# Patient Record
Sex: MALE | Race: WHITE | Employment: OTHER | URBAN - METROPOLITAN AREA
[De-identification: names, ages, dates, MRNs, and addresses within clinical notes are randomized per-mention and may not be internally consistent; named-entity substitution may affect disease eponyms.]

---

## 2019-12-02 ENCOUNTER — HOSPITAL ENCOUNTER (OUTPATIENT)
Dept: INTERVENTIONAL RADIOLOGY/VASCULAR | Age: 84
Discharge: HOME | End: 2019-12-02
Payer: MEDICARE

## 2019-12-02 DIAGNOSIS — M47.816 LUMBAR SPONDYLOSIS: ICD-10-CM

## 2019-12-02 PROCEDURE — 6360000004 HC RX CONTRAST MEDICATION: Performed by: RADIOLOGY

## 2019-12-02 PROCEDURE — 2500000003 HC RX 250 WO HCPCS: Performed by: RADIOLOGY

## 2019-12-02 PROCEDURE — 62323 NJX INTERLAMINAR LMBR/SAC: CPT | Performed by: RADIOLOGY

## 2019-12-02 PROCEDURE — 6360000002 HC RX W HCPCS: Performed by: RADIOLOGY

## 2019-12-02 RX ORDER — LIDOCAINE HYDROCHLORIDE 5 MG/ML
1 INJECTION, SOLUTION INFILTRATION; INTRAVENOUS ONCE
Status: COMPLETED | OUTPATIENT
Start: 2019-12-02 | End: 2019-12-02

## 2019-12-02 RX ORDER — BETAMETHASONE SODIUM PHOSPHATE AND BETAMETHASONE ACETATE 3; 3 MG/ML; MG/ML
12 INJECTION, SUSPENSION INTRA-ARTICULAR; INTRALESIONAL; INTRAMUSCULAR; SOFT TISSUE ONCE
Status: COMPLETED | OUTPATIENT
Start: 2019-12-02 | End: 2019-12-02

## 2019-12-02 RX ADMIN — IOPAMIDOL 3 ML: 408 INJECTION, SOLUTION INTRATHECAL at 14:27

## 2019-12-02 RX ADMIN — LIDOCAINE HYDROCHLORIDE 1 ML: 5 INJECTION, SOLUTION INFILTRATION at 14:27

## 2019-12-02 RX ADMIN — BETAMETHASONE SODIUM PHOSPHATE AND BETAMETHASONE ACETATE 12 MG: 3; 3 INJECTION, SUSPENSION INTRA-ARTICULAR; INTRALESIONAL; INTRAMUSCULAR at 14:26

## 2020-08-14 ENCOUNTER — HOSPITAL ENCOUNTER (OUTPATIENT)
Age: 85
Discharge: HOME | End: 2020-08-16
Payer: MEDICARE

## 2020-08-14 PROCEDURE — 84153 ASSAY OF PSA TOTAL: CPT

## 2020-08-14 PROCEDURE — G0103 PSA SCREENING: HCPCS

## 2020-08-17 LAB
PROSTATE SPECIFIC ANTIGEN: 4.89 NG/ML (ref 0–4)
PROSTATE SPECIFIC ANTIGEN: ABNORMAL NG/ML (ref 0–4)

## 2021-03-18 ENCOUNTER — HOSPITAL ENCOUNTER (OUTPATIENT)
Age: 86
Discharge: HOME | End: 2021-03-18
Payer: MEDICARE

## 2021-03-18 PROCEDURE — U0003 INFECTIOUS AGENT DETECTION BY NUCLEIC ACID (DNA OR RNA); SEVERE ACUTE RESPIRATORY SYNDROME CORONAVIRUS 2 (SARS-COV-2) (CORONAVIRUS DISEASE [COVID-19]), AMPLIFIED PROBE TECHNIQUE, MAKING USE OF HIGH THROUGHPUT TECHNOLOGIES AS DESCRIBED BY CMS-2020-01-R: HCPCS

## 2021-03-19 LAB
SARS-COV-2: NOT DETECTED
SOURCE: NORMAL

## 2021-03-23 ENCOUNTER — HOSPITAL ENCOUNTER (OUTPATIENT)
Dept: INTERVENTIONAL RADIOLOGY/VASCULAR | Age: 86
Discharge: HOME | End: 2021-03-23
Payer: MEDICARE

## 2021-03-23 DIAGNOSIS — M47.817 LUMBOSACRAL SPONDYLOSIS WITHOUT MYELOPATHY: ICD-10-CM

## 2021-03-23 LAB
INR BLD: 1.1
PROTHROMBIN TIME: 13 SEC (ref 9.3–12.4)

## 2021-03-23 PROCEDURE — 2500000003 HC RX 250 WO HCPCS: Performed by: RADIOLOGY

## 2021-03-23 PROCEDURE — 36415 COLL VENOUS BLD VENIPUNCTURE: CPT

## 2021-03-23 PROCEDURE — 62323 NJX INTERLAMINAR LMBR/SAC: CPT

## 2021-03-23 PROCEDURE — 6360000004 HC RX CONTRAST MEDICATION: Performed by: RADIOLOGY

## 2021-03-23 PROCEDURE — 6360000002 HC RX W HCPCS: Performed by: RADIOLOGY

## 2021-03-23 PROCEDURE — 85610 PROTHROMBIN TIME: CPT

## 2021-03-23 RX ORDER — LIDOCAINE HYDROCHLORIDE 5 MG/ML
1 INJECTION, SOLUTION INFILTRATION; INTRAVENOUS ONCE
Status: COMPLETED | OUTPATIENT
Start: 2021-03-23 | End: 2021-03-23

## 2021-03-23 RX ORDER — BETAMETHASONE SODIUM PHOSPHATE AND BETAMETHASONE ACETATE 3; 3 MG/ML; MG/ML
12 INJECTION, SUSPENSION INTRA-ARTICULAR; INTRALESIONAL; INTRAMUSCULAR; SOFT TISSUE ONCE
Status: COMPLETED | OUTPATIENT
Start: 2021-03-23 | End: 2021-03-23

## 2021-03-23 RX ADMIN — LIDOCAINE HYDROCHLORIDE 1 ML: 5 INJECTION, SOLUTION INFILTRATION; INTRAVENOUS at 13:26

## 2021-03-23 RX ADMIN — IOPAMIDOL 3 ML: 408 INJECTION, SOLUTION INTRATHECAL at 13:26

## 2021-03-23 RX ADMIN — BETAMETHASONE SODIUM PHOSPHATE AND BETAMETHASONE ACETATE 12 MG: 3; 3 INJECTION, SUSPENSION INTRA-ARTICULAR; INTRALESIONAL; INTRAMUSCULAR at 13:26

## 2021-05-05 ENCOUNTER — HOSPITAL ENCOUNTER (OUTPATIENT)
Age: 86
Discharge: HOME | End: 2021-05-05
Payer: MEDICARE

## 2021-05-05 PROCEDURE — U0005 INFEC AGEN DETEC AMPLI PROBE: HCPCS

## 2021-05-05 PROCEDURE — U0003 INFECTIOUS AGENT DETECTION BY NUCLEIC ACID (DNA OR RNA); SEVERE ACUTE RESPIRATORY SYNDROME CORONAVIRUS 2 (SARS-COV-2) (CORONAVIRUS DISEASE [COVID-19]), AMPLIFIED PROBE TECHNIQUE, MAKING USE OF HIGH THROUGHPUT TECHNOLOGIES AS DESCRIBED BY CMS-2020-01-R: HCPCS

## 2021-05-11 ENCOUNTER — HOSPITAL ENCOUNTER (OUTPATIENT)
Dept: INTERVENTIONAL RADIOLOGY/VASCULAR | Age: 86
Discharge: HOME | End: 2021-05-11
Payer: MEDICARE

## 2021-05-11 DIAGNOSIS — M43.10 ACQUIRED SPONDYLOLISTHESIS: ICD-10-CM

## 2021-05-11 PROCEDURE — 2500000003 HC RX 250 WO HCPCS: Performed by: RADIOLOGY

## 2021-05-11 PROCEDURE — 6360000004 HC RX CONTRAST MEDICATION: Performed by: RADIOLOGY

## 2021-05-11 PROCEDURE — 6360000002 HC RX W HCPCS: Performed by: RADIOLOGY

## 2021-05-11 PROCEDURE — 62323 NJX INTERLAMINAR LMBR/SAC: CPT

## 2021-05-11 RX ORDER — DEXAMETHASONE SODIUM PHOSPHATE 4 MG/ML
4 INJECTION, SOLUTION INTRA-ARTICULAR; INTRALESIONAL; INTRAMUSCULAR; INTRAVENOUS; SOFT TISSUE ONCE
Status: COMPLETED | OUTPATIENT
Start: 2021-05-11 | End: 2021-05-11

## 2021-05-11 RX ORDER — LIDOCAINE HYDROCHLORIDE 5 MG/ML
1 INJECTION, SOLUTION INFILTRATION; INTRAVENOUS ONCE
Status: COMPLETED | OUTPATIENT
Start: 2021-05-11 | End: 2021-05-11

## 2021-05-11 RX ADMIN — IOPAMIDOL 3 ML: 408 INJECTION, SOLUTION INTRATHECAL at 13:54

## 2021-05-11 RX ADMIN — DEXAMETHASONE SODIUM PHOSPHATE 4 MG: 4 INJECTION, SOLUTION INTRAMUSCULAR; INTRAVENOUS at 13:53

## 2021-05-11 RX ADMIN — LIDOCAINE HYDROCHLORIDE 1 ML: 5 INJECTION, SOLUTION INFILTRATION at 13:54

## 2021-05-11 NOTE — PROGRESS NOTES
Lumbar epidural injection    1335 - Patient admitted to x-ray specials room ambulatory, gait steady. Permit obtained. Patient positioned, secured and prepped for procedure. Dr. Dylan Ellis reviewed case with patient. Voices understanding of procedure. Emotional support provided to patient. Patient reports pain lower back, numbness and tingling in BLE. Pt last had an epidural shot in March 2021 with little relief. Pre procedure /71   79    20   95% room air    1343 - Start procedure /69   80   20   100% room air    prone    1350 - End procedure /74   80   18   98% room air    prone    1353 - Patient sitting at the side of the table. Patient denies any dizziness/lightheadedness. Patient denies any  numbness/tingling of legs/feet. Ambulatory, Gait steady    1355 - Patient taken to x-ray waiting area to wait with . Educated patient to call with any questions/concerns, voices understanding. Discharged home with  at this time.   No complaints of post injection

## 2021-10-21 ENCOUNTER — HOSPITAL ENCOUNTER (OUTPATIENT)
Age: 86
Discharge: HOME | End: 2021-10-23
Payer: MEDICARE

## 2021-10-21 ENCOUNTER — INITIAL CONSULT (OUTPATIENT)
Dept: NEUROSURGERY | Age: 86
End: 2021-10-21
Payer: MEDICARE

## 2021-10-21 VITALS
SYSTOLIC BLOOD PRESSURE: 127 MMHG | HEART RATE: 77 BPM | OXYGEN SATURATION: 93 % | RESPIRATION RATE: 20 BRPM | TEMPERATURE: 98.1 F | HEIGHT: 73 IN | DIASTOLIC BLOOD PRESSURE: 57 MMHG | BODY MASS INDEX: 26.51 KG/M2 | WEIGHT: 200 LBS

## 2021-10-21 DIAGNOSIS — M43.16 SPONDYLOLISTHESIS AT L4-L5 LEVEL: Primary | ICD-10-CM

## 2021-10-21 PROCEDURE — 99203 OFFICE O/P NEW LOW 30 MIN: CPT

## 2021-10-21 RX ORDER — ASPIRIN 81 MG/1
TABLET ORAL
Status: ON HOLD | COMMUNITY
End: 2021-12-16 | Stop reason: HOSPADM

## 2021-10-21 RX ORDER — MELOXICAM 7.5 MG/1
TABLET ORAL
COMMUNITY
End: 2021-12-06

## 2021-10-21 RX ORDER — GABAPENTIN 300 MG/1
300 CAPSULE ORAL 3 TIMES DAILY
Qty: 90 CAPSULE | Refills: 0 | Status: SHIPPED
Start: 2021-10-21 | End: 2021-12-06

## 2021-10-21 RX ORDER — PRAVASTATIN SODIUM 10 MG
TABLET ORAL DAILY
COMMUNITY
Start: 2021-09-21

## 2021-10-21 RX ORDER — AMLODIPINE BESYLATE 10 MG/1
TABLET ORAL DAILY
Status: ON HOLD | COMMUNITY
Start: 2021-09-03 | End: 2021-12-30 | Stop reason: HOSPADM

## 2021-10-21 ASSESSMENT — ENCOUNTER SYMPTOMS
SHORTNESS OF BREATH: 0
ABDOMINAL DISTENTION: 0
VOMITING: 0
NAUSEA: 0
BACK PAIN: 0
TROUBLE SWALLOWING: 0

## 2021-10-21 NOTE — PROGRESS NOTES
Subjective:      Patient ID: Osiris Willson is a 82 y.o. male. Pt seen and evaluated in neurosurgery clinic for back pain. Pt states he has been having back pain for several years. He denies any recent aggravating injury. He states that standing for a long period of time makes his pain worse as well as bending over. He states that OTC Tylenol and ibuprofen help his pain. He describes the pain as \"throbbing\". He states the pain radiates down both legs and is worse on the right. Pt states his pain is currently a 10/10. Pt recently had ERON and states that it's not helping his pain anymore. Pt has had no recent PT. He denies being on any blood thinners right now. He denies any current tobacco usage. Review of Systems   Constitutional: Negative for fever. HENT: Negative for trouble swallowing. Eyes: Negative for visual disturbance. Respiratory: Negative for shortness of breath. Cardiovascular: Negative for chest pain. Gastrointestinal: Negative for abdominal distention, nausea and vomiting. Endocrine: Negative for polyuria. Genitourinary: Negative for dysuria. Musculoskeletal: Negative for back pain and neck pain. Skin: Negative for rash. Neurological: Negative for facial asymmetry and numbness. Psychiatric/Behavioral: Negative for confusion. Objective:   Physical Exam  Constitutional:       Appearance: Normal appearance. HENT:      Head: Normocephalic and atraumatic. Eyes:      Extraocular Movements: Extraocular movements intact. Conjunctiva/sclera: Conjunctivae normal.      Pupils: Pupils are equal, round, and reactive to light. Cardiovascular:      Rate and Rhythm: Normal rate. Pulmonary:      Effort: Pulmonary effort is normal.      Breath sounds: Normal breath sounds. Abdominal:      General: Abdomen is flat. There is no distension. Palpations: Abdomen is soft. Musculoskeletal:         General: Normal range of motion.       Cervical back: Normal range of motion and neck supple. Skin:     General: Skin is warm and dry. Neurological:      Mental Status: He is alert. Comments: Alert and oriented x3  CN 3-12 intact  Motor strength full  Sensation intact to LT  Reflexes normal   Psychiatric:         Mood and Affect: Mood normal.         Thought Content: Thought content normal.         Assessment:      81 y/o male with severe back pain and lumbar radiculopathy. Pt has recently failed ERON. Pt not interested PT at this time due to pain. 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       Plan:      -Lumbar flex/ex films  -Gabapentin 300 mg TID  -Follow up in clinic with Dr. Alex Peña, PA

## 2021-11-04 ENCOUNTER — OFFICE VISIT (OUTPATIENT)
Dept: NEUROSURGERY | Age: 86
End: 2021-11-04
Payer: MEDICARE

## 2021-11-04 VITALS
HEART RATE: 63 BPM | DIASTOLIC BLOOD PRESSURE: 72 MMHG | OXYGEN SATURATION: 94 % | WEIGHT: 200 LBS | TEMPERATURE: 98.6 F | SYSTOLIC BLOOD PRESSURE: 138 MMHG | RESPIRATION RATE: 20 BRPM | HEIGHT: 73 IN | BODY MASS INDEX: 26.51 KG/M2

## 2021-11-04 DIAGNOSIS — M54.42 ACUTE MIDLINE LOW BACK PAIN WITH BILATERAL SCIATICA: Primary | ICD-10-CM

## 2021-11-04 DIAGNOSIS — M54.41 ACUTE MIDLINE LOW BACK PAIN WITH BILATERAL SCIATICA: Primary | ICD-10-CM

## 2021-11-04 PROCEDURE — G8417 CALC BMI ABV UP PARAM F/U: HCPCS | Performed by: NEUROLOGICAL SURGERY

## 2021-11-04 PROCEDURE — 4040F PNEUMOC VAC/ADMIN/RCVD: CPT | Performed by: NEUROLOGICAL SURGERY

## 2021-11-04 PROCEDURE — 99212 OFFICE O/P EST SF 10 MIN: CPT | Performed by: NEUROLOGICAL SURGERY

## 2021-11-04 PROCEDURE — G8427 DOCREV CUR MEDS BY ELIG CLIN: HCPCS | Performed by: NEUROLOGICAL SURGERY

## 2021-11-04 PROCEDURE — G8484 FLU IMMUNIZE NO ADMIN: HCPCS | Performed by: NEUROLOGICAL SURGERY

## 2021-11-04 PROCEDURE — 1036F TOBACCO NON-USER: CPT | Performed by: NEUROLOGICAL SURGERY

## 2021-11-04 PROCEDURE — 1123F ACP DISCUSS/DSCN MKR DOCD: CPT | Performed by: NEUROLOGICAL SURGERY

## 2021-11-04 NOTE — PROGRESS NOTES
Patient is here for follow up consult for: back and bilateral leg pain    Physical exam  Alert and Oriented X3  PERRLA, EOMI  BECKHAM 5/5 except 4/5 in his LLE  Sensation intact to LT and PP  Reflexes are 2+ and symmetric    A/P: patient is here for follow up for: back and bilateral leg pain. His MRI shows spondylolisthesis at L3-L4 and L4-L5. There is also stenosis from L2-L5. I will repeat a lumbar MRI as his MRI is over 3years old. If the findings a re the same,.  I will recommend an L2-L5 laminectomy and fusion    Lawson Shrap MD

## 2021-11-15 ENCOUNTER — PREP FOR PROCEDURE (OUTPATIENT)
Dept: NEUROSURGERY | Age: 86
End: 2021-11-15

## 2021-11-15 DIAGNOSIS — Z01.818 PRE-OP EVALUATION: Primary | ICD-10-CM

## 2021-11-15 RX ORDER — SODIUM CHLORIDE 9 MG/ML
25 INJECTION, SOLUTION INTRAVENOUS PRN
Status: CANCELLED | OUTPATIENT
Start: 2021-11-15

## 2021-11-15 RX ORDER — SODIUM CHLORIDE 9 MG/ML
INJECTION, SOLUTION INTRAVENOUS CONTINUOUS
Status: CANCELLED | OUTPATIENT
Start: 2021-11-15

## 2021-11-15 RX ORDER — SODIUM CHLORIDE 0.9 % (FLUSH) 0.9 %
10 SYRINGE (ML) INJECTION EVERY 12 HOURS SCHEDULED
Status: CANCELLED | OUTPATIENT
Start: 2021-11-15

## 2021-11-15 RX ORDER — SODIUM CHLORIDE 0.9 % (FLUSH) 0.9 %
10 SYRINGE (ML) INJECTION PRN
Status: CANCELLED | OUTPATIENT
Start: 2021-11-15

## 2021-11-24 ENCOUNTER — HOSPITAL ENCOUNTER (OUTPATIENT)
Dept: MRI IMAGING | Age: 86
Discharge: HOME | End: 2021-11-26
Payer: MEDICARE

## 2021-11-24 DIAGNOSIS — M54.41 ACUTE MIDLINE LOW BACK PAIN WITH BILATERAL SCIATICA: ICD-10-CM

## 2021-11-24 DIAGNOSIS — M54.42 ACUTE MIDLINE LOW BACK PAIN WITH BILATERAL SCIATICA: ICD-10-CM

## 2021-11-24 PROCEDURE — 72148 MRI LUMBAR SPINE W/O DYE: CPT

## 2021-12-06 NOTE — PROGRESS NOTES
Megan 38 PRE-ADMISSION TESTING GENERAL INSTRUCTIONS- [facility]-phone number:[phone]    GENERAL INSTRUCTIONS  [x] Antibacterial Soap shower Night before  Surgery  [x] Jeffery wipe instruction sheet and wipes given. [x] Nothing by mouth after midnight, including gum, candy, mints, or water. [x] You may brush your teeth, gargle, but do NOT swallow water. [x]No smoking, chewing tobacco, illegal drugs, or alcohol within 24 hours of your surgery. [x] Jewelry, valuables or body piercing's should not be brought to the hospital. All body and/or tongue piercing's must be removed prior to arriving to hospital.  ALL hair pins must be removed. [x] Do not wear makeup, lotions, powders, deodorant. Nail polish as directed by the nurse. [x] Arrange transportation with a responsible adult  to and from the hospital. If you do not have a responsible adult  to transport you, you will need to make arrangements with a medical transportation company (i.e. [employer]. A Uber/taxi/bus is not appropriate unless you are accompanied by a responsible adult ). Arrange for someone to be with you for the remainder of the day and for 24 hours after your procedure due to having had anesthesia. Who will be your  for transportation? wife  [x] Bring insurance card and photo ID. [x] Transfusion Bracelet: Please bring with you to hospital, day of surgery     PARKING INSTRUCTIONS:   [x] Arrival Time: Dec 14 th, arrive at 5 am, one person to accompany you, please wear mask  · [x] Parking lot '\"I\"  is located on [facility] (the corner of [facility]). To enter, press the button and the gate will lift. A free token will be provided to exit the lot. One car per patient is allowed to park in this lot. All other cars are to park on [address] [place] [address] either in the parking garage or the handicap lot.            [] To reach the [facility] lobby from [address] [place] [address], upon entering the [facility], take elevator B to the 3rd floor. EDUCATION INSTRUCTIONS:    .     [x] Pre-admission Testing educational folder given  [x] Incentive Spirometry,coughing & deep breathing exercises reviewed. [x]Medication information sheet(s)   [x]Fluoroscopy-Xray used in surgery reviewed with patient. Educational pamphlet placed in chart. [x]Pain: Post-op pain is normal and to be expected. You will be asked to rate your pain from 0-10(a zero is not acceptable-education is needed). Your post-op pain goal is:  [x] Ask your nurse for your pain medication. MEDICATION INSTRUCTIONS:   [x]Bring a complete list of your medications, please write the last time you took the medicine, give this list to the nurse. [x] Take the following medications the morning of surgery with 1-2 ounces of water: metoprolol, amlodipine  [x] Stop herbal supplements and vitamins 5 days before your surgery. [x] Follow physician instructions regarding any blood thinners you may be taking. WHAT TO EXPECT:  [x] The day of surgery you will be greeted and checked in by the [employer].  In addition, you will be registered in the [facility] by a Patient Access Representative. Please bring your photo ID and insurance card. A nurse will greet you in accordance to the time you are needed in the pre-op area to prepare you for surgery. Please do not be discouraged if you are not greeted in the order you arrive as there are many variables that are involved in patient preparation. Your patience is greatly appreciated as you wait for your nurse. Please bring in items such as: books, magazines, newspapers, electronics, or any other items  to occupy your time in the waiting area. [x]  Delays may occur with surgery and staff will make a sincere effort to keep you informed of delays. If any delays occur with your procedure, we apologize ahead of time for your inconvenience as we recognize the value of your time.

## 2021-12-07 DIAGNOSIS — M48.061 SPINAL STENOSIS, LUMBAR REGION, WITHOUT NEUROGENIC CLAUDICATION: Primary | ICD-10-CM

## 2021-12-09 ENCOUNTER — HOSPITAL ENCOUNTER (OUTPATIENT)
Dept: PREADMISSION TESTING | Age: 86
Discharge: HOME | End: 2021-12-09
Payer: MEDICARE

## 2021-12-09 ENCOUNTER — OFFICE VISIT (OUTPATIENT)
Dept: NEUROSURGERY | Age: 86
End: 2021-12-09
Payer: MEDICARE

## 2021-12-09 ENCOUNTER — HOSPITAL ENCOUNTER (OUTPATIENT)
Age: 86
Discharge: HOME | End: 2021-12-11
Payer: MEDICARE

## 2021-12-09 ENCOUNTER — NURSE ONLY (OUTPATIENT)
Dept: PRIMARY CARE CLINIC | Age: 86
End: 2021-12-09

## 2021-12-09 ENCOUNTER — HOSPITAL ENCOUNTER (OUTPATIENT)
Dept: GENERAL RADIOLOGY | Age: 86
Discharge: HOME | End: 2021-12-11
Payer: MEDICARE

## 2021-12-09 VITALS
TEMPERATURE: 97.8 F | HEIGHT: 73 IN | OXYGEN SATURATION: 92 % | WEIGHT: 200 LBS | BODY MASS INDEX: 26.51 KG/M2 | DIASTOLIC BLOOD PRESSURE: 64 MMHG | RESPIRATION RATE: 20 BRPM | HEART RATE: 81 BPM | SYSTOLIC BLOOD PRESSURE: 142 MMHG

## 2021-12-09 VITALS
HEART RATE: 72 BPM | RESPIRATION RATE: 20 BRPM | TEMPERATURE: 98.1 F | OXYGEN SATURATION: 93 % | DIASTOLIC BLOOD PRESSURE: 67 MMHG | SYSTOLIC BLOOD PRESSURE: 153 MMHG

## 2021-12-09 DIAGNOSIS — M54.42 CHRONIC MIDLINE LOW BACK PAIN WITH BILATERAL SCIATICA: Primary | ICD-10-CM

## 2021-12-09 DIAGNOSIS — Z01.812 PRE-OPERATIVE LABORATORY EXAMINATION: ICD-10-CM

## 2021-12-09 DIAGNOSIS — M54.41 CHRONIC MIDLINE LOW BACK PAIN WITH BILATERAL SCIATICA: Primary | ICD-10-CM

## 2021-12-09 DIAGNOSIS — Z01.818 PRE-OP EVALUATION: ICD-10-CM

## 2021-12-09 DIAGNOSIS — G89.29 CHRONIC MIDLINE LOW BACK PAIN WITH BILATERAL SCIATICA: Primary | ICD-10-CM

## 2021-12-09 DIAGNOSIS — U07.1 COVID-19: ICD-10-CM

## 2021-12-09 LAB
ABO/RH: NORMAL
ANION GAP SERPL CALCULATED.3IONS-SCNC: 12 MMOL/L (ref 7–16)
ANTIBODY SCREEN: NORMAL
BASOPHILS ABSOLUTE: 0.08 E9/L (ref 0–0.2)
BASOPHILS RELATIVE PERCENT: 0.9 % (ref 0–2)
BILIRUBIN URINE: NEGATIVE
BLOOD, URINE: NEGATIVE
BUN BLDV-MCNC: 21 MG/DL (ref 6–23)
CALCIUM SERPL-MCNC: 9.5 MG/DL (ref 8.6–10.2)
CHLORIDE BLD-SCNC: 103 MMOL/L (ref 98–107)
CLARITY: CLEAR
CO2: 25 MMOL/L (ref 22–29)
COLOR: YELLOW
CREAT SERPL-MCNC: 1.2 MG/DL (ref 0.7–1.2)
EKG ATRIAL RATE: 63 BPM
EKG P AXIS: 68 DEGREES
EKG P-R INTERVAL: 268 MS
EKG Q-T INTERVAL: 378 MS
EKG QRS DURATION: 96 MS
EKG QTC CALCULATION (BAZETT): 386 MS
EKG R AXIS: 56 DEGREES
EKG T AXIS: 73 DEGREES
EKG VENTRICULAR RATE: 63 BPM
EOSINOPHILS ABSOLUTE: 0.22 E9/L (ref 0.05–0.5)
EOSINOPHILS RELATIVE PERCENT: 2.5 % (ref 0–6)
GFR AFRICAN AMERICAN: >60
GFR NON-AFRICAN AMERICAN: 58 ML/MIN/1.73
GLUCOSE BLD-MCNC: 94 MG/DL (ref 74–99)
GLUCOSE URINE: NEGATIVE MG/DL
HCT VFR BLD CALC: 44.9 % (ref 37–54)
HEMOGLOBIN: 14.8 G/DL (ref 12.5–16.5)
IMMATURE GRANULOCYTES #: 0.03 E9/L
IMMATURE GRANULOCYTES %: 0.3 % (ref 0–5)
INR BLD: 1.2
KETONES, URINE: ABNORMAL MG/DL
LEUKOCYTE ESTERASE, URINE: NEGATIVE
LYMPHOCYTES ABSOLUTE: 1.58 E9/L (ref 1.5–4)
LYMPHOCYTES RELATIVE PERCENT: 17.9 % (ref 20–42)
MCH RBC QN AUTO: 29.9 PG (ref 26–35)
MCHC RBC AUTO-ENTMCNC: 33 % (ref 32–34.5)
MCV RBC AUTO: 90.7 FL (ref 80–99.9)
MONOCYTES ABSOLUTE: 0.67 E9/L (ref 0.1–0.95)
MONOCYTES RELATIVE PERCENT: 7.6 % (ref 2–12)
NEUTROPHILS ABSOLUTE: 6.27 E9/L (ref 1.8–7.3)
NEUTROPHILS RELATIVE PERCENT: 70.8 % (ref 43–80)
NITRITE, URINE: NEGATIVE
PDW BLD-RTO: 13.2 FL (ref 11.5–15)
PH UA: 6 (ref 5–9)
PLATELET # BLD: 232 E9/L (ref 130–450)
PMV BLD AUTO: 9.1 FL (ref 7–12)
POTASSIUM REFLEX MAGNESIUM: 4.7 MMOL/L (ref 3.5–5)
PROTEIN UA: NEGATIVE MG/DL
PROTHROMBIN TIME: 12.7 SEC (ref 9.3–12.4)
RBC # BLD: 4.95 E12/L (ref 3.8–5.8)
SODIUM BLD-SCNC: 140 MMOL/L (ref 132–146)
SPECIFIC GRAVITY UA: 1.02 (ref 1–1.03)
UROBILINOGEN, URINE: 0.2 E.U./DL
WBC # BLD: 8.9 E9/L (ref 4.5–11.5)

## 2021-12-09 PROCEDURE — U0005 INFEC AGEN DETEC AMPLI PROBE: HCPCS

## 2021-12-09 PROCEDURE — 86850 RBC ANTIBODY SCREEN: CPT

## 2021-12-09 PROCEDURE — 87088 URINE BACTERIA CULTURE: CPT

## 2021-12-09 PROCEDURE — G8427 DOCREV CUR MEDS BY ELIG CLIN: HCPCS | Performed by: NEUROLOGICAL SURGERY

## 2021-12-09 PROCEDURE — 93005 ELECTROCARDIOGRAM TRACING: CPT

## 2021-12-09 PROCEDURE — 80048 BASIC METABOLIC PNL TOTAL CA: CPT

## 2021-12-09 PROCEDURE — 86900 BLOOD TYPING SEROLOGIC ABO: CPT

## 2021-12-09 PROCEDURE — 86901 BLOOD TYPING SEROLOGIC RH(D): CPT

## 2021-12-09 PROCEDURE — 85025 COMPLETE CBC W/AUTO DIFF WBC: CPT

## 2021-12-09 PROCEDURE — G8484 FLU IMMUNIZE NO ADMIN: HCPCS | Performed by: NEUROLOGICAL SURGERY

## 2021-12-09 PROCEDURE — 4040F PNEUMOC VAC/ADMIN/RCVD: CPT | Performed by: NEUROLOGICAL SURGERY

## 2021-12-09 PROCEDURE — U0003 INFECTIOUS AGENT DETECTION BY NUCLEIC ACID (DNA OR RNA); SEVERE ACUTE RESPIRATORY SYNDROME CORONAVIRUS 2 (SARS-COV-2) (CORONAVIRUS DISEASE [COVID-19]), AMPLIFIED PROBE TECHNIQUE, MAKING USE OF HIGH THROUGHPUT TECHNOLOGIES AS DESCRIBED BY CMS-2020-01-R: HCPCS

## 2021-12-09 PROCEDURE — G8417 CALC BMI ABV UP PARAM F/U: HCPCS | Performed by: NEUROLOGICAL SURGERY

## 2021-12-09 PROCEDURE — 36415 COLL VENOUS BLD VENIPUNCTURE: CPT

## 2021-12-09 PROCEDURE — 85610 PROTHROMBIN TIME: CPT

## 2021-12-09 PROCEDURE — 93010 ELECTROCARDIOGRAM REPORT: CPT | Performed by: INTERNAL MEDICINE

## 2021-12-09 PROCEDURE — 87081 CULTURE SCREEN ONLY: CPT

## 2021-12-09 PROCEDURE — 1036F TOBACCO NON-USER: CPT | Performed by: NEUROLOGICAL SURGERY

## 2021-12-09 PROCEDURE — 99213 OFFICE O/P EST LOW 20 MIN: CPT | Performed by: NEUROLOGICAL SURGERY

## 2021-12-09 PROCEDURE — 81003 URINALYSIS AUTO W/O SCOPE: CPT

## 2021-12-09 PROCEDURE — 1123F ACP DISCUSS/DSCN MKR DOCD: CPT | Performed by: NEUROLOGICAL SURGERY

## 2021-12-09 PROCEDURE — 71046 X-RAY EXAM CHEST 2 VIEWS: CPT

## 2021-12-09 NOTE — PROGRESS NOTES
Saw pt/wife in PAT, reviewed:    Surgical Procedure-reviewed procedure and post-op events:pre-op/PACU, Unit admission, PT/OT evaluation, Discharge [place] [address] Stay expectations-reviewed importance of early mobilization. Instructions of Spine Precautions given-PT to review on evaluation. Surgical Pathway Booklet-reviewed and given  Post-op/Discharge Instructions-reviewed activity allowances/restrictions  Use of Incentive Spirometer-instructed on importance of use post-op and continued use @ home to prevent complications. Instructions on use given  Setting realistic pain goals-reaching goal before discharge. ORTHOTICS: Pt HAS APPT TODAY FOR BRACE FITTING @  ORTHOTICS. REMINDED TO BRING DOS    Discharge Plans- home with self/family care. Receptive to [facility] [address] if recommended. Verbalized understanding of all instructions and questions answered. Contact number given.     Justyn Fields RN, Spine Navigator  [phone] Office  [phone] Cell

## 2021-12-09 NOTE — PROGRESS NOTES
Patient is here for follow up consult for: back and bilateral leg pain. He had a repeat MRI     Physical exam  Alert and Oriented X3  PERRLA, EOMI  BECKHAM 5/5 except 4/5 in his LLE  Sensation intact to LT and PP  Reflexes are 2+ and symmetric     A/P: patient is here for follow up for: back and bilateral leg pain. His MRI shows spondylolisthesis at L3-L4 and L4-L5. There is also stenosis from L2-L5.   I will  recommend an L2-L5 laminectomy and fusion     Simona Curry MD

## 2021-12-10 LAB
MRSA CULTURE ONLY: NORMAL
SARS-COV-2, PCR: NOT DETECTED

## 2021-12-11 LAB — URINE CULTURE, ROUTINE: NORMAL

## 2021-12-13 ENCOUNTER — ANESTHESIA EVENT (OUTPATIENT)
Dept: OPERATING ROOM | Age: 86
DRG: 459 | End: 2021-12-13
Payer: MEDICARE

## 2021-12-13 NOTE — H&P
Pt seen and evaluated in neurosurgery clinic for back pain. Pt states he has been having back pain for several years. He denies any recent aggravating injury. He states that standing for a long period of time makes his pain worse as well as bending over. He states that OTC Tylenol and ibuprofen help his pain. He describes the pain as \"throbbing\". He states the pain radiates down both legs and is worse on the right. Pt states his pain is currently a 10/10.     Pt recently had ERON and states that it's not helping his pain anymore. Pt has had no recent PT. He denies being on any blood thinners right now. He denies any current tobacco usage. Past Medical History:   Diagnosis Date    Hypertension      Past Surgical History:   Procedure Laterality Date    APPENDECTOMY      CAROTID ENDARTERECTOMY Right     CARPAL TUNNEL RELEASE Bilateral     JOINT REPLACEMENT Right      Social History     Socioeconomic History    Marital status:      Spouse name: Not on file    Number of children: Not on file    Years of education: Not on file    Highest education level: Not on file   Occupational History    Not on file   Tobacco Use    Smoking status: Former Smoker    Smokeless tobacco: Never Used   Vaping Use    Vaping Use: Never used   Substance and Sexual Activity    Alcohol use: Not Currently    Drug use: Never    Sexual activity: Not on file   Other Topics Concern    Not on file   Social History Narrative    Not on file     Social Determinants of Health     Financial Resource Strain:     Difficulty of Paying Living Expenses: Not on file   Food Insecurity:     Worried About [address] in the Last Year: Not on file    [address] in the Last Year: Not on file   Transportation Needs:     Lack of Transportation (Medical): Not on file    Lack of Transportation (Non-Medical):  Not on file   Physical Activity:     Days of Exercise per Week: Not on file    Minutes of Exercise per Session: Not Effort: Pulmonary effort is normal.      Breath sounds: Normal breath sounds. Abdominal:      General: Abdomen is flat. There is no distension. Palpations: Abdomen is soft. Musculoskeletal:         General: Normal range of motion. Cervical back: Normal range of motion and neck supple. Skin:     General: Skin is warm and dry. Neurological:      Mental Status: He is alert. Comments: Alert and oriented x3  CN 3-12 intact  Motor strength full  Sensation intact to LT  Reflexes normal   Psychiatric:         Mood and Affect: Mood normal.         Thought Content: Thought content normal.            Assessment:   83 y/o male with severe back pain and lumbar radiculopathy. Pt has recently failed ERON. Pt not interested PT at this time due to pain. 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 Plan:   I am recommending an L2-L5 laminectomy and fusion.   R/B/A of surgery have been discussed and he wishes to proceed

## 2021-12-14 ENCOUNTER — ANESTHESIA (OUTPATIENT)
Dept: OPERATING ROOM | Age: 86
DRG: 459 | End: 2021-12-14
Payer: MEDICARE

## 2021-12-14 ENCOUNTER — HOSPITAL ENCOUNTER (INPATIENT)
Age: 86
LOS: 16 days | Discharge: HOME HEALTH | DRG: 459 | End: 2021-12-30
Attending: NEUROLOGICAL SURGERY | Admitting: NEUROLOGICAL SURGERY
Payer: MEDICARE

## 2021-12-14 ENCOUNTER — APPOINTMENT (OUTPATIENT)
Dept: GENERAL RADIOLOGY | Age: 86
DRG: 459 | End: 2021-12-14
Attending: NEUROLOGICAL SURGERY
Payer: MEDICARE

## 2021-12-14 VITALS — OXYGEN SATURATION: 97 % | DIASTOLIC BLOOD PRESSURE: 72 MMHG | TEMPERATURE: 97.2 F | SYSTOLIC BLOOD PRESSURE: 135 MMHG

## 2021-12-14 DIAGNOSIS — Z01.812 PRE-OPERATIVE LABORATORY EXAMINATION: ICD-10-CM

## 2021-12-14 DIAGNOSIS — M54.9 BACK PAIN, UNSPECIFIED BACK LOCATION, UNSPECIFIED BACK PAIN LATERALITY, UNSPECIFIED CHRONICITY: ICD-10-CM

## 2021-12-14 DIAGNOSIS — U07.1 COVID-19: Primary | ICD-10-CM

## 2021-12-14 DIAGNOSIS — M43.16 SPONDYLOLISTHESIS, LUMBAR REGION: ICD-10-CM

## 2021-12-14 PROBLEM — M48.061 SPINAL STENOSIS OF LUMBAR REGION: Status: ACTIVE | Noted: 2021-12-14

## 2021-12-14 PROCEDURE — 6370000000 HC RX 637 (ALT 250 FOR IP): Performed by: NEUROLOGICAL SURGERY

## 2021-12-14 PROCEDURE — 2720000010 HC SURG SUPPLY STERILE: Performed by: NEUROLOGICAL SURGERY

## 2021-12-14 PROCEDURE — 2500000003 HC RX 250 WO HCPCS: Performed by: ANESTHESIOLOGIST ASSISTANT

## 2021-12-14 PROCEDURE — 2580000003 HC RX 258

## 2021-12-14 PROCEDURE — 63048 LAM FACETEC &FORAMOT EA ADDL: CPT | Performed by: PHYSICIAN ASSISTANT

## 2021-12-14 PROCEDURE — 6370000000 HC RX 637 (ALT 250 FOR IP): Performed by: PHYSICIAN ASSISTANT

## 2021-12-14 PROCEDURE — 6360000002 HC RX W HCPCS: Performed by: ANESTHESIOLOGIST ASSISTANT

## 2021-12-14 PROCEDURE — 6360000002 HC RX W HCPCS

## 2021-12-14 PROCEDURE — 2580000003 HC RX 258: Performed by: NEUROLOGICAL SURGERY

## 2021-12-14 PROCEDURE — 0SG1071 FUSION OF 2 OR MORE LUMBAR VERTEBRAL JOINTS WITH AUTOLOGOUS TISSUE SUBSTITUTE, POSTERIOR APPROACH, POSTERIOR COLUMN, OPEN APPROACH: ICD-10-PCS | Performed by: NEUROLOGICAL SURGERY

## 2021-12-14 PROCEDURE — 1200000000 HC SEMI PRIVATE

## 2021-12-14 PROCEDURE — 95940 IONM IN OPERATNG ROOM 15 MIN: CPT | Performed by: AUDIOLOGIST

## 2021-12-14 PROCEDURE — 2500000003 HC RX 250 WO HCPCS: Performed by: NEUROLOGICAL SURGERY

## 2021-12-14 PROCEDURE — 2580000003 HC RX 258: Performed by: PHYSICIAN ASSISTANT

## 2021-12-14 PROCEDURE — 3700000000 HC ANESTHESIA ATTENDED CARE: Performed by: NEUROLOGICAL SURGERY

## 2021-12-14 PROCEDURE — 97530 THERAPEUTIC ACTIVITIES: CPT

## 2021-12-14 PROCEDURE — 6360000002 HC RX W HCPCS: Performed by: ANESTHESIOLOGY

## 2021-12-14 PROCEDURE — 3600000015 HC SURGERY LEVEL 5 ADDTL 15MIN: Performed by: NEUROLOGICAL SURGERY

## 2021-12-14 PROCEDURE — 3209999900 FLUORO FOR SURGICAL PROCEDURES

## 2021-12-14 PROCEDURE — 6360000002 HC RX W HCPCS: Performed by: NEUROLOGICAL SURGERY

## 2021-12-14 PROCEDURE — 22614 ARTHRD PST TQ 1NTRSPC EA ADD: CPT | Performed by: PHYSICIAN ASSISTANT

## 2021-12-14 PROCEDURE — 00NY0ZZ RELEASE LUMBAR SPINAL CORD, OPEN APPROACH: ICD-10-PCS | Performed by: NEUROLOGICAL SURGERY

## 2021-12-14 PROCEDURE — 95910 NRV CNDJ TEST 7-8 STUDIES: CPT | Performed by: AUDIOLOGIST

## 2021-12-14 PROCEDURE — G0378 HOSPITAL OBSERVATION PER HR: HCPCS

## 2021-12-14 PROCEDURE — 7100000000 HC PACU RECOVERY - FIRST 15 MIN: Performed by: NEUROLOGICAL SURGERY

## 2021-12-14 PROCEDURE — 63047 LAM FACETEC & FORAMOT LUMBAR: CPT | Performed by: NEUROLOGICAL SURGERY

## 2021-12-14 PROCEDURE — 97161 PT EVAL LOW COMPLEX 20 MIN: CPT

## 2021-12-14 PROCEDURE — 22612 ARTHRD PST TQ 1NTRSPC LUMBAR: CPT | Performed by: NEUROLOGICAL SURGERY

## 2021-12-14 PROCEDURE — C1713 ANCHOR/SCREW BN/BN,TIS/BN: HCPCS | Performed by: NEUROLOGICAL SURGERY

## 2021-12-14 PROCEDURE — 97165 OT EVAL LOW COMPLEX 30 MIN: CPT

## 2021-12-14 PROCEDURE — 97535 SELF CARE MNGMENT TRAINING: CPT

## 2021-12-14 PROCEDURE — 3600000005 HC SURGERY LEVEL 5 BASE: Performed by: NEUROLOGICAL SURGERY

## 2021-12-14 PROCEDURE — 22842 INSERT SPINE FIXATION DEVICE: CPT | Performed by: NEUROLOGICAL SURGERY

## 2021-12-14 PROCEDURE — 2780000010 HC IMPLANT OTHER: Performed by: NEUROLOGICAL SURGERY

## 2021-12-14 PROCEDURE — 63047 LAM FACETEC & FORAMOT LUMBAR: CPT | Performed by: PHYSICIAN ASSISTANT

## 2021-12-14 PROCEDURE — 22614 ARTHRD PST TQ 1NTRSPC EA ADD: CPT | Performed by: NEUROLOGICAL SURGERY

## 2021-12-14 PROCEDURE — C1729 CATH, DRAINAGE: HCPCS | Performed by: NEUROLOGICAL SURGERY

## 2021-12-14 PROCEDURE — 22842 INSERT SPINE FIXATION DEVICE: CPT | Performed by: PHYSICIAN ASSISTANT

## 2021-12-14 PROCEDURE — 22612 ARTHRD PST TQ 1NTRSPC LUMBAR: CPT | Performed by: PHYSICIAN ASSISTANT

## 2021-12-14 PROCEDURE — 63048 LAM FACETEC &FORAMOT EA ADDL: CPT | Performed by: NEUROLOGICAL SURGERY

## 2021-12-14 PROCEDURE — 7100000001 HC PACU RECOVERY - ADDTL 15 MIN: Performed by: NEUROLOGICAL SURGERY

## 2021-12-14 PROCEDURE — 3700000001 HC ADD 15 MINUTES (ANESTHESIA): Performed by: NEUROLOGICAL SURGERY

## 2021-12-14 PROCEDURE — 2709999900 HC NON-CHARGEABLE SUPPLY: Performed by: NEUROLOGICAL SURGERY

## 2021-12-14 DEVICE — CREO® THREADED 7.5 X 55MM POLYAXIAL SCREW
Type: IMPLANTABLE DEVICE | Site: SPINE LUMBAR | Status: FUNCTIONAL
Brand: CREO

## 2021-12-14 DEVICE — CREO® THREADED 7.5 X 50MM POLYAXIAL SCREW
Type: IMPLANTABLE DEVICE | Site: SPINE LUMBAR | Status: FUNCTIONAL
Brand: CREO

## 2021-12-14 DEVICE — CREO® THREADED 7.5 X 40MM POLYAXIAL SCREW
Type: IMPLANTABLE DEVICE | Site: SPINE LUMBAR | Status: FUNCTIONAL
Brand: CREO

## 2021-12-14 DEVICE — VIASORB STRP 20X50X5MM: Type: IMPLANTABLE DEVICE | Site: SPINE LUMBAR | Status: FUNCTIONAL

## 2021-12-14 DEVICE — THREADED LOCKING CAP, CREO
Type: IMPLANTABLE DEVICE | Site: SPINE LUMBAR | Status: FUNCTIONAL
Brand: CREO

## 2021-12-14 DEVICE — CREO® THREADED 7.5 X 45MM POLYAXIAL SCREW
Type: IMPLANTABLE DEVICE | Site: SPINE LUMBAR | Status: FUNCTIONAL
Brand: CREO

## 2021-12-14 DEVICE — 5.5MM CURVED ROD, TITANIUM ALLOY, 90MM LENGTH
Type: IMPLANTABLE DEVICE | Site: SPINE LUMBAR | Status: FUNCTIONAL
Brand: CREO

## 2021-12-14 RX ORDER — SODIUM CHLORIDE 9 MG/ML
25 INJECTION, SOLUTION INTRAVENOUS PRN
Status: DISCONTINUED | OUTPATIENT
Start: 2021-12-14 | End: 2021-12-30 | Stop reason: HOSPADM

## 2021-12-14 RX ORDER — SODIUM CHLORIDE 9 MG/ML
25 INJECTION, SOLUTION INTRAVENOUS PRN
Status: DISCONTINUED | OUTPATIENT
Start: 2021-12-14 | End: 2021-12-14 | Stop reason: HOSPADM

## 2021-12-14 RX ORDER — EPHEDRINE SULFATE/0.9% NACL/PF 50 MG/5 ML
SYRINGE (ML) INTRAVENOUS PRN
Status: DISCONTINUED | OUTPATIENT
Start: 2021-12-14 | End: 2021-12-14 | Stop reason: SDUPTHER

## 2021-12-14 RX ORDER — SODIUM CHLORIDE 9 MG/ML
INJECTION, SOLUTION INTRAVENOUS CONTINUOUS
Status: DISCONTINUED | OUTPATIENT
Start: 2021-12-14 | End: 2021-12-14

## 2021-12-14 RX ORDER — MORPHINE SULFATE 2 MG/ML
1 INJECTION, SOLUTION INTRAMUSCULAR; INTRAVENOUS EVERY 5 MIN PRN
Status: DISCONTINUED | OUTPATIENT
Start: 2021-12-14 | End: 2021-12-14 | Stop reason: HOSPADM

## 2021-12-14 RX ORDER — SODIUM CHLORIDE 0.9 % (FLUSH) 0.9 %
5-40 SYRINGE (ML) INJECTION PRN
Status: DISCONTINUED | OUTPATIENT
Start: 2021-12-14 | End: 2021-12-30 | Stop reason: HOSPADM

## 2021-12-14 RX ORDER — KETAMINE HCL IN NACL, ISO-OSM 100MG/10ML
SYRINGE (ML) INJECTION PRN
Status: DISCONTINUED | OUTPATIENT
Start: 2021-12-14 | End: 2021-12-14 | Stop reason: SDUPTHER

## 2021-12-14 RX ORDER — CYCLOBENZAPRINE HCL 10 MG
10 TABLET ORAL 3 TIMES DAILY PRN
Status: DISCONTINUED | OUTPATIENT
Start: 2021-12-14 | End: 2021-12-30 | Stop reason: HOSPADM

## 2021-12-14 RX ORDER — PROPOFOL 10 MG/ML
INJECTION, EMULSION INTRAVENOUS PRN
Status: DISCONTINUED | OUTPATIENT
Start: 2021-12-14 | End: 2021-12-14 | Stop reason: SDUPTHER

## 2021-12-14 RX ORDER — FENTANYL CITRATE 50 UG/ML
INJECTION, SOLUTION INTRAMUSCULAR; INTRAVENOUS PRN
Status: DISCONTINUED | OUTPATIENT
Start: 2021-12-14 | End: 2021-12-14 | Stop reason: SDUPTHER

## 2021-12-14 RX ORDER — OXYCODONE HYDROCHLORIDE 5 MG/1
5 TABLET ORAL EVERY 4 HOURS PRN
Status: DISCONTINUED | OUTPATIENT
Start: 2021-12-14 | End: 2021-12-25

## 2021-12-14 RX ORDER — DEXAMETHASONE SODIUM PHOSPHATE 10 MG/ML
INJECTION, SOLUTION INTRAMUSCULAR; INTRAVENOUS PRN
Status: DISCONTINUED | OUTPATIENT
Start: 2021-12-14 | End: 2021-12-14 | Stop reason: SDUPTHER

## 2021-12-14 RX ORDER — AMLODIPINE BESYLATE 10 MG/1
10 TABLET ORAL DAILY
Status: DISCONTINUED | OUTPATIENT
Start: 2021-12-15 | End: 2021-12-20

## 2021-12-14 RX ORDER — OXYCODONE HYDROCHLORIDE 10 MG/1
10 TABLET ORAL EVERY 4 HOURS PRN
Status: DISCONTINUED | OUTPATIENT
Start: 2021-12-14 | End: 2021-12-25

## 2021-12-14 RX ORDER — MEPERIDINE HYDROCHLORIDE 25 MG/ML
12.5 INJECTION INTRAMUSCULAR; INTRAVENOUS; SUBCUTANEOUS
Status: DISCONTINUED | OUTPATIENT
Start: 2021-12-14 | End: 2021-12-14 | Stop reason: HOSPADM

## 2021-12-14 RX ORDER — ONDANSETRON 4 MG/1
4 TABLET, ORALLY DISINTEGRATING ORAL EVERY 8 HOURS PRN
Status: DISCONTINUED | OUTPATIENT
Start: 2021-12-14 | End: 2021-12-30 | Stop reason: HOSPADM

## 2021-12-14 RX ORDER — ROCURONIUM BROMIDE 10 MG/ML
INJECTION, SOLUTION INTRAVENOUS PRN
Status: DISCONTINUED | OUTPATIENT
Start: 2021-12-14 | End: 2021-12-14 | Stop reason: SDUPTHER

## 2021-12-14 RX ORDER — ONDANSETRON 2 MG/ML
4 INJECTION INTRAMUSCULAR; INTRAVENOUS EVERY 6 HOURS PRN
Status: DISCONTINUED | OUTPATIENT
Start: 2021-12-14 | End: 2021-12-30 | Stop reason: HOSPADM

## 2021-12-14 RX ORDER — VANCOMYCIN HYDROCHLORIDE 500 MG/10ML
INJECTION, POWDER, LYOPHILIZED, FOR SOLUTION INTRAVENOUS PRN
Status: DISCONTINUED | OUTPATIENT
Start: 2021-12-14 | End: 2021-12-14 | Stop reason: HOSPADM

## 2021-12-14 RX ORDER — POLYETHYLENE GLYCOL 3350 17 G/17G
17 POWDER, FOR SOLUTION ORAL DAILY
Status: DISCONTINUED | OUTPATIENT
Start: 2021-12-14 | End: 2021-12-30 | Stop reason: HOSPADM

## 2021-12-14 RX ORDER — ONDANSETRON 2 MG/ML
4 INJECTION INTRAMUSCULAR; INTRAVENOUS
Status: DISCONTINUED | OUTPATIENT
Start: 2021-12-14 | End: 2021-12-14 | Stop reason: HOSPADM

## 2021-12-14 RX ORDER — ONDANSETRON 2 MG/ML
INJECTION INTRAMUSCULAR; INTRAVENOUS PRN
Status: DISCONTINUED | OUTPATIENT
Start: 2021-12-14 | End: 2021-12-14 | Stop reason: SDUPTHER

## 2021-12-14 RX ORDER — LIDOCAINE HYDROCHLORIDE AND EPINEPHRINE 5; 5 MG/ML; UG/ML
INJECTION, SOLUTION INFILTRATION; PERINEURAL PRN
Status: DISCONTINUED | OUTPATIENT
Start: 2021-12-14 | End: 2021-12-14 | Stop reason: HOSPADM

## 2021-12-14 RX ORDER — SODIUM CHLORIDE 0.9 % (FLUSH) 0.9 %
5-40 SYRINGE (ML) INJECTION EVERY 12 HOURS SCHEDULED
Status: DISCONTINUED | OUTPATIENT
Start: 2021-12-14 | End: 2021-12-30 | Stop reason: HOSPADM

## 2021-12-14 RX ORDER — OXYCODONE HYDROCHLORIDE AND ACETAMINOPHEN 5; 325 MG/1; MG/1
1 TABLET ORAL PRN
Status: DISCONTINUED | OUTPATIENT
Start: 2021-12-14 | End: 2021-12-14 | Stop reason: HOSPADM

## 2021-12-14 RX ORDER — NEOSTIGMINE METHYLSULFATE 1 MG/ML
INJECTION, SOLUTION INTRAVENOUS PRN
Status: DISCONTINUED | OUTPATIENT
Start: 2021-12-14 | End: 2021-12-14 | Stop reason: SDUPTHER

## 2021-12-14 RX ORDER — SODIUM PHOSPHATE, DIBASIC AND SODIUM PHOSPHATE, MONOBASIC 7; 19 G/133ML; G/133ML
1 ENEMA RECTAL DAILY PRN
Status: DISCONTINUED | OUTPATIENT
Start: 2021-12-14 | End: 2021-12-30 | Stop reason: HOSPADM

## 2021-12-14 RX ORDER — MORPHINE SULFATE 2 MG/ML
2 INJECTION, SOLUTION INTRAMUSCULAR; INTRAVENOUS EVERY 5 MIN PRN
Status: DISCONTINUED | OUTPATIENT
Start: 2021-12-14 | End: 2021-12-14 | Stop reason: HOSPADM

## 2021-12-14 RX ORDER — OXYCODONE HYDROCHLORIDE AND ACETAMINOPHEN 5; 325 MG/1; MG/1
2 TABLET ORAL PRN
Status: DISCONTINUED | OUTPATIENT
Start: 2021-12-14 | End: 2021-12-14 | Stop reason: HOSPADM

## 2021-12-14 RX ORDER — SODIUM CHLORIDE 0.9 % (FLUSH) 0.9 %
10 SYRINGE (ML) INJECTION EVERY 12 HOURS SCHEDULED
Status: DISCONTINUED | OUTPATIENT
Start: 2021-12-14 | End: 2021-12-14 | Stop reason: HOSPADM

## 2021-12-14 RX ORDER — SODIUM CHLORIDE 0.9 % (FLUSH) 0.9 %
10 SYRINGE (ML) INJECTION PRN
Status: DISCONTINUED | OUTPATIENT
Start: 2021-12-14 | End: 2021-12-14 | Stop reason: HOSPADM

## 2021-12-14 RX ORDER — BUPIVACAINE HYDROCHLORIDE 2.5 MG/ML
INJECTION, SOLUTION EPIDURAL; INFILTRATION; INTRACAUDAL PRN
Status: DISCONTINUED | OUTPATIENT
Start: 2021-12-14 | End: 2021-12-14 | Stop reason: HOSPADM

## 2021-12-14 RX ORDER — SENNA AND DOCUSATE SODIUM 50; 8.6 MG/1; MG/1
1 TABLET, FILM COATED ORAL 2 TIMES DAILY
Status: DISCONTINUED | OUTPATIENT
Start: 2021-12-14 | End: 2021-12-30 | Stop reason: HOSPADM

## 2021-12-14 RX ORDER — LIDOCAINE HYDROCHLORIDE 20 MG/ML
INJECTION, SOLUTION INTRAVENOUS PRN
Status: DISCONTINUED | OUTPATIENT
Start: 2021-12-14 | End: 2021-12-14 | Stop reason: SDUPTHER

## 2021-12-14 RX ORDER — GLYCOPYRROLATE 1 MG/5 ML
SYRINGE (ML) INTRAVENOUS PRN
Status: DISCONTINUED | OUTPATIENT
Start: 2021-12-14 | End: 2021-12-14 | Stop reason: SDUPTHER

## 2021-12-14 RX ORDER — PRAVASTATIN SODIUM 10 MG
10 TABLET ORAL DAILY
Status: DISCONTINUED | OUTPATIENT
Start: 2021-12-14 | End: 2021-12-30 | Stop reason: HOSPADM

## 2021-12-14 RX ADMIN — FENTANYL CITRATE 100 MCG: 50 INJECTION, SOLUTION INTRAMUSCULAR; INTRAVENOUS at 07:05

## 2021-12-14 RX ADMIN — Medication 0.8 MG: at 09:29

## 2021-12-14 RX ADMIN — ROCURONIUM BROMIDE 30 MG: 10 INJECTION, SOLUTION INTRAVENOUS at 07:20

## 2021-12-14 RX ADMIN — SODIUM CHLORIDE: 9 INJECTION, SOLUTION INTRAVENOUS at 05:46

## 2021-12-14 RX ADMIN — DEXAMETHASONE SODIUM PHOSPHATE 10 MG: 10 INJECTION, SOLUTION INTRAMUSCULAR; INTRAVENOUS at 06:36

## 2021-12-14 RX ADMIN — SENNOSIDES AND DOCUSATE SODIUM 1 TABLET: 50; 8.6 TABLET ORAL at 13:18

## 2021-12-14 RX ADMIN — METOPROLOL TARTRATE 12.5 MG: 25 TABLET, FILM COATED ORAL at 21:32

## 2021-12-14 RX ADMIN — PHENYLEPHRINE HYDROCHLORIDE 50 MCG: 10 INJECTION INTRAVENOUS at 08:57

## 2021-12-14 RX ADMIN — HYDROMORPHONE HYDROCHLORIDE 0.5 MG: 1 INJECTION, SOLUTION INTRAMUSCULAR; INTRAVENOUS; SUBCUTANEOUS at 11:18

## 2021-12-14 RX ADMIN — HYDROMORPHONE HYDROCHLORIDE 0.5 MG: 1 INJECTION, SOLUTION INTRAMUSCULAR; INTRAVENOUS; SUBCUTANEOUS at 11:24

## 2021-12-14 RX ADMIN — SODIUM CHLORIDE: 9 INJECTION, SOLUTION INTRAVENOUS at 06:41

## 2021-12-14 RX ADMIN — Medication 0.4 MG: at 09:15

## 2021-12-14 RX ADMIN — Medication 10 ML: at 21:39

## 2021-12-14 RX ADMIN — ONDANSETRON HYDROCHLORIDE 4 MG: 2 SOLUTION INTRAMUSCULAR; INTRAVENOUS at 10:47

## 2021-12-14 RX ADMIN — PHENYLEPHRINE HYDROCHLORIDE 50 MCG: 10 INJECTION INTRAVENOUS at 08:20

## 2021-12-14 RX ADMIN — Medication 2 MG: at 09:15

## 2021-12-14 RX ADMIN — Medication 5 MG: at 09:11

## 2021-12-14 RX ADMIN — PROPOFOL 150 MG: 10 INJECTION, EMULSION INTRAVENOUS at 06:35

## 2021-12-14 RX ADMIN — Medication 2000 MG: at 23:42

## 2021-12-14 RX ADMIN — POLYETHYLENE GLYCOL 3350 17 G: 17 POWDER, FOR SOLUTION ORAL at 13:18

## 2021-12-14 RX ADMIN — SENNOSIDES AND DOCUSATE SODIUM 1 TABLET: 50; 8.6 TABLET ORAL at 21:32

## 2021-12-14 RX ADMIN — Medication 2000 MG: at 16:52

## 2021-12-14 RX ADMIN — BISACODYL 5 MG: 5 TABLET, COATED ORAL at 13:18

## 2021-12-14 RX ADMIN — FENTANYL CITRATE 100 MCG: 50 INJECTION, SOLUTION INTRAMUSCULAR; INTRAVENOUS at 06:35

## 2021-12-14 RX ADMIN — Medication 25 MG: at 06:35

## 2021-12-14 RX ADMIN — CYCLOBENZAPRINE 10 MG: 10 TABLET, FILM COATED ORAL at 13:18

## 2021-12-14 RX ADMIN — SODIUM CHLORIDE: 9 INJECTION, SOLUTION INTRAVENOUS at 07:30

## 2021-12-14 RX ADMIN — LIDOCAINE HYDROCHLORIDE 100 MG: 20 INJECTION, SOLUTION INTRAVENOUS at 06:35

## 2021-12-14 RX ADMIN — FENTANYL CITRATE 50 MCG: 50 INJECTION, SOLUTION INTRAMUSCULAR; INTRAVENOUS at 10:45

## 2021-12-14 RX ADMIN — Medication 2000 MG: at 06:41

## 2021-12-14 RX ADMIN — OXYCODONE HYDROCHLORIDE 10 MG: 10 TABLET ORAL at 15:19

## 2021-12-14 RX ADMIN — ROCURONIUM BROMIDE 50 MG: 10 INJECTION, SOLUTION INTRAVENOUS at 06:36

## 2021-12-14 RX ADMIN — SODIUM CHLORIDE: 9 INJECTION, SOLUTION INTRAVENOUS at 06:30

## 2021-12-14 RX ADMIN — OXYCODONE HYDROCHLORIDE 10 MG: 10 TABLET ORAL at 21:40

## 2021-12-14 ASSESSMENT — PULMONARY FUNCTION TESTS
PIF_VALUE: 20
PIF_VALUE: 20
PIF_VALUE: 21
PIF_VALUE: 22
PIF_VALUE: 23
PIF_VALUE: 19
PIF_VALUE: 12
PIF_VALUE: 20
PIF_VALUE: 13
PIF_VALUE: 20
PIF_VALUE: 16
PIF_VALUE: 13
PIF_VALUE: 20
PIF_VALUE: 21
PIF_VALUE: 19
PIF_VALUE: 16
PIF_VALUE: 22
PIF_VALUE: 20
PIF_VALUE: 21
PIF_VALUE: 20
PIF_VALUE: 21
PIF_VALUE: 20
PIF_VALUE: 22
PIF_VALUE: 17
PIF_VALUE: 22
PIF_VALUE: 12
PIF_VALUE: 20
PIF_VALUE: 33
PIF_VALUE: 13
PIF_VALUE: 20
PIF_VALUE: 19
PIF_VALUE: 12
PIF_VALUE: 21
PIF_VALUE: 20
PIF_VALUE: 21
PIF_VALUE: 20
PIF_VALUE: 22
PIF_VALUE: 4
PIF_VALUE: 20
PIF_VALUE: 27
PIF_VALUE: 12
PIF_VALUE: 12
PIF_VALUE: 22
PIF_VALUE: 21
PIF_VALUE: 22
PIF_VALUE: 21
PIF_VALUE: 13
PIF_VALUE: 20
PIF_VALUE: 13
PIF_VALUE: 20
PIF_VALUE: 21
PIF_VALUE: 13
PIF_VALUE: 12
PIF_VALUE: 20
PIF_VALUE: 12
PIF_VALUE: 21
PIF_VALUE: 22
PIF_VALUE: 20
PIF_VALUE: 12
PIF_VALUE: 21
PIF_VALUE: 20
PIF_VALUE: 21
PIF_VALUE: 21
PIF_VALUE: 22
PIF_VALUE: 21
PIF_VALUE: 22
PIF_VALUE: 23
PIF_VALUE: 21
PIF_VALUE: 19
PIF_VALUE: 20
PIF_VALUE: 20
PIF_VALUE: 21
PIF_VALUE: 12
PIF_VALUE: 21
PIF_VALUE: 20
PIF_VALUE: 13
PIF_VALUE: 20
PIF_VALUE: 23
PIF_VALUE: 13
PIF_VALUE: 20
PIF_VALUE: 12
PIF_VALUE: 13
PIF_VALUE: 19
PIF_VALUE: 20
PIF_VALUE: 20
PIF_VALUE: 12
PIF_VALUE: 13
PIF_VALUE: 20
PIF_VALUE: 12
PIF_VALUE: 12
PIF_VALUE: 13
PIF_VALUE: 21
PIF_VALUE: 21
PIF_VALUE: 20
PIF_VALUE: 12
PIF_VALUE: 23
PIF_VALUE: 20
PIF_VALUE: 22
PIF_VALUE: 21
PIF_VALUE: 22
PIF_VALUE: 21
PIF_VALUE: 12
PIF_VALUE: 20
PIF_VALUE: 4
PIF_VALUE: 19
PIF_VALUE: 1
PIF_VALUE: 21
PIF_VALUE: 31
PIF_VALUE: 14
PIF_VALUE: 20
PIF_VALUE: 3
PIF_VALUE: 12
PIF_VALUE: 16
PIF_VALUE: 21
PIF_VALUE: 13
PIF_VALUE: 12
PIF_VALUE: 21
PIF_VALUE: 22
PIF_VALUE: 20
PIF_VALUE: 22
PIF_VALUE: 12
PIF_VALUE: 12
PIF_VALUE: 20
PIF_VALUE: 12
PIF_VALUE: 21
PIF_VALUE: 5
PIF_VALUE: 20
PIF_VALUE: 21
PIF_VALUE: 20
PIF_VALUE: 22
PIF_VALUE: 22
PIF_VALUE: 12
PIF_VALUE: 20
PIF_VALUE: 20
PIF_VALUE: 19
PIF_VALUE: 13
PIF_VALUE: 12
PIF_VALUE: 12
PIF_VALUE: 21
PIF_VALUE: 20
PIF_VALUE: 12
PIF_VALUE: 20
PIF_VALUE: 22
PIF_VALUE: 21
PIF_VALUE: 22
PIF_VALUE: 21
PIF_VALUE: 20
PIF_VALUE: 19
PIF_VALUE: 13
PIF_VALUE: 12
PIF_VALUE: 13
PIF_VALUE: 13
PIF_VALUE: 12
PIF_VALUE: 12
PIF_VALUE: 16
PIF_VALUE: 20
PIF_VALUE: 23
PIF_VALUE: 12
PIF_VALUE: 23
PIF_VALUE: 20
PIF_VALUE: 12
PIF_VALUE: 12
PIF_VALUE: 13
PIF_VALUE: 13
PIF_VALUE: 20
PIF_VALUE: 22
PIF_VALUE: 20
PIF_VALUE: 2
PIF_VALUE: 18
PIF_VALUE: 20
PIF_VALUE: 23
PIF_VALUE: 20
PIF_VALUE: 6
PIF_VALUE: 19
PIF_VALUE: 19
PIF_VALUE: 12
PIF_VALUE: 22
PIF_VALUE: 2
PIF_VALUE: 13
PIF_VALUE: 20
PIF_VALUE: 21
PIF_VALUE: 20
PIF_VALUE: 21
PIF_VALUE: 13
PIF_VALUE: 20
PIF_VALUE: 21
PIF_VALUE: 12
PIF_VALUE: 2
PIF_VALUE: 12
PIF_VALUE: 20
PIF_VALUE: 40
PIF_VALUE: 12
PIF_VALUE: 20
PIF_VALUE: 20
PIF_VALUE: 18
PIF_VALUE: 14
PIF_VALUE: 16
PIF_VALUE: 22
PIF_VALUE: 22
PIF_VALUE: 21
PIF_VALUE: 20
PIF_VALUE: 21
PIF_VALUE: 22
PIF_VALUE: 13
PIF_VALUE: 13
PIF_VALUE: 16
PIF_VALUE: 12
PIF_VALUE: 20
PIF_VALUE: 16
PIF_VALUE: 20
PIF_VALUE: 21
PIF_VALUE: 21
PIF_VALUE: 19
PIF_VALUE: 12
PIF_VALUE: 13
PIF_VALUE: 12
PIF_VALUE: 20
PIF_VALUE: 16
PIF_VALUE: 21
PIF_VALUE: 12
PIF_VALUE: 19
PIF_VALUE: 12
PIF_VALUE: 21
PIF_VALUE: 20
PIF_VALUE: 12
PIF_VALUE: 32
PIF_VALUE: 20
PIF_VALUE: 12
PIF_VALUE: 13
PIF_VALUE: 20
PIF_VALUE: 12
PIF_VALUE: 19
PIF_VALUE: 21
PIF_VALUE: 21
PIF_VALUE: 12
PIF_VALUE: 20
PIF_VALUE: 19
PIF_VALUE: 21
PIF_VALUE: 12
PIF_VALUE: 12
PIF_VALUE: 21
PIF_VALUE: 21
PIF_VALUE: 20
PIF_VALUE: 22
PIF_VALUE: 21
PIF_VALUE: 2
PIF_VALUE: 20
PIF_VALUE: 22
PIF_VALUE: 19
PIF_VALUE: 20
PIF_VALUE: 16
PIF_VALUE: 19
PIF_VALUE: 13
PIF_VALUE: 19
PIF_VALUE: 20

## 2021-12-14 ASSESSMENT — PAIN SCALES - GENERAL
PAINLEVEL_OUTOF10: 8
PAINLEVEL_OUTOF10: 8
PAINLEVEL_OUTOF10: 5
PAINLEVEL_OUTOF10: 3
PAINLEVEL_OUTOF10: 8
PAINLEVEL_OUTOF10: 7
PAINLEVEL_OUTOF10: 0

## 2021-12-14 ASSESSMENT — PAIN - FUNCTIONAL ASSESSMENT: PAIN_FUNCTIONAL_ASSESSMENT: 0-10

## 2021-12-14 ASSESSMENT — PAIN DESCRIPTION - PAIN TYPE
TYPE: SURGICAL PAIN
TYPE: SURGICAL PAIN

## 2021-12-14 ASSESSMENT — PAIN DESCRIPTION - LOCATION
LOCATION: BACK
LOCATION: BACK

## 2021-12-14 ASSESSMENT — PAIN DESCRIPTION - DESCRIPTORS
DESCRIPTORS: ACHING;BURNING;CONSTANT
DESCRIPTORS: ACHING;BURNING

## 2021-12-14 ASSESSMENT — PAIN DESCRIPTION - FREQUENCY
FREQUENCY: CONTINUOUS
FREQUENCY: CONTINUOUS

## 2021-12-14 NOTE — PROGRESS NOTES
[address]       DEDV:                                                  Patient Name: Chad Pugh    MRN: [id number]    : 1935    Room: [address] [place] [address]      Evaluating OT: JUAN CARLOS Jean/L License #  [id number]       Referring Ace Singh PA-C     Specific Provider Orders/Date: OT evaluation & treatment       Diagnosis:  STENOSIS     Surgery: 21: L2-L5 LAMINECTOMY AND FUSION       Past Medical History:   Diagnosis Date    Hypertension       Past Surgical History:   Procedure Laterality Date    APPENDECTOMY      CAROTID ENDARTERECTOMY Right     CARPAL TUNNEL RELEASE Bilateral     JOINT REPLACEMENT Right       Precautions:  Fall Risk, neutral spine, LSO brace, drain, christie, IV     Assessment of current deficits    [] Functional mobility  [x]ADLs  [x] Strength               [x]Cognition    [x] Functional transfers   [x] IADLs         [x] Safety Awareness   [x]Endurance    [x] Fine Coordination              [x] Balance      [] Vision/perception   [x]Sensation     []Gross Motor Coordination  [] ROM  [] Delirium                   [] Motor Control     OT PLAN OF CARE   OT POC based on physician orders, patient diagnosis and results of clinical assessment    Frequency/Duration: 2-4 days/wk for 2 weeks PRN   Specific OT Treatment Interventions to include:    Instruction/training on adapted ADL techniques and AE recommendations to increase functional independence within precautions  Training on energy conservation strategies, correct breathing pattern and techniques to improve independence/tolerance for self-care routine  Functional transfer/mobility training/DME recommendations for increased independence, safety, and fall prevention  Patient/Family education to increase follow through with safety techniques and functional independence  Recommendation of environmental modifications for increased safety with functional transfers/mobility and ADLs  Cognitive retraining/development of therapeutic activities to improve problem solving, judgement, memory, and attention for increased safety/participation in ADL/IADL tasks  Therapeutic exercise to improve motor endurance, ROM, and functional strength for ADLs/functional transfers  Therapeutic activities to facilitate/challenge dynamic balance, stand tolerance for increased safety and independence with ADLs  Therapeutic activities to facilitate gross/fine motor skills for increased independence with ADLs  Positioning to improve skin integrity, interaction with environment and functional independence    Recommended Adaptive Equipment:  BSC to raise commode, ww per PT, A.E. Home Living: Pt lives with wife in a 1 story trailer with 2 platform steps to enter with 1 HR. B&B on main level. Bathroom setup: walk in shower, std. commode   Equipment owned: shower chair    Prior Level of Function: Ind. with ADLs , Ind. with IADLs; ambulated [employer]  Driving: active  Occupation: [employer]    Pain Level: 8/10; incisional   Cognition: A&O: 4/4; Follows 2 step directions   Memory:  good   Sequencing:  good   Problem solving:  good   Judgement/safety:  good     Functional Assessment:  AM-PAC Daily Activity Raw Score: 15/24   Initial Eval Status  Date: 12-14-21 Treatment Status  Date: STGs = LTGs  Time frame: 10-14 days   Feeding Ind. Grooming Set up seated EOB  Modified [place]    UB Dressing Max A with gown & LSO brace, instruction provided to pt. & wife throughout  Modified [place]    LB Dressing Mod/Max A with attempt cross   over  technique   Modified [place]    Bathing Mod A with sim.  task  Modified [place]    Toileting NT  Modified [place]    Bed Mobility  Logroll: Min A  Supine to sit: Minimal Assist   Sit to supine: Minimal Assist   Supine to sit: Modified [place]   Sit to supine: Modified [place]    Functional Transfers Minimal Assist with sit <> stand, SPT using ww  Modified [place]    Functional Mobility Min A with ww few steps towards HOB  Modified [place]    Balance Sitting:     Static:  SUP    Dynamic:SBA  Standing: Min A     Activity Tolerance Fair- lt. Ax.  Good with mod. Ax. Visual/  Perceptual Glasses: yes        Vitals spO2 & HR remained WFL   on 3-4L O2  WFL     Hand Dominance R   AROM (PROM) Strength Additional Info:    RUE  WFL 4/5 good  and wfl FMC/dexterity noted during ADL tasks     LUE WFL 4/5 good  and wfl FMC/dexterity noted during ADL tasks       Hearing: WFL   Sensation:  No c/o numbness or tingling B UE  Tone: WFL B UE  Edema: none noted B UE    Comments: Upon arrival patient supine, agreeable to OT, cleared by Nursing  Therapist facilitated  Bed mobility/ADLs/functional transfer training with focus on safety, technique & precautions. Pt. Instructed RE: safe transfers/mobility, ADLs, role of OT, treatment plan, recs. , prec. At end of session, patient returned to bed, wife present, all needs met, RN notified, with call light and phone within reach, all lines and tubes intact. Overall patient demonstrated decreased strength, balance, independence & safety during completion of ADL/functional transfer/mobility tasks. Pt would benefit from continued skilled OT to increase safety and independence with completion of ADL/IADL tasks for functional independence and quality of life.     Treatment: OT treatment provided this date includes:    Instruction/training on safety and adapted techniques for completion of ADLs: to increase [place] in self care    Instruction/training on safe functional mobility/transfer techniques: with focus on safety, technique & precautions    Instruction/training on energy conservation/work simplification for completion of ADLs: techniques to increase [place] with self care ADLs & iADLs, work simplification to improve endurance    Proper Positioning/Alignment: for optimal healing, skin integrity to prevent breakdown, decrease edema   Skilled monitoring of vitals: to include BP, spO2 & HR during session   Sitting/standing Balance/Tolerance- to increased balance & activity tolerance during ADLs as well as facilitate proper posture and/or positioning.  Therapeutic exercise- Instruction on B UE ROM exercises to improve strength/function for increased [place] with ADLs & iADLs    Rehab Potential: Good for established goals     Patient / Family Goal: to return home soon      Patient and/or family were instructed on functional diagnosis, prognosis/goals and OT plan of care. Demonstrated good understanding. Eval Complexity: Low    Time In: 14:10  Time Out: 14:35  Total Treatment Time: 10    Min Units   OT Eval Low 47358  x     OT Eval Medium 59595      OT Eval High 24261      OT Re-Eval [id number]       Therapeutic Ex 87599       Therapeutic Activities 62479       ADL/Self Care 20187  10  1   Orthotic Management 70638       Manual 20531     Neuro Re-Ed 90398       Non-Billable Time          Evaluation Time additionally includes thorough review of current medical information, gathering information on past medical history/social history and prior level of function, interpretation of standardized testing/informal observation of tasks, assessment of data and development of plan of care and goals. Lora Duarte, OTR/L   License #  [id number]

## 2021-12-14 NOTE — PROGRESS NOTES
INTRAOPERATIVE MONITORING EMG REPORT    Diagnosis: stenosis  Procedure: L2-L5 posterior fusion   Anesthesia: isoflurane  Surgeon: Dorene Morgan M.D. Intra-op Monitoring:  3  hours    Procedure:     EMG recording electrodes were placed over the vastus medialis, vastus lateralis, anterior tibialis, and medial gastrocnemius   musles for recording spontaneous EMG activity. A silent control was performed to test the integrity of the system prior to the procedure. Results:  Spontaneous EMG: was quiet throughout the surgical procedure.     Evoked EMG:   LEFT    Direct Nerve (mA)            Screw (mA)   L2                                                      19  L3                                                      >30  L4     >30  L5     27      RIGHT     L2                                                      30  L3                                                      >30  L4                                                      >30  L5     >30

## 2021-12-14 NOTE — BRIEF OP NOTE
Brief Postoperative Note      Patient: Manuel Lipoma  YOB: 1935  MRN: [id number]    Date of Procedure: 12/14/2021    Pre-Op Diagnosis: STENOSIS    Post-Op Diagnosis: Same       Procedure(s):  L2-L5 LAMINECTOMY AND FUSION    Surgeon(s):  Joe Davison MD    Assistant:  Physician Assistant: Mary Valenzuela PA-C; Kristel Murphy  Resident: Adam Hirsch DO    Anesthesia: General    Estimated Blood Loss (mL): 459     Complications: None    Specimens:   * No specimens in log *    Implants:  Implant Name Type Inv. Item Serial No.  Lot No. LRB No. Used Action   VIASORB STRP [id number]  VIASORB STRP [id number]  [employer]WD  N/A 1 Implanted   VIASORB STRP [id number]  VIASORB STRP [id number]  [employer]  N/A 1 Implanted   VIASORB STRP [id number]  VIASORB STRP [id number]  [employer]  N/A 1 Implanted   SCREW SPNL L50MM DIA7. 5MM THORLUM THRD PREASSEMBLED FOR  SCREW SPNL L50MM Ethan . 5MM THORLUM THRD PREASSEMBLED FOR  [employer]WD  N/A 4 Implanted   SCREW SPNL L55MM DIA7. 5MM THORLUM THRD PREASSEMBLED FOR  SCREW SPNL L55MM Ethan . 5MM THORLUM THRD PREASSEMBLED FOR  [employer]WD  N/A 2 Implanted   SCREW SPNL L45MM DIA7. [address] NONCANNULATED ELIZABETH  SCREW SPNL L45MM DIA7. [address] NONCANNULATED ELIZABETH  [employer]  N/A 1 Implanted   SCREW SPNL L40MM DIA7. 5MM THORLUM THRD PREASSEMBLED FOR  SCREW SPNL L40MM DIA7. 5MM THORLUM THRD PREASSEMBLED FOR  [employer]  N/A 1 Implanted   CAP SPNL THORLUM ELIZABETH THRD CREO  CAP SPNL THORLUM ELIZABETH THRD CREO  [employer]WD  N/A 8 Implanted   CAREY SPNL L90MM DIA5. 5MM TI ALLY CRV CREO  CAREY SPNL L90MM DIA5. 5MM TI ALLY CRV CREO  [employer]-  N/A 2 Implanted         Drains:   Closed/Suction Drain Inferior Back Accordion 10 [place] (Active)   Dressing Status Clean; Dry; Intact 12/14/21 1055   Drainage Appearance Bloody 12/14/21 1055   Status Compressed 12/14/21 1055   Output (ml) 100 ml 12/14/21 1205       Urethral Catheter Non-latex 16 fr (Active)   $ Urethral catheter insertion Inserted for procedure 12/14/21 4276   Catheter Indications Perioperative use for selected surgical procedures 12/14/21 1215   Urine Color Yellow 12/14/21 1247   Urine Appearance Clear 12/14/21 1247   Output (mL) 150 mL 12/14/21 1205       Findings: see dictated op note    Electronically signed by Jose Forde MD on 12/14/2021 at 4:53 PM

## 2021-12-14 NOTE — BRIEF OP NOTE
Perioperative use for selected surgical procedures 12/14/21 0712   Urine Color Yellow 12/14/21 0712   Urine Appearance Clear 12/14/21 4935       Findings: see dictated op note    Electronically signed by Alix Miller MD on 12/14/2021 at 10:59 AM

## 2021-12-14 NOTE — PROGRESS NOTES
numbness and tingling has decreased since surgery however, some numbness still remains in RLE. Edema:  Not assessed at this time    Vitals:  Supine:   SpO2: 93% 6L NC   HR: 83 bpm  EOB:   SpO2: 92% 6L NC   BP: 131/68   BP:  92/62    Therapeutic Exercises:  Functional activity included bed mobility and tolerance to the upright position    Patient education  Pt educated regarding role of PT evaluation, need for OOB activity, spinal precautions, spinal neutral mechanics, application of LSO  and use of call light for safety    Patient response to education:   Pt verbalized understanding Pt demonstrated skill Pt requires further education in this area   Yes Yes Yes; Reminders     ASSESSMENT:  Conditions Requiring Skilled Therapeutic Intervention:  [x]Decreased strength     [x]Decreased ROM  [x]Decreased functional mobility  [x]Decreased balance   [x]Decreased endurance   [x]Decreased posture  []Decreased sensation  []Decreased coordination   []Decreased vision  [x]Decreased safety awareness   []Increased pain     Comments:    Pt was found supine in bed and agreed to PT eval and treatment. Spinal precautions were reviewed and pt adhered to these precautions throughout treatment session. Pt's wife was present in room throughout treatment and agreed was educated with patient regarding bed mobility and donning and doffing pt's brace. Pt's brace was then applied in the supine position. PT required Min A and VC for hand placement in order to perform a supine to sit transfer. While at EOB pt c/o dizziness and displayed a blank look on his face. Vitals were taken at this time as noted above. Due to symptoms that are consistent with orthostatic hypotension pt was place back into bed in the supine position and skillfully positioned for comfort. This patient can benefit from the continuation of skilled PT intervention to maximize functional level and return to PLOF.       Treatment:  Patient practiced and was instructed in the following treatment:    · Bed mobility training - pt given verbal and tactile cues to facilitate proper sequencing and safety during rolling and supine>sit as well as provided with physical assistance to complete task    · Education in spinal precautions and application of LSO adhering to those precautions. o Bed mobility: Pt was cued for technique during bed mobility transfers. o Sitting EOB: Pt sat at EOB for 5+ minutes as sitting balance and upright tolerance were challenged. o Education: Pt was educated on health benefits of OOB activity while in the hospital.   o Vitals and symptoms were monitored closely throughout session. o Skilled repositioning in bed. Pt's/ family goals    1. Return Home    Prognosis is good  for reaching above PT goals. Patient and or family understand(s) diagnosis, prognosis, and plan of care.   yes    PHYSICAL THERAPY PLAN OF CARE:  PT POC is established based on physician order and patient diagnosis     Referring provider/PT Order:  PT Eval and Treat   12/14/21 1245  PT eval and treat       Varsha Butterfield PA-C     Diagnosis:  Spinal stenosis of lumbar region, unspecified whether neurogenic claudication present [M48.061]  Specific instructions for next treatment:  Sit EOB, postural exercises, Transfer to bedside chair, Increase ambulation distance, Stair negotiation, BLE therapeutic exercise and Review spinal precautions    Current Treatment Recommendations:   [x] Strengthening to improve independence with functional mobility   [x] ROM to improve independence with functional mobility   [x] Balance Training to improve static/dynamic balance and to reduce fall risk  [x] Endurance Training to improve activity tolerance during functional mobility   [x] Transfer Training to improve safety and independence with all functional transfers   [x] Gait Training to improve gait mechanics, endurance and asses need for appropriate assistive device  [x] Stair Training in preparation for safe discharge home and/or into the community   [] Positioning to prevent skin breakdown and contractures  [x] Safety and Education Training   [] Patient/Caregiver Education   [] HEP  [] Other     PT long term treatment goals are located in above grid    Frequency of treatments: 2-5x/week x 1-2 weeks. Time in  13:45  Time out  14:10    Total Treatment Time  15 minutes     Evaluation Time includes thorough review of current medical information, gathering information on past medical history/social history and prior level of function, completion of standardized testing/informal observation of tasks, assessment of data and education on plan of care and goals.     CPT codes:  [x] Low Complexity PT evaluation 39256  [] Moderate Complexity PT evaluation 26174  [] High Complexity PT evaluation 87890  [] PT Re-evaluation 21861  [] Gait training 92684 - minutes  [] Manual therapy 83218 - minutes  [x] Therapeutic activities 07179 15 minutes  [] Therapeutic exercises 19561 - minutes  [] Neuromuscular reeducation 31180 - minutes     Chicho Olsen, SPT  Camille Te[place]ada, [place] [id number]

## 2021-12-14 NOTE — ANESTHESIA POSTPROCEDURE EVALUATION
Department of Anesthesiology  Postprocedure Note    Patient: Giovani Mejia  MRN: [id number]  YOB: 1935  Date of evaluation: 12/14/2021  Time:  2:02 PM     Procedure Summary     Date: 12/14/21 Room / Location: [facility] OR 06 / [facility]    Anesthesia Start: 9115 Anesthesia Stop: 1107    Procedure: L2-L5 LAMINECTOMY AND FUSION (N/A Spine Lumbar) Diagnosis: (STENOSIS)    Surgeons: Padmini Rees MD Responsible Provider: Yandel Meredith MD    Anesthesia Type: general ASA Status: 2          Anesthesia Type: general    Denisse Phase I: Denisse Score: 9    Denisse Phase II:      Last vitals: Reviewed and per EMR flowsheets.        Anesthesia Post Evaluation    Patient location during evaluation: PACU  Patient participation: complete - patient participated  Level of consciousness: awake and alert  Airway patency: patent  Nausea & Vomiting: no nausea and no vomiting  Complications: no  Cardiovascular status: hemodynamically stable and blood pressure returned to baseline  Respiratory status: acceptable  Hydration status: euvolemic

## 2021-12-14 NOTE — ANESTHESIA PRE PROCEDURE
Department of Anesthesiology  Preprocedure Note       Name:  Monica Barraza   Age:  82 y.o.  :  1935                                          MRN:  [id number]         Date:  2021      Surgeon: Chang Inman):  Jadyn Gustafson MD    Procedure: Procedure(s):  L2-L5 LAMINECTOMY AND FUSION - O ARM, C ARM, CANDELARIA TABLE, CELL SAVER, PLATELET GELL, AUDIOLOGY, PLATES, SCREWS, GLOBUS    Medications prior to admission:   Prior to Admission medications    Medication Sig Start Date End Date Taking?  Authorizing Provider   Pediatric Multivitamins-Fl (MULTIVITAMINS/FL PO) Take by mouth   Yes Historical Provider, MD   Pediatric Multivitamins-Fl (MULTIVITAMINS/FL PO) Take by mouth   Yes Historical Provider, MD   aspirin 81 MG EC tablet Take by mouth   Yes Historical Provider, MD   amLODIPine (NORVASC) 10 MG tablet daily  9/3/21  Yes Historical Provider, MD   metoprolol tartrate (LOPRESSOR) 25 MG tablet take 1 tablet twice a day 08  Yes Historical Provider, MD   pravastatin (PRAVACHOL) 10 MG tablet daily  21  Yes Historical Provider, MD       Current medications:    Current Facility-Administered Medications   Medication Dose Route Frequency Provider Last Rate Last Admin    0.9 % sodium chloride infusion   IntraVENous Continuous RADHA Roberts 100 mL/hr at 21 0546 New Bag at 21 0546    0.9 % sodium chloride infusion  25 mL IntraVENous PRN RADHA Roberts        ceFAZolin (ANCEF) 2000 mg in sterile water 20 mL IV syringe  2,000 mg IntraVENous On Call to Σκαφίδια Augustina, PA        sodium chloride flush 0.9 % injection 10 mL  10 mL IntraVENous 2 times per day RADHA Roberts        sodium chloride flush 0.9 % injection 10 mL  10 mL IntraVENous PRN RADHA Roberts        meperidine (DEMEROL) injection 12.5 mg  12.5 mg IntraVENous Q15 Min PRN Iram Gary Finamosito, DO        HYDROmorphone (DILAUDID) injection 0.25 mg  0.25 mg IntraVENous Q5 Min PRN Iram Gary Finamore, DO        HYDROmorphone (DILAUDID) injection 0.5 mg  0.5 mg IntraVENous Q5 Min PRN Dietra Artis, DO        morphine (PF) injection 1 mg  1 mg IntraVENous Q5 Min PRN Dietra Artis, DO        morphine (PF) injection 2 mg  2 mg IntraVENous Q5 Min PRN Dietra Artis, DO        oxyCODONE-acetaminophen (PERCOCET) 5-325 MG per tablet 1 tablet  1 tablet Oral PRN Dietra Artis, DO        Or    oxyCODONE-acetaminophen (PERCOCET) 5-325 MG per tablet 2 tablet  2 tablet Oral PRN Dietra Artis, DO        ondansetron [facility]) injection 4 mg  4 mg IntraVENous Once PRN Dietra Artis, DO           Allergies: Allergies   Allergen Reactions    Demerol [Meperidine Hcl] Shortness Of Breath    Morphine        Problem List:  There is no problem list on file for this patient.       Past Medical History:        Diagnosis Date    Hypertension        Past Surgical History:        Procedure Laterality Date    APPENDECTOMY      CAROTID ENDARTERECTOMY Right     CARPAL TUNNEL RELEASE Bilateral     JOINT REPLACEMENT Right        Social History:    Social History     Tobacco Use    Smoking status: Former Smoker    Smokeless tobacco: Never Used   Substance Use Topics    Alcohol use: Not Currently                                Counseling given: Not Answered      Vital Signs (Current):   Vitals:    12/14/21 0521 12/14/21 0532   BP:  (!) 173/77   Pulse:  87   Resp:  20   Temp:  97.4 °F (36.3 °C)   TempSrc:  Temporal   SpO2:  93%   Weight: 200 lb (90.7 kg)    Height: 6' 1\" (1.854 m)                                               BP Readings from Last 3 Encounters:   12/14/21 (!) 173/77   12/09/21 (!) 142/64   12/09/21 (!) 153/67       NPO Status: Time of last liquid consumption: 0830                        Time of last solid consumption: 0830                        Date of last liquid consumption: 12/13/21                        Date of last solid food consumption: 12/13/21    BMI:   Wt Readings from Last 3 Encounters:   12/14/21 200 lb (90.7 kg)   12/09/21 200 lb (90.7 kg)   11/04/21 200 lb (90.7 kg)     Body mass index is 26.39 kg/m². CBC:   Lab Results   Component Value Date    WBC 8.9 12/09/2021    RBC 4.95 12/09/2021    HGB 14.8 12/09/2021    HCT 44.9 12/09/2021    MCV 90.7 12/09/2021    RDW 13.2 12/09/2021     12/09/2021       CMP:   Lab Results   Component Value Date     12/09/2021    K 4.7 12/09/2021     12/09/2021    CO2 25 12/09/2021    BUN 21 12/09/2021    CREATININE 1.2 12/09/2021    GFRAA >60 12/09/2021    LABGLOM 58 12/09/2021    GLUCOSE 94 12/09/2021    CALCIUM 9.5 12/09/2021       POC Tests: No results for input(s): POCGLU, POCNA, POCK, POCCL, POCBUN, POCHEMO, POCHCT in the last 72 hours.     Coags:   Lab Results   Component Value Date    PROTIME 12.7 12/09/2021    INR 1.2 12/09/2021       HCG (If Applicable): No results found for: PREGTESTUR, PREGSERUM, HCG, HCGQUANT     ABGs: No results found for: PHART, PO2ART, NTJ9TII, GLN7WBA, BEART, V8FFNOLC     Type & Screen (If Applicable):  No results found for: LABABO, LABRH    Drug/Infectious Status (If Applicable):  No results found for: HIV, HEPCAB    COVID-19 Screening (If Applicable):   Lab Results   Component Value Date    COVID19 Not Detected 12/09/2021           Anesthesia Evaluation  Patient summary reviewed and Nursing notes reviewed no history of anesthetic complications:   Airway: Mallampati: II  TM distance: >3 FB   Neck ROM: full  Mouth opening: > = 3 FB Dental:    (+) lower dentures and upper dentures      Pulmonary:Negative Pulmonary ROS and normal exam  breath sounds clear to auscultation                             Cardiovascular:  Exercise tolerance: good (>4 METS),   (+) hypertension:,         Rhythm: regular  Rate: normal                 ROS comment: Walks his dog every morning     Neuro/Psych:   Negative Neuro/Psych ROS              GI/Hepatic/Renal: Neg GI/Hepatic/Renal ROS            Endo/Other: Negative Endo/Other ROS                    Abdominal:

## 2021-12-14 NOTE — PLAN OF CARE
Problem: Discharge Planning:  Goal: Discharged to appropriate level of care  Description: Discharged to appropriate level of care  Outcome: Met This Shift     Problem: Infection - Surgical Site:  Goal: Will show no infection signs and symptoms  Description: Will show no infection signs and symptoms  Outcome: Met This Shift     Problem: Skin Integrity:  Goal: Will show no infection signs and symptoms  Description: Will show no infection signs and symptoms  Outcome: Met This Shift

## 2021-12-14 NOTE — CARE COORDINATION
12/14/21 Transition of Care: Same day surgery for Laminectomy.  Will follow patient in the am. Electronically signed by Roshni Lake RN CM on 12/14/2021 at 2:42 PM

## 2021-12-14 NOTE — CONSULTS
Hospital Medicine  Consult History & Physical        Reason for consult: Medical management    Date of Service: Pt seen/examined in consultation on 12/14/2021    History Of Present Illness:    Mr. Willis Lang, a 82y.o. year old male  who  has a past medical history of Hypertension. Patient was admitted under the neurosurgery service. He underwent L2-L5 laminectomy and fusion. Patient is postop, he states that his pain is minimal.  He denies chest pain, no shortness of breath, no hematuria dysuria hematemesis or hematochezia. Patient states that he has hypertension which is fairly controlled at home. He denies being diabetic. Denies sleep apnea. Past Medical History:        Diagnosis Date    Hypertension        Past Surgical History:        Procedure Laterality Date    APPENDECTOMY      CAROTID ENDARTERECTOMY Right     CARPAL TUNNEL RELEASE Bilateral     JOINT REPLACEMENT Right        Medications Prior to Admission:    Prior to Admission medications    Medication Sig Start Date End Date Taking? Authorizing Provider   Pediatric Multivitamins-Fl (MULTIVITAMINS/FL PO) Take by mouth   Yes Historical Provider, MD   Pediatric Multivitamins-Fl (MULTIVITAMINS/FL PO) Take by mouth   Yes Historical Provider, MD   aspirin 81 MG EC tablet Take by mouth   Yes Historical Provider, MD   amLODIPine (NORVASC) 10 MG tablet daily  9/3/21  Yes Historical Provider, MD   metoprolol tartrate (LOPRESSOR) 25 MG tablet take 1 tablet twice a day 6/5/08  Yes Historical Provider, MD   pravastatin (PRAVACHOL) 10 MG tablet daily  9/21/21  Yes Historical Provider, MD       Allergies:  Demerol [meperidine hcl] and Morphine    Social History:      TOBACCO:   reports that he has quit smoking. He has never used smokeless tobacco.  ETOH:   reports previous alcohol use. Family History:     Reviewed in detail and negative for DM, CAD, Cancer, CVA. Positive as follows:    History reviewed.  No pertinent family history. REVIEW OF SYSTEMS:   Pertinent positives as noted in the HPI. All other systems reviewed and negative. No chest pain, no abdominal pain  PHYSICAL EXAM:  /67   Pulse 82   Temp 97 °F (36.1 °C) (Temporal)   Resp 20   Ht 6' 1\" (1.854 m)   Wt 200 lb (90.7 kg)   SpO2 96%   BMI 26.39 kg/m²   General appearance: No apparent distress, appears stated age and cooperative. HEENT: Normal cephalic, atraumatic without obvious deformity. Pupils equal, round, and reactive to light. Extra ocular muscles intact. Conjunctivae/corneas clear. Neck: Supple, with full range of motion. No jugular venous distention. Trachea midline. Respiratory:  Normal respiratory effort. Clear to auscultation, bilaterally without crackles or rhonchi  Cardiovascular: S1-S2 normal rate,  rhythm with normal S1/S2 , without murmurs  Abdomen: Soft, non-tender, non-distended with normal bowel sounds. Musculoskeletal: No  clubbing, cyanosis or edema bilaterally. Skin: Skin color, texture, turgor normal.  No rashes or lesions. Neurologic:  Neurovascularly intact without any focal sensory/motor deficits. Cranial nerves: II-XII intact, grossly non-focal.  Psychiatric: Alert and oriented, thought content appropriate, normal insight    Labs:     CBC:   No results for input(s): WBC, RBC, HGB, HCT, MCV, RDW, PLT in the last 72 hours. BMP: No results for input(s): NA, K, CL, CO2, BUN, CREATININE, MG, PHOS in the last 72 hours. Invalid input(s): CA  LFT:  No results for input(s): PROT, ALB, ALKPHOS, ALT, AST, BILITOT, AMYLASE, LIPASE in the last 72 hours. CE:  No results for input(s): Chacho Sleeper in the last 72 hours. PT/INR: No results for input(s): INR, APTT in the last 72 hours. BNP: No results for input(s): BNP in the last 72 hours.   Hgb A1C: No results found for: LABA1C  No results found for: EAG  ESR: No results found for: SEDRATE  CRP: No results found for: CRP  D Dimer: No results found for: DDIMER  Folate and B12: No results found for: [id number], No results found for: FOLATE  Lactic Acid: No results found for: LACTA  Thyroid Studies: No results found for: TSH, L2RNACO, M2UERDM, THYROIDAB     Assessment and plan:    1. Essential hypertension: Resume patient medications with metoprolol hold Norvasc. 2.  Hyperlipidemia: Continue statin    3. Spinal stenosis: Status post L2-L5 laminectomy, supportive care, neurosurgery following. Thank you for the consultation. Diet: ADULT DIET; Regular  Thank you for allowing us to participate in this patient's care.    +++++++++++++++++++++++++++++++++++++++++++++++++  Bharath Denis MD  [address]  +++++++++++++++++++++++++++++++++++++++++++++++++  NOTE: This report was transcribed using voice recognition software. Every effort was made to ensure accuracy; however, inadvertent computerized transcription errors may be present.

## 2021-12-14 NOTE — H&P
I have examined the patient and reviewed the H and P and no changes are noted.   The right leg is worse    Brittany Espino MD

## 2021-12-15 PROBLEM — M51.36 LUMBAR DEGENERATIVE DISC DISEASE: Status: ACTIVE | Noted: 2021-12-15

## 2021-12-15 LAB
ALBUMIN SERPL-MCNC: 3.6 G/DL (ref 3.5–5.2)
ALP BLD-CCNC: 62 U/L (ref 40–129)
ALT SERPL-CCNC: 15 U/L (ref 0–40)
ANION GAP SERPL CALCULATED.3IONS-SCNC: 11 MMOL/L (ref 7–16)
AST SERPL-CCNC: 41 U/L (ref 0–39)
BASOPHILS ABSOLUTE: 0.03 E9/L (ref 0–0.2)
BASOPHILS RELATIVE PERCENT: 0.2 % (ref 0–2)
BILIRUB SERPL-MCNC: 0.5 MG/DL (ref 0–1.2)
BUN BLDV-MCNC: 25 MG/DL (ref 6–23)
CALCIUM SERPL-MCNC: 8.7 MG/DL (ref 8.6–10.2)
CHLORIDE BLD-SCNC: 106 MMOL/L (ref 98–107)
CO2: 20 MMOL/L (ref 22–29)
CREAT SERPL-MCNC: 1.1 MG/DL (ref 0.7–1.2)
EOSINOPHILS ABSOLUTE: 0 E9/L (ref 0.05–0.5)
EOSINOPHILS RELATIVE PERCENT: 0 % (ref 0–6)
GFR AFRICAN AMERICAN: >60
GFR NON-AFRICAN AMERICAN: >60 ML/MIN/1.73
GLUCOSE BLD-MCNC: 122 MG/DL (ref 74–99)
HCT VFR BLD CALC: 37.9 % (ref 37–54)
HEMOGLOBIN: 12.6 G/DL (ref 12.5–16.5)
IMMATURE GRANULOCYTES #: 0.09 E9/L
IMMATURE GRANULOCYTES %: 0.6 % (ref 0–5)
LYMPHOCYTES ABSOLUTE: 1.4 E9/L (ref 1.5–4)
LYMPHOCYTES RELATIVE PERCENT: 9.2 % (ref 20–42)
MCH RBC QN AUTO: 30.6 PG (ref 26–35)
MCHC RBC AUTO-ENTMCNC: 33.2 % (ref 32–34.5)
MCV RBC AUTO: 92 FL (ref 80–99.9)
MONOCYTES ABSOLUTE: 1.34 E9/L (ref 0.1–0.95)
MONOCYTES RELATIVE PERCENT: 8.8 % (ref 2–12)
NEUTROPHILS ABSOLUTE: 12.4 E9/L (ref 1.8–7.3)
NEUTROPHILS RELATIVE PERCENT: 81.2 % (ref 43–80)
PDW BLD-RTO: 13.4 FL (ref 11.5–15)
PLATELET # BLD: 211 E9/L (ref 130–450)
PMV BLD AUTO: 9.1 FL (ref 7–12)
POTASSIUM REFLEX MAGNESIUM: 5.6 MMOL/L (ref 3.5–5)
POTASSIUM SERPL-SCNC: 5.6 MMOL/L (ref 3.5–5)
RBC # BLD: 4.12 E12/L (ref 3.8–5.8)
SODIUM BLD-SCNC: 137 MMOL/L (ref 132–146)
TOTAL PROTEIN: 6.6 G/DL (ref 6.4–8.3)
WBC # BLD: 15.3 E9/L (ref 4.5–11.5)

## 2021-12-15 PROCEDURE — 97530 THERAPEUTIC ACTIVITIES: CPT

## 2021-12-15 PROCEDURE — G0378 HOSPITAL OBSERVATION PER HR: HCPCS

## 2021-12-15 PROCEDURE — 6370000000 HC RX 637 (ALT 250 FOR IP): Performed by: PHYSICIAN ASSISTANT

## 2021-12-15 PROCEDURE — 6370000000 HC RX 637 (ALT 250 FOR IP): Performed by: INTERNAL MEDICINE

## 2021-12-15 PROCEDURE — 1200000000 HC SEMI PRIVATE

## 2021-12-15 PROCEDURE — 80053 COMPREHEN METABOLIC PANEL: CPT

## 2021-12-15 PROCEDURE — 80048 BASIC METABOLIC PNL TOTAL CA: CPT

## 2021-12-15 PROCEDURE — 97535 SELF CARE MNGMENT TRAINING: CPT

## 2021-12-15 PROCEDURE — 2500000003 HC RX 250 WO HCPCS: Performed by: NEUROLOGICAL SURGERY

## 2021-12-15 PROCEDURE — 2580000003 HC RX 258: Performed by: PHYSICIAN ASSISTANT

## 2021-12-15 PROCEDURE — 36415 COLL VENOUS BLD VENIPUNCTURE: CPT

## 2021-12-15 PROCEDURE — 6360000002 HC RX W HCPCS: Performed by: NEUROLOGICAL SURGERY

## 2021-12-15 PROCEDURE — 85025 COMPLETE CBC W/AUTO DIFF WBC: CPT

## 2021-12-15 RX ADMIN — SENNOSIDES AND DOCUSATE SODIUM 1 TABLET: 50; 8.6 TABLET ORAL at 20:56

## 2021-12-15 RX ADMIN — Medication 2000 MG: at 16:03

## 2021-12-15 RX ADMIN — BISACODYL 5 MG: 5 TABLET, COATED ORAL at 09:31

## 2021-12-15 RX ADMIN — POLYETHYLENE GLYCOL 3350 17 G: 17 POWDER, FOR SOLUTION ORAL at 09:31

## 2021-12-15 RX ADMIN — SENNOSIDES AND DOCUSATE SODIUM 1 TABLET: 50; 8.6 TABLET ORAL at 09:31

## 2021-12-15 RX ADMIN — Medication 2000 MG: at 09:32

## 2021-12-15 RX ADMIN — OXYCODONE HYDROCHLORIDE 10 MG: 10 TABLET ORAL at 06:50

## 2021-12-15 RX ADMIN — Medication 10 ML: at 09:32

## 2021-12-15 RX ADMIN — PRAVASTATIN SODIUM 10 MG: 10 TABLET ORAL at 16:03

## 2021-12-15 RX ADMIN — OXYCODONE HYDROCHLORIDE 10 MG: 10 TABLET ORAL at 16:03

## 2021-12-15 RX ADMIN — METOPROLOL TARTRATE 12.5 MG: 25 TABLET, FILM COATED ORAL at 20:56

## 2021-12-15 RX ADMIN — Medication 10 ML: at 20:56

## 2021-12-15 RX ADMIN — OXYCODONE HYDROCHLORIDE 10 MG: 10 TABLET ORAL at 11:59

## 2021-12-15 RX ADMIN — METOPROLOL TARTRATE 25 MG: 25 TABLET, FILM COATED ORAL at 09:31

## 2021-12-15 RX ADMIN — OXYCODONE HYDROCHLORIDE 10 MG: 10 TABLET ORAL at 20:56

## 2021-12-15 RX ADMIN — PRAVASTATIN SODIUM 10 MG: 10 TABLET ORAL at 20:56

## 2021-12-15 ASSESSMENT — PAIN DESCRIPTION - PAIN TYPE
TYPE: SURGICAL PAIN

## 2021-12-15 ASSESSMENT — PAIN SCALES - GENERAL
PAINLEVEL_OUTOF10: 8
PAINLEVEL_OUTOF10: 10
PAINLEVEL_OUTOF10: 7
PAINLEVEL_OUTOF10: 10
PAINLEVEL_OUTOF10: 4
PAINLEVEL_OUTOF10: 9
PAINLEVEL_OUTOF10: 7

## 2021-12-15 ASSESSMENT — PAIN DESCRIPTION - LOCATION
LOCATION: BACK

## 2021-12-15 NOTE — PROGRESS NOTES
Physical Therapy Tx    Name: Andreina Blackburn  : 1935  MRN: [id number]      Date of Service: 12/15/2021  Evaluating PT:  Nano Lomax, PT [id number]    Room #:  5210/5210-A  Diagnosis:  Spinal stenosis of lumbar region, unspecified whether neurogenic claudication present [M48.061]  Lumbar degenerative disc disease [M51.36]  PMHx/PSHx:     has a past medical history of Hypertension. has a past surgical history that includes joint replacement (Right); Carotid endarterectomy (Right); Appendectomy; Carpal tunnel release (Bilateral); and Lumbar spine surgery (N/A, 2021). Procedure/Surgery: L2-L5 LAMINECTOMY AND FUSION   Precautions:  Falls, falls, alarm and O2 , FWB (full weight bearing), Hemovac drain, Spinal precautions no \"BLT\" and LSO on when greater than 45degrees (elective)  Equipment Needs: Wheeled Walker    SUBJECTIVE:  Patient lives with spouse  in a trailer with 2 steps without rail and a platform between the two to enter. Patient ambulated with a  7-iron golf club PTA. Equipment owned: None    OBJECTIVE:   Initial Evaluation  Date: 21 Treatment  Date: 12/15/21 Short Term/ Long Term   Goals   AM-PAC 6 Clicks     Was pt agreeable to Eval/treatment? Yes Yes    Does pt have pain? Yes; 9/10 LBP at level of incision Yes; c/o LBP tenderness and discomfort    Bed Mobility  Rolling: Min   Supine to sit: Min  Sit to supine: Min   Scooting: Min  Rolling: Min   Supine to sit: Min  Sit to supine: Min   Scooting: Min Rolling: Ind  Supine to sit: Ind  Sit to supine: Ind  Scooting: Ind   Transfers Sit to stand: Min  Stand to sit: Min  Stand pivot: Min  Sit to stand: Mod  Stand to sit: Min  Stand pivot: Min  Sit to stand: Mod Ind    Stand to sit: Mod Ind    Stand pivot:  Mod Ind     Ambulation    NT 1st: 15 feet with FWW and Min A slow rate  2nd: 15 feet with FWW and Min A 120 feet with Mod Ind     Stair negotiation: ascended and descended  NT NT 3 steps with Mod Ind  1 rail    ROM BUE:  Defer to OT eval  BLE:  wfl     Strength BUE: Defer to OT eval  RLE:  Grossly 4-/5  LLE:  Grossly  4-/5  4/5   Balance Sitting EOB:  SBA  Dynamic Standing:  NT  Sitting EOB:  Ind  Dynamic Standing:  Ind     Patient is Alert & Oriented x person, place, time and situation and follows directions   Sensation:  Pt stated that his numbness and tingling has decreased since surgery however, some numbness still remains in RLE. Edema:  Not assessed at this time    Vitals:  Supine:   SpO2: 92% 6L NC   HR: 89 bpm   BP: 156/72  EOB:   SpO2: 95% 6L NC   BP: 136/63  Post-Ambulation 1st:    SpO2: 94% 6L NC    HR: 95 bpm   BP: 120/56  Post-Ambulation 1st:   SpO2: 93% 6L NC    HR: 95 bpm   BP: 158/69    Therapeutic Exercises:  Functional activity included bed mobility and tolerance to the upright position at EOB, transfer training, ambulation training    Patient education  Pt educated regarding role of PT evaluation, need for OOB activity, spinal precautions, spinal neutral mechanics, application of LSO  and use of call light for safety    Patient response to education:   Pt verbalized understanding Pt demonstrated skill Pt requires further education in this area   Yes Yes Yes; Reminders     ASSESSMENT:  Conditions Requiring Skilled Therapeutic Intervention:  [x]Decreased strength     [x]Decreased ROM  [x]Decreased functional mobility  [x]Decreased balance   [x]Decreased endurance   [x]Decreased posture  []Decreased sensation  []Decreased coordination   []Decreased vision  []Decreased safety awareness   [x]Increased pain     Comments:    Pt was found supine in bed and agreed to PT treatment session. Spinal precautions were reviewed and pt adhered to these precautions throughout treatment session. Pt required min assist and VC for LE mobility and UE support while preforming the supine to sit transfer. While positioned EOB pt c/o lightheadedness that reduced ~2 minutes. Vitals were taken throughout session and noted above.  Static sitting posture presented as kyphotic with adequate abdominal strength to support himself. During ambulation pt required 6L O2 NC and preseted with an incrased RR and SOB. Pt requried frequent breaks throughout both session and rapidly fatigued due to BLE strength and global endurance deficits. Pt dislpayed a step to gait pattern, with a reduced alex narrowed VALDEMAR. Pt is considered to be a fall risk at this time and would benefit from skilled PT intervention to address the above deficits. PT returned an hour later upon of pt to return him to bed. Pt prefored a STS with Min A and VC for hand placement. Pt required line management with hemovad drain, cath line, O2 line with tank and AD. Pt was returned to EOB and repositioned in bed upon request.    Treatment:  Patient practiced and was instructed in the following treatment:    · Bed mobility training - pt given verbal and tactile cues to facilitate proper sequencing and safety during rolling and supine>sit as well as provided with physical assistance to complete task    · Education in spinal precautions and application of LSO adhering to those precautions. · STS and transfer training - educated on hand/foot placement, safety, and sequencing during STS and pivot transfers using assistive device  · Gait training - verbal cues for [employer] approximation/negotiation, upright posture, and safety during 90 and 180 degree turns during gait   o Bed mobility: Pt was cued for technique during bed mobility transfers. o Sitting EOB: Pt sat at EOB for 5+ minutes as sitting balance and upright tolerance were challenged. o Education: Pt was educated on health benefits of OOB activity while in the hospital.   o Vitals and symptoms were monitored closely throughout session. o Transfers completed to bedside chair. Pt's/ family goals    1. Return Home    Prognosis is good  for reaching above PT goals.     Patient and or family understand(s) diagnosis, prognosis, and plan of care.  yes    PHYSICAL THERAPY PLAN OF CARE:  PT POC is established based on physician order and patient diagnosis     Referring provider/PT Order:  PT Eval and Treat   12/14/21 1245  PT eval and treat       Cecilio Bee PA-C     Diagnosis:  Spinal stenosis of lumbar region, unspecified whether neurogenic claudication present [M48.061]  Lumbar degenerative disc disease [M51.36]  Specific instructions for next treatment:  Sit EOB, postural exercises, Transfer to bedside chair, Increase ambulation distance, Stair negotiation, BLE therapeutic exercise and Review spinal precautions    Current Treatment Recommendations:   [x] Strengthening to improve independence with functional mobility   [x] ROM to improve independence with functional mobility   [x] Balance Training to improve static/dynamic balance and to reduce fall risk  [x] Endurance Training to improve activity tolerance during functional mobility   [x] Transfer Training to improve safety and independence with all functional transfers   [x] Gait Training to improve gait mechanics, endurance and asses need for appropriate assistive device  [x] Stair Training in preparation for safe discharge home and/or into the community   [] Positioning to prevent skin breakdown and contractures  [x] Safety and Education Training   [] Patient/Caregiver Education   [] HEP  [] Other     PT long term treatment goals are located in above grid    Frequency of treatments: 2-5x/week x 1-2 weeks. Time in: 09:00  Time out: 09:35    Time in: 10:45  Time out: 11:00    Total Treatment Time  50 minutes     Evaluation Time includes thorough review of current medical information, gathering information on past medical history/social history and prior level of function, completion of standardized testing/informal observation of tasks, assessment of data and education on plan of care and goals.     CPT codes:  [] Low Complexity PT evaluation 80839  [] Moderate Complexity PT evaluation [id number]  [] High Complexity PT evaluation [id number]  [] PT Re-evaluation [id number]  [] Gait training 54911 - minutes  [] Manual therapy 44742 - minutes  [x] Therapeutic activities 12265 50 minutes  [] Therapeutic exercises 34808 - minutes  [] Neuromuscular reeducation 52399 - minutes     Pilo Can, DEWAYNE Pagan, [address] [facility] [id number]

## 2021-12-15 NOTE — OP NOTE
[address]                  [address] [facility],  [facility]                                OPERATIVE REPORT    PATIENT NAME: Leopoldo Arenas                      :        1935  MED REC NO:   [id number]                            ROOM:       4063  ACCOUNT NO:   [de-identified]                           ADMIT DATE: 2021  PROVIDER:     Laura Carpenter MD    DATE OF PROCEDURE:  2021    PREOPERATIVE DIAGNOSES:  1. Lumbar canal stenosis from L2 to L5.  2. L4-L5 degenerative spondylolisthesis. POSTOPERATIVE DIAGNOSES:  1. Lumbar canal stenosis from L2 to L5.  2. L4-L5 degenerative spondylolisthesis. OPERATIONS PERFORMED:  1.  Bilateral segmental arthrodesis and fusion from L2 to L5 with use of  bilateral L2, L3, L4, and L5 pedicle screws with use of locally  harvested autograft plus allograft in the form of BioZorb strips for  posterolateral fusion from L2 to L5.  2.  Bilateral L2, L3, L4, and L5 laminectomy; bilateral L2-L3, L3-L4,  and L4-L5 medial facetectomy; and bilateral L2, L3, L4, and L5  foraminotomy. 3.  Use of O-arm assisted navigation with placement of pedicle screws. 4.  Use of free-running EMG to test pedicle screw heads. 5.  AS modifier for Aracely Thakkar PA-C, who assisted with primary  exposure and primary closure. ANESTHESIA:  Generalized endotracheal anesthesia. SURGEON:  Laura Carpenter MD    ASSISTANT:  Aracely Thakkar PA-C    COMPLICATIONS:  None. ESTIMATED BLOOD LOSS:  200 mL. SPECIMEN:  None. OPERATIVE INDICATIONS:  The patient is an 60-year-old gentleman who  presented to the office complaining of back pain that radiated into his  legs. He had an MRI that showed that he had lumbar canal stenosis from  L2 to L5 with spondylolisthesis at L4-L5.   He had failed conservative  therapy and after risks, benefits, and alternatives were discussed with  the patient, it was determined that he would undergo the above-listed  procedure. Ismael Fajardo PA-C's services were required as she was the  only qualified assistant to assist with primary exposure. OPERATIVE PROCEDURE:  The patient was brought into the operating room. A timeout was performed where he was identified by his name, medical  record number, and the operative procedure which he was about to  undergo. Next, induction of generalized endotracheal anesthesia was  then commenced. Upon completion of induction of generalized  endotracheal anesthesia, he received preoperative antibiotics. [place]  catheter was placed. Monitoring electrodes were placed into the muscle  groups in his lower extremities for free-running EMG testing. He was  then flipped into prone position on a Kevin table. All pressure  points were padded. His lumbosacral region was prepped and draped in  the usual sterile fashion. After this was done, a #10 blade was used to  make a skin incision. Monopolar cautery was used to dissect through the  subcutaneous tissue. I placed self-retaining Weitlaner retractor into  the wound. Next, I opened up the lumbodorsal fascia sharply with  monopolar cautery and I proceeded to then expose the spinous processes  at L2, L3, L4, and L5. I used intraoperative fluoroscopy to confirm I  was at the appropriate levels. I then proceeded to perform a  subperiosteal dissection to expose the bilateral lamina and transverse  processes at L2, L3, L4, and L5. After this was done, I then placed self-retaining angled cerebellar  retractors into the wound. I attached the O-arm reference frame to the  L5 spinous process. Using O-arm assisted navigation, I placed bilateral  L2, L3, L4, and L5 pedicle screws. After this was done, I performed  another O-arm spin that showed that the screws were within the pedicles. I then removed the O-arm from the field. I used a handheld stimulator  to test pedicle screw heads. There was no evidence of pedicle breach. I proceeded to then replace self-retaining angled cerebellar retractors  into the wound. I used a Leksell rongeur to bite up the spinous  processes at L2, L3, L4, and L5. I used a high-speed roger to thin out  the lamina bilaterally at L2, L3, L4, and L5 and after this was done, I  used a small straight curette to detach the ligamentum flavum from the  undersurface of the L5 lamina. I then proceeded to use #4 Kerrison  punch to start my central decompression. I performed bilateral L2, L3,  L4, and L5 laminectomy. Once the laminectomies were completed, I focused my attention to the  lateral recesses. I used #4 Kerrison punch to perform bilateral L2-L3,  L3-L4, and L4-L5 medial facetectomy and bilateral L2, L3, L4, and also  L5 foraminotomy. After this was done, I proceeded to then place rods  into the screw heads. I locked the rods now using locking caps in a  torque/countertorque mechanism. I used a high-speed roger to decorticate  the transverse processes bilaterally at L2, L3, L4, and L5. I irrigated  the wound copiously with antibiotic-impregnated saline. I laid out my  bone grafting material in the lateral gutters which consisted of locally  harvested autograft plus allograft in the form of BioZorb strips. After  obtaining adequate hemostasis with monopolar and bipolar cautery, a  Hemovac drain was placed into the wound. I then proceeded to close the  wound in layers using 0 Vicryl for the fascia, 2-0 Vicryl for the  subcutaneous layer, and staples for the skin. A dry sterile dressing  was placed over this and the patient was then flipped into supine  position on his hospital bed, was extubated, and transported to the  postanesthesia care unit in stable condition. There were no  complications. Counts were correct. I was present for the entire case. Please note Diandra Menon PA-C, was the only qualified assistant to  assist with primary exposure.         Yamini Wilson MD    D: 12/14/2021 16:200:20       T: 12/14/2021 17:03:24     [email or web address]  Job#: [id number]     Doc#: [id number]    CC:

## 2021-12-15 NOTE — PROGRESS NOTES
Hospitalist Progress Note      SYNOPSIS: Patient admitted on 2021 for <principal problem not specified>  Mr. Bhakti Price, a 82y.o. year old male  who  has a past medical history of Hypertension.      Patient was admitted under the neurosurgery service. He underwent L2-L5 laminectomy and fusion. Patient is postop, he states that his pain is minimal.  He denies chest pain, no shortness of breath, no hematuria dysuria hematemesis or hematochezia. Patient states that he has hypertension which is fairly controlled at home. He denies being diabetic. Denies sleep apnea    SUBJECTIVE:  Patient seen and examined. He denies any chest pain or shortness of breath. Is complaining of postsurgical lower back pain he reports improvement in his right lower extremity    records reviewed. Stable overnight. No other overnight issues reported. Temp (24hrs), Av.8 °F (36.6 °C), Min:97.3 °F (36.3 °C), Max:98.2 °F (36.8 °C)    DIET: ADULT DIET; Regular  CODE: Full Code    Intake/Output Summary (Last 24 hours) at 12/15/2021 1432  Last data filed at 12/15/2021 1234  Gross per 24 hour   Intake 360 ml   Output 2705 ml   Net -2345 ml       OBJECTIVE:    /75   Pulse 80   Temp 97.3 °F (36.3 °C) (Temporal)   Resp 18   Ht 6' 1\" (1.854 m)   Wt 200 lb (90.7 kg)   SpO2 93%   BMI 26.39 kg/m²     General appearance: No apparent distress, appears stated age and cooperative. HEENT:  Conjunctivae/corneas clear. Neck: Supple. No jugular venous distention. Respiratory: Clear to auscultation bilaterally, normal respiratory effort  Cardiovascular: Regular rate rhythm, normal S1-S2  Abdomen: Soft, nontender, nondistended  Musculoskeletal: No clubbing, cyanosis, no bilateral lower extremity edema. Brisk capillary refill.    Skin:  No rashes  on visible skin  Neurologic: awake, alert and following commands     Assessment and plan:  #Essential hypertension:   -Resume patient medications with metoprolol hold Norvasc.     #Hyperlipidemia:   -Continue statin     #Spinal stenosis:   -Status post L2-L5 laminectomy on 12/14, supportive care, neurosurgery following.  -Pain control  -PT/OT      DISPOSITION: TBD    Medications:  REVIEWED DAILY    Infusion Medications    sodium chloride       Scheduled Medications    metoprolol tartrate  12.5 mg Oral BID    [Held by provider] amLODIPine  10 mg Oral Daily    pravastatin  10 mg Oral Daily    sodium chloride flush  5-40 mL IntraVENous 2 times per day    ceFAZolin (ANCEF) IVPB  2,000 mg IntraVENous Q8H    polyethylene glycol  17 g Oral Daily    bisacodyl  5 mg Oral Daily    sennosides-docusate sodium  1 tablet Oral BID     PRN Meds: sodium chloride flush, sodium chloride, ondansetron **OR** ondansetron, oxyCODONE **OR** oxyCODONE, HYDROmorphone, cyclobenzaprine, magnesium hydroxide, fleet    Labs:     Recent Labs     12/15/21  0555   WBC 15.3*   HGB 12.6   HCT 37.9          Recent Labs     12/15/21  0555      K 5.6*  5.6*      CO2 20*   BUN 25*   CREATININE 1.1   CALCIUM 8.7       Recent Labs     12/15/21  0555   PROT 6.6   ALKPHOS 62   ALT 15   AST 41*   BILITOT 0.5       No results for input(s): INR in the last 72 hours. No results for input(s): Mcneal Sol in the last 72 hours. Chronic labs:    Lab Results   Component Value Date    PSA SEE NOTE (AA) 08/14/2020    PSA 4.89 (H) 08/14/2020    INR 1.2 12/09/2021       Radiology: REVIEWED DAILY    +++++++++++++++++++++++++++++++++++++++++++++++++  Uriah Ball MD  [employer] - [address], [place]  +++++++++++++++++++++++++++++++++++++++++++++++++  NOTE: This report was transcribed using voice recognition software. Every effort was made to ensure accuracy; however, inadvertent computerized transcription errors may be present.

## 2021-12-15 NOTE — PROGRESS NOTES
Department of Neurosurgery  Progress Note    CHIEF COMPLAINT: lumbar fusion    SUBJECTIVE:  Pre op leg pain improved. alexis op site pain ok    REVIEW OF SYSTEMS :  Constitutional: Negative for chills and fever. Neurological: Negative for dizziness, tremors and speech change. OBJECTIVE:   VITALS:  /63   Pulse 84   Temp 98.1 °F (36.7 °C) (Temporal)   Resp 16   Ht 6' 1\" (1.854 m)   Wt 200 lb (90.7 kg)   SpO2 93%   BMI 26.39 kg/m²   PHYSICAL:  CONSTITUTIONAL:  awake, alert, cooperative, no apparent distress, and appears stated age and BECKHAM.   FC    DATA:  CBC:   Lab Results   Component Value Date    WBC 15.3 12/15/2021    RBC 4.12 12/15/2021    HGB 12.6 12/15/2021    HCT 37.9 12/15/2021    MCV 92.0 12/15/2021    MCH 30.6 12/15/2021    MCHC 33.2 12/15/2021    RDW 13.4 12/15/2021     12/15/2021    MPV 9.1 12/15/2021     BMP:    Lab Results   Component Value Date     12/15/2021    K 5.6 12/15/2021     12/15/2021    CO2 20 12/15/2021    BUN 25 12/15/2021    LABALBU 3.6 12/15/2021    CREATININE 1.1 12/15/2021    CALCIUM 8.7 12/15/2021    GFRAA >60 12/15/2021    LABGLOM >60 12/15/2021    GLUCOSE 122 12/15/2021     PT/INR:    Lab Results   Component Value Date    PROTIME 12.7 12/09/2021    INR 1.2 12/09/2021     PTT:  No results found for: APTT, PTT[APTT}    Current Inpatient Medications  Current Facility-Administered Medications: [Held by provider] amLODIPine (NORVASC) tablet 10 mg, 10 mg, Oral, Daily  metoprolol tartrate (LOPRESSOR) tablet 25 mg, 25 mg, Oral, BID  pravastatin (PRAVACHOL) tablet 10 mg, 10 mg, Oral, Daily  sodium chloride flush 0.9 % injection 5-40 mL, 5-40 mL, IntraVENous, 2 times per day  sodium chloride flush 0.9 % injection 5-40 mL, 5-40 mL, IntraVENous, PRN  0.9 % sodium chloride infusion, 25 mL, IntraVENous, PRN  ondansetron (ZOFRAN-ODT) disintegrating tablet 4 mg, 4 mg, Oral, Q8H PRN **OR** ondansetron (ZOFRAN) injection 4 mg, 4 mg, IntraVENous, Q6H PRN  ceFAZolin (ANCEF) 2000 mg in sterile water 20 mL IV syringe, 2,000 mg, IntraVENous, Q8H  oxyCODONE (ROXICODONE) immediate release tablet 5 mg, 5 mg, Oral, Q4H PRN **OR** oxyCODONE HCl (OXY-IR) immediate release tablet 10 mg, 10 mg, Oral, Q4H PRN  HYDROmorphone (DILAUDID) injection 0.5 mg, 0.5 mg, IntraVENous, Q3H PRN  cyclobenzaprine (FLEXERIL) tablet 10 mg, 10 mg, Oral, TID PRN  polyethylene glycol (GLYCOLAX) packet 17 g, 17 g, Oral, Daily  bisacodyl (DULCOLAX) EC tablet 5 mg, 5 mg, Oral, Daily  sennosides-docusate sodium (SENOKOT-S) 8.6-50 MG tablet 1 tablet, 1 tablet, Oral, BID  magnesium hydroxide (MILK OF MAGNESIA) 400 MG/5ML suspension 30 mL, 30 mL, Oral, Daily PRN  fleet rectal enema 1 enema, 1 enema, Rectal, Daily PRN    ASSESSMENT:   · S/p L2-5 PLF on 12/14 - stable  · Drain management  · Post op pain requiring IV narcotics    PLAN:  · PT/OT  · WBAT with brace  · Lumbar drain  · Pain control  · D/c christie  · admit      Electronically signed by Karyle Else, PA on 12/15/2021 at 7:39 AM

## 2021-12-15 NOTE — PROGRESS NOTES
Occupational Therapy  OT BEDSIDE TREATMENT NOTE    [address] [address] [address]       DPZL:  Patient Name: Leona Salguero  MRN: [id number]  : 1935  Room: [address] [place] [address]-     Per OT Eval:     Evaluating OT: Rossy Duarte, OTR/L License #  [id number]        Referring Burgess Deonte PA-C     Specific Provider Orders/Date: OT evaluation & treatment        Diagnosis:  STENOSIS     Surgery: 21: L2-L5 LAMINECTOMY AND FUSION       Past Medical History        Past Medical History:   Diagnosis Date    Hypertension           Past Surgical History         Past Surgical History:   Procedure Laterality Date    APPENDECTOMY        CAROTID ENDARTERECTOMY Right      CARPAL TUNNEL RELEASE Bilateral      JOINT REPLACEMENT Right            Precautions:  Fall Risk, neutral spine, LSO brace, drain, christie, IV      Assessment of current deficits    []? Functional mobility            [x]?ADLs           [x]? Strength                  [x]? Cognition    [x]? Functional transfers          [x]? IADLs         [x]? Safety Awareness   [x]? Endurance    [x]? Fine Coordination                         [x]? Balance      []? Vision/perception   [x]? Sensation      []? Gross Motor Coordination             []? ROM           []?  Delirium                   []? Motor Control      OT PLAN OF CARE   OT POC based on physician orders, patient diagnosis and results of clinical assessment     Frequency/Duration: 2-4 days/wk for 2 weeks PRN   Specific OT Treatment Interventions to include:   Instruction/training on adapted ADL techniques and AE recommendations to increase functional independence within precautions  Training on energy conservation strategies, correct breathing pattern and techniques to improve independence/tolerance for self-care routine  Functional transfer/mobility training/DME recommendations for increased independence, safety, and fall prevention  Patient/Family education to increase follow through with safety techniques and functional independence  Recommendation of environmental modifications for increased safety with functional transfers/mobility and ADLs  Cognitive retraining/development of therapeutic activities to improve problem solving, judgement, memory, and attention for increased safety/participation in ADL/IADL tasks  Therapeutic exercise to improve motor endurance, ROM, and functional strength for ADLs/functional transfers  Therapeutic activities to facilitate/challenge dynamic balance, stand tolerance for increased safety and independence with ADLs  Therapeutic activities to facilitate gross/fine motor skills for increased independence with ADLs  Positioning to improve skin integrity, interaction with environment and functional independence     Recommended Adaptive Equipment:  BSC to raise commode, ww, A.E.     Home Living: Pt lives with wife in a 1 story trailer with 2 platform steps to enter with 1 HR. B&B on main level. Bathroom setup: walk in shower, std. commode   Equipment owned: shower chair     Prior Level of Function: Ind. with ADLs , Ind. with IADLs; ambulated golf club  Driving: active  Occupation: worked [employer]     Pain Level: did not rate incisional pain   Cognition: A&O: 4/4; Follows 2 step directions              Memory:  good              Sequencing:  good              Problem solving:  good              Judgement/safety:  good                Functional Assessment:  AM-PAC Daily Activity Raw Score: 15/24    Initial Eval Status  Date: 12-14-21 Treatment Status  Date:  12/15/21 STGs = LTGs  Time frame: 10-14 days   Feeding Ind.       Grooming Set up seated EOB  Set up   Modified [place]    UB Dressing Max A with gown & LSO brace, instruction provided to pt.  & wife throughout [facility] A with gown  Simulated      Max A  Doff brace LSO brace  Modified [place]    LB Dressing Mod/Max A with attempt cross over  technique   Max A  introduced AE & will educate on AE next session, pt reports his wife will assist with socks  Modified [place]    Bathing Mod A with sim. task Max A  simulated  Modified [place]    Toileting NT  Max A  Recommending BSC to use with staff Modified [place]    Bed Mobility  Logroll: Min A  Supine to sit: Minimal Assist   Sit to supine: Minimal Assist  Min A  Sit to supine   Fair recall of log roll technique, cues for more controlled slower pace Supine to sit: Modified [place]   Sit to supine: Modified [place]    Functional Transfers Minimal Assist with sit <> stand, SPT using ww  Mod A  From chair, cues for hand placement & safety  Modified [place]    Functional Mobility Min A with ww few steps towards HOB  Min A  With walker, short distance with slow steady pace, slight lightheadedness  Modified [place]    Balance Sitting:     Static:  SUP    Dynamic:SBA  Standing: Min A Sitting: SBA  Standing: Min A  With walker       Activity Tolerance Fair- lt. Ax.  Fair-  Light tasks  Good with mod. Ax. Visual/  Perceptual Glasses: yes          Vitals spO2 & HR remained WFL   on 3-4L O2   WFL      Education:  Pt was educated through out treatment regarding proper technique & safety with bed mobility, functional transfers & mobility, reviewing & maintaining spinal precautions throughout treatment & introduced AE available to ease LB bath/dress along with ADL compensatory strategies to ease tasks, improve safety & prevent falls to return home safely. Pt reports his wife can assist with LB tasks but will consider AE next session. Comments: Upon arrival pt was seated in chair & asking to get into bed. At end of session pt was semi-upright in bed, all lines and tubes intact, call light within reach. · Pt has made Fair progress towards set goals.    · Continue with current plan of care      Treatment Time In: 10:40          Treatment Time Out: 11:03 Treatment Charges: Mins Units   Ther Ex  27864     Manual Therapy 01123     Thera Activities 03530 15 1   ADL/Home Mgt 02917 8 1   Neuro Re-ed 27961     Group Therapy      Orthotic manage/training  72395     Non-Billable Time     Total Timed Treatment 23 2       Eleni BROWN  [address] [place] [address] Drive, [address] [place] [address]

## 2021-12-15 NOTE — CARE COORDINATION
12/15, SW met with patient at bedside along with his daughter-Kamryn. Discussed discharge plan which remains home. Patient lives with spouse in a trailer with 2 steps to enter the home. DME owned is none and PCP is Dr. Wanda Diane. Pharmacy is [facility] in [place]. Patient has drain in presently. Recent PT was 15/24 and OT-15/24. Daughter does not feel patient will need HHC. Daughter does aide services herself. BSC and WW family will be able to get and patient along with daughter does not feel it will be needed. Milvia-patient's wife will be in tomorrow in the am.  Will check with Milvia and patient to confirm that no DME is needed and discuss further HHC if it is recommended by Dr. Jus Cruz to be transportation for patient. SW to follow for further discharge planning needs.       ЕКАТЕРИНА Edouard  PHYSICIANS [facility] SURGICAL [facility] Case Management  [phone]

## 2021-12-16 ENCOUNTER — APPOINTMENT (OUTPATIENT)
Dept: CT IMAGING | Age: 86
DRG: 459 | End: 2021-12-16
Attending: NEUROLOGICAL SURGERY
Payer: MEDICARE

## 2021-12-16 ENCOUNTER — APPOINTMENT (OUTPATIENT)
Dept: GENERAL RADIOLOGY | Age: 86
DRG: 459 | End: 2021-12-16
Attending: NEUROLOGICAL SURGERY
Payer: MEDICARE

## 2021-12-16 LAB
ALBUMIN SERPL-MCNC: 3.5 G/DL (ref 3.5–5.2)
ALP BLD-CCNC: 72 U/L (ref 40–129)
ALT SERPL-CCNC: 5 U/L (ref 0–40)
ANION GAP SERPL CALCULATED.3IONS-SCNC: 10 MMOL/L (ref 7–16)
AST SERPL-CCNC: 19 U/L (ref 0–39)
B.E.: 2 MMOL/L (ref -3–3)
BILIRUB SERPL-MCNC: 0.7 MG/DL (ref 0–1.2)
BUN BLDV-MCNC: 27 MG/DL (ref 6–23)
CALCIUM SERPL-MCNC: 9 MG/DL (ref 8.6–10.2)
CHLORIDE BLD-SCNC: 99 MMOL/L (ref 98–107)
CO2: 26 MMOL/L (ref 22–29)
COHB: 1.5 % (ref 0–1.5)
CREAT SERPL-MCNC: 1.3 MG/DL (ref 0.7–1.2)
CRITICAL: ABNORMAL
DATE ANALYZED: ABNORMAL
DATE OF COLLECTION: ABNORMAL
GFR AFRICAN AMERICAN: >60
GFR NON-AFRICAN AMERICAN: 53 ML/MIN/1.73
GLUCOSE BLD-MCNC: 131 MG/DL (ref 74–99)
HCO3: 27.4 MMOL/L (ref 22–26)
HHB: 13.7 % (ref 0–5)
LAB: ABNORMAL
Lab: ABNORMAL
METHB: 0.3 % (ref 0–1.5)
MODE: ABNORMAL
O2 CONTENT: 15.3 ML/DL
O2 SATURATION: 86 % (ref 92–98.5)
O2HB: 84.5 % (ref 94–97)
OPERATOR ID: 8219
PATIENT TEMP: 37 C
PCO2: 45.8 MMHG (ref 35–45)
PH BLOOD GAS: 7.39 (ref 7.35–7.45)
PO2: 51 MMHG (ref 75–100)
POTASSIUM SERPL-SCNC: 4.7 MMOL/L (ref 3.5–5)
SODIUM BLD-SCNC: 135 MMOL/L (ref 132–146)
SOURCE, BLOOD GAS: ABNORMAL
THB: 12.9 G/DL (ref 11.5–16.5)
TIME ANALYZED: 1639
TOTAL PROTEIN: 6.4 G/DL (ref 6.4–8.3)
TROPONIN, HIGH SENSITIVITY: 39 NG/L (ref 0–11)

## 2021-12-16 PROCEDURE — 82805 BLOOD GASES W/O2 SATURATION: CPT

## 2021-12-16 PROCEDURE — 93005 ELECTROCARDIOGRAM TRACING: CPT | Performed by: INTERNAL MEDICINE

## 2021-12-16 PROCEDURE — 6370000000 HC RX 637 (ALT 250 FOR IP): Performed by: PHYSICIAN ASSISTANT

## 2021-12-16 PROCEDURE — 97535 SELF CARE MNGMENT TRAINING: CPT

## 2021-12-16 PROCEDURE — 1200000000 HC SEMI PRIVATE

## 2021-12-16 PROCEDURE — 6360000004 HC RX CONTRAST MEDICATION: Performed by: RADIOLOGY

## 2021-12-16 PROCEDURE — 97530 THERAPEUTIC ACTIVITIES: CPT

## 2021-12-16 PROCEDURE — 6360000002 HC RX W HCPCS: Performed by: NEUROLOGICAL SURGERY

## 2021-12-16 PROCEDURE — 2580000003 HC RX 258: Performed by: PHYSICIAN ASSISTANT

## 2021-12-16 PROCEDURE — 71275 CT ANGIOGRAPHY CHEST: CPT

## 2021-12-16 PROCEDURE — 2500000003 HC RX 250 WO HCPCS: Performed by: NEUROLOGICAL SURGERY

## 2021-12-16 PROCEDURE — 6370000000 HC RX 637 (ALT 250 FOR IP): Performed by: INTERNAL MEDICINE

## 2021-12-16 PROCEDURE — 36600 WITHDRAWAL OF ARTERIAL BLOOD: CPT

## 2021-12-16 PROCEDURE — 6360000002 HC RX W HCPCS: Performed by: PHYSICIAN ASSISTANT

## 2021-12-16 PROCEDURE — 84484 ASSAY OF TROPONIN QUANT: CPT

## 2021-12-16 PROCEDURE — 36415 COLL VENOUS BLD VENIPUNCTURE: CPT

## 2021-12-16 PROCEDURE — 71045 X-RAY EXAM CHEST 1 VIEW: CPT

## 2021-12-16 PROCEDURE — 74018 RADEX ABDOMEN 1 VIEW: CPT

## 2021-12-16 PROCEDURE — 80053 COMPREHEN METABOLIC PANEL: CPT

## 2021-12-16 RX ORDER — SIMETHICONE 80 MG
80 TABLET,CHEWABLE ORAL EVERY 6 HOURS PRN
Status: DISCONTINUED | OUTPATIENT
Start: 2021-12-16 | End: 2021-12-30 | Stop reason: HOSPADM

## 2021-12-16 RX ORDER — IPRATROPIUM BROMIDE AND ALBUTEROL SULFATE 2.5; .5 MG/3ML; MG/3ML
1 SOLUTION RESPIRATORY (INHALATION)
Status: DISCONTINUED | OUTPATIENT
Start: 2021-12-16 | End: 2021-12-21

## 2021-12-16 RX ORDER — CYCLOBENZAPRINE HCL 10 MG
10 TABLET ORAL 3 TIMES DAILY PRN
Qty: 30 TABLET | Refills: 0 | Status: SHIPPED | OUTPATIENT
Start: 2021-12-16 | End: 2021-12-22

## 2021-12-16 RX ORDER — OXYCODONE HYDROCHLORIDE 5 MG/1
5 TABLET ORAL EVERY 4 HOURS PRN
Qty: 42 TABLET | Refills: 0 | Status: SHIPPED | OUTPATIENT
Start: 2021-12-16 | End: 2021-12-22

## 2021-12-16 RX ADMIN — SENNOSIDES AND DOCUSATE SODIUM 1 TABLET: 50; 8.6 TABLET ORAL at 20:02

## 2021-12-16 RX ADMIN — PRAVASTATIN SODIUM 10 MG: 10 TABLET ORAL at 20:02

## 2021-12-16 RX ADMIN — Medication 2000 MG: at 00:06

## 2021-12-16 RX ADMIN — CYCLOBENZAPRINE 10 MG: 10 TABLET, FILM COATED ORAL at 08:30

## 2021-12-16 RX ADMIN — IOPAMIDOL 80 ML: 755 INJECTION, SOLUTION INTRAVENOUS at 18:32

## 2021-12-16 RX ADMIN — SODIUM CHLORIDE, PRESERVATIVE FREE 10 ML: 5 INJECTION INTRAVENOUS at 10:33

## 2021-12-16 RX ADMIN — Medication 10 ML: at 08:31

## 2021-12-16 RX ADMIN — POLYETHYLENE GLYCOL 3350 17 G: 17 POWDER, FOR SOLUTION ORAL at 08:31

## 2021-12-16 RX ADMIN — METOPROLOL TARTRATE 25 MG: 25 TABLET, FILM COATED ORAL at 22:42

## 2021-12-16 RX ADMIN — ONDANSETRON 4 MG: 2 INJECTION INTRAMUSCULAR; INTRAVENOUS at 10:33

## 2021-12-16 RX ADMIN — Medication 2000 MG: at 10:33

## 2021-12-16 RX ADMIN — METOPROLOL TARTRATE 12.5 MG: 25 TABLET, FILM COATED ORAL at 12:24

## 2021-12-16 RX ADMIN — Medication 10 ML: at 20:36

## 2021-12-16 RX ADMIN — ONDANSETRON 4 MG: 2 INJECTION INTRAMUSCULAR; INTRAVENOUS at 22:42

## 2021-12-16 RX ADMIN — SENNOSIDES AND DOCUSATE SODIUM 1 TABLET: 50; 8.6 TABLET ORAL at 08:30

## 2021-12-16 RX ADMIN — BISACODYL 5 MG: 5 TABLET, COATED ORAL at 08:31

## 2021-12-16 RX ADMIN — OXYCODONE HYDROCHLORIDE 10 MG: 10 TABLET ORAL at 00:06

## 2021-12-16 ASSESSMENT — PAIN SCALES - GENERAL: PAINLEVEL_OUTOF10: 8

## 2021-12-16 NOTE — PROGRESS NOTES
Notified Dr. Konnie Oppenheim of patient having emesis, distended abdomen and hypoactive bowel sounds. New orders to follow.

## 2021-12-16 NOTE — CONSULTS
GENERAL SURGERY  CONSULT NOTE  12/16/2021    Physician Consulted: Dr. Liane Osgood  Reason for Consult: Post-operative Ileus   Referring Physician: Dr. Francia Dubon    AMIRAH Patiño is a 82 y.o. male who recently underwent L2-L5 laminectomy 12/14/2021 with Dr. Francia Dubon who developed nausea, vomiting and abdominal distension. He states he has not had any BM or flatus since Tuesday, has been nauseous and had one episode of emesis this am. Per his wife his abdomen appears more distended than usual.     , /61  K 4.7, Na 135, Ca 9.  Wbc 12/15 15.3      Past Medical History:   Diagnosis Date    Hypertension        Past Surgical History:   Procedure Laterality Date    APPENDECTOMY      CAROTID ENDARTERECTOMY Right     CARPAL TUNNEL RELEASE Bilateral     JOINT REPLACEMENT Right     LUMBAR SPINE SURGERY N/A 12/14/2021    L2-L5 LAMINECTOMY AND FUSION performed by Pennie Estrada MD at [facility] OR       Medications Prior to Admission:    Prior to Admission medications    Medication Sig Start Date End Date Taking? Authorizing Provider   oxyCODONE (ROXICODONE) 5 MG immediate release tablet Take 1 tablet by mouth every 4 hours as needed for Pain for up to 7 days. 12/16/21 12/23/21 Yes Ellen Padilla PA-C   cyclobenzaprine (FLEXERIL) 10 MG tablet Take 1 tablet by mouth 3 times daily as needed for Muscle spasms 12/16/21 12/26/21 Yes Regino Mckeon PA-C   Pediatric Multivitamins-Fl (MULTIVITAMINS/FL PO) Take by mouth   Yes Historical Provider, MD   Pediatric Multivitamins-Fl (MULTIVITAMINS/FL PO) Take by mouth   Yes Historical Provider, MD   amLODIPine (NORVASC) 10 MG tablet daily  9/3/21  Yes Historical Provider, MD   metoprolol tartrate (LOPRESSOR) 25 MG tablet take 1 tablet twice a day 6/5/08  Yes Historical Provider, MD   pravastatin (PRAVACHOL) 10 MG tablet daily  9/21/21  Yes Historical Provider, MD       Allergies   Allergen Reactions    Demerol [Meperidine Hcl] Shortness Of Breath    Morphine        History reviewed. No pertinent family history. Social History     Tobacco Use    Smoking status: Former Smoker    Smokeless tobacco: Never Used   Vaping Use    Vaping Use: Never used   Substance Use Topics    Alcohol use: Not Currently    Drug use: Never         Review of Systems   General ROS: negative  Hematological and Lymphatic ROS: negative  Respiratory ROS: no cough, shortness of breath, or wheezing  Cardiovascular ROS: no chest pain or dyspnea on exertion  Gastrointestinal ROS: abdominal distension, n/v  Genito-Urinary ROS: no dysuria, trouble voiding, or hematuria  Musculoskeletal ROS: negative      PHYSICAL EXAM:    Vitals:    12/16/21 1121   BP: 134/61   Pulse: 106   Resp: 16   Temp: 97.6 °F (36.4 °C)   SpO2: 96%       General Appearance:  well developed, well nourished  Skin:  Skin color, texture, turgor normal. No rashes or lesions. Head/face:  NCAT  Eyes:  No gross abnormalities. Lungs: No increased work of breathing   Heart:  Tachycardic, regular rhythm   Abdomen:  Soft, distended, tympanic, no tenderness  Extremities: pulses present in all extremities  Male Rectal:  Rectal exam deferred. LABS:    CBC  Recent Labs     12/15/21  0555   WBC 15.3*   HGB 12.6   HCT 37.9        BMP  Recent Labs     12/16/21  0656      K 4.7   CL 99   CO2 26   BUN 27*   CREATININE 1.3*   CALCIUM 9.0     Liver Function  Recent Labs     12/16/21  0656   BILITOT 0.7   AST 19   ALT 5   ALKPHOS 72   PROT 6.4   LABALBU 3.5     No results for input(s): LACTATE in the last 72 hours. No results for input(s): INR, PTT in the last 72 hours. Invalid input(s): PT    RADIOLOGY    XR CHEST (2 VW)    Result Date: 12/9/2021  EXAMINATION: TWO XRAY VIEWS OF THE CHEST 12/9/2021 11:09 am COMPARISON: None.  HISTORY: ORDERING SYSTEM PROVIDED HISTORY: Pre-op evaluation TECHNOLOGIST PROVIDED HISTORY: Reason for exam:->pre op What reading provider will be dictating this exam?->CRC FINDINGS: Slight peripheral coarsening of interstitial markings may indicate mild fibrosis. Heart and pulmonary vascularity are normal.  Neither costophrenic angle is blunted. Subtle findings which may indicate mild interstitial fibrosis.          ASSESSMENT:  82 y.o. male with postoperative ileus following laminectomy 12/14    PLAN:  - await KUB result   - monitor bowel function   - monitor electrolytes   - NPO and IVF   - NG tube if continues to have emesis or worsening distension    To be discussed with Dr. Kay Hensley     Electronically signed by Kacy Cisse MD on 12/16/21 at 1:46 PM EST

## 2021-12-16 NOTE — PROGRESS NOTES
Occupational Therapy  OT BEDSIDE TREATMENT NOTE    [address] [address] [address]       AUYS:  Patient Name: Huber Khan  MRN: [id number]  : 1935  Room: [address] [place] [address]     Per OT Eval:     Evaluating OT: Maddi Duarte OTR/L License #  [id number]        Referring Robert Figueroa PA-C     Specific Provider Orders/Date: OT evaluation & treatment        Diagnosis:  STENOSIS     Surgery: 21: L2-L5 LAMINECTOMY AND FUSION       Past Medical History        Past Medical History:   Diagnosis Date    Hypertension           Past Surgical History         Past Surgical History:   Procedure Laterality Date    APPENDECTOMY        CAROTID ENDARTERECTOMY Right      CARPAL TUNNEL RELEASE Bilateral      JOINT REPLACEMENT Right            Precautions:  Fall Risk, neutral spine, LSO brace, drain, christie, IV      Assessment of current deficits    []? Functional mobility            [x]?ADLs           [x]? Strength                  [x]? Cognition    [x]? Functional transfers          [x]? IADLs         [x]? Safety Awareness   [x]? Endurance    [x]? Fine Coordination                         [x]? Balance      []? Vision/perception   [x]? Sensation      []? Gross Motor Coordination             []? ROM           []?  Delirium                   []? Motor Control      OT PLAN OF CARE   OT POC based on physician orders, patient diagnosis and results of clinical assessment     Frequency/Duration: 2-4 days/wk for 2 weeks PRN   Specific OT Treatment Interventions to include:   Instruction/training on adapted ADL techniques and AE recommendations to increase functional independence within precautions  Training on energy conservation strategies, correct breathing pattern and techniques to improve independence/tolerance for self-care routine  Functional transfer/mobility training/DME recommendations for increased independence, safety, and fall prevention  Patient/Family education to increase follow through with safety techniques and functional independence  Recommendation of environmental modifications for increased safety with functional transfers/mobility and ADLs  Cognitive retraining/development of therapeutic activities to improve problem solving, judgement, memory, and attention for increased safety/participation in ADL/IADL tasks  Therapeutic exercise to improve motor endurance, ROM, and functional strength for ADLs/functional transfers  Therapeutic activities to facilitate/challenge dynamic balance, stand tolerance for increased safety and independence with ADLs  Therapeutic activities to facilitate gross/fine motor skills for increased independence with ADLs  Positioning to improve skin integrity, interaction with environment and functional independence     Recommended Adaptive Equipment:  BSC to raise commode, ww, A.E.     Home Living: Pt lives with wife in a 1 story trailer with 2 platform steps to enter with 1 HR. B&B on main level. Bathroom setup: walk in shower, std. commode   Equipment owned: shower chair     Prior Level of Function: Ind. with ADLs , Ind. with IADLs; ambulated golf club  Driving: active  Occupation: worked [employer]     Pain Level: did not rate incisional pain   Cognition: A&O: 4/4; Follows 2 step directions              Memory:  good              Sequencing:  good              Problem solving:  good              Judgement/safety:  good                Functional Assessment:  AM-PAC Daily Activity Raw Score: 15/24    Initial Eval Status  Date: 12-14-21 Treatment Status  Date:  12/15/21 STGs = LTGs  Time frame: 10-14 days   Feeding Ind.  Independent  Pt seated upright in chair eating of breakfast meal     Grooming Set up seated EOB  Set up  Pt washed face, applied deodorant seated upright in chair   Modified [place]    UB Dressing Max A with gown & LSO brace, instruction provided to pt.  & wife throughout uLcinda-gown  Diego/[facility] gown supine in bed    maxA-brace  [place] LSO brace supine in bed Modified [place]    LB Dressing Mod/Max A with attempt cross   over  technique  [place]  Pt donned/doffed hospital socks with use of reacher and sockaide seated upright in chair    DNT pants this date    Pt stating wife will assist with task as needed at time of discharge Modified [place]    Bathing Mod A with sim. task maxA  Simulated Task    Pt able to wash of UB, with assistance to wash of LB, buttocks and mino area Modified [place]    Toileting NT DNT  maxA per previous level    Urinal at bedside Modified [place]    Bed Mobility  Logroll: Min A  Supine to sit: Minimal Assist   Sit to supine: Minimal Assist  [place]  Supine<>EOB    Log roll technique with use of bed rail Supine to sit: Modified [place]   Sit to supine: Modified [place]    Functional Transfers Minimal Assist with sit <> stand, SPT using ww [place]  Sit to Stand  Stand to Sit    Stand Pivot Transfer EOB<>Chair with w.w. Modified [place]    Functional Mobility Min A with ww few steps towards HOB [place]  Pt took of short steps during SPT with w.w., slow pace Modified [place]    Balance Sitting:     Static:  SUP    Dynamic:SBA  Standing: Min A Sitting Supported:  SBA    Standing:  [place]     Activity Tolerance Fair- lt. Ax.  Fair-   Good with mod. Ax. Visual/  Perceptual Glasses: yes          Vitals spO2 & HR remained WFL   on 3-4L O2   WFL      Education:  Pt was educated on role of OT, goals to be reached, precautions to follow, safety and log roll technique with bed mobility, safety and hand placement with transfers, safety and walker management with mobility, and use of AE to assist with LB dressing tasks. Comments: Upon arrival pt was supine in bed, agreeable to therapy, nursing present okaying pt to be seen this session. Pt completed of bed mobility, functional mobility, transfers and ADL tasks this session.  At end of session, pt seated upright in chair, all tubes and lines intact, call light within reach. · Pt has made fair progress towards set goals.    · Continue with current plan of care focusing on increasing of independency with transfers and ADL tasks      Treatment Time In: 8:30am         Treatment Time Out: 8:55am         Treatment Charges: Mins Units   Ther Ex  43648     Manual Therapy 54147     Thera Activities 73072 10 1   ADL/Home Mgt 95838 15 1   Neuro Re-ed 62756     Group Therapy      Orthotic manage/training  11868     Non-Billable Time     Total Timed Treatment 25 2       Jo Ann Bellamy JAIME/L [id number]

## 2021-12-16 NOTE — PROGRESS NOTES
Department of Neurosurgery  Progress Note    CHIEF COMPLAINT: lumbar fusion    SUBJECTIVE:  alexis op site pain okay. C/o stomach cramping. Leg pain improved. REVIEW OF SYSTEMS :  Constitutional: Negative for chills and fever. Neurological: Negative for dizziness, tremors and speech change. OBJECTIVE:   VITALS:  BP (!) 149/69   Pulse 96   Temp 98.4 °F (36.9 °C) (Temporal)   Resp 15   Ht 6' 1\" (1.854 m)   Wt 200 lb (90.7 kg)   SpO2 91%   BMI 26.39 kg/m²   PHYSICAL:  CONSTITUTIONAL:  awake, alert, cooperative, no apparent distress, and appears stated age and BECKHAM.   FC    DATA:  CBC:   Lab Results   Component Value Date    WBC 15.3 12/15/2021    RBC 4.12 12/15/2021    HGB 12.6 12/15/2021    HCT 37.9 12/15/2021    MCV 92.0 12/15/2021    MCH 30.6 12/15/2021    MCHC 33.2 12/15/2021    RDW 13.4 12/15/2021     12/15/2021    MPV 9.1 12/15/2021     BMP:    Lab Results   Component Value Date     12/16/2021    K 4.7 12/16/2021    K 5.6 12/15/2021    CL 99 12/16/2021    CO2 26 12/16/2021    BUN 27 12/16/2021    LABALBU 3.5 12/16/2021    CREATININE 1.3 12/16/2021    CALCIUM 9.0 12/16/2021    GFRAA >60 12/16/2021    LABGLOM 53 12/16/2021    GLUCOSE 131 12/16/2021     PT/INR:    Lab Results   Component Value Date    PROTIME 12.7 12/09/2021    INR 1.2 12/09/2021     PTT:  No results found for: APTT, PTT[APTT}    Current Inpatient Medications  Current Facility-Administered Medications: metoprolol tartrate (LOPRESSOR) tablet 12.5 mg, 12.5 mg, Oral, BID  [Held by provider] amLODIPine (NORVASC) tablet 10 mg, 10 mg, Oral, Daily  pravastatin (PRAVACHOL) tablet 10 mg, 10 mg, Oral, Daily  sodium chloride flush 0.9 % injection 5-40 mL, 5-40 mL, IntraVENous, 2 times per day  sodium chloride flush 0.9 % injection 5-40 mL, 5-40 mL, IntraVENous, PRN  0.9 % sodium chloride infusion, 25 mL, IntraVENous, PRN  ondansetron (ZOFRAN-ODT) disintegrating tablet 4 mg, 4 mg, Oral, Q8H PRN **OR** ondansetron (ZOFRAN) injection 4 mg, 4 mg, IntraVENous, Q6H PRN  ceFAZolin (ANCEF) 2000 mg in sterile water 20 mL IV syringe, 2,000 mg, IntraVENous, Q8H  oxyCODONE (ROXICODONE) immediate release tablet 5 mg, 5 mg, Oral, Q4H PRN **OR** oxyCODONE HCl (OXY-IR) immediate release tablet 10 mg, 10 mg, Oral, Q4H PRN  HYDROmorphone (DILAUDID) injection 0.5 mg, 0.5 mg, IntraVENous, Q3H PRN  cyclobenzaprine (FLEXERIL) tablet 10 mg, 10 mg, Oral, TID PRN  polyethylene glycol (GLYCOLAX) packet 17 g, 17 g, Oral, Daily  bisacodyl (DULCOLAX) EC tablet 5 mg, 5 mg, Oral, Daily  sennosides-docusate sodium (SENOKOT-S) 8.6-50 MG tablet 1 tablet, 1 tablet, Oral, BID  magnesium hydroxide (MILK OF MAGNESIA) 400 MG/5ML suspension 30 mL, 30 mL, Oral, Daily PRN  fleet rectal enema 1 enema, 1 enema, Rectal, Daily PRN    ASSESSMENT:   · S/p L2-5 PLF on 12/14 - stable  · Drain management  · Post op pain requiring IV narcotics    PLAN:  · PT/OT  · WBAT with brace  · Lumbar drain. Plan to remove today  · Pain control  · Admit  · Simethacone.  KUB and gen surg consult possible ileus       Electronically signed by Uma Richardson PA-C on 12/16/2021 at 9:38 AM

## 2021-12-16 NOTE — PROGRESS NOTES
Physical Therapy    Date: 2021       Patient Name: Gail Mak  : 1935      MRN: [id number]    PT attempted patient off the floor for testing.      Michi Francois, PT

## 2021-12-16 NOTE — CARE COORDINATION
12/16/21 Update CM note: POD 2 of PLF. Today patient with c/o abdominal cramping. He is with emesis, distended abdomen and decaresed bowel sounds. KUB ordered and surgery consult pending. Drain remains intact. PT and OT pending. Will follow. Electronically signed by Mervin Dave RN CM on 12/16/2021 at 2:58 PM

## 2021-12-16 NOTE — PROGRESS NOTES
Hospitalist Progress Note      SYNOPSIS: Patient admitted on 2021 for <principal problem not specified>  Mr. Cynthia Caro, a 82y.o. year old male  who  has a past medical history of Hypertension.      Patient was admitted under the neurosurgery service. He underwent L2-L5 laminectomy and fusion. Patient is postop, he states that his pain is minimal.  He denies chest pain, no shortness of breath, no hematuria dysuria hematemesis or hematochezia. Patient states that he has hypertension which is fairly controlled at home. He denies being diabetic. Denies sleep apnea    SUBJECTIVE:  Patient seen and examined. Patient is complaining of shortness of breath and also chest pain is currently on 6 L chest x-ray showed infiltrate versus atelectasis of the right lower lobe. ABG suggestive of significant hypoxia. EKG ordered still pending ABGs reviewed, cardiology consultation pulmonary consultation still pending discussed with wife at the bedside. Recommended CT scan of the chest with contrast to rule out PE patient's and his wife are aware of the contrast and the consequences on the kidney function. records reviewed. Stable overnight. No other overnight issues reported. Temp (24hrs), Av.2 °F (36.8 °C), Min:97.6 °F (36.4 °C), Max:99 °F (37.2 °C)    DIET: Diet NPO Exceptions are: Sips of Water with Meds  CODE: Full Code    Intake/Output Summary (Last 24 hours) at 2021 1702  Last data filed at 2021 1045  Gross per 24 hour   Intake 420 ml   Output 725 ml   Net -305 ml       OBJECTIVE:    BP (!) 181/87   Pulse 96   Temp 97.7 °F (36.5 °C) (Temporal)   Resp 18   Ht 6' 1\" (1.854 m)   Wt 200 lb (90.7 kg)   SpO2 93%   BMI 26.39 kg/m²     General appearance: No apparent distress, appears stated age and cooperative. HEENT:  Conjunctivae/corneas clear. Neck: Supple. No jugular venous distention.    Respiratory: Clear to auscultation bilaterally, normal respiratory effort  Cardiovascular: Regular rate rhythm, normal S1-S2  Abdomen: Soft, nontender, nondistended  Musculoskeletal: No clubbing, cyanosis, no bilateral lower extremity edema. Brisk capillary refill. Skin:  No rashes  on visible skin  Neurologic: awake, alert and following commands     Assessment and plan:  #Acute hypoxic respiratory failure  -She is reporting chest pain  -ABGs reviewed  -Stat CT of the chest to rule out pulmonary blood ordered  -Trend troponin  -Cardiology and pulmonary consultation  -Start bronchodilator treatment    #Essential hypertension:   -Resume patient medications with metoprolol hold Norvasc.     #Hyperlipidemia:   -Continue statin     #Spinal stenosis:   -Status post L2-L5 laminectomy on 12/14, supportive care, neurosurgery following.  -Pain control  -PT/OT      DISPOSITION: TBD    Medications:  REVIEWED DAILY    Infusion Medications    sodium chloride       Scheduled Medications    metoprolol tartrate  25 mg Oral BID    amLODIPine  10 mg Oral Daily    pravastatin  10 mg Oral Daily    sodium chloride flush  5-40 mL IntraVENous 2 times per day    polyethylene glycol  17 g Oral Daily    bisacodyl  5 mg Oral Daily    sennosides-docusate sodium  1 tablet Oral BID     PRN Meds: simethicone, sodium chloride flush, sodium chloride, ondansetron **OR** ondansetron, oxyCODONE **OR** oxyCODONE, HYDROmorphone, cyclobenzaprine, magnesium hydroxide, fleet    Labs:     Recent Labs     12/15/21  0555   WBC 15.3*   HGB 12.6   HCT 37.9          Recent Labs     12/15/21  0555 12/16/21  0656    135   K 5.6*  5.6* 4.7    99   CO2 20* 26   BUN 25* 27*   CREATININE 1.1 1.3*   CALCIUM 8.7 9.0       Recent Labs     12/15/21  0555 12/16/21  0656   PROT 6.6 6.4   ALKPHOS 62 72   ALT 15 5   AST 41* 19   BILITOT 0.5 0.7       No results for input(s): INR in the last 72 hours. No results for input(s): [place] in the last 72 hours.     Chronic labs:    Lab Results   Component Value Date    PSA SEE NOTE (AA) 08/14/2020    PSA 4.89 (H) 08/14/2020    INR 1.2 12/09/2021       Radiology: REVIEWED DAILY    +++++++++++++++++++++++++++++++++++++++++++++++++  Henry Peralta MD  [facility] - [address] [place], [place]  +++++++++++++++++++++++++++++++++++++++++++++++++  NOTE: This report was transcribed using voice recognition software. Every effort was made to ensure accuracy; however, inadvertent computerized transcription errors may be present.

## 2021-12-16 NOTE — PROGRESS NOTES
ABG drawn x 1 from Right Radial. Patient had NormalAllen's Test.  Patient was on 6 liters/min via nasal cannula  at time of puncture. Pressure held for 5. No bleeding or bruising noted at puncture site.   Patient tolerated procedure well      Performed by Scot Moore RCP

## 2021-12-16 NOTE — PROGRESS NOTES
Message sent to [facility] - [place], [place] regarding O2 level, tachycardia and blood pressure. Awaiting response.

## 2021-12-17 ENCOUNTER — TELEPHONE (OUTPATIENT)
Dept: NEUROSURGERY | Age: 86
End: 2021-12-17

## 2021-12-17 LAB
ALBUMIN SERPL-MCNC: 3.4 G/DL (ref 3.5–5.2)
ALP BLD-CCNC: 71 U/L (ref 40–129)
ALT SERPL-CCNC: <5 U/L (ref 0–40)
ANION GAP SERPL CALCULATED.3IONS-SCNC: 11 MMOL/L (ref 7–16)
AST SERPL-CCNC: 16 U/L (ref 0–39)
BASOPHILS ABSOLUTE: 0.04 E9/L (ref 0–0.2)
BASOPHILS RELATIVE PERCENT: 0.3 % (ref 0–2)
BILIRUB SERPL-MCNC: 0.6 MG/DL (ref 0–1.2)
BUN BLDV-MCNC: 29 MG/DL (ref 6–23)
CALCIUM SERPL-MCNC: 9 MG/DL (ref 8.6–10.2)
CHLORIDE BLD-SCNC: 100 MMOL/L (ref 98–107)
CO2: 26 MMOL/L (ref 22–29)
CREAT SERPL-MCNC: 1.2 MG/DL (ref 0.7–1.2)
EKG ATRIAL RATE: 98 BPM
EKG P AXIS: 43 DEGREES
EKG P-R INTERVAL: 250 MS
EKG Q-T INTERVAL: 328 MS
EKG QRS DURATION: 94 MS
EKG QTC CALCULATION (BAZETT): 418 MS
EKG R AXIS: 46 DEGREES
EKG T AXIS: 74 DEGREES
EKG VENTRICULAR RATE: 98 BPM
EOSINOPHILS ABSOLUTE: 0.04 E9/L (ref 0.05–0.5)
EOSINOPHILS RELATIVE PERCENT: 0.3 % (ref 0–6)
GFR AFRICAN AMERICAN: >60
GFR NON-AFRICAN AMERICAN: 58 ML/MIN/1.73
GLUCOSE BLD-MCNC: 143 MG/DL (ref 74–99)
HCT VFR BLD CALC: 36.1 % (ref 37–54)
HEMOGLOBIN: 11.9 G/DL (ref 12.5–16.5)
IMMATURE GRANULOCYTES #: 0.12 E9/L
IMMATURE GRANULOCYTES %: 0.8 % (ref 0–5)
LYMPHOCYTES ABSOLUTE: 1.16 E9/L (ref 1.5–4)
LYMPHOCYTES RELATIVE PERCENT: 8.1 % (ref 20–42)
MCH RBC QN AUTO: 30.2 PG (ref 26–35)
MCHC RBC AUTO-ENTMCNC: 33 % (ref 32–34.5)
MCV RBC AUTO: 91.6 FL (ref 80–99.9)
MONOCYTES ABSOLUTE: 1.29 E9/L (ref 0.1–0.95)
MONOCYTES RELATIVE PERCENT: 9 % (ref 2–12)
NEUTROPHILS ABSOLUTE: 11.73 E9/L (ref 1.8–7.3)
NEUTROPHILS RELATIVE PERCENT: 81.5 % (ref 43–80)
PDW BLD-RTO: 13.5 FL (ref 11.5–15)
PLATELET # BLD: 201 E9/L (ref 130–450)
PMV BLD AUTO: 9.6 FL (ref 7–12)
POTASSIUM SERPL-SCNC: 4.7 MMOL/L (ref 3.5–5)
RBC # BLD: 3.94 E12/L (ref 3.8–5.8)
SARS-COV-2, NAAT: NOT DETECTED
SODIUM BLD-SCNC: 137 MMOL/L (ref 132–146)
TOTAL PROTEIN: 6.4 G/DL (ref 6.4–8.3)
TROPONIN, HIGH SENSITIVITY: 36 NG/L (ref 0–11)
TROPONIN, HIGH SENSITIVITY: 38 NG/L (ref 0–11)
TROPONIN, HIGH SENSITIVITY: 39 NG/L (ref 0–11)
WBC # BLD: 14.4 E9/L (ref 4.5–11.5)

## 2021-12-17 PROCEDURE — 97535 SELF CARE MNGMENT TRAINING: CPT

## 2021-12-17 PROCEDURE — 6370000000 HC RX 637 (ALT 250 FOR IP): Performed by: INTERNAL MEDICINE

## 2021-12-17 PROCEDURE — 94640 AIRWAY INHALATION TREATMENT: CPT

## 2021-12-17 PROCEDURE — 99222 1ST HOSP IP/OBS MODERATE 55: CPT | Performed by: INTERNAL MEDICINE

## 2021-12-17 PROCEDURE — 84484 ASSAY OF TROPONIN QUANT: CPT

## 2021-12-17 PROCEDURE — 87635 SARS-COV-2 COVID-19 AMP PRB: CPT

## 2021-12-17 PROCEDURE — 99223 1ST HOSP IP/OBS HIGH 75: CPT | Performed by: INTERNAL MEDICINE

## 2021-12-17 PROCEDURE — 2700000000 HC OXYGEN THERAPY PER DAY

## 2021-12-17 PROCEDURE — 6360000002 HC RX W HCPCS: Performed by: PHYSICIAN ASSISTANT

## 2021-12-17 PROCEDURE — 6360000002 HC RX W HCPCS: Performed by: INTERNAL MEDICINE

## 2021-12-17 PROCEDURE — 97530 THERAPEUTIC ACTIVITIES: CPT

## 2021-12-17 PROCEDURE — 85025 COMPLETE CBC W/AUTO DIFF WBC: CPT

## 2021-12-17 PROCEDURE — 1200000000 HC SEMI PRIVATE

## 2021-12-17 PROCEDURE — 97530 THERAPEUTIC ACTIVITIES: CPT | Performed by: PHYSICAL THERAPIST

## 2021-12-17 PROCEDURE — 2580000003 HC RX 258: Performed by: PHYSICIAN ASSISTANT

## 2021-12-17 PROCEDURE — 99222 1ST HOSP IP/OBS MODERATE 55: CPT | Performed by: SURGERY

## 2021-12-17 PROCEDURE — 6370000000 HC RX 637 (ALT 250 FOR IP): Performed by: PHYSICIAN ASSISTANT

## 2021-12-17 PROCEDURE — 36415 COLL VENOUS BLD VENIPUNCTURE: CPT

## 2021-12-17 PROCEDURE — APPSS60 APP SPLIT SHARED TIME 46-60 MINUTES: Performed by: NURSE PRACTITIONER

## 2021-12-17 PROCEDURE — 80053 COMPREHEN METABOLIC PANEL: CPT

## 2021-12-17 PROCEDURE — 93010 ELECTROCARDIOGRAM REPORT: CPT | Performed by: INTERNAL MEDICINE

## 2021-12-17 PROCEDURE — 6370000000 HC RX 637 (ALT 250 FOR IP): Performed by: NEUROLOGICAL SURGERY

## 2021-12-17 RX ORDER — SODIUM CHLORIDE 9 MG/ML
INJECTION, SOLUTION INTRAVENOUS CONTINUOUS
Status: DISCONTINUED | OUTPATIENT
Start: 2021-12-17 | End: 2021-12-18

## 2021-12-17 RX ORDER — METHYLPREDNISOLONE SODIUM SUCCINATE 40 MG/ML
20 INJECTION, POWDER, LYOPHILIZED, FOR SOLUTION INTRAMUSCULAR; INTRAVENOUS EVERY 6 HOURS
Status: COMPLETED | OUTPATIENT
Start: 2021-12-17 | End: 2021-12-18

## 2021-12-17 RX ORDER — ACETAMINOPHEN 325 MG/1
650 TABLET ORAL EVERY 4 HOURS PRN
Status: DISCONTINUED | OUTPATIENT
Start: 2021-12-17 | End: 2021-12-30 | Stop reason: HOSPADM

## 2021-12-17 RX ADMIN — Medication 10 ML: at 19:30

## 2021-12-17 RX ADMIN — METHYLPREDNISOLONE SODIUM SUCCINATE 20 MG: 40 INJECTION, POWDER, FOR SOLUTION INTRAMUSCULAR; INTRAVENOUS at 18:46

## 2021-12-17 RX ADMIN — SENNOSIDES AND DOCUSATE SODIUM 1 TABLET: 50; 8.6 TABLET ORAL at 10:03

## 2021-12-17 RX ADMIN — METOPROLOL TARTRATE 25 MG: 25 TABLET, FILM COATED ORAL at 19:28

## 2021-12-17 RX ADMIN — ACETAMINOPHEN 650 MG: 325 TABLET ORAL at 23:17

## 2021-12-17 RX ADMIN — Medication 10 ML: at 10:04

## 2021-12-17 RX ADMIN — IPRATROPIUM BROMIDE AND ALBUTEROL SULFATE 1 AMPULE: .5; 2.5 SOLUTION RESPIRATORY (INHALATION) at 15:48

## 2021-12-17 RX ADMIN — IPRATROPIUM BROMIDE AND ALBUTEROL SULFATE 1 AMPULE: .5; 2.5 SOLUTION RESPIRATORY (INHALATION) at 19:59

## 2021-12-17 RX ADMIN — SENNOSIDES AND DOCUSATE SODIUM 1 TABLET: 50; 8.6 TABLET ORAL at 19:28

## 2021-12-17 RX ADMIN — CYCLOBENZAPRINE 10 MG: 10 TABLET, FILM COATED ORAL at 10:03

## 2021-12-17 RX ADMIN — HYDROMORPHONE HYDROCHLORIDE 0.5 MG: 1 INJECTION, SOLUTION INTRAMUSCULAR; INTRAVENOUS; SUBCUTANEOUS at 18:02

## 2021-12-17 RX ADMIN — IPRATROPIUM BROMIDE AND ALBUTEROL SULFATE 1 AMPULE: .5; 2.5 SOLUTION RESPIRATORY (INHALATION) at 13:03

## 2021-12-17 RX ADMIN — SODIUM CHLORIDE, PRESERVATIVE FREE 10 ML: 5 INJECTION INTRAVENOUS at 13:51

## 2021-12-17 RX ADMIN — POLYETHYLENE GLYCOL 3350 17 G: 17 POWDER, FOR SOLUTION ORAL at 10:03

## 2021-12-17 RX ADMIN — AMLODIPINE BESYLATE 10 MG: 10 TABLET ORAL at 10:03

## 2021-12-17 RX ADMIN — OXYCODONE HYDROCHLORIDE 10 MG: 10 TABLET ORAL at 10:31

## 2021-12-17 RX ADMIN — PRAVASTATIN SODIUM 10 MG: 10 TABLET ORAL at 19:29

## 2021-12-17 RX ADMIN — METHYLPREDNISOLONE SODIUM SUCCINATE 20 MG: 40 INJECTION, POWDER, FOR SOLUTION INTRAMUSCULAR; INTRAVENOUS at 13:50

## 2021-12-17 RX ADMIN — OXYCODONE HYDROCHLORIDE 10 MG: 10 TABLET ORAL at 06:33

## 2021-12-17 RX ADMIN — IPRATROPIUM BROMIDE AND ALBUTEROL SULFATE 1 AMPULE: .5; 2.5 SOLUTION RESPIRATORY (INHALATION) at 09:24

## 2021-12-17 RX ADMIN — SODIUM CHLORIDE: 9 INJECTION, SOLUTION INTRAVENOUS at 15:10

## 2021-12-17 RX ADMIN — METOPROLOL TARTRATE 25 MG: 25 TABLET, FILM COATED ORAL at 10:03

## 2021-12-17 RX ADMIN — BISACODYL 5 MG: 5 TABLET, COATED ORAL at 10:04

## 2021-12-17 RX ADMIN — SODIUM CHLORIDE, PRESERVATIVE FREE 10 ML: 5 INJECTION INTRAVENOUS at 15:11

## 2021-12-17 ASSESSMENT — PAIN SCALES - GENERAL
PAINLEVEL_OUTOF10: 9
PAINLEVEL_OUTOF10: 5
PAINLEVEL_OUTOF10: 8
PAINLEVEL_OUTOF10: 5
PAINLEVEL_OUTOF10: 8
PAINLEVEL_OUTOF10: 4

## 2021-12-17 ASSESSMENT — PAIN DESCRIPTION - LOCATION
LOCATION: BACK
LOCATION: BACK

## 2021-12-17 ASSESSMENT — PAIN DESCRIPTION - PAIN TYPE
TYPE: ACUTE PAIN;SURGICAL PAIN
TYPE: ACUTE PAIN;SURGICAL PAIN

## 2021-12-17 ASSESSMENT — PAIN DESCRIPTION - DESCRIPTORS: DESCRIPTORS: ACHING

## 2021-12-17 ASSESSMENT — PAIN DESCRIPTION - PROGRESSION: CLINICAL_PROGRESSION: GRADUALLY IMPROVING

## 2021-12-17 NOTE — PROGRESS NOTES
Attending Attestation   Patient seen and examined, agree with resident note except for changes made by me, for remaining HP/Consult details please see resident HP/Consult note. General Surgery Progress Note:    CC: ileus     S: s/p L2-5 PLF, with a n/v and abd distension, had a small bm and small amount of flatus this AM. Feels better over all,       Objective:  @BP (!) 116/58   Pulse 95   Temp 98 °F (36.7 °C) (Temporal)   Resp 20   Ht 6' 1\" (1.854 m)   Wt 200 lb (90.7 kg)   SpO2 98%   BMI 26.39 kg/m² @    Physical -     Gen: NAD  Resp: Breathing Comfortably, no resp distress fairly clear b/l   CV: Reg Rate no extra heart sounds   Abd: softly distended mild tympany no obvious groin hernias or abd wall hernias   EXT nvi    Assessment/Plan: S/p L2-5 PLF with post op ileus     - monitor electrolytes, IVF while NPO, enemas, ok for PO meds, and sips   - if has worsening N/V may need NG but will hold off on that for now.        Vin Zapata MD FACS     3:23 PM

## 2021-12-17 NOTE — PLAN OF CARE
Problem: Pain - Acute:  Goal: Pain level will decrease  Description: Pain level will decrease  Outcome: Met This Shift     Problem: Skin Integrity:  Goal: Absence of new skin breakdown  Description: Absence of new skin breakdown  Outcome: Met This Shift     Problem: Falls - Risk of:  Goal: Will remain free from falls  Description: Will remain free from falls  Outcome: Met This Shift     Problem: Falls - Risk of:  Goal: Absence of physical injury  Description: Absence of physical injury  Outcome: Met This Shift     Problem: Pain:  Goal: Pain level will decrease  Description: Pain level will decrease  Outcome: Met This Shift

## 2021-12-17 NOTE — CONSULTS
[facility]  Department of Internal Medicine  Division of Pulmonary, Critical Care and Sleep Medicine  Consult Note    Corinne General, DO, MPH, Bella Figueroa, Stephani Mar MD, [address] DO, [facility]      Patient: Rick Thompson  MRN: [id number]  : 1935    Encounter Time: 10:48 AM     Date of Admission: 2021  5:17 AM    Primary Care Physician: Lamont Tapia MD    Reason for Consultation: Post Op respiratory failure     HISTORY OF PRESENT ILLNESS : Rick Thompson 82 y.o. male was seen in consultation regarding the above chief compliant. Rick Thompson is a 82 y.o. male who recently underwent L2-L5 laminectomy 2021 with Dr. Adrianna Wilson. Patient was admitted under the neurosurgery service. He underwent L2-L5 laminectomy and fusion. Patient is postop, he states that his pain is minimal.  He denies chest pain, no shortness of breath, no hematuria dysuria hematemesis or hematochezia. Patient states that he has hypertension which is fairly controlled at home. He denies being diabetic. Denies sleep apnea. Post operative chest pain and SOB prompted a pulmonary consult and CTA was negative for PE but show chronic disease like subtle fibrosis, ? Pleural plaques, and cystic changes /COPD. ABG reviewed    PAST MEDICAL HISTORY:  has a past medical history of Hypertension. SURGICAL HISTORY:  has a past surgical history that includes joint replacement (Right); Carotid endarterectomy (Right); Appendectomy; Carpal tunnel release (Bilateral); and Lumbar spine surgery (N/A, 2021). SOCIAL HISTORY:  reports that he has quit smoking. He has never used smokeless tobacco. He reports previous alcohol use. He reports that he does not use drugs. FAMILY  HISTORY: family history is not on file. +     MEDICATIONS:    Prior to Admission medications    Medication Sig Start Date End Date Taking?  Authorizing Provider   oxyCODONE (ROXICODONE) 5 MG immediate release tablet Take 1 tablet by mouth every 4 hours as needed for Pain for up to 7 days. 12/16/21 12/23/21 Yes Ellen Horner PA-C   cyclobenzaprine (FLEXERIL) 10 MG tablet Take 1 tablet by mouth 3 times daily as needed for Muscle spasms 12/16/21 12/26/21 Yes Kt Hearn PA-C   Pediatric Multivitamins-Fl (MULTIVITAMINS/FL PO) Take by mouth   Yes Historical Provider, MD   Pediatric Multivitamins-Fl (MULTIVITAMINS/FL PO) Take by mouth   Yes Historical Provider, MD   amLODIPine (NORVASC) 10 MG tablet daily  9/3/21  Yes Historical Provider, MD   metoprolol tartrate (LOPRESSOR) 25 MG tablet take 1 tablet twice a day 6/5/08  Yes Historical Provider, MD   pravastatin (PRAVACHOL) 10 MG tablet daily  9/21/21  Yes Historical Provider, MD       ALLERGIES: Demerol [meperidine hcl] and Morphine       REVIEW OF SYSTEMS:  Otherwise negative if not reported or listed below  Constitutional:  No unanticipated weight loss. No change in sleep pattern or activity. No fevers, chills or rigors. Eyes:    No visual changes or diplopia. No scleral icterus. ENT:    No Headaches, hearing loss or vertigo. No mouth sores or sore throat. Cardiovascular:  + chest discomfort, palpitations. Respiratory:  + cough, No wheezing      No sputum production. No hemoptysis, pleuritic pain. Gastrointestinal:  No abdominal pain, appetite loss, blood in stools. No hematemesis  Genitourinary:  No dysuria, trouble voiding or hematuria. No nocturia. Musculoskeletal:   No weakness or joint complaints. Integumentary: No rashes or pruritis. Neurological:  No headache, numbness or tingling. Psychiatric:   No effect on mood, memory, mentation, or behavior. No anxiety or depression. Endocrine:   No excessive thirst, fluid intake, or urination. No tremor. Hematologic: No abnormal bruising or bleeding. Lymphatic:  No swollen lymph nodes. Immunologic:  No hives or hx of anaphaxsis.       OBJECTIVE: PHYSICAL EXAM:   VITALS:   Vitals:    12/16/21 2230 12/16/21 2242 12/17/21 0745 12/17/21 0755   BP: (!) 166/76 (!) 166/76 (!) 116/58    Pulse: 110  95    Resp: 16 16 20   Temp: 98.5 °F (36.9 °C)  98 °F (36.7 °C)    TempSrc: Temporal  Temporal    SpO2: 93%  95% 94%   Weight:       Height:            Intake/Output Summary (Last 24 hours) at 12/17/2021 1048  Last data filed at 12/16/2021 2248  Gross per 24 hour   Intake --   Output 200 ml   Net -200 ml        CONSTITUTIONAL:   A&O x 3, NAD  SKIN:     No rash, No suspicious lesions or skin discoloration  HEENT:     EOMI, MMM, No thrush  NECK:    No bruits, No JVP apprechiated  CV:      Sinus,  No murmur, No rubs, No gallops  PULMONARY:   Couse BS,  No Wheezing, No Rales, No Rhonchi      No noted egophony  ABDOMEN:     Soft, non-tender. BS normal. No R/R/G  EXT:    No deformities . No clubbing.       + lower extremity edema, No venous stasis  PULSE:   Appears equal and palpable.   PSYCHIATRIC:  Seems appropriate, No acute psycosis  MS:    No fractures, No gross weakness  NEUROLOGIC:   The clinical assessment is non-focal     DATA: IMAGING & TESTING:     LABORATORY TESTS:    CBC:   Lab Results   Component Value Date    WBC 15.3 12/15/2021    RBC 4.12 12/15/2021    HGB 12.6 12/15/2021    HCT 37.9 12/15/2021    MCV 92.0 12/15/2021    MCH 30.6 12/15/2021    MCHC 33.2 12/15/2021    RDW 13.4 12/15/2021     12/15/2021    MPV 9.1 12/15/2021     CMP:    Lab Results   Component Value Date     12/17/2021    K 4.7 12/17/2021    K 5.6 12/15/2021     12/17/2021    CO2 26 12/17/2021    BUN 29 12/17/2021    CREATININE 1.2 12/17/2021    GFRAA >60 12/17/2021    LABGLOM 58 12/17/2021    GLUCOSE 143 12/17/2021    PROT 6.4 12/17/2021    LABALBU 3.4 12/17/2021    CALCIUM 9.0 12/17/2021    BILITOT 0.6 12/17/2021    ALKPHOS 71 12/17/2021    AST 16 12/17/2021    ALT <5 12/17/2021     Magnesium:  No results found for: MG  Phosphorus:  No results found for: PHOS PRO-BNP: No results found for: PROBNP   ABGs:   Lab Results   Component Value Date    PH 7.394 12/16/2021    PO2 51.0 12/16/2021    PCO2 45.8 12/16/2021     Hemoglobin A1C: No components found for: HGBA1C    IMAGING:  Imaging tests were completed and reviewed and discussed radiology and care team involved and reveals     XR ABDOMEN (KUB) (SINGLE AP VIEW)  Result Date: 12/16/2021Dilated small and large bowel loops suggest generalized ileus. Clinical correlation and follow-up to resolution recommended. MRI LUMBAR SPINE WO CONTRAST  Result Date: 11/24/2021  Multilevel degenerative disc disease with associated facet and ligamentous hypertrophy resulting in canal stenosis at L3-4 and L4-5. Bilateral foraminal narrowing as described above. XR CHEST PORTABLE    Result Date: 12/16/2021  EXAMINATION: ONE XRAY VIEW OF THE CHEST 12/16/2021 4:19 pm COMPARISON: 12/09/2021 HISTORY: ORDERING SYSTEM PROVIDED HISTORY: hypoxia TECHNOLOGIST PROVIDED HISTORY: Reason for exam:->hypoxia What reading provider will be dictating this exam?->CRC FINDINGS: Right lower lobe atelectasis and/or infiltrate. .  There is no effusion or pneumothorax. The cardiomediastinal silhouette is without acute process. The osseous structures are without acute process. Right lower lobe atelectasis and/or infiltrate. CTA PULMONARY W CONTRAST  Result Date: 12/16/2021  INDINGS: No thoracic aortic aneurysm or dissection. Mild-to-moderate scattered calcified plaque in the thoracic aorta. No evidence of pulmonary embolism. Mild cardiomegaly. Coronary atherosclerosis. No pericardial effusion. Shotty subcentimeter short axis mediastinal hilar nodes. No pleural effusion or pneumothorax. Low lung volumes. There are a few small peripheral densities in the lungs which may represent scarring or infection (for example series 4, image 73). Another curvilinear density is noted the medial left lower lobe (series 4 image 168).   Mild dependent atelectasis, right greater than left. The central airways are patent. There is centrilobular and paraseptal emphysema. Degenerative changes of the spine. The visualized abdomen demonstrates changes of cholecystectomy and the splenic parenchymal calcification. No evidence of pulmonary embolism. No acute aortic injury or aneurysm. There are a few nonspecific densities in the peripheral lungs. Imaging features can be seen with COVID-19 pneumonia, though are nonspecific and can occur with a variety of infectious and noninfectious processes. Mild cardiomegaly. Atherosclerotic disease. Assessment:   1. Post op respiratory failure  2. 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 Plueral plaques ? Old infection or possible asbestosis  4. CT show sutttble changes of pulmonary fibrosis (periphearl)        Plan:   1. CTA negative  2. Atelectasis  3. Non specific Chest pain but not pleuritic  4. Most of the finding are chronic   5. Continue ICS,   6. Bronchodialators  7. Wean oxygen as able   8.  Three doses of steroids to move things along        Reed Lowe DO DO, MPH, [facility]  Professor of Internal Medicine  Pulmonary, Critical Care and Sleep Medicine

## 2021-12-17 NOTE — CONSULTS
Inpatient Cardiology Consultation      Reason for Consult:  Chest pain     Consulting Physician: Dr. Rica Wilde    Requesting Physician:  Dr. Samina Goldsmith    Date of Consultation: 12/17/2021    HISTORY OF PRESENT ILLNESS:   Mr. Nahid Chavira is a 82year old male who is new to [facility]. PMHx: HTN, HLD, Former smoker, Interstitial Fibrosis, Hx of R CEA, GERD, Arthritis. Please note that the following information was obtained from the patient and patient's wife (via telephone). [facility] 12/14/2021 for an elective L2-L5 laminectomy and fusion for spinal stenosis. Post-op, patient was noted to have an ileus and general surgery was consulted. Patient was made NPO / monitoring bowel function. On 12/16/2021, patient suddenly became SOB and had mid-sternal chest \"burning\" with \"acidic taste in my mouth\" due to laying prone most of the day, lasting 4 hours, relieved with sitting upright. CXR showed infiltrate vs atelectasis of the RLL. Labs: Na+  37, potassium 4.7, BUN 29, creatinine 1.2 (trending down) High-sensitivity troponin 39, 39, 38. CTA: No evidence of PE, no aortic injury or aneurysm, mild cardiomegaly, atherosclerotic disease. There are a few nonspecific densities in the peripheral lungs. Imaging features can be seen with Covid-19 pneumonia. Please note: past medical records were reviewed per electronic medical record (EMR) - see detailed reports under Past Medical/ Surgical History. Past Medical History:    1. HTN  2. HLD: on statin therapy   3. Hx of Appendectomy, Carpel tunnel release, carotid endarterectomy, Hx of lumbar spine surgery. 4. Pharm stress test: 11/18/2021: LVEF 50%, nonischemic. 5. Interstitial fibrosis   6. Former smoker: quit 40 years ago   9. Hx of Right CEA  8. Arthritis  9. GERD  10. Reported LHC > 10 years ago that was \"normal\" per patient. 11. COVID-19 infection with hypoxia [facility]): hospitalized. (remains unvaccinated).        Medications Prior to admit:  Prior to Admission medications    Medication Sig Start Date End Date Taking? Authorizing Provider   oxyCODONE (ROXICODONE) 5 MG immediate release tablet Take 1 tablet by mouth every 4 hours as needed for Pain for up to 7 days.  12/16/21 12/23/21 Yes Regino Mckeon PA-C   cyclobenzaprine (FLEXERIL) 10 MG tablet Take 1 tablet by mouth 3 times daily as needed for Muscle spasms 12/16/21 12/26/21 Yes Regino Mckeon PA-C   Pediatric Multivitamins-Fl (MULTIVITAMINS/FL PO) Take by mouth   Yes Historical Provider, MD   Pediatric Multivitamins-Fl (MULTIVITAMINS/FL PO) Take by mouth   Yes Historical Provider, MD   amLODIPine (NORVASC) 10 MG tablet daily  9/3/21  Yes Historical Provider, MD   metoprolol tartrate (LOPRESSOR) 25 MG tablet take 1 tablet twice a day 6/5/08  Yes Historical Provider, MD   pravastatin (PRAVACHOL) 10 MG tablet daily  9/21/21  Yes Historical Provider, MD       Current Medications:    Current Facility-Administered Medications: simethicone (MYLICON) chewable tablet 80 mg, 80 mg, Oral, Q6H PRN  metoprolol tartrate (LOPRESSOR) tablet 25 mg, 25 mg, Oral, BID  ipratropium-albuterol (DUONEB) nebulizer solution 1 ampule, 1 ampule, Inhalation, Q4H WA  amLODIPine (NORVASC) tablet 10 mg, 10 mg, Oral, Daily  pravastatin (PRAVACHOL) tablet 10 mg, 10 mg, Oral, Daily  sodium chloride flush 0.9 % injection 5-40 mL, 5-40 mL, IntraVENous, 2 times per day  sodium chloride flush 0.9 % injection 5-40 mL, 5-40 mL, IntraVENous, PRN  0.9 % sodium chloride infusion, 25 mL, IntraVENous, PRN  ondansetron (ZOFRAN-ODT) disintegrating tablet 4 mg, 4 mg, Oral, Q8H PRN **OR** ondansetron (ZOFRAN) injection 4 mg, 4 mg, IntraVENous, Q6H PRN  oxyCODONE (ROXICODONE) immediate release tablet 5 mg, 5 mg, Oral, Q4H PRN **OR** oxyCODONE HCl (OXY-IR) immediate release tablet 10 mg, 10 mg, Oral, Q4H PRN  HYDROmorphone (DILAUDID) injection 0.5 mg, 0.5 mg, IntraVENous, Q3H PRN  cyclobenzaprine (FLEXERIL) tablet 10 mg, 10 mg, Oral, TID PRN  polyethylene glycol (GLYCOLAX) packet 17 g, 17 g, Oral, Daily  bisacodyl (DULCOLAX) EC tablet 5 mg, 5 mg, Oral, Daily  sennosides-docusate sodium (SENOKOT-S) 8.6-50 MG tablet 1 tablet, 1 tablet, Oral, BID  magnesium hydroxide (MILK OF MAGNESIA) 400 MG/5ML suspension 30 mL, 30 mL, Oral, Daily PRN  fleet rectal enema 1 enema, 1 enema, Rectal, Daily PRN    Allergies:  Demerol [meperidine hcl] and Morphine    Social History: Former smoker: quit 40 years ago. Denies alcohol and illicit drug use. Family History: Noncontributory due to advanced age. REVIEW OF SYSTEMS:     · Constitutional: + fatigue. Denies fevers, chills or night sweats  · Eyes: Denies visual changes or drainage  · ENT: Denies headaches or hearing loss. No mouth sores or sore throat. No epistaxis   · Cardiovascular: See HPI. · Respiratory: Denies CASTELLANO, cough, orthopnea or PND. No hemoptysis   · Gastrointestinal: Denies hematemesis or anorexia. No hematochezia or melena    · Genitourinary: Denies urgency, dysuria or hematuria. · Musculoskeletal: Chronic back pain. · Integumentary: Denies rash, hives or pruritis   · Neurological: Denies dizziness, headaches or seizures. No numbness or tingling  · Psychiatric: Denies anxiety or depression. · Endocrine: Denies temperature intolerance. No recent weight change. .  · Hematologic/Lymphatic: Denies abnormal bruising or bleeding. No swollen lymph nodes    PHYSICAL EXAM:   BP (!) 116/58   Pulse 95   Temp 98 °F (36.7 °C) (Temporal)   Resp 16   Ht 6' 1\" (1.854 m)   Wt 200 lb (90.7 kg)   SpO2 95%   BMI 26.39 kg/m²   CONST:  Well developed, well nourished elderly male who appears of stated age. Awake, alert and cooperative. No apparent distress. HEENT:   Head- Normocephalic, atraumatic   Eyes- Conjunctivae pink, anicteric  Throat- Oral mucosa pink and moist  Neck-  No stridor, trachea midline, no jugular venous distention. No carotid bruit. CHEST: Chest symmetrical and non-tender to palpation.  No accessory muscle use or intercostal retractions  RESPIRATORY: Lung sounds - clear throughout fields   CARDIOVASCULAR:     Heart Inspection- shows no noted pulsations  Heart Palpation- no heaves or thrills; PMI is non-displaced   Heart Ausculation- Regular rate and rhythm, no murmur. No s3, s4 or rub   PV: No lower extremity edema. No varicosities. Pedal pulses palpable, no clubbing or cyanosis   ABDOMEN: Soft, non-tender to light palpation. Bowel sounds present. No palpable masses no organomegaly; no abdominal bruit  MS: Good muscle strength and tone. No atrophy or abnormal movements. : Deferred  SKIN: Warm and dry no statis dermatitis or ulcers   NEURO / PSYCH: Oriented to person, place and time. Speech clear and appropriate. Follows all commands. Pleasant affect     DATA:    ECG: As per Dr. Mich Batres. Non-telemetry monitored unit. Diagnostic:      Intake/Output Summary (Last 24 hours) at 12/17/2021 0830  Last data filed at 12/16/2021 2248  Gross per 24 hour   Intake 180 ml   Output 400 ml   Net -220 ml       Labs:   CBC:   Recent Labs     12/15/21  0555   WBC 15.3*   HGB 12.6   HCT 37.9        BMP:   Recent Labs     12/16/21  0656 12/17/21  0509    137   K 4.7 4.7   CO2 26 26   BUN 27* 29*   CREATININE 1.3* 1.2   LABGLOM 53 58   CALCIUM 9.0 9.0     Mag: No results for input(s): MG in the last 72 hours. Phos: No results for input(s): PHOS in the last 72 hours. TFT: No results found for: TSH, M0JOLAF, D6JDBXR, THYROIDAB, FT3, T4FREE   HgA1c: No results found for: LABA1C  No results found for: EAG  proBNP: No results for input(s): PROBNP in the last 72 hours. PT/INR: No results for input(s): PROTIME, INR in the last 72 hours. APTT:No results for input(s): APTT in the last 72 hours.   CARDIAC ENZYMES:  Recent Labs     12/16/21  1701 12/17/21  0052 12/17/21  0509   TROPHS 39* 39* 38*     FASTING LIPID PANEL:No results found for: CHOL, HDL, LDLDIRECT, LDLCALC, TRIG  LIVER PROFILE:  Recent Labs 12/16/21  0656 12/17/21  0509   AST 19 16   ALT 5 <5   LABALBU 3.5 3.4*       Chest x-ray: 12/16/2021:  Right lower lobe atelectasis and/or infiltrate. CTA pulmonary: 12/16/2021:  No evidence of pulmonary embolism.       No acute aortic injury or aneurysm.       There are a few nonspecific densities in the peripheral lungs.  Imaging   features can be seen with COVID-19 pneumonia, though are nonspecific and can   occur with a variety of infectious and noninfectious processes.       Mild cardiomegaly.  Atherosclerotic disease.         Lexiscan MPS: 11/18/2021: LVEF 50%, nonischemic. Assessment:  1. Spinal stenosis S/p elective L2-L5 laminectomy and fusion (12/14/21). 2. Acute hypoxic respiratory failure post-op: on 6L. PE ruled out. 3. Chest pain (post-op): appears GI etiology. Hx of GERD Recent nonischemic stress test with normal LVEF 11/2021. Troponin pattern flat with no acute EKG changes. 4. HTN  5. HLD: on statin therapy   6. Hx of Interstitial Fibrosis  7. Hx of COVID-19 infection (5/2021, hospitalized [facility]) with hypoxia. Unvaccinated. 8. Hx of R CEA  9. Arthritis       Plan:  1. Records obtained from Dr. Medina Erp office reviewed. 2. Continue current cardiac medications  3. No further cardiac testing needed or planned. 4. Further recommendations pending the above clinical course. 5. Follow-up with Dr. Mayelin Hammond upon discharge. Above assessment/plan discussed with Dr. Shelby Miramontes. Electronically signed by YO Jansen CNP on 12/17/21 at 11:34 AM EMELY Barros 673 MD Lance    I have personally participated in a face-to-face and personally obtained history and performed physical exam on the date of service. I reviewed chart, vitals, labs and radiologic studies.  I also participated in medical decision making with YO Jansen CNP on the date of service All of the assessments and recommendations are from me and I agree with all of the pertinent clinical information, assessment and treatment plan. I have reviewed and edited the note above based on my findings during my history, exam, and decision making. Please see my additional contributions to the history, physical exam, assessment, and recommendations below. HISTORY OF PRESENT ILLNESS:     Reviewed, as above      Past medical history:  Reviewed, as above. Past surgical history:  Reviewed, as above. Current medications:  Reviewed, as above    Allergies:  Reviewed, as above    Social history:  Reviewed, as above    Family medical history:  Reviewed, as above. REVIEW OF SYSTEMS:   Reviewed, as above. PHYSICAL EXAM:   CONSTITUTIONAL:  awake, alert, cooperative, no apparent distress, and appears stated age  EYES:  lids and lashes normal and pupils equal, round and reactive to light, anicteric sclerae  HEAD:  normocepalic, without obvious abnormality, atraumatic, pink, moist mucous membranes. NECK:  Supple, symmetrical, trachea midline, no adenopathy, thyroid symmetric, not enlarged and no tenderness, skin normal  HEMATOLOGIC/LYMPHATICS:  no cervical lymphadenopathy and no supraclavicular lymphadenopathy  LUNGS:  No increased work of breathing, decreased air exchange due to poor inspiratory effort, scattered rhonchi  CARDIOVASCULAR:  Normal apical impulse, regular rate and rhythm, normal S1 and S2, no S3 or S4, 3/6 stock murmur at the apex patient was strongly advised to call 911 if symptoms recur or get worse for any reason no JVD, no carotid bruit, no pedal edema, good carotid upstroke bilaterally. ABDOMEN:  Soft, nontender, no masses, no hepatomegaly or splenomegaly, BS+  CHEST: nontender to palpation, expands symmetrically  MUSCULOSKELETAL:  No clubbing no cyanosis. there is no redness, warmth, or swelling of the joints  NEUROLOGIC:  Alert, awake,oriented x3.   SKIN:  no bruising or bleeding, normal skin color, texture, turgor and no redness, warmth, or swelling    /64   Pulse 94 Temp 97.9 °F (36.6 °C)   Resp 18   Ht 6' 1\" (1.854 m)   Wt 200 lb (90.7 kg)   SpO2 98%   BMI 26.39 kg/m²       I/O last 3 completed shifts:  In: -   Out: 200 [Urine:200]  I/O this shift:  In: 61 [P.O.:60]  Out: -       DATA:   I personally reviewed the admission EKG with the following interpretation: Sinus rhythm, first-degree AV block, early transition, possible LVH    ECHO: Not performed to date  Stress Test: reviewed  Angiography: Not performed to date  Cardiology Labs:   BMP:    Lab Results   Component Value Date     12/17/2021    K 4.7 12/17/2021    K 5.6 12/15/2021     12/17/2021    CO2 26 12/17/2021    BUN 29 12/17/2021     CMP:    Lab Results   Component Value Date     12/17/2021    K 4.7 12/17/2021    K 5.6 12/15/2021     12/17/2021    CO2 26 12/17/2021    BUN 29 12/17/2021    PROT 6.4 12/17/2021     CBC:    Lab Results   Component Value Date    WBC 14.4 12/17/2021    RBC 3.94 12/17/2021    HGB 11.9 12/17/2021    HCT 36.1 12/17/2021    MCV 91.6 12/17/2021    RDW 13.5 12/17/2021     12/17/2021     PT/INR:  No results found for: PTINR  PT/INR Warfarin:  No components found for: PTPATWAR, PTINRWAR  PTT:  No results found for: APTT  PTT Heparin:  No components found for: APTTHEP  Magnesium:  No results found for: MG  TSH:  No results found for: TSH  TROPONIN:  No components found for: TROP  BNP:  No results found for: BNP  FASTING LIPID PANEL:  No results found for: CHOL, HDL, TRIG  CTA PULMONARY W CONTRAST   Final Result   No evidence of pulmonary embolism. No acute aortic injury or aneurysm. There are a few nonspecific densities in the peripheral lungs. Imaging   features can be seen with COVID-19 pneumonia, though are nonspecific and can   occur with a variety of infectious and noninfectious processes. Mild cardiomegaly. Atherosclerotic disease. XR CHEST PORTABLE   Final Result   Right lower lobe atelectasis and/or infiltrate.          XR ABDOMEN (KUB) (SINGLE AP VIEW)   Final Result   Dilated small and large bowel loops suggest generalized ileus. Clinical   correlation and follow-up to resolution recommended. FLUORO FOR SURGICAL PROCEDURES   Final Result   Intraprocedural fluoroscopic spot images as above. See separate procedure   report for more information. I have personally reviewed the laboratory, cardiac diagnostic and radiographic testing as outlined above: All records from Dr. Noam Carrizales office reviewed and summarized as above    IMPRESSION:  1. Chest pain: Right-sided, left-sided, no EKG changes, flat troponin, recent negative Lexiscan stress test, no further cardiac work-up is planned at this point of time  2. Elevated troponin: Flat pattern, not consistent with ACS type I  3. Acute hypoxic respiratory failure: Postop, will follow with the PCP  4. Hypertension: Controlled  5. Hyperlipidemia: On statin  6. History of pulmonary fibrosis  7. History of COVID-19 infection in May 2021  8. Carotid artery disease s/p right CEA      RECOMMENDATIONS:   1. Continue current treatment  2. Basic metabolic panel and CBC in am  3. No further cardiac work-up is planned at this point of time    I have reviewed my findings and recommendations with patient and his family at bedside    Thank you for the consult  Electronically signed by Armani Cherry MD on 12/17/2021 at 7:13 PM  NOTE: This report was transcribed using voice recognition software.  Every effort was made to ensure accuracy; however, inadvertent computerized transcription errors may be present

## 2021-12-17 NOTE — PROGRESS NOTES
Occupational Therapy  OT BEDSIDE TREATMENT NOTE   [address] [place] [address]  [address] [place] [address]       JBFQ:  Patient Name: Cale Caro  MRN: [id number]  : 1935  Room: [address] [place] [address]     Per OT Eval:     Evaluating OT: Matias Duarte OTR/L License #  [id number]        Referring Joesph Grewal PA-C     Specific Provider Orders/Date: OT evaluation & treatment        Diagnosis:  STENOSIS     Surgery: 23: L2-L5 LAMINECTOMY AND FUSION       Past Medical History        Past Medical History:   Diagnosis Date    Hypertension           Past Surgical History         Past Surgical History:   Procedure Laterality Date    APPENDECTOMY        CAROTID ENDARTERECTOMY Right      CARPAL TUNNEL RELEASE Bilateral      JOINT REPLACEMENT Right            Precautions:  Fall Risk, neutral spine, LSO brace, drain, christie, IV      Assessment of current deficits    []? Functional mobility            [x]?ADLs           [x]? Strength                  [x]? Cognition    [x]? Functional transfers          [x]? IADLs         [x]? Safety Awareness   [x]? Endurance    [x]? Fine Coordination                         [x]? Balance      []? Vision/perception   [x]? Sensation      []? Gross Motor Coordination             []? ROM           []?  Delirium                   []? Motor Control      OT PLAN OF CARE   OT POC based on physician orders, patient diagnosis and results of clinical assessment     Frequency/Duration: 2-4 days/wk for 2 weeks PRN   Specific OT Treatment Interventions to include:   Instruction/training on adapted ADL techniques and AE recommendations to increase functional independence within precautions  Training on energy conservation strategies, correct breathing pattern and techniques to improve independence/tolerance for self-care routine  Functional transfer/mobility training/DME recommendations for increased independence, safety, and fall session. Pt completed of bed mobility, functional mobility, transfers and ADL tasks this session. Pt complaining of back pain throughout session, providing of rest breaks as needed, with extended time to complete tasks. At end of session, pt seated upright in chair, all tubes and lines intact, call light within reach. · Pt has made fair progress towards set goals.    · Continue with current plan of care focusing on increasing of independency with transfers and ADL tasks      Treatment Time In: 10:05am        Treatment Time Out: 10:35am        Treatment Charges: Mins Units   Ther Ex  [id number]     Manual Therapy 40599     Thera Activities 28860 13 1   ADL/Home Mgt [id number] 17 1   Neuro Re-ed [id number]     Group Therapy      Orthotic manage/training  [id number]     Non-Billable Time     Total Timed Treatment 30 2       Jo Ann JAIME/NOY [id number]

## 2021-12-17 NOTE — PROGRESS NOTES
Hospitalist Progress Note      SYNOPSIS: Patient admitted on 2021 for <principal problem not specified>  Mr. Marline Tao, a 82y.o. year old male  who  has a past medical history of Hypertension.      Patient was admitted under the neurosurgery service. He underwent L2-L5 laminectomy and fusion. Patient is postop, he states that his pain is minimal.  He denies chest pain, no shortness of breath, no hematuria dysuria hematemesis or hematochezia. Patient states that he has hypertension which is fairly controlled at home. He denies being diabetic. Denies sleep apnea    SUBJECTIVE:  Patient seen and examined. Patient still on 6 L but he stated that he is breathing better today. He denies any chest pain. CT scan of the chest was negative for PE    records reviewed. Temp (24hrs), Av.2 °F (36.8 °C), Min:97.7 °F (36.5 °C), Max:98.6 °F (37 °C)    DIET: Diet NPO Exceptions are: Sips of Water with Meds  CODE: Full Code    Intake/Output Summary (Last 24 hours) at 2021 1519  Last data filed at 2021 2248  Gross per 24 hour   Intake --   Output 200 ml   Net -200 ml       OBJECTIVE:    BP (!) 116/58   Pulse 95   Temp 98 °F (36.7 °C) (Temporal)   Resp 20   Ht 6' 1\" (1.854 m)   Wt 200 lb (90.7 kg)   SpO2 98%   BMI 26.39 kg/m²     General appearance: No apparent distress, appears stated age and cooperative. HEENT:  Conjunctivae/corneas clear. Neck: Supple. No jugular venous distention. Respiratory: Diminished air entry bilaterally  Cardiovascular: Regular rate rhythm, normal S1-S2  Abdomen: Soft, nontender, nondistended  Musculoskeletal: No clubbing, cyanosis, no bilateral lower extremity edema. Brisk capillary refill.    Skin:  No rashes  on visible skin  Neurologic: awake, alert and following commands     Assessment and plan:  #Acute postoperative hypoxic respiratory failure  -CT scan of the chest was negative for PE  -Pulmonologist ordered IV Solu-Medrol  -Resume incentive spirometer and bronchodilator treatment  -History of COVID-19 infection May 2021 hospitalized  -Wean off oxygen as tolerated    #Chest pain  -Acute concern has been ruled out  -Received nonischemic stress test with normal LV EF November 2021  -No acute EKG changes  -Appreciate cardiology input    #Essential hypertension:   -Resume patient medications with metoprolol hold Norvasc.     #Hyperlipidemia:   -Continue statin     #Spinal stenosis:   -Status post L2-L5 laminectomy on 12/14, supportive care, neurosurgery following.  -Pain control  -PT/OT      DISPOSITION: TBD    Medications:  REVIEWED DAILY    Infusion Medications    sodium chloride      sodium chloride 75 mL/hr at 12/17/21 1510    sodium chloride       Scheduled Medications    methylPREDNISolone  20 mg IntraVENous Q6H    metoprolol tartrate  25 mg Oral BID    ipratropium-albuterol  1 ampule Inhalation Q4H WA    amLODIPine  10 mg Oral Daily    pravastatin  10 mg Oral Daily    sodium chloride flush  5-40 mL IntraVENous 2 times per day    polyethylene glycol  17 g Oral Daily    bisacodyl  5 mg Oral Daily    sennosides-docusate sodium  1 tablet Oral BID     PRN Meds: simethicone, sodium chloride flush, sodium chloride, ondansetron **OR** ondansetron, oxyCODONE **OR** oxyCODONE, HYDROmorphone, cyclobenzaprine, magnesium hydroxide, fleet    Labs:     Recent Labs     12/15/21  0555 12/17/21  1118   WBC 15.3* 14.4*   HGB 12.6 11.9*   HCT 37.9 36.1*    201       Recent Labs     12/15/21  0555 12/16/21  0656 12/17/21  0509    135 137   K 5.6*  5.6* 4.7 4.7    99 100   CO2 20* 26 26   BUN 25* 27* 29*   CREATININE 1.1 1.3* 1.2   CALCIUM 8.7 9.0 9.0       Recent Labs     12/15/21  0555 12/16/21  0656 12/17/21  0509   PROT 6.6 6.4 6.4   ALKPHOS 62 72 71   ALT 15 5 <5   AST 41* 19 16   BILITOT 0.5 0.7 0.6       No results for input(s): INR in the last 72 hours. No results for input(s): Robertson Lester in the last 72 hours.     Chronic labs:    Lab Results   Component Value Date    PSA SEE NOTE (AA) 08/14/2020    PSA 4.89 (H) 08/14/2020    INR 1.2 12/09/2021       Radiology: REVIEWED DAILY    +++++++++++++++++++++++++++++++++++++++++++++++++  Xavi Davidson MD  [employer] - [address] [place], [place]  +++++++++++++++++++++++++++++++++++++++++++++++++  NOTE: This report was transcribed using voice recognition software. Every effort was made to ensure accuracy; however, inadvertent computerized transcription errors may be present.

## 2021-12-17 NOTE — TELEPHONE ENCOUNTER
Patient's daughter came in the office with a few questions. Patient is hospitalized, has been NPO since Wednesday, no IV and the family is concerned about dehydration. Call placed to Rocco Magallanes, she is coming to talk to Leyda.

## 2021-12-17 NOTE — PROGRESS NOTES
Physical Therapy  Facility/Department: [address] NEURO SPINE  Daily Treatment Note  NAME: Marline Tao  : 1935  MRN: [id number]    Date of Service: 2021    Evaluating PT:  Brady De La Cruz PT [id number]     Room #:  5210/5210-A  Diagnosis:  Spinal stenosis of lumbar region, unspecified whether neurogenic claudication present [M48.061]  Lumbar degenerative disc disease [M51.36]  PMHx/PSHx:     has a past medical history of Hypertension. has a past surgical history that includes joint replacement (Right); Carotid endarterectomy (Right); Appendectomy; Carpal tunnel release (Bilateral); and Lumbar spine surgery (N/A, 2021).    Procedure/Surgery: L2-L5 LAMINECTOMY AND FUSION   Precautions:  Falls, falls, alarm and O2 , FWB (full weight bearing), Hemovac drain, Spinal precautions no \"BLT\" and LSO on when greater than 45degrees (elective)  Equipment Needs: Wheeled Walker     SUBJECTIVE:  Patient lives with spouse  in a trailer with 2 steps without rail and a platform between the two to enter. Patient ambulated with a  7-iron golf club PTA. Equipment owned: None     OBJECTIVE:    Initial Evaluation  Date: 21 Treatment  Date: 21 Short Term/ Long Term   Goals   AM-PAC 6 Clicks      Was pt agreeable to Eval/treatment? Yes Yes     Does pt have pain? Yes; 9/10 LBP at level of incision 8/10 back pain; RN aware     Bed Mobility  Rolling: Min   Supine to sit: Min  Sit to supine: Min   Scooting: Min  Rolling: NT  Supine to sit: ModA  Sit to supine: NT   Scooting: Brain Rolling: Ind  Supine to sit: Ind  Sit to supine: Ind  Scooting: Ind   Transfers Sit to stand: Min  Stand to sit: Min  Stand pivot: Min  Sit to stand: ModA  Stand to sit: Brain  Stand pivot: Brain with FWW Sit to stand: Mod Ind    Stand to sit: Mod Ind    Stand pivot:  Mod Ind     Ambulation    NT 20 feet x2 with FWW with Brain 120 feet with Mod Ind     Stair negotiation: ascended and descended  NT NT 3 steps with Mod Ind  1 rail ROM BUE:  Defer to OT eval  BLE:  wfl       Strength BUE: Defer to OT eval  RLE:  Grossly 4-/5  LLE:  Grossly  4-/5   4/5   Balance Sitting EOB:  SBA  Dynamic Standing:  NT Sitting EOB: SBA  Dynamic standing: Brain with FWW Sitting EOB:  Ind  Dynamic Standing:  Ind      Pt is A & O x 3  Sensation:  Pt denies numbness and tingling to extremities  Edema:  unremarkable    Vitals:  SPO2 maintained 90-93% on 7L/min via HFNC sitting in chair  O2 flow at 8L/min during amb due to restrictions of portable tank with SPO2 maintained >90%     Therapeutic Exercises:    Seated LAQ with PF/DF x10 ea LE for neural glides  Functional mobility training    Patient education  Pt educated on review of spinal precautions, safety with mobility, gait mechanics and transfer mechanics    Patient response to education:   Pt verbalized understanding Pt demonstrated skill Pt requires further education in this area   yes yes yes     ASSESSMENT:    Comments:  Pt resting in R sidelying arrival, agreeable to PT session. Pt completed bed mobility with slow pace and required manual assist for B LE and trunk for supine>sit. Pt completed sit<>stand transfers from EOB and chair with arm rests with increased assist for lift vs lowering. Pt amb with very slow pace of movement requiring standing rest breaks every 3-5 feet. Pt amb with short step length and mildly widened VALDEMAR throughout all amb. Pt seated in chair following amb and completed above TE's. Pt remained in chair upon completion of session with daughter at bedside and RN notified of performance/positioning.     Treatment:  Patient practiced and was instructed in the following treatment:     Bed mobility: cues for sequencing and technique, manual assist for maintaining spinal neutral and log rolling technique   Transfer training: cues for hand placement, manual assist for lift/lower with sit<>stand transfers, manual assist for balance and walker progression during standing   Gait training: cues for upright posture and increased step length, manual assist for balance and walker placement    PLAN:    Patient is making continued progress towards established goals. Will continue with current POC.         PLAN:    Time in  1222  Time out  1255    Total Treatment Time  33 min    CPT codes:  [] Gait training 34043 0 minutes  [] Manual therapy 46452 0 minutes  [x] Therapeutic activities 03547 33 minutes  [] Therapeutic exercises 31899 0 minutes  [] Neuromuscular reeducation 14546 0 minutes      Chacha Hall PT, DPT  [id number]

## 2021-12-17 NOTE — PROGRESS NOTES
Department of Neurosurgery  Progress Note    CHIEF COMPLAINT: lumbar fusion    SUBJECTIVE:  alexis op site pain okay. +flatulence. No SOB or CP    REVIEW OF SYSTEMS :  Constitutional: Negative for chills and fever. Neurological: Negative for dizziness, tremors and speech change. OBJECTIVE:   VITALS:  BP (!) 166/76   Pulse 110   Temp 98.5 °F (36.9 °C) (Temporal)   Resp 16   Ht 6' 1\" (1.854 m)   Wt 200 lb (90.7 kg)   SpO2 93%   BMI 26.39 kg/m²   PHYSICAL:  CONSTITUTIONAL:  awake, alert, cooperative, no apparent distress, and appears stated age and BECKHAM.   FC    DATA:  CBC:   Lab Results   Component Value Date    WBC 15.3 12/15/2021    RBC 4.12 12/15/2021    HGB 12.6 12/15/2021    HCT 37.9 12/15/2021    MCV 92.0 12/15/2021    MCH 30.6 12/15/2021    MCHC 33.2 12/15/2021    RDW 13.4 12/15/2021     12/15/2021    MPV 9.1 12/15/2021     BMP:    Lab Results   Component Value Date     12/17/2021    K 4.7 12/17/2021    K 5.6 12/15/2021     12/17/2021    CO2 26 12/17/2021    BUN 29 12/17/2021    LABALBU 3.4 12/17/2021    CREATININE 1.2 12/17/2021    CALCIUM 9.0 12/17/2021    GFRAA >60 12/17/2021    LABGLOM 58 12/17/2021    GLUCOSE 143 12/17/2021     PT/INR:    Lab Results   Component Value Date    PROTIME 12.7 12/09/2021    INR 1.2 12/09/2021     PTT:  No results found for: APTT, PTT[APTT}    Current Inpatient Medications  Current Facility-Administered Medications: simethicone (MYLICON) chewable tablet 80 mg, 80 mg, Oral, Q6H PRN  metoprolol tartrate (LOPRESSOR) tablet 25 mg, 25 mg, Oral, BID  ipratropium-albuterol (DUONEB) nebulizer solution 1 ampule, 1 ampule, Inhalation, Q4H WA  amLODIPine (NORVASC) tablet 10 mg, 10 mg, Oral, Daily  pravastatin (PRAVACHOL) tablet 10 mg, 10 mg, Oral, Daily  sodium chloride flush 0.9 % injection 5-40 mL, 5-40 mL, IntraVENous, 2 times per day  sodium chloride flush 0.9 % injection 5-40 mL, 5-40 mL, IntraVENous, PRN  0.9 % sodium chloride infusion, 25 mL, IntraVENous, PRN  ondansetron (ZOFRAN-ODT) disintegrating tablet 4 mg, 4 mg, Oral, Q8H PRN **OR** ondansetron (ZOFRAN) injection 4 mg, 4 mg, IntraVENous, Q6H PRN  oxyCODONE (ROXICODONE) immediate release tablet 5 mg, 5 mg, Oral, Q4H PRN **OR** oxyCODONE HCl (OXY-IR) immediate release tablet 10 mg, 10 mg, Oral, Q4H PRN  HYDROmorphone (DILAUDID) injection 0.5 mg, 0.5 mg, IntraVENous, Q3H PRN  cyclobenzaprine (FLEXERIL) tablet 10 mg, 10 mg, Oral, TID PRN  polyethylene glycol (GLYCOLAX) packet 17 g, 17 g, Oral, Daily  bisacodyl (DULCOLAX) EC tablet 5 mg, 5 mg, Oral, Daily  sennosides-docusate sodium (SENOKOT-S) 8.6-50 MG tablet 1 tablet, 1 tablet, Oral, BID  magnesium hydroxide (MILK OF MAGNESIA) 400 MG/5ML suspension 30 mL, 30 mL, Oral, Daily PRN  fleet rectal enema 1 enema, 1 enema, Rectal, Daily PRN    ASSESSMENT:   · S/p L2-5 PLF on 12/14 - stable  · Drain management  · Post op pain requiring IV narcotics  · ileus    PLAN:  · PT/OT  · WBAT with brace  · Pain control  · Admit  · gen surg following      Electronically signed by RADHA May on 12/17/2021 at 7:25 AM

## 2021-12-17 NOTE — PROGRESS NOTES
Messaged Dr Nery Villarreal with General Surgery, [facility], that we spoke to Bonny, Ml Chan and Agata Lindsay regarding patient's daughter concerns with patient being NPO and NO IV fluids being ordered. Informed her that an order was placed for Normal saline at this time.

## 2021-12-18 ENCOUNTER — APPOINTMENT (OUTPATIENT)
Dept: GENERAL RADIOLOGY | Age: 86
DRG: 459 | End: 2021-12-18
Attending: NEUROLOGICAL SURGERY
Payer: MEDICARE

## 2021-12-18 LAB
ALBUMIN SERPL-MCNC: 3.3 G/DL (ref 3.5–5.2)
ALP BLD-CCNC: 73 U/L (ref 40–129)
ALT SERPL-CCNC: 6 U/L (ref 0–40)
ANION GAP SERPL CALCULATED.3IONS-SCNC: 10 MMOL/L (ref 7–16)
AST SERPL-CCNC: 16 U/L (ref 0–39)
BILIRUB SERPL-MCNC: 0.5 MG/DL (ref 0–1.2)
BUN BLDV-MCNC: 33 MG/DL (ref 6–23)
CALCIUM SERPL-MCNC: 8.7 MG/DL (ref 8.6–10.2)
CHLORIDE BLD-SCNC: 102 MMOL/L (ref 98–107)
CO2: 25 MMOL/L (ref 22–29)
CREAT SERPL-MCNC: 1 MG/DL (ref 0.7–1.2)
GFR AFRICAN AMERICAN: >60
GFR NON-AFRICAN AMERICAN: >60 ML/MIN/1.73
GLUCOSE BLD-MCNC: 190 MG/DL (ref 74–99)
POTASSIUM SERPL-SCNC: 4.5 MMOL/L (ref 3.5–5)
PROCALCITONIN: 0.38 NG/ML (ref 0–0.08)
SODIUM BLD-SCNC: 137 MMOL/L (ref 132–146)
TOTAL PROTEIN: 6.1 G/DL (ref 6.4–8.3)
TROPONIN, HIGH SENSITIVITY: 28 NG/L (ref 0–11)
TROPONIN, HIGH SENSITIVITY: 30 NG/L (ref 0–11)

## 2021-12-18 PROCEDURE — 2580000003 HC RX 258: Performed by: PHYSICIAN ASSISTANT

## 2021-12-18 PROCEDURE — 84145 PROCALCITONIN (PCT): CPT

## 2021-12-18 PROCEDURE — 6370000000 HC RX 637 (ALT 250 FOR IP): Performed by: INTERNAL MEDICINE

## 2021-12-18 PROCEDURE — 71045 X-RAY EXAM CHEST 1 VIEW: CPT

## 2021-12-18 PROCEDURE — 97530 THERAPEUTIC ACTIVITIES: CPT | Performed by: PHYSICAL THERAPIST

## 2021-12-18 PROCEDURE — 94640 AIRWAY INHALATION TREATMENT: CPT

## 2021-12-18 PROCEDURE — 6370000000 HC RX 637 (ALT 250 FOR IP): Performed by: STUDENT IN AN ORGANIZED HEALTH CARE EDUCATION/TRAINING PROGRAM

## 2021-12-18 PROCEDURE — 93005 ELECTROCARDIOGRAM TRACING: CPT | Performed by: NEUROLOGICAL SURGERY

## 2021-12-18 PROCEDURE — 6370000000 HC RX 637 (ALT 250 FOR IP): Performed by: PHYSICIAN ASSISTANT

## 2021-12-18 PROCEDURE — 84484 ASSAY OF TROPONIN QUANT: CPT

## 2021-12-18 PROCEDURE — 2700000000 HC OXYGEN THERAPY PER DAY

## 2021-12-18 PROCEDURE — 6370000000 HC RX 637 (ALT 250 FOR IP): Performed by: NEUROLOGICAL SURGERY

## 2021-12-18 PROCEDURE — 36415 COLL VENOUS BLD VENIPUNCTURE: CPT

## 2021-12-18 PROCEDURE — 82550 ASSAY OF CK (CPK): CPT

## 2021-12-18 PROCEDURE — 99232 SBSQ HOSP IP/OBS MODERATE 35: CPT | Performed by: SURGERY

## 2021-12-18 PROCEDURE — 6360000002 HC RX W HCPCS: Performed by: NEUROLOGICAL SURGERY

## 2021-12-18 PROCEDURE — 82552 ASSAY OF CPK IN BLOOD: CPT

## 2021-12-18 PROCEDURE — 2060000000 HC ICU INTERMEDIATE R&B

## 2021-12-18 PROCEDURE — 99233 SBSQ HOSP IP/OBS HIGH 50: CPT | Performed by: INTERNAL MEDICINE

## 2021-12-18 PROCEDURE — 6360000002 HC RX W HCPCS: Performed by: INTERNAL MEDICINE

## 2021-12-18 PROCEDURE — 80053 COMPREHEN METABOLIC PANEL: CPT

## 2021-12-18 RX ORDER — LACTULOSE 10 G/15ML
20 SOLUTION ORAL 3 TIMES DAILY
Status: COMPLETED | OUTPATIENT
Start: 2021-12-18 | End: 2021-12-18

## 2021-12-18 RX ADMIN — METHYLPREDNISOLONE SODIUM SUCCINATE 20 MG: 40 INJECTION, POWDER, FOR SOLUTION INTRAMUSCULAR; INTRAVENOUS at 05:21

## 2021-12-18 RX ADMIN — SODIUM CHLORIDE: 9 INJECTION, SOLUTION INTRAVENOUS at 05:29

## 2021-12-18 RX ADMIN — METOPROLOL TARTRATE 25 MG: 25 TABLET, FILM COATED ORAL at 09:05

## 2021-12-18 RX ADMIN — CYCLOBENZAPRINE 10 MG: 10 TABLET, FILM COATED ORAL at 22:02

## 2021-12-18 RX ADMIN — OXYCODONE 5 MG: 5 TABLET ORAL at 18:51

## 2021-12-18 RX ADMIN — METHYLPREDNISOLONE SODIUM SUCCINATE 20 MG: 40 INJECTION, POWDER, FOR SOLUTION INTRAMUSCULAR; INTRAVENOUS at 00:23

## 2021-12-18 RX ADMIN — SENNOSIDES AND DOCUSATE SODIUM 1 TABLET: 50; 8.6 TABLET ORAL at 22:02

## 2021-12-18 RX ADMIN — CYCLOBENZAPRINE 10 MG: 10 TABLET, FILM COATED ORAL at 09:05

## 2021-12-18 RX ADMIN — AMLODIPINE BESYLATE 10 MG: 10 TABLET ORAL at 09:05

## 2021-12-18 RX ADMIN — IPRATROPIUM BROMIDE AND ALBUTEROL SULFATE 1 AMPULE: .5; 2.5 SOLUTION RESPIRATORY (INHALATION) at 12:01

## 2021-12-18 RX ADMIN — BISACODYL 5 MG: 5 TABLET, COATED ORAL at 09:05

## 2021-12-18 RX ADMIN — IPRATROPIUM BROMIDE AND ALBUTEROL SULFATE 1 AMPULE: .5; 2.5 SOLUTION RESPIRATORY (INHALATION) at 16:40

## 2021-12-18 RX ADMIN — ACETAMINOPHEN 650 MG: 325 TABLET ORAL at 15:02

## 2021-12-18 RX ADMIN — IPRATROPIUM BROMIDE AND ALBUTEROL SULFATE 1 AMPULE: .5; 2.5 SOLUTION RESPIRATORY (INHALATION) at 20:05

## 2021-12-18 RX ADMIN — OXYCODONE 5 MG: 5 TABLET ORAL at 15:02

## 2021-12-18 RX ADMIN — IPRATROPIUM BROMIDE AND ALBUTEROL SULFATE 1 AMPULE: .5; 2.5 SOLUTION RESPIRATORY (INHALATION) at 09:36

## 2021-12-18 RX ADMIN — LACTULOSE 20 G: 20 SOLUTION ORAL at 15:02

## 2021-12-18 RX ADMIN — METOPROLOL TARTRATE 25 MG: 25 TABLET, FILM COATED ORAL at 22:01

## 2021-12-18 RX ADMIN — Medication 10 ML: at 22:03

## 2021-12-18 RX ADMIN — LACTULOSE 20 G: 20 SOLUTION ORAL at 22:01

## 2021-12-18 RX ADMIN — Medication 10 ML: at 10:31

## 2021-12-18 RX ADMIN — ENOXAPARIN SODIUM 40 MG: 100 INJECTION SUBCUTANEOUS at 09:05

## 2021-12-18 RX ADMIN — POLYETHYLENE GLYCOL 3350 17 G: 17 POWDER, FOR SOLUTION ORAL at 09:04

## 2021-12-18 RX ADMIN — SENNOSIDES AND DOCUSATE SODIUM 1 TABLET: 50; 8.6 TABLET ORAL at 09:05

## 2021-12-18 RX ADMIN — LACTULOSE 20 G: 20 SOLUTION ORAL at 10:32

## 2021-12-18 ASSESSMENT — PAIN DESCRIPTION - ONSET: ONSET: ON-GOING

## 2021-12-18 ASSESSMENT — PAIN DESCRIPTION - PAIN TYPE: TYPE: SURGICAL PAIN

## 2021-12-18 ASSESSMENT — PAIN DESCRIPTION - ORIENTATION: ORIENTATION: LOWER;MID

## 2021-12-18 ASSESSMENT — PAIN DESCRIPTION - PROGRESSION: CLINICAL_PROGRESSION: NOT CHANGED

## 2021-12-18 ASSESSMENT — PAIN SCALES - GENERAL
PAINLEVEL_OUTOF10: 4
PAINLEVEL_OUTOF10: 0
PAINLEVEL_OUTOF10: 8
PAINLEVEL_OUTOF10: 0

## 2021-12-18 ASSESSMENT — PAIN - FUNCTIONAL ASSESSMENT: PAIN_FUNCTIONAL_ASSESSMENT: PREVENTS OR INTERFERES SOME ACTIVE ACTIVITIES AND ADLS

## 2021-12-18 ASSESSMENT — PAIN DESCRIPTION - FREQUENCY: FREQUENCY: CONTINUOUS

## 2021-12-18 ASSESSMENT — PAIN DESCRIPTION - LOCATION: LOCATION: BACK

## 2021-12-18 ASSESSMENT — PAIN DESCRIPTION - DESCRIPTORS: DESCRIPTORS: ACHING

## 2021-12-18 NOTE — PROGRESS NOTES
Physical Therapy  Facility/Department: [address] NEURO SPINE  Daily Treatment Note  NAME: Cynthia Caro  : 1935  MRN: [id number]    Date of Service: 2021    Evaluating PT: Gerard Clifford, PT [id number]     Room #:  0167/0031-C  Diagnosis:  Spinal stenosis of lumbar region, unspecified whether neurogenic claudication present [M48.061]  Lumbar degenerative disc disease [M51.36]  PMHx/PSHx:     has a past medical history of Hypertension.    has a past surgical history that includes joint replacement (Right); Carotid endarterectomy (Right); Appendectomy; Carpal tunnel release (Bilateral); and Lumbar spine surgery (N/A, 2021).       Procedure/Surgery: L2-L5 LAMINECTOMY AND FUSION   Precautions:  Falls, falls, alarm and O2 , FWB (full weight bearing), Hemovac drain, Spinal precautions no \"BLT\" and LSO on when greater than 45degrees (elective)  Equipment Needs: Wheeled Walker     SUBJECTIVE:  Patient lives with spouse  in a trailer with 2 steps without rail and a platform between the two to enter.   Patient ambulated with a  7-iron golf club PTA. Equipment owned: None     OBJECTIVE:    Initial Evaluation  Date: 21 Treatment  Date: 21 Short Term/ Long Term   Goals   AM-PAC 6 Clicks  90/50     Was pt agreeable to Eval/treatment? Yes Yes     Does pt have pain?  Yes; 9/10 LBP at level of incision 8/10 back pain; RN aware     Bed Mobility  Rolling: Min   Supine to sit: Min  Sit to supine: Min   Scooting: Min  NT, pt received sitting EOB with HCA upon arrival Rolling: Ind  Supine to sit: Ind  Sit to supine: Ind  Scooting: Ind   Transfers Sit to stand: Min  Stand to sit: Min  Stand pivot: Min  Sit to stand: Brain  Stand to sit: Brain  Stand pivot: Brain with FWW Sit to stand: Mod Ind    Stand to sit: Mod Ind    Stand pivot: Mod Ind     Ambulation    NT 40 feet x2, 60 feet with Brain with  feet with Mod Ind     Stair negotiation: ascended and descended  NT NT 3 steps with Mod Ind  1 rail    ROM BUE: codes:  [] Gait training 71590 0 minutes  [] Manual therapy 31297 0 minutes  [x] Therapeutic activities 50667 30 minutes  [] Therapeutic exercises 46850 0 minutes  [] Neuromuscular reeducation 54515 0 minutes      Hunter Boyce PT, DPT  [id number]

## 2021-12-18 NOTE — CONSULTS
[place]  Department of Internal Medicine  Division of Pulmonary, Critical Care and Sleep Medicine  Consult Note    Urszula Bennett DO, MPH, Kyara Floyd MD, [facility]  [facility]      Patient: Yunier Arreola  MRN: [id number]  : 1935    Encounter Time: 1:39 PM     Date of Admission: 2021  5:17 AM    Primary Care Physician: Ирина Hurst MD    Reason for Consultation: Post Op respiratory failure    SUBJECTIVBE    Son by side  On 4 liters  No SOB  No cough or hemoptysis  CT with chronic changes    OBJECTIVE:     PHYSICAL EXAM:   VITALS:   Vitals:    21 0800 21 0936 21 1114 21 1201   BP: (!) 152/69  (!) 145/66    Pulse: 96  92    Resp: 16 16 16 16   Temp: 97.4 °F (36.3 °C)  97.9 °F (36.6 °C)    TempSrc: Temporal  Temporal    SpO2: 93% (!) 49% 95% 92%   Weight:       Height:            Intake/Output Summary (Last 24 hours) at 2021 1339  Last data filed at 2021 0530  Gross per 24 hour   Intake 1261 ml   Output 300 ml   Net 961 ml        CONSTITUTIONAL:   A&O x 3, NAD  SKIN:     No rash, No suspicious lesions or skin discoloration  HEENT:     EOMI, MMM, No thrush  NECK:    No bruits, No JVP apprechiated  CV:      Sinus,  No murmur, No rubs, No gallops  PULMONARY:   Couse BS,  No Wheezing, No Rales, No Rhonchi      No noted egophony  ABDOMEN:     Soft, non-tender. BS normal. No R/R/G  EXT:    No deformities . No clubbing.       + lower extremity edema, No venous stasis  PULSE:   Appears equal and palpable.   PSYCHIATRIC:  Seems appropriate, No acute psycosis  MS:    No fractures, No gross weakness  NEUROLOGIC:   The clinical assessment is non-focal     DATA: IMAGING & TESTING:     LABORATORY TESTS:    CBC:   Lab Results   Component Value Date    WBC 14.4 2021    RBC 3.94 2021    HGB 11.9 2021    HCT 36.1 2021    MCV 91.6 2021    MCH 30.2 2021    MCHC 33.0 2021 RDW 13.5 12/17/2021     12/17/2021    MPV 9.6 12/17/2021     CMP:    Lab Results   Component Value Date     12/18/2021    K 4.5 12/18/2021    K 5.6 12/15/2021     12/18/2021    CO2 25 12/18/2021    BUN 33 12/18/2021    CREATININE 1.0 12/18/2021    GFRAA >60 12/18/2021    LABGLOM >60 12/18/2021    GLUCOSE 190 12/18/2021    PROT 6.1 12/18/2021    LABALBU 3.3 12/18/2021    CALCIUM 8.7 12/18/2021    BILITOT 0.5 12/18/2021    ALKPHOS 73 12/18/2021    AST 16 12/18/2021    ALT 6 12/18/2021     Magnesium:  No results found for: MG  Phosphorus:  No results found for: PHOS     PRO-BNP: No results found for: PROBNP   ABGs:   Lab Results   Component Value Date    PH 7.394 12/16/2021    PO2 51.0 12/16/2021    PCO2 45.8 12/16/2021     Hemoglobin A1C: No components found for: HGBA1C    IMAGING:  Imaging tests were completed and reviewed and discussed radiology and care team involved and reveals     XR ABDOMEN (KUB) (SINGLE AP VIEW)  Result Date: 12/16/2021Dilated small and large bowel loops suggest generalized ileus. Clinical correlation and follow-up to resolution recommended. MRI LUMBAR SPINE WO CONTRAST  Result Date: 11/24/2021  Multilevel degenerative disc disease with associated facet and ligamentous hypertrophy resulting in canal stenosis at L3-4 and L4-5. Bilateral foraminal narrowing as described above. XR CHEST PORTABLE    Result Date: 12/16/2021  EXAMINATION: ONE XRAY VIEW OF THE CHEST 12/16/2021 4:19 pm COMPARISON: 12/09/2021 HISTORY: ORDERING SYSTEM PROVIDED HISTORY: hypoxia TECHNOLOGIST PROVIDED HISTORY: Reason for exam:->hypoxia What reading provider will be dictating this exam?->CRC FINDINGS: Right lower lobe atelectasis and/or infiltrate. .  There is no effusion or pneumothorax. The cardiomediastinal silhouette is without acute process. The osseous structures are without acute process. Right lower lobe atelectasis and/or infiltrate.      CTA PULMONARY W CONTRAST  Result Date: 12/16/2021  INDINGS: No thoracic aortic aneurysm or dissection. Mild-to-moderate scattered calcified plaque in the thoracic aorta. No evidence of pulmonary embolism. Mild cardiomegaly. Coronary atherosclerosis. No pericardial effusion. Shotty subcentimeter short axis mediastinal hilar nodes. No pleural effusion or pneumothorax. Low lung volumes. There are a few small peripheral densities in the lungs which may represent scarring or infection (for example series 4, image 73). Another curvilinear density is noted the medial left lower lobe (series 4 image 168). Mild dependent atelectasis, right greater than left. The central airways are patent. There is centrilobular and paraseptal emphysema. Degenerative changes of the spine. The visualized abdomen demonstrates changes of cholecystectomy and the splenic parenchymal calcification. No evidence of pulmonary embolism. No acute aortic injury or aneurysm. There are a few nonspecific densities in the peripheral lungs. Imaging features can be seen with COVID-19 pneumonia, though are nonspecific and can occur with a variety of infectious and noninfectious processes. Mild cardiomegaly. Atherosclerotic disease. Assessment:   1. Post op respiratory failure  2. 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 Plueral plaques ? Old infection or possible asbestosis  4. CT show sutttble changes of pulmonary fibrosis (periphearl)        Plan:   1. CTA negative for PE but noted atelectasis  2. Non specific Chest pain but not pleuritic  3. Most of the finding are chronic, some fibrosis for sure   4. Continue ICS,   5. Bronchodialators  6. Wean oxygen as able   7.  Three doses of steroids to move things along      [facility], DO BUCKNER, MPH, Genoveva Hernandez  Professor of Internal Medicine  Pulmonary, Critical Care and Sleep Medicine

## 2021-12-18 NOTE — PROGRESS NOTES
Hospitalist Progress Note      SYNOPSIS: Patient admitted on 2021 for <principal problem not specified>  Mr. Ute Mc, a 82y.o. year old male  who  has a past medical history of Hypertension.      Patient was admitted under the neurosurgery service. He underwent L2-L5 laminectomy and fusion. Patient is postop, he states that his pain is minimal.  He denies chest pain, no shortness of breath, no hematuria dysuria hematemesis or hematochezia. Patient states that he has hypertension which is fairly controlled at home. He denies being diabetic. Denies sleep apnea    SUBJECTIVE:  Patient seen and examined. Patient still on 6 L but he stated that he is breathing better today. He denies any chest pain. No acute changes overnight    records reviewed. Temp (24hrs), Av °F (36.7 °C), Min:97.4 °F (36.3 °C), Max:98.7 °F (37.1 °C)    DIET: ADULT DIET; Clear Liquid  CODE: Full Code    Intake/Output Summary (Last 24 hours) at 2021 1409  Last data filed at 2021 0530  Gross per 24 hour   Intake 1261 ml   Output 300 ml   Net 961 ml       OBJECTIVE:    BP (!) 145/66   Pulse 92   Temp 97.9 °F (36.6 °C) (Temporal)   Resp 16   Ht 6' 1\" (1.854 m)   Wt 200 lb (90.7 kg)   SpO2 92%   BMI 26.39 kg/m²     General appearance: No apparent distress, appears stated age and cooperative. HEENT:  Conjunctivae/corneas clear. Neck: Supple. No jugular venous distention. Respiratory: Diminished air entry bilaterally  Cardiovascular: Regular rate rhythm, normal S1-S2  Abdomen: Soft, nontender, nondistended  Musculoskeletal: No clubbing, cyanosis, no bilateral lower extremity edema. Brisk capillary refill.    Skin:  No rashes  on visible skin  Neurologic: awake, alert and following commands     Assessment and plan:  #Acute postoperative hypoxic respiratory failure  -CT scan of the chest was negative for PE  -Pulmonologist ordered IV Solu-Medrol  -Resume incentive spirometer and bronchodilator treatment  -History of COVID-19 infection May 2021 hospitalized  -Wean off oxygen as tolerated    #Chest pain  -Resolved  -Acute concern has been ruled out  -Received nonischemic stress test with normal LV EF November 2021  -No acute EKG changes  -Appreciate cardiology input    #Post operative ileus  -Resolved    #Essential hypertension:   -Resume patient medications with metoprolol hold Norvasc.     #Hyperlipidemia:   -Continue statin     #Spinal stenosis:   -Status post L2-L5 laminectomy on 12/14, supportive care, neurosurgery following.  -Pain control  -PT/OT      DISPOSITION: TBD    Medications:  REVIEWED DAILY    Infusion Medications    sodium chloride      sodium chloride       Scheduled Medications    lactulose  20 g Oral TID    enoxaparin  40 mg SubCUTAneous Daily    metoprolol tartrate  25 mg Oral BID    ipratropium-albuterol  1 ampule Inhalation Q4H WA    amLODIPine  10 mg Oral Daily    pravastatin  10 mg Oral Daily    sodium chloride flush  5-40 mL IntraVENous 2 times per day    polyethylene glycol  17 g Oral Daily    bisacodyl  5 mg Oral Daily    sennosides-docusate sodium  1 tablet Oral BID     PRN Meds: acetaminophen, simethicone, sodium chloride flush, sodium chloride, ondansetron **OR** ondansetron, oxyCODONE **OR** oxyCODONE, HYDROmorphone, cyclobenzaprine, magnesium hydroxide, fleet    Labs:     Recent Labs     12/17/21  1118   WBC 14.4*   HGB 11.9*   HCT 36.1*          Recent Labs     12/16/21  0656 12/17/21  0509 12/18/21  0626    137 137   K 4.7 4.7 4.5   CL 99 100 102   CO2 26 26 25   BUN 27* 29* 33*   CREATININE 1.3* 1.2 1.0   CALCIUM 9.0 9.0 8.7       Recent Labs     12/16/21  0656 12/17/21  0509 12/18/21  0626   PROT 6.4 6.4 6.1*   ALKPHOS 72 71 73   ALT 5 <5 6   AST 19 16 16   BILITOT 0.7 0.6 0.5       No results for input(s): INR in the last 72 hours. No results for input(s): Angel Jocelyn in the last 72 hours.     Chronic labs:    Lab Results   Component Value Date    PSA SEE NOTE (AA) 08/14/2020    PSA 4.89 (H) 08/14/2020    INR 1.2 12/09/2021       Radiology: REVIEWED DAILY    +++++++++++++++++++++++++++++++++++++++++++++++++  Jozef Hernández MD  [facility] - [address] [place], [place]  +++++++++++++++++++++++++++++++++++++++++++++++++  NOTE: This report was transcribed using voice recognition software. Every effort was made to ensure accuracy; however, inadvertent computerized transcription errors may be present.

## 2021-12-18 NOTE — PROGRESS NOTES
Mom calling for results. Please review. Some labs are still pending.   Pts need for continuous oxygen has increased. Pt repeatedly states that he \"doesn't fee right\" , and \"something is wrong\" When pt ambulates pts o2 drops to 80 on 7 liters. Pt states that he has a new onset burning and discomfort in his chest. Pulmonary, Neurosurgery and the hospitalist all made aware. Orders received for state EKG lab collection and to be transferred to a telemetry unit.

## 2021-12-18 NOTE — PLAN OF CARE
Sm amt of sero sang dng noticed on bed linens x 2. Lower L flank spot where drain was appears to be the site of leakage. Cleaned. New dressing. New iv. Pt did quite a bit of walking this lefyt. See flow sheet. Supportive wife in to visit for most of lefty. Patricia cld supper well.       Large loose bm at appro 1650

## 2021-12-18 NOTE — PROGRESS NOTES
General Surgery Progress Note:    CC: ileus     S: feels better + BM yesterday,       Objective:  @/64   Pulse 91   Temp 98.7 °F (37.1 °C)   Resp 18   Ht 6' 1\" (1.854 m)   Wt 200 lb (90.7 kg)   SpO2 92%   BMI 26.39 kg/m² @    Physical -     Gen: NAD  Resp: Breathing Comfortably, no resp distress fairly clear b/l   CV: Reg Rate no extra heart sounds   Abd: softly distended + tympany non tender   EXT nvi    Assessment/Plan: S/p L2-5 PLF with post op ileus     - clears already started, advance as able       Mar Cárdenas MD FACS     9:02 AM

## 2021-12-18 NOTE — PROGRESS NOTES
[place]  Department of Internal Medicine  Division of Pulmonary, Critical Care and Sleep Medicine  Consult Note    Early DO Felicia, MPH, Mainor Silva MD, [facility]  [facility]      Patient: Angie Starr  MRN: [id number]  : 1935    Encounter Time: 3:16 PM     Date of Admission: 2021  5:17 AM    Primary Care Physician: Mariana Byrd MD    Reason for Consultation: Post Op respiratory failure    SUBJECTIVE:    Son by side  On 4 liters  No SOB  No cough or hemoptysis  CT with chronic changes  Cr normalized     OBJECTIVE:     PHYSICAL EXAM:   VITALS:   Vitals:    21 0936 21 1114 21 1201 21 1458   BP:  (!) 145/66  (!) 148/67   Pulse:  92  106   Resp: 16 16 16 16   Temp:  97.9 °F (36.6 °C)  98.8 °F (37.1 °C)   TempSrc:  Temporal  Temporal   SpO2: (!) 49% 95% 92% 91%   Weight:       Height:            Intake/Output Summary (Last 24 hours) at 2021 1516  Last data filed at 2021 0530  Gross per 24 hour   Intake 1261 ml   Output 300 ml   Net 961 ml        CONSTITUTIONAL:   A&O x 3, NAD  SKIN:     No rash, No suspicious lesions or skin discoloration  HEENT:     EOMI, MMM, No thrush  NECK:    No bruits, No JVP apprechiated  CV:      Sinus,  No murmur, No rubs, No gallops  PULMONARY:   Couse BS,  No Wheezing, No Rales, No Rhonchi      No noted egophony  ABDOMEN:     Soft, non-tender. BS normal. No R/R/G  EXT:    No deformities . No clubbing.       + lower extremity edema, No venous stasis  PULSE:   Appears equal and palpable.   PSYCHIATRIC:  Seems appropriate, No acute psycosis  MS:    No fractures, No gross weakness  NEUROLOGIC:   The clinical assessment is non-focal     DATA: IMAGING & TESTING:     LABORATORY TESTS:    CBC:   Lab Results   Component Value Date    WBC 14.4 2021    RBC 3.94 2021    HGB 11.9 2021    HCT 36.1 2021    MCV 91.6 2021    MCH 30.2 2021 MCHC 33.0 12/17/2021    RDW 13.5 12/17/2021     12/17/2021    MPV 9.6 12/17/2021     CMP:    Lab Results   Component Value Date     12/18/2021    K 4.5 12/18/2021    K 5.6 12/15/2021     12/18/2021    CO2 25 12/18/2021    BUN 33 12/18/2021    CREATININE 1.0 12/18/2021    GFRAA >60 12/18/2021    LABGLOM >60 12/18/2021    GLUCOSE 190 12/18/2021    PROT 6.1 12/18/2021    LABALBU 3.3 12/18/2021    CALCIUM 8.7 12/18/2021    BILITOT 0.5 12/18/2021    ALKPHOS 73 12/18/2021    AST 16 12/18/2021    ALT 6 12/18/2021     Magnesium:  No results found for: MG  Phosphorus:  No results found for: PHOS     PRO-BNP: No results found for: PROBNP   ABGs:   Lab Results   Component Value Date    PH 7.394 12/16/2021    PO2 51.0 12/16/2021    PCO2 45.8 12/16/2021     Hemoglobin A1C: No components found for: HGBA1C    IMAGING:  Imaging tests were completed and reviewed and discussed radiology and care team involved and reveals     XR ABDOMEN (KUB) (SINGLE AP VIEW)  Result Date: 12/16/2021Dilated small and large bowel loops suggest generalized ileus. Clinical correlation and follow-up to resolution recommended. MRI LUMBAR SPINE WO CONTRAST  Result Date: 11/24/2021  Multilevel degenerative disc disease with associated facet and ligamentous hypertrophy resulting in canal stenosis at L3-4 and L4-5. Bilateral foraminal narrowing as described above. XR CHEST PORTABLE    Result Date: 12/16/2021  EXAMINATION: ONE XRAY VIEW OF THE CHEST 12/16/2021 4:19 pm COMPARISON: 12/09/2021 HISTORY: ORDERING SYSTEM PROVIDED HISTORY: hypoxia TECHNOLOGIST PROVIDED HISTORY: Reason for exam:->hypoxia What reading provider will be dictating this exam?->CRC FINDINGS: Right lower lobe atelectasis and/or infiltrate. .  There is no effusion or pneumothorax. The cardiomediastinal silhouette is without acute process. The osseous structures are without acute process. Right lower lobe atelectasis and/or infiltrate.      CTA PULMONARY W CONTRAST  Result Date: 12/16/2021  INDINGS: No thoracic aortic aneurysm or dissection. Mild-to-moderate scattered calcified plaque in the thoracic aorta. No evidence of pulmonary embolism. Mild cardiomegaly. Coronary atherosclerosis. No pericardial effusion. Shotty subcentimeter short axis mediastinal hilar nodes. No pleural effusion or pneumothorax. Low lung volumes. There are a few small peripheral densities in the lungs which may represent scarring or infection (for example series 4, image 73). Another curvilinear density is noted the medial left lower lobe (series 4 image 168). Mild dependent atelectasis, right greater than left. The central airways are patent. There is centrilobular and paraseptal emphysema. Degenerative changes of the spine. The visualized abdomen demonstrates changes of cholecystectomy and the splenic parenchymal calcification. No evidence of pulmonary embolism. No acute aortic injury or aneurysm. There are a few nonspecific densities in the peripheral lungs. Imaging features can be seen with COVID-19 pneumonia, though are nonspecific and can occur with a variety of infectious and noninfectious processes. Mild cardiomegaly. Atherosclerotic disease. Assessment:   1. Post op respiratory failure  2. Hypoxemic Respiratory Failure   3. CT evidence of cytic disease of the lung  4. Plueral plaques ? Old infection or possible asbestosis  5. CT show sutttble changes of pulmonary fibrosis (periphearl)        Plan:   1. CTA negative for PE but noted atelectasis  2. Non specific Chest pain but not pleuritic  3. Most of the finding are chronic, some fibrosis for sure   4. Continue ICS,   5. Bronchodialators  6. Check Procalcitonin  7. Stop fluids Cr is normal now and monitor  8. Wean oxygen as able   9.  Three doses of steroids to move things along      Delvin Briones DO DO, MPH, Jerl Chamber  Professor of Internal Medicine  Pulmonary, Critical Care and Sleep Medicine

## 2021-12-18 NOTE — PLAN OF CARE
Problem: Discharge Planning:  Goal: Discharged to appropriate level of care  Description: Discharged to appropriate level of care  Outcome: Ongoing     Problem: Infection - Surgical Site:  Goal: Will show no infection signs and symptoms  Description: Will show no infection signs and symptoms  12/18/2021 1403 by Verónica Marie RN  Outcome: Ongoing  12/18/2021 0058 by Dorene Hines RN  Outcome: Met This Shift     Problem: Mobility - Impaired:  Goal: Mobility will improve to maximum level  Description: Mobility will improve to maximum level  Outcome: Ongoing     Problem: Pain - Acute:  Goal: Pain level will decrease  Description: Pain level will decrease  Outcome: Ongoing     Problem: Sensory Perception - Impaired:  Goal: Sensory function intact, lower extremity  Description: Sensory function intact, lower extremity  Outcome: Ongoing     Problem: Skin Integrity:  Goal: Will show no infection signs and symptoms  Description: Will show no infection signs and symptoms  12/18/2021 1403 by Verónica Marie RN  Outcome: Ongoing  12/18/2021 0058 by Dorene Hines RN  Outcome: Met This Shift  Goal: Absence of new skin breakdown  Description: Absence of new skin breakdown  Outcome: Ongoing  Goal: Demonstration of wound healing without infection will improve  Description: Demonstration of wound healing without infection will improve  Outcome: Ongoing  Goal: Complications related to intravenous access or infusion will be avoided or minimized  Description: Complications related to intravenous access or infusion will be avoided or minimized  Outcome: Ongoing     Problem: Falls - Risk of:  Goal: Will remain free from falls  Description: Will remain free from falls  Outcome: Ongoing  Goal: Absence of physical injury  Description: Absence of physical injury  Outcome: Ongoing     Problem: Nutritional:  Goal: Nutritional status will improve  Description: Nutritional status will improve  Outcome: Ongoing     Problem: Physical Regulation:  Goal: Diagnostic test results will improve  Description: Diagnostic test results will improve  Outcome: Ongoing  Goal: Will remain free from infection  Description: Will remain free from infection  Outcome: Ongoing  Goal: Ability to maintain vital signs within normal range will improve  Description: Ability to maintain vital signs within normal range will improve  Outcome: Ongoing     Problem: Respiratory:  Goal: Ability to maintain normal respiratory secretions will improve  Description: Ability to maintain normal respiratory secretions will improve  Outcome: Ongoing     Problem: Pain:  Goal: Pain level will decrease  Description: Pain level will decrease  Outcome: Ongoing  Goal: Control of acute pain  Description: Control of acute pain  Outcome: Ongoing  Goal: Control of chronic pain  Description: Control of chronic pain  Outcome: Ongoing

## 2021-12-18 NOTE — PROGRESS NOTES
Arterial Blood Gas was attempted in the left radial artery x2 and sample was not obtained. Another RT attempted a right radial artery stick and also had an unsuccessful attempt to obtain the sample. Therefore, ABG sample was not able to be obtained at this time. Will notify RN.

## 2021-12-18 NOTE — PROGRESS NOTES
Department of Neurosurgery  Attending Progress Note    CHIEF COMPLAINT:    SUBJECTIVE:  No new events.  He is having incisional pain and ileus is improving    ROS:    OBJECTIVE  Physical  VITALS:  /64   Pulse 91   Temp 98.7 °F (37.1 °C)   Resp 18   Ht 6' 1\" (1.854 m)   Wt 200 lb (90.7 kg)   SpO2 92%   BMI 26.39 kg/m²   NEUROLOGIC:  Mental Status Exam:  Level of Alertness:   awake  Orientation:   person, place, time  Motor Exam:  Motor exam is symmetrical 5 out of 5 all extremities bilaterally  Sensory:  Sensory intact    Data  CBC:   Lab Results   Component Value Date    WBC 14.4 12/17/2021    RBC 3.94 12/17/2021    HGB 11.9 12/17/2021    HCT 36.1 12/17/2021    MCV 91.6 12/17/2021    MCH 30.2 12/17/2021    MCHC 33.0 12/17/2021    RDW 13.5 12/17/2021     12/17/2021    MPV 9.6 12/17/2021     BMP:    Lab Results   Component Value Date     12/18/2021    K 4.5 12/18/2021    K 5.6 12/15/2021     12/18/2021    CO2 25 12/18/2021    BUN 33 12/18/2021    LABALBU 3.3 12/18/2021    CREATININE 1.0 12/18/2021    CALCIUM 8.7 12/18/2021    GFRAA >60 12/18/2021    LABGLOM >60 12/18/2021    GLUCOSE 190 12/18/2021     Current Inpatient Medications  Current Facility-Administered Medications: lactulose (CHRONULAC) 10 GM/15ML solution 20 g, 20 g, Oral, TID  enoxaparin (LOVENOX) injection 40 mg, 40 mg, SubCUTAneous, Daily  0.9 % sodium chloride infusion, , IntraVENous, Continuous  0.9 % sodium chloride infusion, , IntraVENous, Continuous  acetaminophen (TYLENOL) tablet 650 mg, 650 mg, Oral, Q4H PRN  simethicone (MYLICON) chewable tablet 80 mg, 80 mg, Oral, Q6H PRN  metoprolol tartrate (LOPRESSOR) tablet 25 mg, 25 mg, Oral, BID  ipratropium-albuterol (DUONEB) nebulizer solution 1 ampule, 1 ampule, Inhalation, Q4H WA  amLODIPine (NORVASC) tablet 10 mg, 10 mg, Oral, Daily  pravastatin (PRAVACHOL) tablet 10 mg, 10 mg, Oral, Daily  sodium chloride flush 0.9 % injection 5-40 mL, 5-40 mL, IntraVENous, 2 times per day  sodium chloride flush 0.9 % injection 5-40 mL, 5-40 mL, IntraVENous, PRN  0.9 % sodium chloride infusion, 25 mL, IntraVENous, PRN  ondansetron (ZOFRAN-ODT) disintegrating tablet 4 mg, 4 mg, Oral, Q8H PRN **OR** ondansetron (ZOFRAN) injection 4 mg, 4 mg, IntraVENous, Q6H PRN  oxyCODONE (ROXICODONE) immediate release tablet 5 mg, 5 mg, Oral, Q4H PRN **OR** oxyCODONE HCl (OXY-IR) immediate release tablet 10 mg, 10 mg, Oral, Q4H PRN  HYDROmorphone (DILAUDID) injection 0.5 mg, 0.5 mg, IntraVENous, Q3H PRN  cyclobenzaprine (FLEXERIL) tablet 10 mg, 10 mg, Oral, TID PRN  polyethylene glycol (GLYCOLAX) packet 17 g, 17 g, Oral, Daily  bisacodyl (DULCOLAX) EC tablet 5 mg, 5 mg, Oral, Daily  sennosides-docusate sodium (SENOKOT-S) 8.6-50 MG tablet 1 tablet, 1 tablet, Oral, BID  magnesium hydroxide (MILK OF MAGNESIA) 400 MG/5ML suspension 30 mL, 30 mL, Oral, Daily PRN  fleet rectal enema 1 enema, 1 enema, Rectal, Daily PRN    ASSESSMENT AND PLAN:    S/p lumbar fusion. Will try to wean oxygen.   PT/OT and mobilize    Zachary Anderson MD

## 2021-12-19 LAB
ALBUMIN SERPL-MCNC: 3.4 G/DL (ref 3.5–5.2)
ALP BLD-CCNC: 84 U/L (ref 40–129)
ALT SERPL-CCNC: 21 U/L (ref 0–40)
ANION GAP SERPL CALCULATED.3IONS-SCNC: 10 MMOL/L (ref 7–16)
AST SERPL-CCNC: 34 U/L (ref 0–39)
BILIRUB SERPL-MCNC: 0.5 MG/DL (ref 0–1.2)
BUN BLDV-MCNC: 34 MG/DL (ref 6–23)
C-REACTIVE PROTEIN: 13.4 MG/DL (ref 0–0.4)
CALCIUM SERPL-MCNC: 9.2 MG/DL (ref 8.6–10.2)
CHLORIDE BLD-SCNC: 105 MMOL/L (ref 98–107)
CO2: 26 MMOL/L (ref 22–29)
CREAT SERPL-MCNC: 1 MG/DL (ref 0.7–1.2)
EKG ATRIAL RATE: 115 BPM
EKG ATRIAL RATE: 96 BPM
EKG P AXIS: 83 DEGREES
EKG P-R INTERVAL: 232 MS
EKG Q-T INTERVAL: 312 MS
EKG Q-T INTERVAL: 336 MS
EKG QRS DURATION: 100 MS
EKG QRS DURATION: 98 MS
EKG QTC CALCULATION (BAZETT): 424 MS
EKG QTC CALCULATION (BAZETT): 472 MS
EKG R AXIS: 39 DEGREES
EKG R AXIS: 44 DEGREES
EKG T AXIS: 1 DEGREES
EKG T AXIS: 60 DEGREES
EKG VENTRICULAR RATE: 138 BPM
EKG VENTRICULAR RATE: 96 BPM
GFR AFRICAN AMERICAN: >60
GFR NON-AFRICAN AMERICAN: >60 ML/MIN/1.73
GLUCOSE BLD-MCNC: 116 MG/DL (ref 74–99)
MAGNESIUM: 2.3 MG/DL (ref 1.6–2.6)
POTASSIUM SERPL-SCNC: 3.8 MMOL/L (ref 3.5–5)
PROCALCITONIN: 0.32 NG/ML (ref 0–0.08)
SODIUM BLD-SCNC: 141 MMOL/L (ref 132–146)
TOTAL PROTEIN: 6.3 G/DL (ref 6.4–8.3)
TROPONIN, HIGH SENSITIVITY: 29 NG/L (ref 0–11)

## 2021-12-19 PROCEDURE — 99232 SBSQ HOSP IP/OBS MODERATE 35: CPT | Performed by: SURGERY

## 2021-12-19 PROCEDURE — 6360000002 HC RX W HCPCS: Performed by: NEUROLOGICAL SURGERY

## 2021-12-19 PROCEDURE — 84484 ASSAY OF TROPONIN QUANT: CPT

## 2021-12-19 PROCEDURE — 2580000003 HC RX 258: Performed by: INTERNAL MEDICINE

## 2021-12-19 PROCEDURE — 36415 COLL VENOUS BLD VENIPUNCTURE: CPT

## 2021-12-19 PROCEDURE — 6360000002 HC RX W HCPCS: Performed by: INTERNAL MEDICINE

## 2021-12-19 PROCEDURE — 94640 AIRWAY INHALATION TREATMENT: CPT

## 2021-12-19 PROCEDURE — 6370000000 HC RX 637 (ALT 250 FOR IP): Performed by: INTERNAL MEDICINE

## 2021-12-19 PROCEDURE — 80053 COMPREHEN METABOLIC PANEL: CPT

## 2021-12-19 PROCEDURE — 99233 SBSQ HOSP IP/OBS HIGH 50: CPT | Performed by: INTERNAL MEDICINE

## 2021-12-19 PROCEDURE — 2700000000 HC OXYGEN THERAPY PER DAY

## 2021-12-19 PROCEDURE — 6370000000 HC RX 637 (ALT 250 FOR IP): Performed by: PHYSICIAN ASSISTANT

## 2021-12-19 PROCEDURE — 86140 C-REACTIVE PROTEIN: CPT

## 2021-12-19 PROCEDURE — 2580000003 HC RX 258: Performed by: PHYSICIAN ASSISTANT

## 2021-12-19 PROCEDURE — 84145 PROCALCITONIN (PCT): CPT

## 2021-12-19 PROCEDURE — 2500000003 HC RX 250 WO HCPCS: Performed by: INTERNAL MEDICINE

## 2021-12-19 PROCEDURE — 83735 ASSAY OF MAGNESIUM: CPT

## 2021-12-19 PROCEDURE — 93005 ELECTROCARDIOGRAM TRACING: CPT | Performed by: NEUROLOGICAL SURGERY

## 2021-12-19 PROCEDURE — 2060000000 HC ICU INTERMEDIATE R&B

## 2021-12-19 RX ORDER — DILTIAZEM HYDROCHLORIDE 5 MG/ML
20 INJECTION INTRAVENOUS ONCE
Status: COMPLETED | OUTPATIENT
Start: 2021-12-19 | End: 2021-12-19

## 2021-12-19 RX ORDER — METOPROLOL TARTRATE 50 MG/1
50 TABLET, FILM COATED ORAL 2 TIMES DAILY
Status: DISCONTINUED | OUTPATIENT
Start: 2021-12-19 | End: 2021-12-27

## 2021-12-19 RX ADMIN — AMPICILLIN SODIUM AND SULBACTAM SODIUM 3000 MG: 2; 1 INJECTION, POWDER, FOR SOLUTION INTRAMUSCULAR; INTRAVENOUS at 11:41

## 2021-12-19 RX ADMIN — DEXTROSE MONOHYDRATE 5 MG/HR: 50 INJECTION, SOLUTION INTRAVENOUS at 13:52

## 2021-12-19 RX ADMIN — METOPROLOL TARTRATE 25 MG: 25 TABLET, FILM COATED ORAL at 09:35

## 2021-12-19 RX ADMIN — IPRATROPIUM BROMIDE AND ALBUTEROL SULFATE 1 AMPULE: .5; 2.5 SOLUTION RESPIRATORY (INHALATION) at 16:17

## 2021-12-19 RX ADMIN — OXYCODONE 5 MG: 5 TABLET ORAL at 09:42

## 2021-12-19 RX ADMIN — Medication 10 ML: at 21:21

## 2021-12-19 RX ADMIN — OXYCODONE HYDROCHLORIDE 10 MG: 10 TABLET ORAL at 01:34

## 2021-12-19 RX ADMIN — METOPROLOL TARTRATE 50 MG: 50 TABLET, FILM COATED ORAL at 21:19

## 2021-12-19 RX ADMIN — OXYCODONE 5 MG: 5 TABLET ORAL at 14:00

## 2021-12-19 RX ADMIN — AMPICILLIN SODIUM AND SULBACTAM SODIUM 3000 MG: 2; 1 INJECTION, POWDER, FOR SOLUTION INTRAMUSCULAR; INTRAVENOUS at 18:09

## 2021-12-19 RX ADMIN — IPRATROPIUM BROMIDE AND ALBUTEROL SULFATE 1 AMPULE: .5; 2.5 SOLUTION RESPIRATORY (INHALATION) at 20:13

## 2021-12-19 RX ADMIN — DILTIAZEM HYDROCHLORIDE 20 MG: 5 INJECTION INTRAVENOUS at 12:15

## 2021-12-19 RX ADMIN — AMLODIPINE BESYLATE 10 MG: 10 TABLET ORAL at 09:35

## 2021-12-19 RX ADMIN — PRAVASTATIN SODIUM 10 MG: 10 TABLET ORAL at 21:19

## 2021-12-19 RX ADMIN — IPRATROPIUM BROMIDE AND ALBUTEROL SULFATE 1 AMPULE: .5; 2.5 SOLUTION RESPIRATORY (INHALATION) at 08:03

## 2021-12-19 RX ADMIN — IPRATROPIUM BROMIDE AND ALBUTEROL SULFATE 1 AMPULE: .5; 2.5 SOLUTION RESPIRATORY (INHALATION) at 12:29

## 2021-12-19 RX ADMIN — ENOXAPARIN SODIUM 40 MG: 100 INJECTION SUBCUTANEOUS at 09:35

## 2021-12-19 RX ADMIN — OXYCODONE 5 MG: 5 TABLET ORAL at 22:04

## 2021-12-19 RX ADMIN — Medication 10 ML: at 09:35

## 2021-12-19 RX ADMIN — SENNOSIDES AND DOCUSATE SODIUM 1 TABLET: 50; 8.6 TABLET ORAL at 21:21

## 2021-12-19 RX ADMIN — OXYCODONE 5 MG: 5 TABLET ORAL at 18:17

## 2021-12-19 RX ADMIN — SODIUM CHLORIDE, PRESERVATIVE FREE 10 ML: 5 INJECTION INTRAVENOUS at 11:42

## 2021-12-19 RX ADMIN — AMPICILLIN SODIUM AND SULBACTAM SODIUM 3000 MG: 2; 1 INJECTION, POWDER, FOR SOLUTION INTRAMUSCULAR; INTRAVENOUS at 23:59

## 2021-12-19 ASSESSMENT — PAIN SCALES - GENERAL
PAINLEVEL_OUTOF10: 8
PAINLEVEL_OUTOF10: 5
PAINLEVEL_OUTOF10: 8
PAINLEVEL_OUTOF10: 4
PAINLEVEL_OUTOF10: 8
PAINLEVEL_OUTOF10: 3
PAINLEVEL_OUTOF10: 8
PAINLEVEL_OUTOF10: 5
PAINLEVEL_OUTOF10: 2
PAINLEVEL_OUTOF10: 5

## 2021-12-19 ASSESSMENT — PAIN DESCRIPTION - ORIENTATION
ORIENTATION: MID;LOWER

## 2021-12-19 ASSESSMENT — PAIN DESCRIPTION - FREQUENCY
FREQUENCY: CONTINUOUS

## 2021-12-19 ASSESSMENT — PAIN DESCRIPTION - DESCRIPTORS
DESCRIPTORS: ACHING;DISCOMFORT
DESCRIPTORS: ACHING;DISCOMFORT
DESCRIPTORS: ACHING;DULL

## 2021-12-19 ASSESSMENT — PAIN - FUNCTIONAL ASSESSMENT
PAIN_FUNCTIONAL_ASSESSMENT: ACTIVITIES ARE NOT PREVENTED
PAIN_FUNCTIONAL_ASSESSMENT: PREVENTS OR INTERFERES SOME ACTIVE ACTIVITIES AND ADLS
PAIN_FUNCTIONAL_ASSESSMENT: PREVENTS OR INTERFERES SOME ACTIVE ACTIVITIES AND ADLS

## 2021-12-19 ASSESSMENT — PAIN DESCRIPTION - PAIN TYPE
TYPE: SURGICAL PAIN
TYPE: SURGICAL PAIN

## 2021-12-19 ASSESSMENT — PAIN DESCRIPTION - LOCATION
LOCATION: BACK
LOCATION: BACK
LOCATION: ABDOMEN;BACK

## 2021-12-19 ASSESSMENT — PAIN DESCRIPTION - ONSET
ONSET: ON-GOING

## 2021-12-19 ASSESSMENT — PAIN DESCRIPTION - PROGRESSION
CLINICAL_PROGRESSION: GRADUALLY IMPROVING
CLINICAL_PROGRESSION: NOT CHANGED
CLINICAL_PROGRESSION: NOT CHANGED

## 2021-12-19 NOTE — PROGRESS NOTES
Department of Neurosurgery  Attending Progress Note    CHIEF COMPLAINT:     SUBJECTIVE:  He is doing better    ROS:    OBJECTIVE  Physical  VITALS:  /82   Pulse 91   Temp 98 °F (36.7 °C) (Oral)   Resp 17   Ht 6' 1\" (1.854 m)   Wt 200 lb (90.7 kg)   SpO2 95%   BMI 26.39 kg/m²   NEUROLOGIC:  Mental Status Exam:  Level of Alertness:   awake  Orientation:   person, place, time  Motor Exam:  Motor exam is symmetrical 5 out of 5 all extremities bilaterally  Sensory:  Sensory intact    Data  CBC:   Lab Results   Component Value Date    WBC 14.4 12/17/2021    RBC 3.94 12/17/2021    HGB 11.9 12/17/2021    HCT 36.1 12/17/2021    MCV 91.6 12/17/2021    MCH 30.2 12/17/2021    MCHC 33.0 12/17/2021    RDW 13.5 12/17/2021     12/17/2021    MPV 9.6 12/17/2021     BMP:    Lab Results   Component Value Date     12/18/2021    K 4.5 12/18/2021    K 5.6 12/15/2021     12/18/2021    CO2 25 12/18/2021    BUN 33 12/18/2021    LABALBU 3.3 12/18/2021    CREATININE 1.0 12/18/2021    CALCIUM 8.7 12/18/2021    GFRAA >60 12/18/2021    LABGLOM >60 12/18/2021    GLUCOSE 190 12/18/2021     Current Inpatient Medications  Current Facility-Administered Medications: enoxaparin (LOVENOX) injection 40 mg, 40 mg, SubCUTAneous, Daily  acetaminophen (TYLENOL) tablet 650 mg, 650 mg, Oral, Q4H PRN  simethicone (MYLICON) chewable tablet 80 mg, 80 mg, Oral, Q6H PRN  metoprolol tartrate (LOPRESSOR) tablet 25 mg, 25 mg, Oral, BID  ipratropium-albuterol (DUONEB) nebulizer solution 1 ampule, 1 ampule, Inhalation, Q4H WA  amLODIPine (NORVASC) tablet 10 mg, 10 mg, Oral, Daily  pravastatin (PRAVACHOL) tablet 10 mg, 10 mg, Oral, Daily  sodium chloride flush 0.9 % injection 5-40 mL, 5-40 mL, IntraVENous, 2 times per day  sodium chloride flush 0.9 % injection 5-40 mL, 5-40 mL, IntraVENous, PRN  0.9 % sodium chloride infusion, 25 mL, IntraVENous, PRN  ondansetron (ZOFRAN-ODT) disintegrating tablet 4 mg, 4 mg, Oral, Q8H PRN **OR** ondansetron (ZOFRAN) injection 4 mg, 4 mg, IntraVENous, Q6H PRN  oxyCODONE (ROXICODONE) immediate release tablet 5 mg, 5 mg, Oral, Q4H PRN **OR** oxyCODONE HCl (OXY-IR) immediate release tablet 10 mg, 10 mg, Oral, Q4H PRN  HYDROmorphone (DILAUDID) injection 0.5 mg, 0.5 mg, IntraVENous, Q3H PRN  cyclobenzaprine (FLEXERIL) tablet 10 mg, 10 mg, Oral, TID PRN  polyethylene glycol (GLYCOLAX) packet 17 g, 17 g, Oral, Daily  bisacodyl (DULCOLAX) EC tablet 5 mg, 5 mg, Oral, Daily  sennosides-docusate sodium (SENOKOT-S) 8.6-50 MG tablet 1 tablet, 1 tablet, Oral, BID  magnesium hydroxide (MILK OF MAGNESIA) 400 MG/5ML suspension 30 mL, 30 mL, Oral, Daily PRN  fleet rectal enema 1 enema, 1 enema, Rectal, Daily PRN    ASSESSMENT AND PLAN:    S/p lumbar fusion. Had chest pain and discomfort yesterday. He is feeling better this morning.   PT/OT and mobilize    Kar Wilson MD

## 2021-12-19 NOTE — PROGRESS NOTES
New onset Afib, Dr. Janet Mccall notified, consult Cardiology.  Consult sent [facility] to [facility]

## 2021-12-19 NOTE — PROGRESS NOTES
General Surgery Progress Note:    CC: ileus     S: feels better + BM yesterday, some nausea with breakfast but over all still feels better. No cp. Objective:  @BP (!) 150/52   Pulse 91   Temp 97.4 °F (36.3 °C) (Temporal)   Resp 20   Ht 6' 1\" (1.854 m)   Wt 200 lb (90.7 kg)   SpO2 95%   BMI 26.39 kg/m² @    Physical -     Gen: NAD  Resp: Breathing Comfortably, no resp distress fairly clear b/l   CV: Reg Rate no extra heart sounds   Abd: distended + tympany non tender   EXT nvi    Assessment/Plan: S/p L2-5 PLF with post op ileus     - advance diet slowly, still some nausea but having bowel function.      Anirudh Khoury MD FACS     11:07 AM

## 2021-12-19 NOTE — PROGRESS NOTES
Hospitalist Progress Note      SYNOPSIS: Patient admitted on 2021 for <principal problem not specified>  Mr. Ute Mc, a 82y.o. year old male  who  has a past medical history of Hypertension.      Patient was admitted under the neurosurgery service. He underwent L2-L5 laminectomy and fusion. Patient is postop, he states that his pain is minimal.  He denies chest pain, no shortness of breath, no hematuria dysuria hematemesis or hematochezia. Patient states that he has hypertension which is fairly controlled at home. He denies being diabetic. Denies sleep apnea    SUBJECTIVE:  Patient seen and examined. Patient developed new onset A. fib with rapid ventricular response today. His oxygen needs was increased to 9 L overnight. Cardiology started the patient on IV Cardizem  records reviewed. Temp (24hrs), Av °F (36.7 °C), Min:97 °F (36.1 °C), Max:98.8 °F (37.1 °C)    DIET: ADULT DIET; Regular; Low Fiber  CODE: Full Code    Intake/Output Summary (Last 24 hours) at 2021 1446  Last data filed at 2021 1213  Gross per 24 hour   Intake 400 ml   Output --   Net 400 ml       OBJECTIVE:    BP (!) 148/60   Pulse 130   Temp 97.4 °F (36.3 °C) (Temporal)   Resp 20   Ht 6' 1\" (1.854 m)   Wt 200 lb (90.7 kg)   SpO2 94%   BMI 26.39 kg/m²     General appearance: No apparent distress, appears stated age and cooperative. HEENT:  Conjunctivae/corneas clear. Neck: Supple. No jugular venous distention. Respiratory: Diminished air entry bilaterally  Cardiovascular: Regular rate rhythm, normal S1-S2  Abdomen: Soft, nontender, nondistended  Musculoskeletal: No clubbing, cyanosis, no bilateral lower extremity edema. Brisk capillary refill.    Skin:  No rashes  on visible skin  Neurologic: awake, alert and following commands     Assessment and plan:  #Acute postoperative hypoxic respiratory failure  #Possible aspiration  -CT scan of the chest was negative for PE  -Pulmonologist ordered IV Solu-Medrol  -Resume incentive spirometer and bronchodilator treatment  -History of COVID-19 infection May 2021 hospitalized  -Wean off oxygen as tolerated  -IV Unasyn started 12/19 pulmonary    #New onset atrial fibrillation RVR. [id number] 4 (age, hypertension, PAD)  #Atypical CP.   Recent preoperative stress test negative for ischemia  -Appreciate cardiology input  -IV Cardizem drip  -Cardiology plans for anticoagulation once cleared by neurosurgery      #Post operative ileus  -Resolved    #Essential hypertension:   -Resume patient medications with metoprolol hold Norvasc.     #Hyperlipidemia:   -Continue statin     #Spinal stenosis:   -Status post L2-L5 laminectomy on 12/14, supportive care, neurosurgery following.  -Pain control  -PT/OT      DISPOSITION: TBD    Medications:  REVIEWED DAILY    Infusion Medications    dilTIAZem 10 mg/hr (12/19/21 1445)    sodium chloride       Scheduled Medications    ampicillin-sulbactam  3,000 mg IntraVENous Q6H    metoprolol tartrate  50 mg Oral BID    enoxaparin  40 mg SubCUTAneous Daily    ipratropium-albuterol  1 ampule Inhalation Q4H WA    amLODIPine  10 mg Oral Daily    pravastatin  10 mg Oral Daily    sodium chloride flush  5-40 mL IntraVENous 2 times per day    polyethylene glycol  17 g Oral Daily    bisacodyl  5 mg Oral Daily    sennosides-docusate sodium  1 tablet Oral BID     PRN Meds: perflutren lipid microspheres, acetaminophen, simethicone, sodium chloride flush, sodium chloride, ondansetron **OR** ondansetron, oxyCODONE **OR** oxyCODONE, HYDROmorphone, cyclobenzaprine, magnesium hydroxide, fleet    Labs:     Recent Labs     12/17/21  1118   WBC 14.4*   HGB 11.9*   HCT 36.1*          Recent Labs     12/17/21  0509 12/18/21  0626 12/19/21  0526    137 141   K 4.7 4.5 3.8    102 105   CO2 26 25 26   BUN 29* 33* 34*   CREATININE 1.2 1.0 1.0   CALCIUM 9.0 8.7 9.2       Recent Labs     12/17/21  0509 12/18/21  0626 12/19/21  0526   PROT 6.4 6.1* 6.3*   ALKPHOS 71 73 84   ALT <5 6 21   AST 16 16 34   BILITOT 0.6 0.5 0.5       No results for input(s): INR in the last 72 hours. No results for input(s): Hortencia Geovani in the last 72 hours. Chronic labs:    Lab Results   Component Value Date    PSA SEE NOTE (AA) 08/14/2020    PSA 4.89 (H) 08/14/2020    INR 1.2 12/09/2021       Radiology: REVIEWED DAILY    +++++++++++++++++++++++++++++++++++++++++++++++++  Adrienne Gutierrez MD  [facility] Physician - [address] [facility], [facility]  +++++++++++++++++++++++++++++++++++++++++++++++++  NOTE: This report was transcribed using voice recognition software. Every effort was made to ensure accuracy; however, inadvertent computerized transcription errors may be present.

## 2021-12-19 NOTE — PROGRESS NOTES
[place]  Department of Internal Medicine  Division of Pulmonary, Critical Care and Sleep Medicine  Consult Note    Eric Sullivan DO, MPH, Tyson Becerra, Salazar Kennedy MD, [facility]  [facility]      Patient: Christina Wong  MRN: [id number]  : 1935    Encounter Time: 9:03 AM     Date of Admission: 2021  5:17 AM    Primary Care Physician: Darnell Hernandez MD    Reason for Consultation: Post Op respiratory failure    SUBJECTIVE:    Son by side  On 7 liters  He states he is having abd pain  Trop negative x 3  CXR shows new infiltrate in RML and elevated hemidiaphragm which is chronic  No cough or hemoptysis  CT with chronic changes  Cr normalized     OBJECTIVE:     PHYSICAL EXAM:   VITALS:   Vitals:    21 1814 21 1945 21 0415 21 0730   BP:  135/68 129/82 (!) 150/52   Pulse:  100 91 91   Resp:    Temp:  97 °F (36.1 °C) 98 °F (36.7 °C) 97.4 °F (36.3 °C)   TempSrc:  Temporal Oral Temporal   SpO2: 91% 93% 95% 95%   Weight:       Height:            Intake/Output Summary (Last 24 hours) at 2021 0903  Last data filed at 2021 0353  Gross per 24 hour   Intake 300 ml   Output --   Net 300 ml        CONSTITUTIONAL:   A&O x 3, NAD  SKIN:     No rash, No suspicious lesions or skin discoloration  HEENT:     EOMI, MMM, No thrush  NECK:    No bruits, No JVP apprechiated  CV:      Sinus,  No murmur, No rubs, No gallops  PULMONARY:   Couse BS,  No Wheezing, No Rales, No Rhonchi      No noted egophony  ABDOMEN:     Soft, non-tender. BS normal. No R/R/G  EXT:    No deformities . No clubbing.       + lower extremity edema, No venous stasis  PULSE:   Appears equal and palpable.   PSYCHIATRIC:  Seems appropriate, No acute psycosis  MS:    No fractures, No gross weakness  NEUROLOGIC:   The clinical assessment is non-focal     DATA: IMAGING & TESTING:     LABORATORY TESTS:    CBC:   Lab Results   Component Value Date    WBC 14.4 12/17/2021    RBC 3.94 12/17/2021    HGB 11.9 12/17/2021    HCT 36.1 12/17/2021    MCV 91.6 12/17/2021    MCH 30.2 12/17/2021    MCHC 33.0 12/17/2021    RDW 13.5 12/17/2021     12/17/2021    MPV 9.6 12/17/2021     CMP:    Lab Results   Component Value Date     12/19/2021    K 3.8 12/19/2021    K 5.6 12/15/2021     12/19/2021    CO2 26 12/19/2021    BUN 34 12/19/2021    CREATININE 1.0 12/19/2021    GFRAA >60 12/19/2021    LABGLOM >60 12/19/2021    GLUCOSE 116 12/19/2021    PROT 6.3 12/19/2021    LABALBU 3.4 12/19/2021    CALCIUM 9.2 12/19/2021    BILITOT 0.5 12/19/2021    ALKPHOS 84 12/19/2021    AST 34 12/19/2021    ALT 21 12/19/2021     Magnesium:  No results found for: MG  Phosphorus:  No results found for: PHOS     PRO-BNP: No results found for: PROBNP   ABGs:   Lab Results   Component Value Date    PH 7.394 12/16/2021    PO2 51.0 12/16/2021    PCO2 45.8 12/16/2021     Hemoglobin A1C: No components found for: HGBA1C    IMAGING:  Imaging tests were completed and reviewed and discussed radiology and care team involved and reveals     CHEST FILM COMPARED 12/18:            XR ABDOMEN (KUB) (SINGLE AP VIEW)  Result Date: 12/16/2021Dilated small and large bowel loops suggest generalized ileus. Clinical correlation and follow-up to resolution recommended. MRI LUMBAR SPINE WO CONTRAST  Result Date: 11/24/2021  Multilevel degenerative disc disease with associated facet and ligamentous hypertrophy resulting in canal stenosis at L3-4 and L4-5. Bilateral foraminal narrowing as described above. XR CHEST PORTABLE    Result Date: 12/16/2021  EXAMINATION: ONE XRAY VIEW OF THE CHEST 12/16/2021 4:19 pm COMPARISON: 12/09/2021 HISTORY: ORDERING SYSTEM PROVIDED HISTORY: hypoxia TECHNOLOGIST PROVIDED HISTORY: Reason for exam:->hypoxia What reading provider will be dictating this exam?->CRC FINDINGS: Right lower lobe atelectasis and/or infiltrate. .  There is no effusion or pneumothorax.  The cardiomediastinal silhouette is without acute process. The osseous structures are without acute process. Right lower lobe atelectasis and/or infiltrate. CTA PULMONARY W CONTRAST  Result Date: 12/16/2021  INDINGS: No thoracic aortic aneurysm or dissection. Mild-to-moderate scattered calcified plaque in the thoracic aorta. No evidence of pulmonary embolism. Mild cardiomegaly. Coronary atherosclerosis. No pericardial effusion. Shotty subcentimeter short axis mediastinal hilar nodes. No pleural effusion or pneumothorax. Low lung volumes. There are a few small peripheral densities in the lungs which may represent scarring or infection (for example series 4, image 73). Another curvilinear density is noted the medial left lower lobe (series 4 image 168). Mild dependent atelectasis, right greater than left. The central airways are patent. There is centrilobular and paraseptal emphysema. Degenerative changes of the spine. The visualized abdomen demonstrates changes of cholecystectomy and the splenic parenchymal calcification. No evidence of pulmonary embolism. No acute aortic injury or aneurysm. There are a few nonspecific densities in the peripheral lungs. Imaging features can be seen with COVID-19 pneumonia, though are nonspecific and can occur with a variety of infectious and noninfectious processes. Mild cardiomegaly. Atherosclerotic disease. Assessment:   1. Post op respiratory failure  2. Hypoxemic Respiratory Failure   3. CT evidence of cytic disease of the lung  4. Plueral plaques ? Old infection or possible asbestosis  5. CT show sutttble changes of pulmonary fibrosis (periphearl)        Plan:   1. CTA negative for PE but noted atelectasis, new decrease sats?, CXR with change yet even thought procal is negative I will start Abx for aspiration pneumonia. 2. Most of the finding are chronic, some fibrosis for sure   3. Continue ICS,   4. Bronchodialators QID  5.  Stop fluids Cr is normal now and monitor  6. Wean oxygen as able   7. + BM   8.  Monitor status    Mane Bennett DO DO, MPH, Genesis Sifuentes  Professor of Internal Medicine  Pulmonary, Critical Care and Sleep Medicine

## 2021-12-19 NOTE — CONSULTS
INPATIENT CARDIOLOGY FOLLOW-UP    Name: Ute Mc    Age: 82 y.o. Date of Admission: 12/14/2021  5:17 AM    Date of Service: 12/19/2021    Primary Cardiologist: Luca    Chief Complaint: Follow-up for new AF    Interim History:  Patient underwent laminectomy on 12/14/2021. Seen by Dr. Mich Batres for atypical chest pain on the 16th. Has had problems with postoperative respiratory failure and ileus. Today developed new onset A. fib RVR. Patient denies awareness of the arrhythmia, no palpitations, chest pain or shortness of breath.     Review of Systems:   Negative except as described above    Problem List:  Patient Active Problem List   Diagnosis    Spinal stenosis of lumbar region    Spondylolisthesis, lumbar region    Lumbar degenerative disc disease    Chest pain    Elevated troponin    Primary hypertension    Puerperal sepsis with acute hypoxic respiratory failure (HCC)    Hyperlipidemia    Pulmonary fibrosis (HCC)       Current Medications:    Current Facility-Administered Medications:     ampicillin-sulbactam (UNASYN) 3000 mg ivpb minibag, 3,000 mg, IntraVENous, Q6H, Fredi Avery DO    enoxaparin (LOVENOX) injection 40 mg, 40 mg, SubCUTAneous, Daily, Oumou Shahid MD, 40 mg at 12/19/21 0935    acetaminophen (TYLENOL) tablet 650 mg, 650 mg, Oral, Q4H PRN, Oumou Shahid MD, 650 mg at 12/18/21 1502    simethicone (MYLICON) chewable tablet 80 mg, 80 mg, Oral, Q6H PRN, Ellen Grossman PA-C    metoprolol tartrate (LOPRESSOR) tablet 25 mg, 25 mg, Oral, BID, Jackie Reyez MD, 25 mg at 12/19/21 0935    ipratropium-albuterol (DUONEB) nebulizer solution 1 ampule, 1 ampule, Inhalation, Q4H WA, Jackie Reyez MD, 1 ampule at 12/19/21 0803    amLODIPine (NORVASC) tablet 10 mg, 10 mg, Oral, Daily, Ellen Grossman PA-C, 10 mg at 12/19/21 0935    pravastatin (PRAVACHOL) tablet 10 mg, 10 mg, Oral, Daily, Ellen Grossman PA-C, 10 mg at 12/17/21 1929    sodium chloride flush 0.9 % injection 5-40 mL, 5-40 mL, IntraVENous, 2 times per day, Ellen Grossman PA-C, 10 mL at 12/19/21 0935    sodium chloride flush 0.9 % injection 5-40 mL, 5-40 mL, IntraVENous, PRN, Ellen Grossman PA-C, 10 mL at 12/17/21 1511    0.9 % sodium chloride infusion, 25 mL, IntraVENous, PRN, Ellen Grossman PA-C    ondansetron (ZOFRAN-ODT) disintegrating tablet 4 mg, 4 mg, Oral, Q8H PRN **OR** ondansetron (ZOFRAN) injection 4 mg, 4 mg, IntraVENous, Q6H PRN, Moi Duarte PA-C, 4 mg at 12/16/21 2242    oxyCODONE (ROXICODONE) immediate release tablet 5 mg, 5 mg, Oral, Q4H PRN, 5 mg at 12/19/21 0942 **OR** oxyCODONE HCl (OXY-IR) immediate release tablet 10 mg, 10 mg, Oral, Q4H PRN, Moi Duarte PA-C, 10 mg at 12/19/21 0134    HYDROmorphone (DILAUDID) injection 0.5 mg, 0.5 mg, IntraVENous, Q3H PRN, Moi Duarte PA-C, 0.5 mg at 12/17/21 1802    cyclobenzaprine (FLEXERIL) tablet 10 mg, 10 mg, Oral, TID PRN, Moi Duarte PA-C, 10 mg at 12/18/21 2202    polyethylene glycol (GLYCOLAX) packet 17 g, 17 g, Oral, Daily, Ellen Grossman PA-C, 17 g at 12/18/21 0805    bisacodyl (DULCOLAX) EC tablet 5 mg, 5 mg, Oral, Daily, Ellen Grossman PA-C, 5 mg at 12/18/21 2777    sennosides-docusate sodium (SENOKOT-S) 8.6-50 MG tablet 1 tablet, 1 tablet, Oral, BID, Ellen Grossman PA-C, 1 tablet at 12/18/21 2202    magnesium hydroxide (MILK OF MAGNESIA) 400 MG/5ML suspension 30 mL, 30 mL, Oral, Daily PRN, Moi Duarte PA-C    fleet rectal enema 1 enema, 1 enema, Rectal, Daily PRN, Moi Duarte PA-C    Physical Exam:  BP (!) 148/60   Pulse 130   Temp 97.4 °F (36.3 °C) (Temporal)   Resp 20   Ht 6' 1\" (1.854 m)   Wt 200 lb (90.7 kg)   SpO2 95%   BMI 26.39 kg/m²   Wt Readings from Last 3 Encounters:   12/14/21 200 lb (90.7 kg)   12/09/21 200 lb (90.7 kg)   11/04/21 200 lb (90.7 kg)     Appearance: Elderly gentleman, awake, alert, on nasal cannula  Skin: Intact, no rash  Head: Normocephalic, atraumatic  Eyes: EOMI, no conjunctival erythema  ENMT: No pharyngeal erythema, MMM, no rhinorrhea  Neck: Supple, no elevated JVP, no carotid bruits  Lungs: Clear to auscultation bilaterally. No wheezes, rales, or rhonchi. Cardiac: PMI nondisplaced, irregular rhythm with a tachycardic rate, S1 & S2 normal, no murmurs  Abdomen: Soft, nontender, +bowel sounds  Extremities: Moves all extremities x 4, no lower extremity edema  Neurologic: No focal motor deficits apparent, normal mood and affect  Peripheral Pulses: Intact posterior tibial pulses bilaterally    Intake/Output:    Intake/Output Summary (Last 24 hours) at 2021 1128  Last data filed at 2021 0353  Gross per 24 hour   Intake 300 ml   Output --   Net 300 ml     No intake/output data recorded. Laboratory Tests:  Recent Labs     21  0509 21  0626 21  0526    137 141   K 4.7 4.5 3.8    102 105   CO2 26 25 26   BUN 29* 33* 34*   CREATININE 1.2 1.0 1.0   GLUCOSE 143* 190* 116*   CALCIUM 9.0 8.7 9.2     No results found for: MG  Recent Labs     21  0509 21  0626 21  0526   ALKPHOS 71 73 84   ALT <5 6 21   AST 16 16 34   PROT 6.4 6.1* 6.3*   BILITOT 0.6 0.5 0.5   LABALBU 3.4* 3.3* 3.4*     Recent Labs     21  1118   WBC 14.4*   RBC 3.94   HGB 11.9*   HCT 36.1*   MCV 91.6   MCH 30.2   MCHC 33.0   RDW 13.5      MPV 9.6     No results found for: CKTOTAL, CKMB, CKMBINDEX, TROPONINI  Lab Results   Component Value Date    INR 1.2 2021    INR 1.1 2021    PROTIME 12.7 (H) 2021    PROTIME 13.0 (H) 2021     No results found for: TSHFT4, TSH  No results found for: LABA1C  No results found for: EAG  No results found for: CHOL  No results found for: TRIG  No results found for: HDL  No results found for: LDLCALC, LDLCHOLESTEROL  No results found for: LABVLDL, VLDL  No results found for: CHOLHDLRATIO  No results for input(s): PROBNP in the last 72 hours.     Cardiac Tests:    EK/18: SR 96 bpm 1* AV block LVH    Telemetry: Sinus rhythm -> sinus with frequent PACs around 8 this morning -> converted to A. fib RVR currently rates 110s to 140s    Chest X-ray:   12/18/21      Impression   Slight worsening of right greater than left basal opacities which may   represent atelectasis or infiltrates from pneumonia.  Continued follow-up   recommended. CTA chest 12/16/21  Impression   No evidence of pulmonary embolism.       No acute aortic injury or aneurysm.       There are a few nonspecific densities in the peripheral lungs.  Imaging   features can be seen with COVID-19 pneumonia, though are nonspecific and can   occur with a variety of infectious and noninfectious processes.       Mild cardiomegaly.  Atherosclerotic disease. Echocardiogram:     Stress test:    Adenosine stress test: 11/18/2021: LVEF 50%, nonischemic. Cardiac catheterization:   Reported normal >10 year ago    ----------------------------------------------------------------------------------------------------------------------------------------------------------------  IMPRESSION:  1. New onset atrial fibrillation RVR. [id number] (age, hypertension, PAD)  2. Atypical CP. Recent preoperative stress test negative for ischemia  3. Post op respiratory failure, on 9 L high flow nasal cannula  4. Postoperative ileus  5. S/p Laminectomy 12/14/21  6. ILD/fibrosis  7. Hypertension  8. Hx R CEA  9. GERD  10.  Hx COVID 19 5/2021    RECOMMENDATIONS:     Bolus of IV diltiazem and start infusion   Increase standing dose of metoprolol tartrate to 50 mg twice daily   Given elevated stroke risk, recommend anticoagulation when okay from surgical standpoint, discussed with Dr. Wade Adams, recommends Tuesday, 12/21/2021   Echocardiogram given respiratory failure and AF, once heart rate improves   Further care per primary service neurosurgery, pulmonary, general surgery   Risk factor modification    Jose Francisco Purcell MD, [address] [facility]    NOTE: This report was transcribed using voice recognition software. Every effort was made to ensure accuracy; however, inadvertent computerized transcription errors may be present.

## 2021-12-20 LAB
ADENOVIRUS BY PCR: NOT DETECTED
ALBUMIN SERPL-MCNC: 2.9 G/DL (ref 3.5–5.2)
ALP BLD-CCNC: 67 U/L (ref 40–129)
ALT SERPL-CCNC: 21 U/L (ref 0–40)
ANION GAP SERPL CALCULATED.3IONS-SCNC: 11 MMOL/L (ref 7–16)
AST SERPL-CCNC: 25 U/L (ref 0–39)
BASOPHILS ABSOLUTE: 0.03 E9/L (ref 0–0.2)
BASOPHILS RELATIVE PERCENT: 0.3 % (ref 0–2)
BILIRUB SERPL-MCNC: 0.5 MG/DL (ref 0–1.2)
BORDETELLA PARAPERTUSSIS BY PCR: NOT DETECTED
BORDETELLA PERTUSSIS BY PCR: NOT DETECTED
BUN BLDV-MCNC: 43 MG/DL (ref 6–23)
CALCIUM SERPL-MCNC: 8.6 MG/DL (ref 8.6–10.2)
CHLAMYDOPHILIA PNEUMONIAE BY PCR: NOT DETECTED
CHLORIDE BLD-SCNC: 101 MMOL/L (ref 98–107)
CO2: 26 MMOL/L (ref 22–29)
CORONAVIRUS 229E BY PCR: NOT DETECTED
CORONAVIRUS HKU1 BY PCR: NOT DETECTED
CORONAVIRUS NL63 BY PCR: NOT DETECTED
CORONAVIRUS OC43 BY PCR: NOT DETECTED
CREAT SERPL-MCNC: 1.2 MG/DL (ref 0.7–1.2)
EOSINOPHILS ABSOLUTE: 0.14 E9/L (ref 0.05–0.5)
EOSINOPHILS RELATIVE PERCENT: 1.3 % (ref 0–6)
GFR AFRICAN AMERICAN: >60
GFR NON-AFRICAN AMERICAN: 58 ML/MIN/1.73
GLUCOSE BLD-MCNC: 138 MG/DL (ref 74–99)
HCT VFR BLD CALC: 31.2 % (ref 37–54)
HEMOGLOBIN: 10.1 G/DL (ref 12.5–16.5)
HUMAN METAPNEUMOVIRUS BY PCR: NOT DETECTED
HUMAN RHINOVIRUS/ENTEROVIRUS BY PCR: NOT DETECTED
IMMATURE GRANULOCYTES #: 0.07 E9/L
IMMATURE GRANULOCYTES %: 0.6 % (ref 0–5)
INFLUENZA A BY PCR: NOT DETECTED
INFLUENZA B BY PCR: NOT DETECTED
LACTIC ACID, SEPSIS: 1.6 MMOL/L (ref 0.5–1.9)
LYMPHOCYTES ABSOLUTE: 1.41 E9/L (ref 1.5–4)
LYMPHOCYTES RELATIVE PERCENT: 13 % (ref 20–42)
MCH RBC QN AUTO: 29.5 PG (ref 26–35)
MCHC RBC AUTO-ENTMCNC: 32.4 % (ref 32–34.5)
MCV RBC AUTO: 91.2 FL (ref 80–99.9)
MONOCYTES ABSOLUTE: 1.09 E9/L (ref 0.1–0.95)
MONOCYTES RELATIVE PERCENT: 10.1 % (ref 2–12)
MYCOPLASMA PNEUMONIAE BY PCR: NOT DETECTED
NEUTROPHILS ABSOLUTE: 8.07 E9/L (ref 1.8–7.3)
NEUTROPHILS RELATIVE PERCENT: 74.7 % (ref 43–80)
PARAINFLUENZA VIRUS 1 BY PCR: NOT DETECTED
PARAINFLUENZA VIRUS 2 BY PCR: NOT DETECTED
PARAINFLUENZA VIRUS 3 BY PCR: NOT DETECTED
PARAINFLUENZA VIRUS 4 BY PCR: NOT DETECTED
PDW BLD-RTO: 13.6 FL (ref 11.5–15)
PLATELET # BLD: 245 E9/L (ref 130–450)
PMV BLD AUTO: 9.6 FL (ref 7–12)
POTASSIUM SERPL-SCNC: 3.8 MMOL/L (ref 3.5–5)
RBC # BLD: 3.42 E12/L (ref 3.8–5.8)
RESPIRATORY SYNCYTIAL VIRUS BY PCR: NOT DETECTED
SARS-COV-2, PCR: NOT DETECTED
SODIUM BLD-SCNC: 138 MMOL/L (ref 132–146)
TOTAL PROTEIN: 6.2 G/DL (ref 6.4–8.3)
WBC # BLD: 10.8 E9/L (ref 4.5–11.5)

## 2021-12-20 PROCEDURE — 2580000003 HC RX 258: Performed by: INTERNAL MEDICINE

## 2021-12-20 PROCEDURE — 80053 COMPREHEN METABOLIC PANEL: CPT

## 2021-12-20 PROCEDURE — 99233 SBSQ HOSP IP/OBS HIGH 50: CPT | Performed by: INTERNAL MEDICINE

## 2021-12-20 PROCEDURE — 36415 COLL VENOUS BLD VENIPUNCTURE: CPT

## 2021-12-20 PROCEDURE — 6370000000 HC RX 637 (ALT 250 FOR IP): Performed by: PHYSICIAN ASSISTANT

## 2021-12-20 PROCEDURE — 83605 ASSAY OF LACTIC ACID: CPT

## 2021-12-20 PROCEDURE — 2500000003 HC RX 250 WO HCPCS: Performed by: INTERNAL MEDICINE

## 2021-12-20 PROCEDURE — 6360000002 HC RX W HCPCS: Performed by: INTERNAL MEDICINE

## 2021-12-20 PROCEDURE — 87040 BLOOD CULTURE FOR BACTERIA: CPT

## 2021-12-20 PROCEDURE — 2700000000 HC OXYGEN THERAPY PER DAY

## 2021-12-20 PROCEDURE — 94640 AIRWAY INHALATION TREATMENT: CPT

## 2021-12-20 PROCEDURE — 97530 THERAPEUTIC ACTIVITIES: CPT

## 2021-12-20 PROCEDURE — 97535 SELF CARE MNGMENT TRAINING: CPT

## 2021-12-20 PROCEDURE — 85025 COMPLETE CBC W/AUTO DIFF WBC: CPT

## 2021-12-20 PROCEDURE — 0202U NFCT DS 22 TRGT SARS-COV-2: CPT

## 2021-12-20 PROCEDURE — 6360000002 HC RX W HCPCS: Performed by: NEUROLOGICAL SURGERY

## 2021-12-20 PROCEDURE — 2060000000 HC ICU INTERMEDIATE R&B

## 2021-12-20 PROCEDURE — 6370000000 HC RX 637 (ALT 250 FOR IP): Performed by: INTERNAL MEDICINE

## 2021-12-20 PROCEDURE — 99232 SBSQ HOSP IP/OBS MODERATE 35: CPT | Performed by: INTERNAL MEDICINE

## 2021-12-20 PROCEDURE — 99232 SBSQ HOSP IP/OBS MODERATE 35: CPT | Performed by: SURGERY

## 2021-12-20 PROCEDURE — 6360000002 HC RX W HCPCS: Performed by: PHYSICIAN ASSISTANT

## 2021-12-20 RX ADMIN — SENNOSIDES AND DOCUSATE SODIUM 1 TABLET: 50; 8.6 TABLET ORAL at 09:19

## 2021-12-20 RX ADMIN — SENNOSIDES AND DOCUSATE SODIUM 1 TABLET: 50; 8.6 TABLET ORAL at 20:41

## 2021-12-20 RX ADMIN — OXYCODONE 5 MG: 5 TABLET ORAL at 05:39

## 2021-12-20 RX ADMIN — ENOXAPARIN SODIUM 40 MG: 100 INJECTION SUBCUTANEOUS at 09:19

## 2021-12-20 RX ADMIN — DEXTROSE MONOHYDRATE 10 MG/HR: 50 INJECTION, SOLUTION INTRAVENOUS at 05:55

## 2021-12-20 RX ADMIN — OXYCODONE 5 MG: 5 TABLET ORAL at 10:50

## 2021-12-20 RX ADMIN — IPRATROPIUM BROMIDE AND ALBUTEROL SULFATE 1 AMPULE: .5; 2.5 SOLUTION RESPIRATORY (INHALATION) at 17:16

## 2021-12-20 RX ADMIN — AMPICILLIN SODIUM AND SULBACTAM SODIUM 3000 MG: 2; 1 INJECTION, POWDER, FOR SOLUTION INTRAMUSCULAR; INTRAVENOUS at 12:19

## 2021-12-20 RX ADMIN — METOPROLOL TARTRATE 50 MG: 50 TABLET, FILM COATED ORAL at 09:19

## 2021-12-20 RX ADMIN — HYDROMORPHONE HYDROCHLORIDE 0.5 MG: 1 INJECTION, SOLUTION INTRAMUSCULAR; INTRAVENOUS; SUBCUTANEOUS at 08:37

## 2021-12-20 RX ADMIN — DILTIAZEM HYDROCHLORIDE 30 MG: 30 TABLET, FILM COATED ORAL at 14:24

## 2021-12-20 RX ADMIN — BISACODYL 5 MG: 5 TABLET, COATED ORAL at 09:19

## 2021-12-20 RX ADMIN — DILTIAZEM HYDROCHLORIDE 30 MG: 30 TABLET, FILM COATED ORAL at 23:48

## 2021-12-20 RX ADMIN — AMPICILLIN SODIUM AND SULBACTAM SODIUM 3000 MG: 2; 1 INJECTION, POWDER, FOR SOLUTION INTRAMUSCULAR; INTRAVENOUS at 18:38

## 2021-12-20 RX ADMIN — AMLODIPINE BESYLATE 10 MG: 10 TABLET ORAL at 09:19

## 2021-12-20 RX ADMIN — OXYCODONE 5 MG: 5 TABLET ORAL at 15:28

## 2021-12-20 RX ADMIN — DILTIAZEM HYDROCHLORIDE 30 MG: 30 TABLET, FILM COATED ORAL at 18:39

## 2021-12-20 RX ADMIN — METOPROLOL TARTRATE 50 MG: 50 TABLET, FILM COATED ORAL at 20:41

## 2021-12-20 RX ADMIN — HYDROMORPHONE HYDROCHLORIDE 0.5 MG: 1 INJECTION, SOLUTION INTRAMUSCULAR; INTRAVENOUS; SUBCUTANEOUS at 02:16

## 2021-12-20 RX ADMIN — DEXTROSE MONOHYDRATE 5 MG/HR: 50 INJECTION, SOLUTION INTRAVENOUS at 22:10

## 2021-12-20 RX ADMIN — AMPICILLIN SODIUM AND SULBACTAM SODIUM 3000 MG: 2; 1 INJECTION, POWDER, FOR SOLUTION INTRAMUSCULAR; INTRAVENOUS at 23:30

## 2021-12-20 RX ADMIN — AMPICILLIN SODIUM AND SULBACTAM SODIUM 3000 MG: 2; 1 INJECTION, POWDER, FOR SOLUTION INTRAMUSCULAR; INTRAVENOUS at 06:00

## 2021-12-20 RX ADMIN — PRAVASTATIN SODIUM 10 MG: 10 TABLET ORAL at 20:41

## 2021-12-20 RX ADMIN — OXYCODONE 5 MG: 5 TABLET ORAL at 20:41

## 2021-12-20 ASSESSMENT — PAIN DESCRIPTION - LOCATION
LOCATION: BACK

## 2021-12-20 ASSESSMENT — PAIN DESCRIPTION - PAIN TYPE
TYPE: SURGICAL PAIN

## 2021-12-20 ASSESSMENT — PAIN SCALES - GENERAL
PAINLEVEL_OUTOF10: 8
PAINLEVEL_OUTOF10: 6
PAINLEVEL_OUTOF10: 7
PAINLEVEL_OUTOF10: 8
PAINLEVEL_OUTOF10: 8
PAINLEVEL_OUTOF10: 10
PAINLEVEL_OUTOF10: 6

## 2021-12-20 ASSESSMENT — PAIN DESCRIPTION - FREQUENCY: FREQUENCY: CONTINUOUS

## 2021-12-20 ASSESSMENT — PAIN - FUNCTIONAL ASSESSMENT: PAIN_FUNCTIONAL_ASSESSMENT: PREVENTS OR INTERFERES SOME ACTIVE ACTIVITIES AND ADLS

## 2021-12-20 ASSESSMENT — PAIN DESCRIPTION - ORIENTATION: ORIENTATION: MID;LOWER

## 2021-12-20 ASSESSMENT — PAIN DESCRIPTION - PROGRESSION: CLINICAL_PROGRESSION: NOT CHANGED

## 2021-12-20 ASSESSMENT — PAIN DESCRIPTION - ONSET: ONSET: ON-GOING

## 2021-12-20 ASSESSMENT — PAIN DESCRIPTION - DESCRIPTORS: DESCRIPTORS: ACHING;DISCOMFORT

## 2021-12-20 NOTE — PROGRESS NOTES
[place]  Department of Internal Medicine  Division of Pulmonary, Critical Care and Sleep Medicine  Consult Note    Veronica Ann DO, MPH, Georgina Espitia, Avi Schwab, MD, [id number] Kelly DO, [facility]      Patient: Manuel Lipoma  MRN: [id number]  : 1935    Encounter Time: 11:42 AM     Date of Admission: 2021  5:17 AM    Primary Care Physician: Mary Castillo MD    Reason for Consultation: Post Op respiratory failure    SUBJECTIVE:    Still on 6 liters  CXR shows new infiltrate in RML and elevated hemidiaphragm which is chronic  No cough or hemoptysis  CT with chronic changes - Knonw Fibrosis per family  Had similar anesthesia issue in last surgery  Cr normalized     OBJECTIVE:     PHYSICAL EXAM:   VITALS:   Vitals:    21 0440 21 0445 21 0630 21 0837   BP: (!) 128/50   (!) 143/66   Pulse: 94   91   Resp: 18   20   Temp: 96.7 °F (35.9 °C)   97.6 °F (36.4 °C)   TempSrc: Temporal   Temporal   SpO2:  93% 91% 92%   Weight:       Height:            Intake/Output Summary (Last 24 hours) at 2021 1142  Last data filed at 2021 1854  Gross per 24 hour   Intake 734.42 ml   Output --   Net 734.42 ml        CONSTITUTIONAL:   A&O x 3, NAD  SKIN:     No rash, No suspicious lesions or skin discoloration  HEENT:     EOMI, MMM, No thrush  NECK:    No bruits, No JVP apprechiated  CV:      Sinus,  No murmur, No rubs, No gallops  PULMONARY:   Couse BS,  No Wheezing, No Rales, No Rhonchi      No noted egophony  ABDOMEN:     Soft, non-tender. BS normal. No R/R/G  EXT:    No deformities . No clubbing.       + lower extremity edema, No venous stasis  PULSE:   Appears equal and palpable.   PSYCHIATRIC:  Seems appropriate, No acute psycosis  MS:    No fractures, No gross weakness  NEUROLOGIC:   The clinical assessment is non-focal     DATA: IMAGING & TESTING:     LABORATORY TESTS:    CBC:   Lab Results   Component Value Date    WBC 10.8 12/20/2021    RBC 3.42 12/20/2021    HGB 10.1 12/20/2021    HCT 31.2 12/20/2021    MCV 91.2 12/20/2021    MCH 29.5 12/20/2021    MCHC 32.4 12/20/2021    RDW 13.6 12/20/2021     12/20/2021    MPV 9.6 12/20/2021     CMP:    Lab Results   Component Value Date     12/20/2021    K 3.8 12/20/2021    K 5.6 12/15/2021     12/20/2021    CO2 26 12/20/2021    BUN 43 12/20/2021    CREATININE 1.2 12/20/2021    GFRAA >60 12/20/2021    LABGLOM 58 12/20/2021    GLUCOSE 138 12/20/2021    PROT 6.2 12/20/2021    LABALBU 2.9 12/20/2021    CALCIUM 8.6 12/20/2021    BILITOT 0.5 12/20/2021    ALKPHOS 67 12/20/2021    AST 25 12/20/2021    ALT 21 12/20/2021     Magnesium:    Lab Results   Component Value Date    MG 2.3 12/19/2021     Phosphorus:  No results found for: PHOS     PRO-BNP: No results found for: PROBNP   ABGs:   Lab Results   Component Value Date    PH 7.394 12/16/2021    PO2 51.0 12/16/2021    PCO2 45.8 12/16/2021     Hemoglobin A1C: No components found for: HGBA1C    IMAGING:  Imaging tests were completed and reviewed and discussed radiology and care team involved and reveals     CHEST FILM COMPARED 12/18:            XR ABDOMEN (KUB) (SINGLE AP VIEW)  Result Date: 12/16/2021Dilated small and large bowel loops suggest generalized ileus. Clinical correlation and follow-up to resolution recommended. MRI LUMBAR SPINE WO CONTRAST  Result Date: 11/24/2021  Multilevel degenerative disc disease with associated facet and ligamentous hypertrophy resulting in canal stenosis at L3-4 and L4-5. Bilateral foraminal narrowing as described above. XR CHEST PORTABLE    Result Date: 12/16/2021  EXAMINATION: ONE XRAY VIEW OF THE CHEST 12/16/2021 4:19 pm COMPARISON: 12/09/2021 HISTORY: ORDERING SYSTEM PROVIDED HISTORY: hypoxia TECHNOLOGIST PROVIDED HISTORY: Reason for exam:->hypoxia What reading provider will be dictating this exam?->CRC FINDINGS: Right lower lobe atelectasis and/or infiltrate. .  There is no effusion or pneumothorax. The cardiomediastinal silhouette is without acute process. The osseous structures are without acute process. Right lower lobe atelectasis and/or infiltrate. CTA PULMONARY W CONTRAST  Result Date: 12/16/2021  INDINGS: No thoracic aortic aneurysm or dissection. Mild-to-moderate scattered calcified plaque in the thoracic aorta. No evidence of pulmonary embolism. Mild cardiomegaly. Coronary atherosclerosis. No pericardial effusion. Shotty subcentimeter short axis mediastinal hilar nodes. No pleural effusion or pneumothorax. Low lung volumes. There are a few small peripheral densities in the lungs which may represent scarring or infection (for example series 4, image 73). Another curvilinear density is noted the medial left lower lobe (series 4 image 168). Mild dependent atelectasis, right greater than left. The central airways are patent. There is centrilobular and paraseptal emphysema. Degenerative changes of the spine. The visualized abdomen demonstrates changes of cholecystectomy and the splenic parenchymal calcification. No evidence of pulmonary embolism. No acute aortic injury or aneurysm. There are a few nonspecific densities in the peripheral lungs. Imaging features can be seen with COVID-19 pneumonia, though are nonspecific and can occur with a variety of infectious and noninfectious processes. Mild cardiomegaly. Atherosclerotic disease. Assessment:   1. Post op respiratory failure  2. Hypoxemic Respiratory Failure   3. CT evidence of cytic disease of the lung  4. Plueral plaques ? Old infection or possible asbestosis  5. CT show sutttble changes of pulmonary fibrosis (periphearl)        Plan:   1. CTA negative for PE but noted atelectasis, new decrease sats?, CXR with change yet even thought procal is negative I will start Abx for aspiration pneumonia. 2. Most of the finding are chronic, some fibrosis for sure   3.  Continue ICS,   4. Bronchodialators QID  5. Stop fluids Cr is normal now and monitor  6. Wean oxygen as able   7. Monitor status  8.  Day 2 of antibiotics    Jonah Rios DO DO, MPH, FCCP, Shakila See  Professor of Internal Medicine  Pulmonary, Critical Care and Sleep Medicine

## 2021-12-20 NOTE — PROGRESS NOTES
[facility]  ATTENDING PHYSICIAN PROGRESS NOTE     I have examined the patient and  reviewed the record. I have reviewed all relevant labs and imaging data. The following summarizes my clinical findings and independent assessment. CC: Ileus    S. Patient is tolerating diet. He is having multiple bowel movements. He has no nausea or vomiting    O. No apparent distress  Lungs are clear  Cardiovascular A. Fib  Abdomen soft less distended slight tympany    ASSESSMENT:  Active Problems:    Spinal stenosis of lumbar region    Spondylolisthesis, lumbar region    Lumbar degenerative disc disease    Chest pain    Elevated troponin    Primary hypertension    Puerperal sepsis with acute hypoxic respiratory failure (HCC)    Hyperlipidemia    Pulmonary fibrosis ([facility] [address]  Resolved Problems:    * No resolved hospital problems. *       PLAN:  Status post L2-L5 posterior lumbar fusion  From postop ileus resolving  Continue aggressive bowel regiment  Minimize narcotics  We will sign off  DVT Proph: SOL/ autumn Michel MD, FACS  12/20/2021  12:50 PM    NOTE: This report was transcribed using voice recognition software. Every effort was made to ensure accuracy; however, inadvertent computerized transcription errors may be present.

## 2021-12-20 NOTE — PROGRESS NOTES
independence  Recommendation of environmental modifications for increased safety with functional transfers/mobility and ADLs  Cognitive retraining/development of therapeutic activities to improve problem solving, judgement, memory, and attention for increased safety/participation in ADL/IADL tasks  Therapeutic exercise to improve motor endurance, ROM, and functional strength for ADLs/functional transfers  Therapeutic activities to facilitate/challenge dynamic balance, stand tolerance for increased safety and independence with ADLs  Therapeutic activities to facilitate gross/fine motor skills for increased independence with ADLs  Positioning to improve skin integrity, interaction with environment and functional independence     Recommended Adaptive Equipment:  BSC to raise commode, ww, A.E.     Home Living: Pt lives with wife in a 1 story trailer with 2 platform steps to enter with 1 HR.  B&B on main level. Bathroom setup: walk in shower, std. commode   Equipment owned: shower chair     Prior Level of Function: Ind. with ADLs , Ind. with IADLs; ambulated [employer]  Driving: active  Occupation: worked [employer]     Pain Level: back- moderate  Cognition: A&O: 4/4                Functional Assessment:  AM-PAC Daily Activity Raw Score: 15/24    Initial Eval Status  Date: 12-14-21 Treatment Status  Date:  12/20/21   BID- PM session STGs = LTGs  Time frame: 10-14 days   Feeding Ind.  Independent       Grooming Set up seated EOB  Set up  Poor activity tolerance for hand washing at sink after toileting. Pt used hand  to conserve energy    Modified [place]    UB Dressing Max A with gown & LSO brace, instruction provided to pt. & wife throughout Mod A  To manage gown and Mod A to don LSO   Modified [place]    LB Dressing Mod/Max A with attempt cross   over  technique  Mod A  To pull pants up/down over hips for toileting activity Modified [place]    Bathing Mod A with sim.  task maxA  Per previous tx Modified [place]    Toileting NT Mod A  For clothing management  Min A transfers to standard commode with use of grab bar  Min A for use of urinal      Modified [place]    Bed Mobility  Logroll: Min A  Supine to sit: Minimal Assist   Sit to supine: Minimal Assist  Supine to sit: Min A Supine to sit: Modified [place]   Sit to supine: Modified [place]    Functional Transfers Minimal Assist with sit <> stand, SPT using ww STS: CGA  From EOB Modified [place]    Functional Mobility Min A with ww few steps towards HOB Min A  Using ww in room Modified [place]    Balance Sitting:     Static:  SUP    Dynamic:SBA  Standing: Min A Sitting Supported:  SBA     Standing:  Min A     Activity Tolerance Fair- lt. Ax.  poor    Good with mod. Ax. Visual/  Perceptual Glasses: yes          Vitals spO2 & HR remained WFL   on 3-4L O2   WFL       Treatment: Upon arrival pt was supine in bed attempting to get OOB with wife present. OT assisted pt with donning LSO when seated EOB. Pt requested to use urinal standing. Pt transferred to bedside chair with use of ww. Verbal cues for hand /feet placement and walker safety. Therapist returned to pt's room after 30 minutes of pt sitting in bedside chair. Pt requested to use bathroom commode. OT facilitated and instructed pt on safe functional mobility with ww and functional transfers to standard commode with grab bar. At end of session pt was transferred to bed with alarm on, all lines and tubes intact and call light within reach. Education: Transfer training, balance training, AD management, LSO management and benefits of OOB activity    · Pt has made fair progress towards set goals.        Treatment Charges: Mins Units   Ther Ex  32159     Manual Therapy 81242     Thera Activities [id number] 58 1   ADL/Home Mgt [id number] 23 2   Neuro Re-ed [id number]     Group Therapy      Orthotic manage/training  16783     Non-Billable Time       Time In: 1-10-1:25  Time Out: - 1:58- 2:21  Total Time: [address], [place] [address]

## 2021-12-20 NOTE — PROGRESS NOTES
Physical Therapy  Facility/Department: [address] NEURO SPINE  Daily Treatment Note  NAME: Jyoti Stewart  : 1935  MRN: [id number]    Date of Service: 2021    Evaluating PT: Brittany Hernandez, PT [id number]     Room #:  1471/0917-L  Diagnosis:  Spinal stenosis of lumbar region, unspecified whether neurogenic claudication present [M48.061]  Lumbar degenerative disc disease [M51.36]  PMHx/PSHx:     has a past medical history of Hypertension.    has a past surgical history that includes joint replacement (Right); Carotid endarterectomy (Right); Appendectomy; Carpal tunnel release (Bilateral); and Lumbar spine surgery (N/A, 2021).       Procedure/Surgery: L2-L5 LAMINECTOMY AND FUSION   Precautions:  Falls, falls, alarm and O2 , FWB (full weight bearing), Hemovac drain, Spinal precautions no \"BLT\" and LSO on when greater than 45degrees (elective)  Equipment Needs: Wheeled Walker     SUBJECTIVE:  Patient lives with spouse  in a trailer with 2 steps without rail and a platform between the two to enter.   Patient ambulated with a  [employer] PTA. Equipment owned: None     OBJECTIVE:    Initial Evaluation  Date: 21 Treatment  Date: 21 Short Term/ Long Term   Goals   AM-PAC 6 Clicks  46/47     Was pt agreeable to Eval/treatment? Yes Yes     Does pt have pain?  Yes; 9/10 LBP at level of incision Severe back pain, does not rate     Bed Mobility  Rolling: Min   Supine to sit: Min  Sit to supine: Min   Scooting: Min  Rolling: Min   Supine to sit: Min  Sit to supine: Min   Scooting: Min Rolling: Ind  Supine to sit: Ind  Sit to supine: Ind  Scooting: Ind   Transfers Sit to stand: Min  Stand to sit: Min  Stand pivot: Min  Sit to stand: NT  Stand to sit: NT  Stand pivot: NT Sit to stand: Mod Ind    Stand to sit: Mod Ind    Stand pivot: Mod Ind     Ambulation    NT  feet with Mod Ind     Stair negotiation: ascended and descended  NT NT 3 steps with Mod Ind  1 rail    ROM BUE:  Defer to OT eval  BLE:  wfl       Strength BUE: Defer to OT eval  RLE:  Grossly 4-/5  LLE:  Grossly  4-/5   4/5   Balance Sitting EOB:  SBA  Dynamic Standing:  NT Sitting EOB: SBA  Dynamic standing: NT Sitting EOB:  Ind  Dynamic Standing:  Ind      Pt is A & O x 4  Sensation:  Pt denies numbness and tingling to extremities  Edema:  None noted    Vitals:  SPO2 monitored throughout session as noted below via HFNC:  On 10L at rest: 88-90%    On 13L at rest: 92%    During mobility on 13L: 89-90%    After activity on 13L: 82%    After activity on 15L: 93%    Weaned slowly back to 10L at rest after all activity prior to leaving patient's room. Patient education  Pt educated on role of PT, spinal precautions, importance of continued mobility, PLB    Patient response to education:   Pt verbalized understanding Pt demonstrated skill Pt requires further education in this area   yes yes yes     ASSESSMENT:    Comments:  Patient semi-supine upon entry and agreeable to PT treatment with wife present in room. SPO2 monitored throughout session and noted above with desaturation after activity on 13L to 82% with significantly slow recovery. Patient able to roll to [employer]O brace. Patient required light assist to achieve sitting EOB. Patient sat EOB for approximately 15 minutes with education provided on PLB. Standing deferred this date d/t unstable O2 and significant pain with mobility. Patient declined opportunity to sit in bedside chair. Patient in significant LBP with pain medications given prior to start of session. Patient assisted back to semi-supine in bed with SPO2 monitoring. Patient left semi-supine in bed to end session with all needs met. Treatment:  Patient practiced and was instructed in the following treatment:     Bed Mobility: VCs provided for sequencing and safety during mobility. Manual assist provided for completion of task.    Therapeutic Activities: patient sat EOB for approximately 15 minutes for improved OOB tolerance    PLAN:    Patient is making good progress towards established goals. Will continue with current POC.       Time in  1525  Time out  1605    Total Treatment Time  40 minutes     CPT codes:  [] Gait training 05947 - minutes  [] Manual therapy 41758 - minutes  [x] Therapeutic activities 90166 40 minutes  [] Therapeutic exercises 99711 - minutes  [] Neuromuscular reeducation 54294 - minutes    Jordan Byers PT, DPT  [id number]

## 2021-12-20 NOTE — PROGRESS NOTES
Occupational Therapy  OT BEDSIDE TREATMENT NOTE      Date:2021  Patient Name: Huber Khan  MRN: [id number]  : 1935  [facility]     Per OT Eval:    Rodolfo Noguera OT: Maddi Duarte OTR/L License #  [id number]        Referring Robert Figueroa PA-C     Specific Provider Orders/Date: OT evaluation & treatment        Diagnosis:  STENOSIS     Surgery: 21: L2-L5 LAMINECTOMY AND FUSION       Past Medical History           Past Medical History:   Diagnosis Date    Hypertension           Past Surgical History             Past Surgical History:   Procedure Laterality Date    APPENDECTOMY        CAROTID ENDARTERECTOMY Right      CARPAL TUNNEL RELEASE Bilateral      JOINT REPLACEMENT Right            Precautions:  Fall Risk, neutral spine, LSO brace, drain, christie, IV      Assessment of current deficits    []? ? Functional mobility            [x]? ?ADLs           [x]? ? Strength                  [x]? ? Cognition    [x]? ? Functional transfers          [x]? ? IADLs         [x]? ? Safety Awareness   [x]? ?Endurance    [x]? ? Fine Coordination                         [x]? ? Balance      []? ? Vision/perception   [x]? ? Sensation      []? ?Gross Motor Coordination             []? ? ROM           []? ? Delirium                   []? ? Motor Control      OT PLAN OF CARE   OT POC based on physician orders, patient diagnosis and results of clinical assessment     Frequency/Duration: 2-4 days/wk for 2 weeks PRN   Specific OT Treatment Interventions to include:   Instruction/training on adapted ADL techniques and AE recommendations to increase functional independence within precautions  Training on energy conservation strategies, correct breathing pattern and techniques to improve independence/tolerance for self-care routine  Functional transfer/mobility training/DME recommendations for increased independence, safety, and fall prevention  Patient/Family education to increase follow through with safety techniques and functional independence  Recommendation of environmental modifications for increased safety with functional transfers/mobility and ADLs  Cognitive retraining/development of therapeutic activities to improve problem solving, judgement, memory, and attention for increased safety/participation in ADL/IADL tasks  Therapeutic exercise to improve motor endurance, ROM, and functional strength for ADLs/functional transfers  Therapeutic activities to facilitate/challenge dynamic balance, stand tolerance for increased safety and independence with ADLs  Therapeutic activities to facilitate gross/fine motor skills for increased independence with ADLs  Positioning to improve skin integrity, interaction with environment and functional independence     Recommended Adaptive Equipment:  BSC to raise commode, ww, A.E.     Home Living: Pt lives with wife in a 1 story trailer with 2 platform steps to enter with 1 HR.  B&B on main level. Bathroom setup: walk in shower, std. commode   Equipment owned: shower chair     Prior Level of Function: Ind. with ADLs , Ind. with IADLs; ambulated [employer]  Driving: active  Occupation: worked [employer]     Pain Level: back- moderate  Cognition: A&O: 4/4                Functional Assessment:  AM-PAC Daily Activity Raw Score: 18/24    Initial Eval Status  Date: 12-14-21 Treatment Status  Date:  12/20/21 STGs = LTGs  Time frame: 10-14 days   Feeding Ind.  Independent       Grooming Set up seated EOB  Set up  Per previous tx    Modified [place]    UB Dressing Max A with gown & LSO brace, instruction provided to pt. & wife throughout [place]  To manage gown and Mod A to don LSO   Modified [place]    LB Dressing Mod/Max A with attempt cross   over  technique  SBA  Pt used reacher/ sock aid to doff/ don socks. Demonstrated use of reacher to don pants with pt verbalizing understanding Modified [place]    Bathing Mod A with sim.  task maxA  Per previous tx Modified [place]    Toileting NT DNT  Per previous tx      Modified [place]    Bed Mobility  Logroll: Min A  Supine to sit: Minimal Assist   Sit to supine: Minimal Assist  Supine to sit: Min A Supine to sit: Modified [place]   Sit to supine: Modified [place]    Functional Transfers Minimal Assist with sit <> stand, SPT using ww STS: CGA  From EOB Modified [place]    Functional Mobility Min A with ww few steps towards HOB CGA  Using ww in room Modified [place]    Balance Sitting:     Static:  SUP    Dynamic:SBA  Standing: Min A Sitting Supported:  SBA     Standing:  CGA     Activity Tolerance Fair- lt. Ax.  Fair-    Good with mod. Ax. Visual/  Perceptual Glasses: yes          Vitals spO2 & HR remained WFL   on 3-4L O2   WFL       Treatment: Upon arrival pt was supine in bed. Extended time and various rest breaks due to back pain. Issued reacher, sock aid, [place] 9 and LHS per pt request during tx due to pt planning to d/c home. At end of session pt was transferred to chair with alarm on, all lines and tubes intact and call light within reach. Education: Transfer training, balance training, AD management, LSO management and benefits of OOB activity    · Pt has made good progress towards set goals.        Treatment Charges: Mins Units   Ther Ex  39571     Manual Therapy 62838     Thera Activities 41133 9 1   ADL/Home Mgt 19318 15 1   Neuro Re-ed 72125     Group Therapy      Orthotic manage/training  86007     Non-Billable Time       Time In: 8:25  Time Out: 8:49  Total Time: [address] [facility] JAIME/L [id number]

## 2021-12-20 NOTE — PROGRESS NOTES
Hospitalist Progress Note      SYNOPSIS: Patient admitted on 2021 for <principal problem not specified>  Mr. Ingrid Buck, a 82y.o. year old male  who  has a past medical history of Hypertension.      Patient was admitted under the neurosurgery service. He underwent L2-L5 laminectomy and fusion. Patient is postop, he states that his pain is minimal.  He denies chest pain, no shortness of breath, no hematuria dysuria hematemesis or hematochezia. Patient states that he has hypertension which is fairly controlled at home. Postoperatively the patient developed acute respiratory failure requiring high oxygen patient was diagnosed with aspiration pneumonia. He developed new onset A. fib with rapid ventricular response on . SUBJECTIVE:  Patient seen and examined. Patient still on 9 L. Started overnight on Unasyn for aspiration pneumonia. Temp (24hrs), Av.4 °F (36.3 °C), Min:96.7 °F (35.9 °C), Max:98.2 °F (36.8 °C)    DIET: ADULT DIET; Regular; Low Fiber  CODE: Full Code    Intake/Output Summary (Last 24 hours) at 2021 1206  Last data filed at 2021 1854  Gross per 24 hour   Intake 734.42 ml   Output --   Net 734.42 ml       OBJECTIVE:    BP (!) 143/66   Pulse 91   Temp 97.6 °F (36.4 °C) (Temporal)   Resp 20   Ht 6' 1\" (1.854 m)   Wt 200 lb (90.7 kg)   SpO2 92%   BMI 26.39 kg/m²     General appearance: No apparent distress, appears stated age and cooperative. HEENT:  Conjunctivae/corneas clear. Neck: Supple. No jugular venous distention. Respiratory: Diminished air entry bilaterally  Cardiovascular: A. fib  Abdomen: Soft, nontender, nondistended  Musculoskeletal: No clubbing, cyanosis, no bilateral lower extremity edema. Brisk capillary refill.    Skin:  No rashes  on visible skin  Neurologic: awake, alert and following commands     Assessment and plan:  #Acute postoperative hypoxic respiratory failure  #Possible aspiration  -CT scan of the chest was negative for PE  -Pulmonologist ordered IV Solu-Medrol  -Resume incentive spirometer and bronchodilator treatment  -History of COVID-19 infection May 2021 hospitalized  -Wean off oxygen as tolerated  -IV Unasyn started 12/19 pulmonary    #New onset atrial fibrillation RVR. [id number] (age, hypertension, PAD)  #Atypical CP. Recent preoperative stress test negative for ischemia  -Appreciate cardiology input  -IV Cardizem drip  -Change Norvasc to p.o.  Cardizem for better rate control  -Cardiology plans for anticoagulation once cleared by neurosurgery      #Post operative ileus  -Resolved    #Essential hypertension:   -Resume patient medications with metoprolol and Cardizem     #Hyperlipidemia:   -Continue statin     #Spinal stenosis:   -Status post L2-L5 laminectomy on 12/14, supportive care, neurosurgery following.  -Pain control  -PT/OT      DISPOSITION: TBD    Medications:  REVIEWED DAILY    Infusion Medications    dilTIAZem 5 mg/hr (12/20/21 0839)    sodium chloride       Scheduled Medications    ampicillin-sulbactam  3,000 mg IntraVENous Q6H    metoprolol tartrate  50 mg Oral BID    enoxaparin  40 mg SubCUTAneous Daily    ipratropium-albuterol  1 ampule Inhalation Q4H WA    amLODIPine  10 mg Oral Daily    pravastatin  10 mg Oral Daily    sodium chloride flush  5-40 mL IntraVENous 2 times per day    polyethylene glycol  17 g Oral Daily    bisacodyl  5 mg Oral Daily    sennosides-docusate sodium  1 tablet Oral BID     PRN Meds: perflutren lipid microspheres, acetaminophen, simethicone, sodium chloride flush, sodium chloride, ondansetron **OR** ondansetron, oxyCODONE **OR** oxyCODONE, HYDROmorphone, cyclobenzaprine, magnesium hydroxide, fleet    Labs:     Recent Labs     12/20/21  0720   WBC 10.8   HGB 10.1*   HCT 31.2*          Recent Labs     12/18/21  0626 12/19/21  0526 12/20/21  0720    141 138   K 4.5 3.8 3.8    105 101   CO2 25 26 26   BUN 33* 34* 43*   CREATININE 1.0 1.0 1.2   CALCIUM 8.7 9.2 8.6       Recent Labs     12/18/21  0626 12/19/21  0526 12/20/21  0720   PROT 6.1* 6.3* 6.2*   ALKPHOS 73 84 67   ALT 6 21 21   AST 16 34 25   BILITOT 0.5 0.5 0.5       No results for input(s): INR in the last 72 hours. No results for input(s): Erle Keepers in the last 72 hours. Chronic labs:    Lab Results   Component Value Date    PSA SEE NOTE (AA) 08/14/2020    PSA 4.89 (H) 08/14/2020    INR 1.2 12/09/2021       Radiology: REVIEWED DAILY    +++++++++++++++++++++++++++++++++++++++++++++++++  Clarice Garcia MD  [facility] Physician - [address], [place]  +++++++++++++++++++++++++++++++++++++++++++++++++  NOTE: This report was transcribed using voice recognition software. Every effort was made to ensure accuracy; however, inadvertent computerized transcription errors may be present.

## 2021-12-20 NOTE — PROGRESS NOTES
INPATIENT CARDIOLOGY FOLLOW-UP    Name: Silver Howe    Age: 82 y.o. Date of Admission: 12/14/2021  5:17 AM    Date of Service: 12/20/2021    Primary Cardiologist: Dr. Niecy Mejia    Chief Complaint: Follow-up for new onset atrial fibrillation with RVR    Interim History:  No new overnight cardiac complaints. Currently with no complaints of CP, SOB, palpitations, dizziness, or lightheadedness. Atrial fibrillation with rates ranging from 100-130. Still on 9 L of oxygen via nasal cannula.     Review of Systems:   Negative except as described above    Problem List:  Patient Active Problem List   Diagnosis    Spinal stenosis of lumbar region    Spondylolisthesis, lumbar region    Lumbar degenerative disc disease    Chest pain    Elevated troponin    Primary hypertension    Puerperal sepsis with acute hypoxic respiratory failure (HCC)    Hyperlipidemia    Pulmonary fibrosis (HCC)       Current Medications:    Current Facility-Administered Medications:     dilTIAZem (CARDIZEM) tablet 30 mg, 30 mg, Oral, 4 times per day, Steffany Munoz MD    ampicillin-sulbactam (UNASYN) 3000 mg ivpb minibag, 3,000 mg, IntraVENous, Q6H, Fredi Avery DO, Last Rate: 200 mL/hr at 12/20/21 1219, 3,000 mg at 12/20/21 1219    [COMPLETED] dilTIAZem injection 20 mg, 20 mg, IntraVENous, Once, 20 mg at 12/19/21 1215 **FOLLOWED BY** dilTIAZem 100 mg in dextrose 5 % 100 mL infusion ([facility]), 5-15 mg/hr, IntraVENous, Continuous, Jj Byers MD, Last Rate: 5 mL/hr at 12/20/21 0839, 5 mg/hr at 12/20/21 0839    metoprolol tartrate (LOPRESSOR) tablet 50 mg, 50 mg, Oral, BID, Jj Byers MD, 50 mg at 12/20/21 0919    perflutren lipid microspheres (DEFINITY) injection 1.65 mg, 1.5 mL, IntraVENous, ONCE PRN, Jj Byers MD    enoxaparin (LOVENOX) injection 40 mg, 40 mg, SubCUTAneous, Daily, Claudia Ray MD, 40 mg at 12/20/21 0919    acetaminophen (TYLENOL) tablet 650 mg, 650 mg, Oral, Q4H PRN, Taylor Lyme, MD, 650 mg at 12/18/21 1502    simethicone (MYLICON) chewable tablet 80 mg, 80 mg, Oral, Q6H PRN, Cecilio Bee PA-C    ipratropium-albuterol (DUONEB) nebulizer solution 1 ampule, 1 ampule, Inhalation, Q4H WA, Peter Whitley MD, 1 ampule at 12/19/21 2013    pravastatin (PRAVACHOL) tablet 10 mg, 10 mg, Oral, Daily, Ellen Grossman PA-C, 10 mg at 12/19/21 2119    sodium chloride flush 0.9 % injection 5-40 mL, 5-40 mL, IntraVENous, 2 times per day, Ellen Grossman PA-C, 10 mL at 12/19/21 2121    sodium chloride flush 0.9 % injection 5-40 mL, 5-40 mL, IntraVENous, PRN, Ellen Grossman PA-C, 10 mL at 12/19/21 1142    0.9 % sodium chloride infusion, 25 mL, IntraVENous, PRN, Ellne Grossamn PA-C    ondansetron (ZOFRAN-ODT) disintegrating tablet 4 mg, 4 mg, Oral, Q8H PRN **OR** ondansetron (ZOFRAN) injection 4 mg, 4 mg, IntraVENous, Q6H PRN, Cecilio Bee PA-C, 4 mg at 12/16/21 2242    oxyCODONE (ROXICODONE) immediate release tablet 5 mg, 5 mg, Oral, Q4H PRN, 5 mg at 12/20/21 1050 **OR** oxyCODONE HCl (OXY-IR) immediate release tablet 10 mg, 10 mg, Oral, Q4H PRN, Cecilio Bee PA-C, 10 mg at 12/19/21 0134    HYDROmorphone (DILAUDID) injection 0.5 mg, 0.5 mg, IntraVENous, Q3H PRN, Cecilio Bee PA-C, 0.5 mg at 12/20/21 4381    cyclobenzaprine (FLEXERIL) tablet 10 mg, 10 mg, Oral, TID PRN, Cecilio Bee PA-C, 10 mg at 12/18/21 2202    polyethylene glycol (GLYCOLAX) packet 17 g, 17 g, Oral, Daily, Ellen Grossman PA-C, 17 g at 12/18/21 7312    bisacodyl (DULCOLAX) EC tablet 5 mg, 5 mg, Oral, Daily, Ellen Grossman PA-C, 5 mg at 12/20/21 0919    sennosides-docusate sodium (SENOKOT-S) 8.6-50 MG tablet 1 tablet, 1 tablet, Oral, BID, Ellen Grossman PA-C, 1 tablet at 12/20/21 0919    magnesium hydroxide (MILK OF MAGNESIA) 400 MG/5ML suspension 30 mL, 30 mL, Oral, Daily PRN, Cecilio Bee PA-C    fleet rectal enema 1 enema, 1 enema, Rectal, Daily PRN, Cecilio Bee PA-C    Physical Exam:  BP (!) 143/66 Pulse 91   Temp 97.6 °F (36.4 °C) (Temporal)   Resp 20   Ht 6' 1\" (1.854 m)   Wt 200 lb (90.7 kg)   SpO2 91%   BMI 26.39 kg/m²   Wt Readings from Last 3 Encounters:   12/14/21 200 lb (90.7 kg)   12/09/21 200 lb (90.7 kg)   11/04/21 200 lb (90.7 kg)     Appearance: Awake, alert, no acute respiratory distress  Skin: Intact, no rash  Head: Normocephalic, atraumatic  Eyes: EOMI, no conjunctival erythema  ENMT: No pharyngeal erythema, MMM, no rhinorrhea  Neck: Supple, no elevated JVP, no carotid bruits  Lungs: Clear to auscultation bilaterally. No wheezes, rales, or rhonchi. Cardiac: PMI nondisplaced, tachycardic with irregularly irregular rhythm, S1 & S2 normal, no murmurs  Abdomen: Soft, nontender, +bowel sounds  Extremities: Moves all extremities x 4, no lower extremity edema  Neurologic: No focal motor deficits apparent, normal mood and affect  Peripheral Pulses: Intact posterior tibial pulses bilaterally    Intake/Output:    Intake/Output Summary (Last 24 hours) at 12/20/2021 1242  Last data filed at 12/19/2021 1854  Gross per 24 hour   Intake 634.42 ml   Output --   Net 634.42 ml     No intake/output data recorded.     Laboratory Tests:  Recent Labs     12/18/21  0626 12/19/21  0526 12/20/21  0720    141 138   K 4.5 3.8 3.8    105 101   CO2 25 26 26   BUN 33* 34* 43*   CREATININE 1.0 1.0 1.2   GLUCOSE 190* 116* 138*   CALCIUM 8.7 9.2 8.6     Lab Results   Component Value Date    MG 2.3 12/19/2021     Recent Labs     12/18/21  0626 12/19/21  0526 12/20/21  0720   ALKPHOS 73 84 67   ALT 6 21 21   AST 16 34 25   PROT 6.1* 6.3* 6.2*   BILITOT 0.5 0.5 0.5   LABALBU 3.3* 3.4* 2.9*     Recent Labs     12/20/21  0720   WBC 10.8   RBC 3.42*   HGB 10.1*   HCT 31.2*   MCV 91.2   MCH 29.5   MCHC 32.4   RDW 13.6      MPV 9.6     No results found for: CKTOTAL, CKMB, CKMBINDEX, TROPONINI  Lab Results   Component Value Date    INR 1.2 12/09/2021    INR 1.1 03/23/2021    PROTIME 12.7 (H) 12/09/2021 PROTIME 13.0 (H) 03/23/2021     No results found for: TSHFT4, TSH  No results found for: LABA1C  No results found for: EAG  No results found for: CHOL  No results found for: TRIG  No results found for: HDL  No results found for: LDLCALC, LDLCHOLESTEROL  No results found for: LABVLDL, VLDL  No results found for: CHOLHDLRATIO  No results for input(s): PROBNP in the last 72 hours. Stress test:    Adenosine stress test: 11/18/2021: LVEF 50%, nonischemic.     Cardiac catheterization:   Reported normal >10 year ago     ----------------------------------------------------------------------------------------------------------------------------------------------------------------  IMPRESSION:  1. New onset atrial fibrillation RVR. [id number] 4 (age, hypertension, PAD)  2. Atypical CP. Recent preoperative stress test negative for ischemia  3. Post op respiratory failure, on 9 L high flow nasal cannula  4. Postoperative ileus  5. S/p Laminectomy 12/14/21  6. ILD/fibrosis  7. Hypertension  8. Hx R CEA  9. GERD  10. Hx COVID 19 5/2021     RECOMMENDATIONS:     · Continue Cardizem drip. Currently at 5. Also on oral Cardizem 30 every 6.  · Metoprolol SR 50 mg twice daily. · Given elevated stroke risk, recommend anticoagulation when okay from surgical standpoint, discussed with Dr. Hannah Dubon, recommends Tuesday, 12/21/2021  · Echocardiogram given respiratory failure and AF, once heart rate improves. We will also do a bubble study to look for any underlying shunt as a cause of his hypoxia. · Currently being treated for presumed aspiration pneumonia. · Further care per primary service neurosurgery, pulmonary, general surgery. · Will consider TOBIAS guided cardioversion once his respiratory status improves and anticoagulation has been initiated.

## 2021-12-20 NOTE — PROGRESS NOTES
Department of Neurosurgery  Progress Note    CHIEF COMPLAINT: lumbar fusion    SUBJECTIVE:  alexis op site pain okay. No SOB or CP today    REVIEW OF SYSTEMS :  Constitutional: Negative for chills and fever. Neurological: Negative for dizziness, tremors and speech change. OBJECTIVE:   VITALS:  BP (!) 128/50   Pulse 94   Temp 96.7 °F (35.9 °C) (Temporal)   Resp 18   Ht 6' 1\" (1.854 m)   Wt 200 lb (90.7 kg)   SpO2 91%   BMI 26.39 kg/m²   PHYSICAL:  CONSTITUTIONAL:  awake, alert, cooperative, no apparent distress, and appears stated age and BECKHAM.   FC    DATA:  CBC:   Lab Results   Component Value Date    WBC 14.4 12/17/2021    RBC 3.94 12/17/2021    HGB 11.9 12/17/2021    HCT 36.1 12/17/2021    MCV 91.6 12/17/2021    MCH 30.2 12/17/2021    MCHC 33.0 12/17/2021    RDW 13.5 12/17/2021     12/17/2021    MPV 9.6 12/17/2021     BMP:    Lab Results   Component Value Date     12/19/2021    K 3.8 12/19/2021    K 5.6 12/15/2021     12/19/2021    CO2 26 12/19/2021    BUN 34 12/19/2021    LABALBU 3.4 12/19/2021    CREATININE 1.0 12/19/2021    CALCIUM 9.2 12/19/2021    GFRAA >60 12/19/2021    LABGLOM >60 12/19/2021    GLUCOSE 116 12/19/2021     PT/INR:    Lab Results   Component Value Date    PROTIME 12.7 12/09/2021    INR 1.2 12/09/2021     PTT:  No results found for: APTT, PTT[APTT}    Current Inpatient Medications  Current Facility-Administered Medications: ampicillin-sulbactam (UNASYN) 3000 mg ivpb minibag, 3,000 mg, IntraVENous, Q6H  [COMPLETED] dilTIAZem injection 20 mg, 20 mg, IntraVENous, Once **FOLLOWED BY** dilTIAZem 100 mg in dextrose 5 % 100 mL infusion ([facility]), 5-15 mg/hr, IntraVENous, Continuous  metoprolol tartrate (LOPRESSOR) tablet 50 mg, 50 mg, Oral, BID  perflutren lipid microspheres (DEFINITY) injection 1.65 mg, 1.5 mL, IntraVENous, ONCE PRN  enoxaparin (LOVENOX) injection 40 mg, 40 mg, SubCUTAneous, Daily  acetaminophen (TYLENOL) tablet 650 mg, 650 mg, Oral, Q4H PRN  simethicone (MYLICON) chewable tablet 80 mg, 80 mg, Oral, Q6H PRN  ipratropium-albuterol (DUONEB) nebulizer solution 1 ampule, 1 ampule, Inhalation, Q4H WA  amLODIPine (NORVASC) tablet 10 mg, 10 mg, Oral, Daily  pravastatin (PRAVACHOL) tablet 10 mg, 10 mg, Oral, Daily  sodium chloride flush 0.9 % injection 5-40 mL, 5-40 mL, IntraVENous, 2 times per day  sodium chloride flush 0.9 % injection 5-40 mL, 5-40 mL, IntraVENous, PRN  0.9 % sodium chloride infusion, 25 mL, IntraVENous, PRN  ondansetron (ZOFRAN-ODT) disintegrating tablet 4 mg, 4 mg, Oral, Q8H PRN **OR** ondansetron (ZOFRAN) injection 4 mg, 4 mg, IntraVENous, Q6H PRN  oxyCODONE (ROXICODONE) immediate release tablet 5 mg, 5 mg, Oral, Q4H PRN **OR** oxyCODONE HCl (OXY-IR) immediate release tablet 10 mg, 10 mg, Oral, Q4H PRN  HYDROmorphone (DILAUDID) injection 0.5 mg, 0.5 mg, IntraVENous, Q3H PRN  cyclobenzaprine (FLEXERIL) tablet 10 mg, 10 mg, Oral, TID PRN  polyethylene glycol (GLYCOLAX) packet 17 g, 17 g, Oral, Daily  bisacodyl (DULCOLAX) EC tablet 5 mg, 5 mg, Oral, Daily  sennosides-docusate sodium (SENOKOT-S) 8.6-50 MG tablet 1 tablet, 1 tablet, Oral, BID  magnesium hydroxide (MILK OF MAGNESIA) 400 MG/5ML suspension 30 mL, 30 mL, Oral, Daily PRN  fleet rectal enema 1 enema, 1 enema, Rectal, Daily PRN    ASSESSMENT:   · S/p L2-5 PLF on 12/14 - stable  · Ileus - improving  · A-fib    PLAN:  · PT/OT  · WBAT with brace  · Pain control  · Ok full anticoagulation tomorrow 12/21 if needed        Electronically signed by RADHA Cardenas on 12/20/2021 at 7:54 AM

## 2021-12-21 LAB
ALBUMIN SERPL-MCNC: 3.1 G/DL (ref 3.5–5.2)
ALP BLD-CCNC: 68 U/L (ref 40–129)
ALT SERPL-CCNC: 18 U/L (ref 0–40)
ANION GAP SERPL CALCULATED.3IONS-SCNC: 10 MMOL/L (ref 7–16)
AST SERPL-CCNC: 17 U/L (ref 0–39)
BASOPHILS ABSOLUTE: 0.04 E9/L (ref 0–0.2)
BASOPHILS RELATIVE PERCENT: 0.3 % (ref 0–2)
BILIRUB SERPL-MCNC: 0.6 MG/DL (ref 0–1.2)
BUN BLDV-MCNC: 47 MG/DL (ref 6–23)
CALCIUM SERPL-MCNC: 8.3 MG/DL (ref 8.6–10.2)
CHLORIDE BLD-SCNC: 101 MMOL/L (ref 98–107)
CO2: 27 MMOL/L (ref 22–29)
CREAT SERPL-MCNC: 1.4 MG/DL (ref 0.7–1.2)
EOSINOPHILS ABSOLUTE: 0.13 E9/L (ref 0.05–0.5)
EOSINOPHILS RELATIVE PERCENT: 1.1 % (ref 0–6)
GFR AFRICAN AMERICAN: 58
GFR NON-AFRICAN AMERICAN: 48 ML/MIN/1.73
GLUCOSE BLD-MCNC: 123 MG/DL (ref 74–99)
HCT VFR BLD CALC: 30.8 % (ref 37–54)
HEMOGLOBIN: 10 G/DL (ref 12.5–16.5)
IMMATURE GRANULOCYTES #: 0.1 E9/L
IMMATURE GRANULOCYTES %: 0.8 % (ref 0–5)
LYMPHOCYTES ABSOLUTE: 1.53 E9/L (ref 1.5–4)
LYMPHOCYTES RELATIVE PERCENT: 12.7 % (ref 20–42)
MCH RBC QN AUTO: 30.4 PG (ref 26–35)
MCHC RBC AUTO-ENTMCNC: 32.5 % (ref 32–34.5)
MCV RBC AUTO: 93.6 FL (ref 80–99.9)
MONOCYTES ABSOLUTE: 1.02 E9/L (ref 0.1–0.95)
MONOCYTES RELATIVE PERCENT: 8.5 % (ref 2–12)
NEUTROPHILS ABSOLUTE: 9.2 E9/L (ref 1.8–7.3)
NEUTROPHILS RELATIVE PERCENT: 76.6 % (ref 43–80)
PDW BLD-RTO: 13.5 FL (ref 11.5–15)
PLATELET # BLD: 228 E9/L (ref 130–450)
PMV BLD AUTO: 9.6 FL (ref 7–12)
POTASSIUM SERPL-SCNC: 3.8 MMOL/L (ref 3.5–5)
RBC # BLD: 3.29 E12/L (ref 3.8–5.8)
SODIUM BLD-SCNC: 138 MMOL/L (ref 132–146)
TOTAL PROTEIN: 5.9 G/DL (ref 6.4–8.3)
WBC # BLD: 12 E9/L (ref 4.5–11.5)

## 2021-12-21 PROCEDURE — 2580000003 HC RX 258: Performed by: INTERNAL MEDICINE

## 2021-12-21 PROCEDURE — 99233 SBSQ HOSP IP/OBS HIGH 50: CPT | Performed by: INTERNAL MEDICINE

## 2021-12-21 PROCEDURE — 6370000000 HC RX 637 (ALT 250 FOR IP): Performed by: INTERNAL MEDICINE

## 2021-12-21 PROCEDURE — 2500000003 HC RX 250 WO HCPCS: Performed by: INTERNAL MEDICINE

## 2021-12-21 PROCEDURE — 94640 AIRWAY INHALATION TREATMENT: CPT

## 2021-12-21 PROCEDURE — 2060000000 HC ICU INTERMEDIATE R&B

## 2021-12-21 PROCEDURE — 97530 THERAPEUTIC ACTIVITIES: CPT

## 2021-12-21 PROCEDURE — 6360000002 HC RX W HCPCS: Performed by: INTERNAL MEDICINE

## 2021-12-21 PROCEDURE — 80053 COMPREHEN METABOLIC PANEL: CPT

## 2021-12-21 PROCEDURE — 85025 COMPLETE CBC W/AUTO DIFF WBC: CPT

## 2021-12-21 PROCEDURE — 6370000000 HC RX 637 (ALT 250 FOR IP): Performed by: PHYSICIAN ASSISTANT

## 2021-12-21 PROCEDURE — 2700000000 HC OXYGEN THERAPY PER DAY

## 2021-12-21 PROCEDURE — 36415 COLL VENOUS BLD VENIPUNCTURE: CPT

## 2021-12-21 PROCEDURE — 2580000003 HC RX 258: Performed by: PHYSICIAN ASSISTANT

## 2021-12-21 PROCEDURE — 97535 SELF CARE MNGMENT TRAINING: CPT

## 2021-12-21 PROCEDURE — 6360000002 HC RX W HCPCS: Performed by: NEUROLOGICAL SURGERY

## 2021-12-21 RX ORDER — 0.9 % SODIUM CHLORIDE 0.9 %
500 INTRAVENOUS SOLUTION INTRAVENOUS ONCE
Status: COMPLETED | OUTPATIENT
Start: 2021-12-21 | End: 2021-12-21

## 2021-12-21 RX ADMIN — SENNOSIDES AND DOCUSATE SODIUM 1 TABLET: 50; 8.6 TABLET ORAL at 09:14

## 2021-12-21 RX ADMIN — AMPICILLIN SODIUM AND SULBACTAM SODIUM 3000 MG: 2; 1 INJECTION, POWDER, FOR SOLUTION INTRAMUSCULAR; INTRAVENOUS at 18:26

## 2021-12-21 RX ADMIN — OXYCODONE HYDROCHLORIDE 10 MG: 10 TABLET ORAL at 01:25

## 2021-12-21 RX ADMIN — CYCLOBENZAPRINE 10 MG: 10 TABLET, FILM COATED ORAL at 09:13

## 2021-12-21 RX ADMIN — IPRATROPIUM BROMIDE AND ALBUTEROL SULFATE 1 AMPULE: .5; 2.5 SOLUTION RESPIRATORY (INHALATION) at 13:45

## 2021-12-21 RX ADMIN — Medication 10 ML: at 09:14

## 2021-12-21 RX ADMIN — AMPICILLIN SODIUM AND SULBACTAM SODIUM 3000 MG: 2; 1 INJECTION, POWDER, FOR SOLUTION INTRAMUSCULAR; INTRAVENOUS at 05:34

## 2021-12-21 RX ADMIN — AMPICILLIN SODIUM AND SULBACTAM SODIUM 3000 MG: 2; 1 INJECTION, POWDER, FOR SOLUTION INTRAMUSCULAR; INTRAVENOUS at 23:58

## 2021-12-21 RX ADMIN — Medication 10 ML: at 22:22

## 2021-12-21 RX ADMIN — IPRATROPIUM BROMIDE 0.5 MG: 0.5 SOLUTION RESPIRATORY (INHALATION) at 20:04

## 2021-12-21 RX ADMIN — DILTIAZEM HYDROCHLORIDE 30 MG: 30 TABLET, FILM COATED ORAL at 12:19

## 2021-12-21 RX ADMIN — SIMETHICONE 80 MG: 80 TABLET, CHEWABLE ORAL at 09:24

## 2021-12-21 RX ADMIN — METOPROLOL TARTRATE 50 MG: 50 TABLET, FILM COATED ORAL at 22:12

## 2021-12-21 RX ADMIN — SODIUM CHLORIDE 500 ML: 9 INJECTION, SOLUTION INTRAVENOUS at 10:53

## 2021-12-21 RX ADMIN — DILTIAZEM HYDROCHLORIDE 30 MG: 30 TABLET, FILM COATED ORAL at 23:58

## 2021-12-21 RX ADMIN — AMPICILLIN SODIUM AND SULBACTAM SODIUM 3000 MG: 2; 1 INJECTION, POWDER, FOR SOLUTION INTRAMUSCULAR; INTRAVENOUS at 12:19

## 2021-12-21 RX ADMIN — OXYCODONE HYDROCHLORIDE 10 MG: 10 TABLET ORAL at 09:14

## 2021-12-21 RX ADMIN — DILTIAZEM HYDROCHLORIDE 30 MG: 30 TABLET, FILM COATED ORAL at 18:26

## 2021-12-21 RX ADMIN — METOPROLOL TARTRATE 50 MG: 50 TABLET, FILM COATED ORAL at 09:13

## 2021-12-21 RX ADMIN — DILTIAZEM HYDROCHLORIDE 30 MG: 30 TABLET, FILM COATED ORAL at 05:34

## 2021-12-21 RX ADMIN — BISACODYL 5 MG: 5 TABLET, COATED ORAL at 09:14

## 2021-12-21 RX ADMIN — OXYCODONE HYDROCHLORIDE 10 MG: 10 TABLET ORAL at 13:23

## 2021-12-21 RX ADMIN — OXYCODONE HYDROCHLORIDE 10 MG: 10 TABLET ORAL at 22:13

## 2021-12-21 RX ADMIN — ENOXAPARIN SODIUM 40 MG: 100 INJECTION SUBCUTANEOUS at 09:13

## 2021-12-21 RX ADMIN — POLYETHYLENE GLYCOL 3350 17 G: 17 POWDER, FOR SOLUTION ORAL at 09:14

## 2021-12-21 RX ADMIN — DEXTROSE MONOHYDRATE 5 MG/HR: 50 INJECTION, SOLUTION INTRAVENOUS at 18:03

## 2021-12-21 RX ADMIN — OXYCODONE 5 MG: 5 TABLET ORAL at 18:26

## 2021-12-21 RX ADMIN — PRAVASTATIN SODIUM 10 MG: 10 TABLET ORAL at 22:13

## 2021-12-21 RX ADMIN — SENNOSIDES AND DOCUSATE SODIUM 1 TABLET: 50; 8.6 TABLET ORAL at 22:25

## 2021-12-21 ASSESSMENT — PAIN SCALES - GENERAL
PAINLEVEL_OUTOF10: 4
PAINLEVEL_OUTOF10: 8
PAINLEVEL_OUTOF10: 0
PAINLEVEL_OUTOF10: 10
PAINLEVEL_OUTOF10: 8

## 2021-12-21 ASSESSMENT — PAIN DESCRIPTION - ORIENTATION
ORIENTATION: MID;LOWER
ORIENTATION: MID;LOWER

## 2021-12-21 ASSESSMENT — PAIN DESCRIPTION - PROGRESSION
CLINICAL_PROGRESSION: NOT CHANGED
CLINICAL_PROGRESSION: NOT CHANGED

## 2021-12-21 ASSESSMENT — PAIN DESCRIPTION - PAIN TYPE
TYPE: SURGICAL PAIN
TYPE: SURGICAL PAIN

## 2021-12-21 ASSESSMENT — PAIN DESCRIPTION - DESCRIPTORS: DESCRIPTORS: ACHING

## 2021-12-21 ASSESSMENT — PAIN DESCRIPTION - LOCATION
LOCATION: BACK
LOCATION: BACK

## 2021-12-21 NOTE — PLAN OF CARE
Problem: Infection - Surgical Site:  Goal: Will show no infection signs and symptoms  Description: Will show no infection signs and symptoms  Outcome: Met This Shift     Problem: Skin Integrity:  Goal: Will show no infection signs and symptoms  Description: Will show no infection signs and symptoms  Outcome: Met This Shift     Problem: Skin Integrity:  Goal: Absence of new skin breakdown  Description: Absence of new skin breakdown  Outcome: Met This Shift     Problem: Skin Integrity:  Goal: Complications related to intravenous access or infusion will be avoided or minimized  Description: Complications related to intravenous access or infusion will be avoided or minimized  Outcome: Met This Shift     Problem: Falls - Risk of:  Goal: Will remain free from falls  Description: Will remain free from falls  Outcome: Met This Shift     Problem: Falls - Risk of:  Goal: Absence of physical injury  Description: Absence of physical injury  Outcome: Met This Shift     Problem: Physical Regulation:  Goal: Will remain free from infection  Description: Will remain free from infection  Outcome: Met This Shift

## 2021-12-21 NOTE — CARE COORDINATION
Therapy notes from 12/20 reviewed. Met with patient and his wife Milvia at the bedside to discuss transition of care plan. Reviewed therapy input from yesterday with them and explained the recommendation at this time would be for rehab prior to returning home. Discussed options for rehab and explained possibility of being able to transition to ARU. Options discussed for ARU choices. Milvia agreeable to transition to ARU at [facility]. She did state she would not agree with transition to [facility]. Call placed to [facility], liaison with [facility] and referral made. She will follow patient for now. Patient will need to be off the cardizem drip  And will need to be weaned down to 6L O2 per NC prior to transition of care. [facility] also notified of negative respiratory panel 12/20. Patient needs to have a negative PCR COVID test with in 72 hours of discharge. Patient remains on Cardizem drip @ 5 at this time, 10 L O2 HF with POX of 91% this am.  Per nursing, possible cardioversion to be completed tomorrow. Ileus is resolving, positive BM yesterday with bowel regimen. Will continue to follow for further transition of care planning needs. Matilde Deshpande RN.  P:  [phone]    The Plan for Transition of Care is related to the following treatment goals:  Improve functional mobility to return home. The Patient and/or patient representative and wife Roberto Carlos Marcano, was provided with a choice of provider and agrees with the discharge plan. [x] Yes [] No    Freedom of choice list was provided with basic dialogue that supports the patient's individualized plan of care/goals, treatment preferences and shares the quality data associated with the providers.  [x] Yes [] No

## 2021-12-21 NOTE — PROGRESS NOTES
Occupational Therapy  OT BEDSIDE TREATMENT NOTE      Date:2021  Patient Name: Laura Becker  MRN: [id number]  : 1935  Room: [address] [place] [address]     Per OT Eval:    Niyah Davidson OT: Anamika Bey. DANYELL DuarteR/L License #  [id number]        Referring Barry Lilly PA-C     Specific Provider Orders/Date: OT evaluation & treatment        Diagnosis:  STENOSIS     Surgery: 21: L2-L5 LAMINECTOMY AND FUSION       Past Medical History           Past Medical History:   Diagnosis Date    Hypertension           Past Surgical History             Past Surgical History:   Procedure Laterality Date    APPENDECTOMY        CAROTID ENDARTERECTOMY Right      CARPAL TUNNEL RELEASE Bilateral      JOINT REPLACEMENT Right            Precautions:  Fall Risk, neutral spine, LSO brace,  IV      Assessment of current deficits    []? ? Functional mobility            [x]? ?ADLs           [x]? ? Strength                  [x]? ? Cognition    [x]? ? Functional transfers          [x]? ? IADLs         [x]? ? Safety Awareness   [x]? ?Endurance    [x]? ? Fine Coordination                         [x]? ? Balance      []? ? Vision/perception   [x]? ? Sensation      []? ?Gross Motor Coordination             []? ? ROM           []? ? Delirium                   []? ? Motor Control      OT PLAN OF CARE   OT POC based on physician orders, patient diagnosis and results of clinical assessment     Frequency/Duration: 2-4 days/wk for 2 weeks PRN   Specific OT Treatment Interventions to include:   Instruction/training on adapted ADL techniques and AE recommendations to increase functional independence within precautions  Training on energy conservation strategies, correct breathing pattern and techniques to improve independence/tolerance for self-care routine  Functional transfer/mobility training/DME recommendations for increased independence, safety, and fall prevention  Patient/Family education to increase follow through with safety techniques and functional independence  Recommendation of environmental modifications for increased safety with functional transfers/mobility and ADLs  Cognitive retraining/development of therapeutic activities to improve problem solving, judgement, memory, and attention for increased safety/participation in ADL/IADL tasks  Therapeutic exercise to improve motor endurance, ROM, and functional strength for ADLs/functional transfers  Therapeutic activities to facilitate/challenge dynamic balance, stand tolerance for increased safety and independence with ADLs  Therapeutic activities to facilitate gross/fine motor skills for increased independence with ADLs  Positioning to improve skin integrity, interaction with environment and functional independence     Recommended Adaptive Equipment:  BSC to raise commode, ww, A.E.     Home Living: Pt lives with wife in a 1 story trailer with 2 platform steps to enter with 1 HR.  B&B on main level. Bathroom setup: walk in shower, std. commode   Equipment owned: shower chair     Prior Level of Function: Ind. with ADLs , Ind. with IADLs; ambulated golf club  Driving: active  Occupation: worked [employer]     Pain Level: back- moderate  Cognition: A&O: 4/4, pleasant & cooperative but requires encouragement for OOB activity                 Functional Assessment:  AM-PAC Daily Activity Raw Score: 15/24    Initial Eval Status  Date: 12-14-21 Treatment Status  Date:  12/1/21  STGs = LTGs  Time frame: 10-14 days   Feeding Ind.  Independent  Encouraged pt to sit up in chair for lunch meal this date to increase activity        Grooming Set up seated EOB  Set up  Seated in chair, washing off face & hands, brushing hair & rinsing out his mouth   (unable to tolerate standing at sink this date, after walking in hallway)    Modified [facility]    UB Dressing Max A with gown & LSO brace, instruction provided to pt.  & wife throughout Mod A  To manage gown      Modified [facility]    LB Dressing Mod/Max A with attempt cross   over  technique  Mod A  Per last session, will educate on AE  Modified [facility]    Bathing Mod A with sim. task Max A  Declined, simulated Modified [facility]    Toileting NT Mod A  Simulated, using urinal       Modified [facility]    Bed Mobility  Logroll: Min A  Supine to sit: Minimal Assist   Sit to supine: Minimal Assist  NT  Pt seated at EOB with PT upon arrival  Supine to sit: Modified [facility]   Sit to supine: Modified [facility]    Functional Transfers Minimal Assist with sit <> stand, SPT using ww Min A  Sit < > stand  Mod A  Stand pivot with walker  Cues for hand placement & technique  Modified [facility]    Functional Mobility Min A with ww few steps towards HOB Mod A  Using walker, short household distance   Modified [facility]    Balance Sitting:     Static:  SUP    Dynamic:SBA  Standing: Min A Sitting Supported:  SBA     Standing: Mod A with walker      Activity Tolerance Fair- lt. Ax. Fair-  Increased activity today, limited by back pain  Good with mod. Ax. Visual/  Perceptual Glasses: yes          Vitals spO2 & HR remained WFL   on 3-4L O2  10L at rest 84-90%  15L during mobility 89-94%  (informed nsg) WFL     Education: Functional transfers & mobility along with walker management, maintaining spinal precautions, LSO management and benefits of OOB activity & getting up to chair several times a day to progress with ADL tasks. Comment: Upon arrival pt was seated at EOB with PT & agreeable for therapy, nsg approval, pt has been in & out of A-fib. At end of session pt was seated in chair, spouse arrived, tray table in front in agreement to sit up in chair for an hour if tolerable. Nsg informed pt was up in chair. Pt will be having a procedure tomorrow to shock his heart out of A-fib. · Pt has made fair progress towards set goals.      Treatment Charges: Mins Units   Ther Ex  [id number]     Manual Therapy Kisha Robison [id number] [phone]   ADL/Home Mgt [phone]   Neuro Re-ed [id number]     Group Therapy      Orthotic manage/training  [id number]     Non-Billable Time       Time In: 11:53  Time Out: 12:15  Total Time: 23 minutes    Jay Harris.  [address], [address] [place] [address]

## 2021-12-21 NOTE — PROGRESS NOTES
[facility]  Department of Internal Medicine  Division of Pulmonary, Critical Care and Sleep Medicine  Consult Note    Lori Marshall DO, MPH, Liberty Lucas MD, [facility]  [place]  [facility]      Patient: Kellen Garcia  MRN: [id number]  : 1935    Encounter Time: 6:19 PM     Date of Admission: 2021  5:17 AM    Primary Care Physician: Koko Vicente MD    Reason for Consultation: Post Op respiratory failure    SUBJECTIVE:    Still on 9 liters  CXR shows new infiltrate in RML and elevated hemidiaphragm which is chronic - Abx day 2  No cough or hemoptysis  CT with chronic changes - knonw fibrosis per family  Had similar anesthesia issue in last surgery  Cr normalized   For TOBIAS and cardioversion      OBJECTIVE:     PHYSICAL EXAM:   VITALS:   Vitals:    21 0534 21 0715 21 1346 21 1545   BP: 133/80 108/65  119/75   Pulse:  92  103   Resp:  18  20   Temp:  96.3 °F (35.7 °C)  98.2 °F (36.8 °C)   TempSrc:  Temporal  Temporal   SpO2:  91% 95% 93%   Weight:       Height:            Intake/Output Summary (Last 24 hours) at 2021 1819  Last data filed at 2021 1559  Gross per 24 hour   Intake --   Output 250 ml   Net -250 ml        CONSTITUTIONAL:   A&O x 3, NAD  SKIN:     No rash, Fair skin with shin discoloration  HEENT:     EOMI, MMM, No thrush  NECK:    No bruits, No JVP apprechiated  CV:      Sinus,  No murmur, No rubs, No gallops  PULMONARY:   Couse BS,  No Wheezing, No Rales, No Rhonchi  ABDOMEN:     Soft, non-tender. BS normal. No R/R/G  EXT:    No deformities . No clubbing.       + lower extremity edema, No venous stasis  PULSE:   Appears equal and palpable.   PSYCHIATRIC:  Seems appropriate, No acute psycosis  MS:    No fractures, No gross weakness  NEUROLOGIC:   The clinical assessment is non-focal     DATA: IMAGING & TESTING:     LABORATORY TESTS:    CBC:   Lab Results   Component Value Date    WBC 12.0 12/21/2021    RBC 3.29 12/21/2021    HGB 10.0 12/21/2021    HCT 30.8 12/21/2021    MCV 93.6 12/21/2021    MCH 30.4 12/21/2021    MCHC 32.5 12/21/2021    RDW 13.5 12/21/2021     12/21/2021    MPV 9.6 12/21/2021     CMP:    Lab Results   Component Value Date     12/21/2021    K 3.8 12/21/2021    K 5.6 12/15/2021     12/21/2021    CO2 27 12/21/2021    BUN 47 12/21/2021    CREATININE 1.4 12/21/2021    GFRAA 58 12/21/2021    LABGLOM 48 12/21/2021    GLUCOSE 123 12/21/2021    PROT 5.9 12/21/2021    LABALBU 3.1 12/21/2021    CALCIUM 8.3 12/21/2021    BILITOT 0.6 12/21/2021    ALKPHOS 68 12/21/2021    AST 17 12/21/2021    ALT 18 12/21/2021     Magnesium:    Lab Results   Component Value Date    MG 2.3 12/19/2021     Phosphorus:  No results found for: PHOS     PRO-BNP: No results found for: PROBNP   ABGs:   Lab Results   Component Value Date    PH 7.394 12/16/2021    PO2 51.0 12/16/2021    PCO2 45.8 12/16/2021     Hemoglobin A1C: No components found for: HGBA1C    IMAGING:  Imaging tests were completed and reviewed and discussed radiology and care team involved and reveals     CHEST FILM COMPARED 12/18:            XR ABDOMEN (KUB) (SINGLE AP VIEW)  Result Date: 12/16/2021Dilated small and large bowel loops suggest generalized ileus. Clinical correlation and follow-up to resolution recommended. MRI LUMBAR SPINE WO CONTRAST  Result Date: 11/24/2021  Multilevel degenerative disc disease with associated facet and ligamentous hypertrophy resulting in canal stenosis at L3-4 and L4-5. Bilateral foraminal narrowing as described above. XR CHEST PORTABLE  Result Date: 12/16/2021   FINDINGS: Right lower lobe atelectasis and/or infiltrate. .  There is no effusion or pneumothorax. The cardiomediastinal silhouette is without acute process. The osseous structures are without acute process. Right lower lobe atelectasis and/or infiltrate.      CTA PULMONARY W CONTRAST  Result Date: 12/16/2021  INDINGS: No thoracic aortic aneurysm or dissection. Mild-to-moderate scattered calcified plaque in the thoracic aorta. No evidence of pulmonary embolism. Mild cardiomegaly. Coronary atherosclerosis. No pericardial effusion. Shotty subcentimeter short axis mediastinal hilar nodes. No pleural effusion or pneumothorax. Low lung volumes. There are a few small peripheral densities in the lungs which may represent scarring or infection (for example series 4, image 73). Another curvilinear density is noted the medial left lower lobe (series 4 image 168). Mild dependent atelectasis, right greater than left. The central airways are patent. There is centrilobular and paraseptal emphysema. Degenerative changes of the spine. The visualized abdomen demonstrates changes of cholecystectomy and the splenic parenchymal calcification. No evidence of pulmonary embolism. No acute aortic injury or aneurysm. There are a few nonspecific densities in the peripheral lungs. Imaging features can be seen with COVID-19 pneumonia, though are nonspecific and can occur with a variety of infectious and noninfectious processes. Mild cardiomegaly. Atherosclerotic disease. Assessment:   1. Post op respiratory failure  2. Hypoxemic Respiratory Failure   3. CT evidence of disease of the lung  4. Plueral plaques ? Old infection or possible asbestosis  5. CT show sutttble changes of pulmonary fibrosis (periphearl)  6. New onset atrial fibrillation RVR.  [id number] 4 (age, hypertension, PAD)  7. Atypical CP.  Recent preoperative stress test negative for ischemia  8. Post op respiratory failure, on 9 L high flow nasal cannula  9. Postoperative ileus  10. 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 Hypertension  13. Hx R CEA  14. GERD  15. Hx COVID 19 5/2021          Plan:   1. CTA negative for PE but noted atelectasis, new decrease sats?, CXR with change yet even thought procal is negative I will start Abx for aspiration pneumonia.

## 2021-12-21 NOTE — PROGRESS NOTES
Comprehensive Nutrition Assessment    Type and Reason for Visit:  Initial,RD Nutrition Re-Screen/LOS    Nutrition Recommendations/Plan: Continue Diet. Will Start ONS. Nutrition Assessment:  Pt adm w/ chronic back pain x multiple years pta. PMHx Spinal Stenosis, Pulmonary Fibrosis, HTN; adm w/ PNA; s/p Laminectomy 12/14 resulting in post-op Ileus which is noted to be resolving currently. Pt at risk d/t increased needs for healing and noted decreased intake d/t post-op Ileus w/ N/V/C. Will Start ONS and monitor. Malnutrition Assessment:  Malnutrition Status: At risk for malnutrition (Comment)    Context:  Acute Illness     Findings of the 6 clinical characteristics of malnutrition:  Energy Intake:  1 - 75% or less of estimated energy requirements for 7 or more days  Weight Loss:  Unable to assess     Body Fat Loss:  No significant body fat loss     Muscle Mass Loss:  No significant muscle mass loss    Fluid Accumulation:  Unable to assess     Strength:  Not Performed    Estimated Daily Nutrient Needs:  Energy (kcal):  8464-6447; Weight Used for Energy Requirements:  Current     Protein (g):  1.3-1.5= 110-125; Weight Used for Protein Requirements:  Ideal        Fluid (ml/day):  3803-7243; Method Used for Fluid Requirements:  1 ml/kcal      Nutrition Related Findings:  A&O, U/L dentures, distended/non-tender Abd, hypo BS, +1 BLE edema, no I/O's noted currently      Wounds:  Surgical Incision       Current Nutrition Therapies:    ADULT DIET; Regular; Low Fiber    Anthropometric Measures:  · Height: 6' 1\" (185.4 cm)  · Current Body Weight: 200 lb (90.7 kg) (unknown method 12/14)   · Admission Body Weight: 200 lb (90.7 kg) (unknown method 12/14)    · Usual Body Weight:  (UTO UBW 2/2 poor EMR wt hx pta)     · Ideal Body Weight: 184 lbs; % Ideal Body Weight     · BMI: 26.4  · Adjusted Body Weight:  ; No Adjustment   · Adjusted BMI:      · BMI Categories: Overweight (BMI 25.0-29. 9)       Nutrition Diagnosis:   · Inadequate oral intake related to altered GI function (2/2 post-op Ileus) as evidenced by intake 51-75%,GI abnormality,nausea,vomiting,constipation      Nutrition Interventions:   Food and/or Nutrient Delivery:  Continue Current Diet,Start Oral Nutrition Supplement (Continue Diet. Will Start ONS.)  Nutrition Education/Counseling:  Education not indicated   Coordination of Nutrition Care:  Continue to monitor while inpatient    Goals:  PO intake >75% of meals/ONS. Nutrition Monitoring and Evaluation:   Behavioral-Environmental Outcomes:  None Identified   Food/Nutrient Intake Outcomes:  Food and Nutrient Intake,Supplement Intake,Diet Advancement/Tolerance  Physical Signs/Symptoms Outcomes:  Biochemical Data,Chewing or Swallowing,Constipation,GI Status,Nausea or Vomiting,Fluid Status or Edema,Nutrition Focused Physical Findings,Skin,Weight     Discharge Planning:     Too soon to determine     Electronically signed by Katarina Koch RD, LD on 12/21/21 at 1:36 PM EST    Contact: ext [phone]

## 2021-12-21 NOTE — PROGRESS NOTES
Department of Neurosurgery  Progress Note    CHIEF COMPLAINT: lumbar fusion    SUBJECTIVE:  No new concerns. No CP or SOB    REVIEW OF SYSTEMS :  Constitutional: Negative for chills and fever. Neurological: Negative for dizziness, tremors and speech change. OBJECTIVE:   VITALS:  /65   Pulse 92   Temp 96.3 °F (35.7 °C) (Temporal)   Resp 18   Ht 6' 1\" (1.854 m)   Wt 200 lb (90.7 kg)   SpO2 91%   BMI 26.39 kg/m²   PHYSICAL:  CONSTITUTIONAL:  awake, alert, cooperative, no apparent distress, and appears stated age and BECKHAM.   FC    DATA:  CBC:   Lab Results   Component Value Date    WBC 12.0 12/21/2021    RBC 3.29 12/21/2021    HGB 10.0 12/21/2021    HCT 30.8 12/21/2021    MCV 93.6 12/21/2021    MCH 30.4 12/21/2021    MCHC 32.5 12/21/2021    RDW 13.5 12/21/2021     12/21/2021    MPV 9.6 12/21/2021     BMP:    Lab Results   Component Value Date     12/21/2021    K 3.8 12/21/2021    K 5.6 12/15/2021     12/21/2021    CO2 27 12/21/2021    BUN 47 12/21/2021    LABALBU 3.1 12/21/2021    CREATININE 1.4 12/21/2021    CALCIUM 8.3 12/21/2021    GFRAA 58 12/21/2021    LABGLOM 48 12/21/2021    GLUCOSE 123 12/21/2021     PT/INR:    Lab Results   Component Value Date    PROTIME 12.7 12/09/2021    INR 1.2 12/09/2021     PTT:  No results found for: APTT, PTT[APTT}    Current Inpatient Medications  Current Facility-Administered Medications: dilTIAZem (CARDIZEM) tablet 30 mg, 30 mg, Oral, 4 times per day  ampicillin-sulbactam (UNASYN) 3000 mg ivpb minibag, 3,000 mg, IntraVENous, Q6H  [COMPLETED] dilTIAZem injection 20 mg, 20 mg, IntraVENous, Once **FOLLOWED BY** dilTIAZem 100 mg in dextrose 5 % 100 mL infusion (ADD-[place]), 5-15 mg/hr, IntraVENous, Continuous  metoprolol tartrate (LOPRESSOR) tablet 50 mg, 50 mg, Oral, BID  perflutren lipid microspheres (DEFINITY) injection 1.65 mg, 1.5 mL, IntraVENous, ONCE PRN  enoxaparin (LOVENOX) injection 40 mg, 40 mg, SubCUTAneous, Daily  acetaminophen (TYLENOL) tablet 650 mg, 650 mg, Oral, Q4H PRN  simethicone (MYLICON) chewable tablet 80 mg, 80 mg, Oral, Q6H PRN  ipratropium-albuterol (DUONEB) nebulizer solution 1 ampule, 1 ampule, Inhalation, Q4H WA  pravastatin (PRAVACHOL) tablet 10 mg, 10 mg, Oral, Daily  sodium chloride flush 0.9 % injection 5-40 mL, 5-40 mL, IntraVENous, 2 times per day  sodium chloride flush 0.9 % injection 5-40 mL, 5-40 mL, IntraVENous, PRN  0.9 % sodium chloride infusion, 25 mL, IntraVENous, PRN  ondansetron (ZOFRAN-ODT) disintegrating tablet 4 mg, 4 mg, Oral, Q8H PRN **OR** ondansetron (ZOFRAN) injection 4 mg, 4 mg, IntraVENous, Q6H PRN  oxyCODONE (ROXICODONE) immediate release tablet 5 mg, 5 mg, Oral, Q4H PRN **OR** oxyCODONE HCl (OXY-IR) immediate release tablet 10 mg, 10 mg, Oral, Q4H PRN  HYDROmorphone (DILAUDID) injection 0.5 mg, 0.5 mg, IntraVENous, Q3H PRN  cyclobenzaprine (FLEXERIL) tablet 10 mg, 10 mg, Oral, TID PRN  polyethylene glycol (GLYCOLAX) packet 17 g, 17 g, Oral, Daily  bisacodyl (DULCOLAX) EC tablet 5 mg, 5 mg, Oral, Daily  sennosides-docusate sodium (SENOKOT-S) 8.6-50 MG tablet 1 tablet, 1 tablet, Oral, BID  magnesium hydroxide (MILK OF MAGNESIA) 400 MG/5ML suspension 30 mL, 30 mL, Oral, Daily PRN  fleet rectal enema 1 enema, 1 enema, Rectal, Daily PRN    ASSESSMENT:   · S/p L2-5 PLF on 12/14 - stable  · Ileus - improving  · A-fib  · pneumonia    PLAN:  · PT/OT  · WBAT with brace  · Pain control  · AB per pulmonology  · Ok for full anticoagulation if needed        Electronically signed by RADHA Coronado on 12/21/2021 at 8:53 AM

## 2021-12-21 NOTE — PROGRESS NOTES
Hospitalist Progress Note      SYNOPSIS: Patient admitted on 2021 for <principal problem not specified>  Mr. Rama Oquendo, a 82y.o. year old male  who  has a past medical history of Hypertension.      Patient was admitted under the neurosurgery service. He underwent L2-L5 laminectomy and fusion. Patient is postop, he states that his pain is minimal.  He denies chest pain, no shortness of breath, no hematuria dysuria hematemesis or hematochezia. Patient states that he has hypertension which is fairly controlled at home. Postoperatively the patient developed acute respiratory failure requiring high oxygen patient was diagnosed with aspiration pneumonia. He developed new onset A. fib with rapid ventricular response on . SUBJECTIVE:  Patient seen and examined. Patient on 10L. Started overnight on Unasyn for aspiration pneumonia. Wife reports his intake has been poor during hospitalization. Pt more sleepy       Temp (24hrs), Av.8 °F (36.6 °C), Min:96.3 °F (35.7 °C), Max:98.6 °F (37 °C)    DIET: ADULT DIET; Regular; Low Fiber  ADULT ORAL NUTRITION SUPPLEMENT; Breakfast, Dinner; Low Calorie/High Protein Oral Supplement  ADULT ORAL NUTRITION SUPPLEMENT; Breakfast, Dinner; Wound Healing Oral Supplement  CODE: Full Code    Intake/Output Summary (Last 24 hours) at 2021 1605  Last data filed at 2021 1559  Gross per 24 hour   Intake --   Output 250 ml   Net -250 ml       OBJECTIVE:    /75   Pulse 103   Temp 98.2 °F (36.8 °C) (Temporal)   Resp 20   Ht 6' 1\" (1.854 m)   Wt 200 lb (90.7 kg)   SpO2 93%   BMI 26.39 kg/m²     General appearance: No apparent distress, appears stated age and cooperative. HEENT:  Conjunctivae/corneas clear. Neck: Supple. No jugular venous distention. Respiratory: Diminished air entry bilaterally  Cardiovascular: A. fib  Abdomen: Soft, nontender, nondistended  Musculoskeletal: No clubbing, cyanosis, no bilateral lower extremity edema.  Brisk capillary refill. Skin:  No rashes  on visible skin  Neurologic: awake, alert and following commands     Assessment and plan:  #Acute postoperative hypoxic respiratory failure  #Possible aspiration  -CT scan of the chest was negative for PE  -Pulmonologist ordered IV Solu-Medrol  -Resume incentive spirometer and bronchodilator treatment  -History of COVID-19 infection May 2021 hospitalized  -Wean off oxygen as tolerated  -IV Unasyn started 12/19 pulmonary    #New onset atrial fibrillation RVR. [id number] 4 (age, hypertension, PAD)  #Atypical CP. Recent preoperative stress test negative for ischemia  -Appreciate cardiology input  -IV Cardizem drip  -Change Norvasc to p.o.  Cardizem for better rate control  -Cardiology plans for anticoagulation once cleared by neurosurgery    #EUGENIO  -Bolus as indicated, strict I/O, monitor renal function     #Post operative ileus  -Resolved    #Essential hypertension:   -Resume patient medications with metoprolol and Cardizem     #Hyperlipidemia:   -Continue statin     #Spinal stenosis:   -Status post L2-L5 laminectomy on 12/14, supportive care, neurosurgery following.  -Pain control  -PT/OT      DISPOSITION: TBD    Medications:  REVIEWED DAILY    Infusion Medications    dilTIAZem 5 mg/hr (12/20/21 2210)    sodium chloride       Scheduled Medications    apixaban  5 mg Oral BID    dilTIAZem  30 mg Oral 4 times per day    ampicillin-sulbactam  3,000 mg IntraVENous Q6H    metoprolol tartrate  50 mg Oral BID    ipratropium-albuterol  1 ampule Inhalation Q4H WA    pravastatin  10 mg Oral Daily    sodium chloride flush  5-40 mL IntraVENous 2 times per day    polyethylene glycol  17 g Oral Daily    bisacodyl  5 mg Oral Daily    sennosides-docusate sodium  1 tablet Oral BID     PRN Meds: phenol, perflutren lipid microspheres, acetaminophen, simethicone, sodium chloride flush, sodium chloride, ondansetron **OR** ondansetron, oxyCODONE **OR** oxyCODONE, HYDROmorphone, cyclobenzaprine, magnesium hydroxide, fleet    Labs:     Recent Labs     12/20/21  0720 12/21/21  0628   WBC 10.8 12.0*   HGB 10.1* 10.0*   HCT 31.2* 30.8*    228       Recent Labs     12/19/21  0526 12/20/21  0720 12/21/21  0628    138 138   K 3.8 3.8 3.8    101 101   CO2 26 26 27   BUN 34* 43* 47*   CREATININE 1.0 1.2 1.4*   CALCIUM 9.2 8.6 8.3*       Recent Labs     12/19/21  0526 12/20/21  0720 12/21/21  0628   PROT 6.3* 6.2* 5.9*   ALKPHOS 84 67 68   ALT 21 21 18   AST 34 25 17   BILITOT 0.5 0.5 0.6       No results for input(s): INR in the last 72 hours. No results for input(s): Jennifer Daniel in the last 72 hours. Chronic labs:    Lab Results   Component Value Date    PSA SEE NOTE (AA) 08/14/2020    PSA 4.89 (H) 08/14/2020    INR 1.2 12/09/2021       Radiology: REVIEWED DAILY    +++++++++++++++++++++++++++++++++++++++++++++++++  Gracie Fulton MD  [facility] Physician - [address], [place]  +++++++++++++++++++++++++++++++++++++++++++++++++  NOTE: This report was transcribed using voice recognition software. Every effort was made to ensure accuracy; however, inadvertent computerized transcription errors may be present.

## 2021-12-21 NOTE — PROGRESS NOTES
Physical Therapy  Facility/Department: [facility] NEURO SPINE  Daily Treatment Note  NAME: Wong Fields  : 1935  MRN: [id number]    Date of Service: 2021    Evaluating PT: Danette Sheets, PT [id number]     Room #:  2679/6917-S  Diagnosis:  Spinal stenosis of lumbar region, unspecified whether neurogenic claudication present [M48.061]  Lumbar degenerative disc disease [M51.36]  PMHx/PSHx:     has a past medical history of Hypertension.    has a past surgical history that includes joint replacement (Right); Carotid endarterectomy (Right); Appendectomy; Carpal tunnel release (Bilateral); and Lumbar spine surgery (N/A, 2021).       Procedure/Surgery: L2-L5 LAMINECTOMY AND FUSION   Precautions:  Falls, falls, alarm and O2 , FWB (full weight bearing), Hemovac drain, Spinal precautions no \"BLT\" and LSO on when greater than 45degrees (elective)  Equipment Needs: Wheeled Walker     SUBJECTIVE:  Patient lives with spouse  in a trailer with 2 steps without rail and a platform between the two to enter.   Patient ambulated with a  7-iron golf club PTA. Equipment owned: None     OBJECTIVE:    Initial Evaluation  Date: 21 Treatment  Date: 21 Short Term/ Long Term   Goals   AM-PAC 6 Clicks /     Was pt agreeable to Eval/treatment? Yes Yes     Does pt have pain?  Yes; 9/10 LBP at level of incision Severe back pain, does not rate     Bed Mobility  Rolling: Min   Supine to sit: Min  Sit to supine: Min   Scooting: Min  Rolling: Brain  Supine to sit: Brain  Sit to supine: Brain  Scooting: Brain Rolling: Ind  Supine to sit: Ind  Sit to supine: Ind  Scooting: Ind   Transfers Sit to stand: Min  Stand to sit: Min  Stand pivot: Min  Sit to stand: Brain  Stand to sit: [address] pivot: ModA with [employer] Sit to stand: Mod Ind    Stand to sit: Mod Ind    Stand pivot: Mod Ind     Ambulation    NT 60 feet x 1 rep with [employer] with ModA 120 feet with Mod Ind     Stair negotiation: ascended and descended  NT NT 3 steps with Mod Ind  1 rail    ROM BUE:  Defer to OT eval  BLE:  wfl       Strength BUE: Defer to OT eval  RLE:  Grossly 4-/5  LLE:  Grossly  4-/5   4/5   Balance Sitting EOB:  SBA  Dynamic Standing:  NT Sitting EOB: SBA  Dynamic standing: NT Sitting EOB:  Ind  Dynamic Standing:  Ind      Pt is A & O x 4  Sensation:  Pt denies numbness and tingling to extremities  Edema:  None noted    Vitals:  SPO2 monitored throughout session as noted below via HFNC:    On 10L at rest: 84-90%      During mobility on 15L: 89-94%    After activity on 15L: 92%    After activity on 10L: 90%    Weaned slowly back to 10L at rest after all activity prior to leaving patient's room. Patient education  Pt educated on role of PT, spinal precautions, importance of continued mobility, PLB    Patient response to education:   Pt verbalized understanding Pt demonstrated skill Pt requires further education in this area   yes yes yes     ASSESSMENT:    Comments: RN cleared pt for activity as tolerated prior to room entry. Patient semi-supine upon entry and agreeable to PT treatment with wife present in room. SPO2 monitored throughout session and as noted above. Pulse oximetry readings difficult to obtain however pt is appears in no distress and reports no distress. O2 increased to 15L with activity based on available O2 tank settings. Assisted patient in rolling to don LSO. Patient required light assist to achieve sitting EOB. Patient sat EOB for approximately 8 minutes. Saturation remained above 90% on 15L with standing activity. Once seated in bedside chair pt was placed back on 10L O2, with O2 levels remaining between 89-91%. JAIME present to assist patient with ADL training following session. RN notified of patients' performance and O2 saturation following activity. Treatment:  Patient practiced and was instructed in the following treatment:     Bed Mobility: VCs provided for sequencing and safety during mobility.  Manual assist provided for completion of task.  Therapeutic Activities: patient sat EOB for approximately 15 minutes for improved OOB tolerance   Verbal cues for proper positioning and sequencing using assistive device to maximize functional mobility independence. PLAN:    Patient is making good progress towards established goals. Will continue with current POC.       Time in  1115  Time out  1200    Total Treatment Time  35 minutes     CPT codes:  [] Gait training 23336 - minutes  [] Manual therapy 81004 - minutes  [x] Therapeutic activities 96002 35 minutes  [] Therapeutic exercises 19013 - minutes  [] Neuromuscular reeducation 50103 - minutes    Christian Christinat, PT, DPT  Board Certified Neurologic Clinical Specialist  [id number]

## 2021-12-21 NOTE — PROGRESS NOTES
INPATIENT CARDIOLOGY FOLLOW-UP    Name: Ute Mc    Age: 82 y.o. Date of Admission: 12/14/2021  5:17 AM    Date of Service: 12/21/2021    Primary Cardiologist: Dr. Andrés Solano    Chief Complaint: Follow-up for new onset atrial fibrillation with RVR    Interim History:  No new overnight cardiac complaints. Currently with no complaints of CP, SOB, palpitations, dizziness, or lightheadedness. Atrial fibrillation with rates ranging from 100-130. Still on 9 L of oxygen via nasal cannula.     Review of Systems:   Negative except as described above    Problem List:  Patient Active Problem List   Diagnosis    Spinal stenosis of lumbar region    Spondylolisthesis, lumbar region    Lumbar degenerative disc disease    Chest pain    Elevated troponin    Primary hypertension    Puerperal sepsis with acute hypoxic respiratory failure (HCC)    Hyperlipidemia    Pulmonary fibrosis (HCC)       Current Medications:    Current Facility-Administered Medications:     apixaban (ELIQUIS) tablet 5 mg, 5 mg, Oral, BID, Elin Ledezma MD    phenol 1.4 % mouth spray 1 spray, 1 spray, Mouth/Throat, Q2H PRN, RADHA Bui    dilTIAZem (CARDIZEM) tablet 30 mg, 30 mg, Oral, 4 times per day, Jackie Reyez MD, 30 mg at 12/21/21 1219    ampicillin-sulbactam (UNASYN) 3000 mg ivpb minibag, 3,000 mg, IntraVENous, Q6H, Vergiamerico Dieziro , Stopped at 12/21/21 1300    [COMPLETED] dilTIAZem injection 20 mg, 20 mg, IntraVENous, Once, 20 mg at 12/19/21 1215 **FOLLOWED BY** dilTIAZem 100 mg in dextrose 5 % 100 mL infusion (ADD-[place]), 5-15 mg/hr, IntraVENous, Continuous, Bertis Merlin, MD, Last Rate: 5 mL/hr at 12/20/21 2210, 5 mg/hr at 12/20/21 2210    metoprolol tartrate (LOPRESSOR) tablet 50 mg, 50 mg, Oral, BID, Bertis Merlin, MD, 50 mg at 12/21/21 0913    perflutren lipid microspheres (DEFINITY) injection 1.65 mg, 1.5 mL, IntraVENous, ONCE PRN, Bertis Merlin, MD    acetaminophen (TYLENOL) tablet 650 mg, 650 mg, Oral, Q4H PRN, Janet Braxton MD, 650 mg at 12/18/21 1502    simethicone (MYLICON) chewable tablet 80 mg, 80 mg, Oral, Q6H PRN, RADHA Keane-DEANNE, 80 mg at 12/21/21 0924    ipratropium-albuterol (DUONEB) nebulizer solution 1 ampule, 1 ampule, Inhalation, Q4H WA, Pascual Tang MD, 1 ampule at 12/21/21 1345    pravastatin (PRAVACHOL) tablet 10 mg, 10 mg, Oral, Daily, Ellen Grossman PA-C, 10 mg at 12/20/21 2041    sodium chloride flush 0.9 % injection 5-40 mL, 5-40 mL, IntraVENous, 2 times per day, Ellen Grossman PA-C, 10 mL at 12/21/21 0914    sodium chloride flush 0.9 % injection 5-40 mL, 5-40 mL, IntraVENous, PRN, Ellen Grossman PA-C, 10 mL at 12/19/21 1142    0.9 % sodium chloride infusion, 25 mL, IntraVENous, PRN, Ellen Grossman PA-C    ondansetron (ZOFRAN-ODT) disintegrating tablet 4 mg, 4 mg, Oral, Q8H PRN **OR** ondansetron (ZOFRAN) injection 4 mg, 4 mg, IntraVENous, Q6H PRN, RADHA Keane-C, 4 mg at 12/16/21 2242    oxyCODONE (ROXICODONE) immediate release tablet 5 mg, 5 mg, Oral, Q4H PRN, 5 mg at 12/20/21 2041 **OR** oxyCODONE HCl (OXY-IR) immediate release tablet 10 mg, 10 mg, Oral, Q4H PRN, RADHA Keane-C, 10 mg at 12/21/21 1323    HYDROmorphone (DILAUDID) injection 0.5 mg, 0.5 mg, IntraVENous, Q3H PRN, Mario Green PA-C, 0.5 mg at 12/20/21 4469    cyclobenzaprine (FLEXERIL) tablet 10 mg, 10 mg, Oral, TID PRN, Mario Green PA-C, 10 mg at 12/21/21 0913    polyethylene glycol (GLYCOLAX) packet 17 g, 17 g, Oral, Daily, Ellen Grossman PA-C, 17 g at 12/21/21 0914    bisacodyl (DULCOLAX) EC tablet 5 mg, 5 mg, Oral, Daily, Ellen Grossman PA-C, 5 mg at 12/21/21 0914    sennosides-docusate sodium (SENOKOT-S) 8.6-50 MG tablet 1 tablet, 1 tablet, Oral, BID, Ellen Grossman PA-C, 1 tablet at 12/21/21 0914    magnesium hydroxide (MILK OF MAGNESIA) 400 MG/5ML suspension 30 mL, 30 mL, Oral, Daily PRN, Mario Green PA-C    fleet rectal enema 1 enema, 1 enema, Rectal, Daily 12.7 (H) 12/09/2021    PROTIME 13.0 (H) 03/23/2021     No results found for: TSHFT4, TSH  No results found for: LABA1C  No results found for: EAG  No results found for: CHOL  No results found for: TRIG  No results found for: HDL  No results found for: LDLCALC, LDLCHOLESTEROL  No results found for: LABVLDL, VLDL  No results found for: CHOLHDLRATIO  No results for input(s): PROBNP in the last 72 hours. Stress test:    Adenosine stress test: 11/18/2021: LVEF 50%, nonischemic.     Cardiac catheterization:   Reported normal >10 year ago     ----------------------------------------------------------------------------------------------------------------------------------------------------------------  IMPRESSION:  1. New onset atrial fibrillation RVR. [id number] 4 (age, hypertension, PAD)  2. Atypical CP. Recent preoperative stress test negative for ischemia  3. Post op respiratory failure, on 9 L high flow nasal cannula  4. Postoperative ileus  5. S/p Laminectomy 12/14/21  6. ILD/fibrosis  7. Hypertension  8. Hx R CEA  9. GERD  10. Hx COVID 19 5/2021     RECOMMENDATIONS:     · Continue Cardizem drip. Currently at 5. Also on oral Cardizem 30 every 6.  · Metoprolol SR 50 mg twice daily. · Discussed with Dr. Luisa Stauffer, recommends Tuesday, 12/21/2021 as a start date for Eliquis. Has been initiated. · Echocardiogram given respiratory failure and AF, once heart rate improves. We will also do a bubble study to look for any underlying shunt as a cause of his hypoxia. · Currently being treated for presumed aspiration pneumonia. · Further care per primary service neurosurgery, pulmonary, general surgery. · Patient agreeable to proceed with TOBIAS guided cardioversion. We will schedule for tomorrow. Will assess for shunt at that time as well.

## 2021-12-22 ENCOUNTER — APPOINTMENT (OUTPATIENT)
Dept: GENERAL RADIOLOGY | Age: 86
DRG: 459 | End: 2021-12-22
Attending: NEUROLOGICAL SURGERY
Payer: MEDICARE

## 2021-12-22 ENCOUNTER — ANESTHESIA (OUTPATIENT)
Dept: CARDIAC CATH/INVASIVE PROCEDURES | Age: 86
DRG: 459 | End: 2021-12-22
Payer: MEDICARE

## 2021-12-22 ENCOUNTER — ANESTHESIA EVENT (OUTPATIENT)
Dept: CARDIAC CATH/INVASIVE PROCEDURES | Age: 86
DRG: 459 | End: 2021-12-22
Payer: MEDICARE

## 2021-12-22 LAB
ANION GAP SERPL CALCULATED.3IONS-SCNC: 11 MMOL/L (ref 7–16)
BASOPHILS ABSOLUTE: 0.02 E9/L (ref 0–0.2)
BASOPHILS RELATIVE PERCENT: 0.2 % (ref 0–2)
BUN BLDV-MCNC: 47 MG/DL (ref 6–23)
CALCIUM SERPL-MCNC: 8.7 MG/DL (ref 8.6–10.2)
CHLORIDE BLD-SCNC: 102 MMOL/L (ref 98–107)
CO2: 28 MMOL/L (ref 22–29)
CREAT SERPL-MCNC: 1.3 MG/DL (ref 0.7–1.2)
EKG ATRIAL RATE: 85 BPM
EKG P AXIS: 55 DEGREES
EKG P-R INTERVAL: 218 MS
EKG Q-T INTERVAL: 380 MS
EKG QRS DURATION: 106 MS
EKG QTC CALCULATION (BAZETT): 452 MS
EKG R AXIS: 54 DEGREES
EKG T AXIS: 94 DEGREES
EKG VENTRICULAR RATE: 85 BPM
EOSINOPHILS ABSOLUTE: 0.19 E9/L (ref 0.05–0.5)
EOSINOPHILS RELATIVE PERCENT: 1.6 % (ref 0–6)
GFR AFRICAN AMERICAN: >60
GFR NON-AFRICAN AMERICAN: 53 ML/MIN/1.73
GLUCOSE BLD-MCNC: 113 MG/DL (ref 74–99)
HCT VFR BLD CALC: 31.5 % (ref 37–54)
HEMOGLOBIN: 10.1 G/DL (ref 12.5–16.5)
IMMATURE GRANULOCYTES #: 0.12 E9/L
IMMATURE GRANULOCYTES %: 1 % (ref 0–5)
LV EF: 60 %
LVEF MODALITY: NORMAL
LYMPHOCYTES ABSOLUTE: 1.3 E9/L (ref 1.5–4)
LYMPHOCYTES RELATIVE PERCENT: 11.2 % (ref 20–42)
MCH RBC QN AUTO: 30.1 PG (ref 26–35)
MCHC RBC AUTO-ENTMCNC: 32.1 % (ref 32–34.5)
MCV RBC AUTO: 94 FL (ref 80–99.9)
MONOCYTES ABSOLUTE: 0.89 E9/L (ref 0.1–0.95)
MONOCYTES RELATIVE PERCENT: 7.7 % (ref 2–12)
NEUTROPHILS ABSOLUTE: 9.06 E9/L (ref 1.8–7.3)
NEUTROPHILS RELATIVE PERCENT: 78.3 % (ref 43–80)
PDW BLD-RTO: 13.5 FL (ref 11.5–15)
PLATELET # BLD: 263 E9/L (ref 130–450)
PMV BLD AUTO: 9.6 FL (ref 7–12)
POTASSIUM SERPL-SCNC: 3.8 MMOL/L (ref 3.5–5)
RBC # BLD: 3.35 E12/L (ref 3.8–5.8)
SODIUM BLD-SCNC: 141 MMOL/L (ref 132–146)
WBC # BLD: 11.6 E9/L (ref 4.5–11.5)

## 2021-12-22 PROCEDURE — 36415 COLL VENOUS BLD VENIPUNCTURE: CPT

## 2021-12-22 PROCEDURE — 99233 SBSQ HOSP IP/OBS HIGH 50: CPT | Performed by: INTERNAL MEDICINE

## 2021-12-22 PROCEDURE — 94640 AIRWAY INHALATION TREATMENT: CPT

## 2021-12-22 PROCEDURE — 85025 COMPLETE CBC W/AUTO DIFF WBC: CPT

## 2021-12-22 PROCEDURE — 2700000000 HC OXYGEN THERAPY PER DAY

## 2021-12-22 PROCEDURE — 71046 X-RAY EXAM CHEST 2 VIEWS: CPT

## 2021-12-22 PROCEDURE — 2580000003 HC RX 258: Performed by: INTERNAL MEDICINE

## 2021-12-22 PROCEDURE — 6360000002 HC RX W HCPCS: Performed by: INTERNAL MEDICINE

## 2021-12-22 PROCEDURE — 6370000000 HC RX 637 (ALT 250 FOR IP): Performed by: INTERNAL MEDICINE

## 2021-12-22 PROCEDURE — 2060000000 HC ICU INTERMEDIATE R&B

## 2021-12-22 PROCEDURE — 97530 THERAPEUTIC ACTIVITIES: CPT

## 2021-12-22 PROCEDURE — 6370000000 HC RX 637 (ALT 250 FOR IP): Performed by: PHYSICIAN ASSISTANT

## 2021-12-22 PROCEDURE — 93005 ELECTROCARDIOGRAM TRACING: CPT | Performed by: INTERNAL MEDICINE

## 2021-12-22 PROCEDURE — 80048 BASIC METABOLIC PNL TOTAL CA: CPT

## 2021-12-22 PROCEDURE — 93306 TTE W/DOPPLER COMPLETE: CPT

## 2021-12-22 PROCEDURE — 74022 RADEX COMPL AQT ABD SERIES: CPT

## 2021-12-22 PROCEDURE — 93010 ELECTROCARDIOGRAM REPORT: CPT | Performed by: INTERNAL MEDICINE

## 2021-12-22 PROCEDURE — 6360000002 HC RX W HCPCS: Performed by: PHYSICIAN ASSISTANT

## 2021-12-22 PROCEDURE — 97535 SELF CARE MNGMENT TRAINING: CPT

## 2021-12-22 RX ORDER — SODIUM CHLORIDE 0.9 % (FLUSH) 0.9 %
5-40 SYRINGE (ML) INJECTION PRN
Status: DISCONTINUED | OUTPATIENT
Start: 2021-12-22 | End: 2021-12-30 | Stop reason: HOSPADM

## 2021-12-22 RX ORDER — SODIUM CHLORIDE 0.9 % (FLUSH) 0.9 %
5-40 SYRINGE (ML) INJECTION EVERY 12 HOURS SCHEDULED
Status: DISCONTINUED | OUTPATIENT
Start: 2021-12-22 | End: 2021-12-30 | Stop reason: HOSPADM

## 2021-12-22 RX ORDER — CYCLOBENZAPRINE HCL 10 MG
10 TABLET ORAL 3 TIMES DAILY PRN
Qty: 30 TABLET | Refills: 0 | Status: SHIPPED | OUTPATIENT
Start: 2021-12-22 | End: 2022-01-01

## 2021-12-22 RX ORDER — OXYCODONE HYDROCHLORIDE 5 MG/1
5 TABLET ORAL EVERY 4 HOURS PRN
Qty: 42 TABLET | Refills: 0 | Status: SHIPPED | OUTPATIENT
Start: 2021-12-22 | End: 2021-12-29

## 2021-12-22 RX ORDER — SODIUM CHLORIDE 9 MG/ML
25 INJECTION, SOLUTION INTRAVENOUS PRN
Status: DISCONTINUED | OUTPATIENT
Start: 2021-12-22 | End: 2021-12-30 | Stop reason: HOSPADM

## 2021-12-22 RX ORDER — METOPROLOL TARTRATE 5 MG/5ML
5 INJECTION INTRAVENOUS EVERY 6 HOURS PRN
Status: DISCONTINUED | OUTPATIENT
Start: 2021-12-22 | End: 2021-12-30 | Stop reason: HOSPADM

## 2021-12-22 RX ORDER — SODIUM CHLORIDE 9 MG/ML
INJECTION, SOLUTION INTRAVENOUS CONTINUOUS
Status: DISCONTINUED | OUTPATIENT
Start: 2021-12-22 | End: 2021-12-24

## 2021-12-22 RX ADMIN — DILTIAZEM HYDROCHLORIDE 30 MG: 30 TABLET, FILM COATED ORAL at 17:58

## 2021-12-22 RX ADMIN — APIXABAN 5 MG: 5 TABLET, FILM COATED ORAL at 03:34

## 2021-12-22 RX ADMIN — SENNOSIDES AND DOCUSATE SODIUM 1 TABLET: 50; 8.6 TABLET ORAL at 11:11

## 2021-12-22 RX ADMIN — DILTIAZEM HYDROCHLORIDE 30 MG: 30 TABLET, FILM COATED ORAL at 11:12

## 2021-12-22 RX ADMIN — APIXABAN 5 MG: 5 TABLET, FILM COATED ORAL at 11:12

## 2021-12-22 RX ADMIN — SODIUM CHLORIDE: 9 INJECTION, SOLUTION INTRAVENOUS at 03:00

## 2021-12-22 RX ADMIN — SODIUM CHLORIDE: 9 INJECTION, SOLUTION INTRAVENOUS at 18:03

## 2021-12-22 RX ADMIN — PRAVASTATIN SODIUM 10 MG: 10 TABLET ORAL at 20:35

## 2021-12-22 RX ADMIN — METOPROLOL TARTRATE 50 MG: 50 TABLET, FILM COATED ORAL at 11:11

## 2021-12-22 RX ADMIN — AMPICILLIN SODIUM AND SULBACTAM SODIUM 3000 MG: 2; 1 INJECTION, POWDER, FOR SOLUTION INTRAMUSCULAR; INTRAVENOUS at 11:52

## 2021-12-22 RX ADMIN — IPRATROPIUM BROMIDE 0.5 MG: 0.5 SOLUTION RESPIRATORY (INHALATION) at 16:30

## 2021-12-22 RX ADMIN — SENNOSIDES AND DOCUSATE SODIUM 1 TABLET: 50; 8.6 TABLET ORAL at 20:35

## 2021-12-22 RX ADMIN — OXYCODONE HYDROCHLORIDE 10 MG: 10 TABLET ORAL at 15:29

## 2021-12-22 RX ADMIN — CYCLOBENZAPRINE 10 MG: 10 TABLET, FILM COATED ORAL at 14:16

## 2021-12-22 RX ADMIN — DILTIAZEM HYDROCHLORIDE 30 MG: 30 TABLET, FILM COATED ORAL at 05:29

## 2021-12-22 RX ADMIN — IPRATROPIUM BROMIDE 0.5 MG: 0.5 SOLUTION RESPIRATORY (INHALATION) at 21:19

## 2021-12-22 RX ADMIN — DILTIAZEM HYDROCHLORIDE 30 MG: 30 TABLET, FILM COATED ORAL at 23:37

## 2021-12-22 RX ADMIN — OXYCODONE HYDROCHLORIDE 10 MG: 10 TABLET ORAL at 05:38

## 2021-12-22 RX ADMIN — AMPICILLIN SODIUM AND SULBACTAM SODIUM 3000 MG: 2; 1 INJECTION, POWDER, FOR SOLUTION INTRAMUSCULAR; INTRAVENOUS at 05:30

## 2021-12-22 RX ADMIN — APIXABAN 5 MG: 5 TABLET, FILM COATED ORAL at 20:35

## 2021-12-22 RX ADMIN — AMPICILLIN SODIUM AND SULBACTAM SODIUM 3000 MG: 2; 1 INJECTION, POWDER, FOR SOLUTION INTRAMUSCULAR; INTRAVENOUS at 18:02

## 2021-12-22 RX ADMIN — AMPICILLIN SODIUM AND SULBACTAM SODIUM 3000 MG: 2; 1 INJECTION, POWDER, FOR SOLUTION INTRAMUSCULAR; INTRAVENOUS at 23:38

## 2021-12-22 RX ADMIN — BISACODYL 5 MG: 5 TABLET, COATED ORAL at 11:11

## 2021-12-22 RX ADMIN — METOPROLOL TARTRATE 50 MG: 50 TABLET, FILM COATED ORAL at 19:23

## 2021-12-22 RX ADMIN — ONDANSETRON 4 MG: 2 INJECTION INTRAMUSCULAR; INTRAVENOUS at 23:37

## 2021-12-22 ASSESSMENT — PAIN SCALES - GENERAL
PAINLEVEL_OUTOF10: 9
PAINLEVEL_OUTOF10: 8
PAINLEVEL_OUTOF10: 2
PAINLEVEL_OUTOF10: 0

## 2021-12-22 ASSESSMENT — PAIN DESCRIPTION - DESCRIPTORS: DESCRIPTORS: ACHING

## 2021-12-22 ASSESSMENT — PAIN - FUNCTIONAL ASSESSMENT: PAIN_FUNCTIONAL_ASSESSMENT: PREVENTS OR INTERFERES SOME ACTIVE ACTIVITIES AND ADLS

## 2021-12-22 ASSESSMENT — PAIN DESCRIPTION - ONSET: ONSET: ON-GOING

## 2021-12-22 ASSESSMENT — PAIN DESCRIPTION - PAIN TYPE: TYPE: SURGICAL PAIN

## 2021-12-22 ASSESSMENT — PAIN DESCRIPTION - LOCATION: LOCATION: BACK

## 2021-12-22 ASSESSMENT — PAIN DESCRIPTION - PROGRESSION: CLINICAL_PROGRESSION: NOT CHANGED

## 2021-12-22 ASSESSMENT — PAIN DESCRIPTION - ORIENTATION: ORIENTATION: LOWER;MID

## 2021-12-22 ASSESSMENT — PAIN DESCRIPTION - FREQUENCY: FREQUENCY: CONTINUOUS

## 2021-12-22 NOTE — PROGRESS NOTES
Physical Therapy  Facility/Department: [address] NEURO SPINE  Daily Treatment Note  NAME: Wong Fields  : 1935  MRN: [id number]    Date of Service: 2021    Evaluating PT: Kimber Estefanía, PT [id number]     Room #:  1118/0690-X  Diagnosis:  Spinal stenosis of lumbar region, unspecified whether neurogenic claudication present [M48.061]  Lumbar degenerative disc disease [M51.36]  PMHx/PSHx:     has a past medical history of Hypertension.    has a past surgical history that includes joint replacement (Right); Carotid endarterectomy (Right); Appendectomy; Carpal tunnel release (Bilateral); and Lumbar spine surgery (N/A, 2021).       Procedure/Surgery: L2-L5 LAMINECTOMY AND FUSION   Precautions:  Falls, falls, alarm and O2 , FWB (full weight bearing), Hemovac drain, Spinal precautions no \"BLT\" and LSO on when greater than 45degrees (elective)  Equipment Needs: Wheeled Walker     SUBJECTIVE:  Patient lives with spouse  in a trailer with 2 steps without rail and a platform between the two to enter.   Patient ambulated with a 7-iron golf club PTA. Equipment owned: None     OBJECTIVE:    Initial Evaluation  Date: 21 Treatment  Date: 21 Short Term/ Long Term   Goals   AM-PAC 6 Clicks      Was pt agreeable to Eval/treatment? Yes Yes     Does pt have pain?  Yes; 9/10 LBP at level of incision Did not report     Bed Mobility  Rolling: Min   Supine to sit: Min  Sit to supine: Min   Scooting: Min  Rolling:NT  Supine to sit:NT  Sit to supine:NT  Scooting:NT Rolling: Ind  Supine to sit: Ind  Sit to supine: Ind  Scooting: Ind   Transfers Sit to stand: Min  Stand to sit: Min  Stand pivot: Min  Sit to stand: [address]  Stand to sit: Brain  Stand pivot: ModA with [employer] Sit to stand: Mod Ind    Stand to sit: Mod Ind    Stand pivot: Mod Ind     Ambulation    NT 5 feet with [employer] with ModA  (short distance to commode) 120 feet with Mod Ind     Stair negotiation: ascended and descended  NT NT 3 steps with Mod Ind  1 rail

## 2021-12-22 NOTE — DISCHARGE INSTR - COC
Continuity of Care Form    Patient Name: Nathaly Modi   :  1935  MRN:  [id number]    Admit date:  2021  Discharge date:  ***    Code Status Order: Full Code   Advance Directives:   Advance Care Flowsheet Documentation       Date/Time Healthcare Directive Type of Healthcare Directive Copy in [address] Agent's Name Healthcare Agent's Phone Number    21 0533 No, patient does not have an advance directive for healthcare treatment -- -- -- -- --            Admitting Physician:  Jeannie Bhakta MD  PCP: Wilfrido Rivas MD    Discharging Nurse: [facility] Unit/Room#: 8505/8505-B  Discharging Unit Phone Number: ***    Emergency Contact:   Extended Emergency Contact Information  Primary Emergency Contact: [place] Phone: [phone]  Relation: Spouse  Secondary Emergency Contact: [facility], [address] Phone: [phone]  Mobile Phone: [phone]  Relation: Child    Past Surgical History:  Past Surgical History:   Procedure Laterality Date    APPENDECTOMY      CAROTID ENDARTERECTOMY Right     CARPAL TUNNEL RELEASE Bilateral     JOINT REPLACEMENT Right     LUMBAR SPINE SURGERY N/A 2021    L2-L5 LAMINECTOMY AND FUSION performed by Jeannie Bhakta MD at [address] [place] [address]       Immunization History: There is no immunization history on file for this patient.     Active Problems:  Patient Active Problem List   Diagnosis Code    Spinal stenosis of lumbar region M48.061    Spondylolisthesis, lumbar region M43.16    Lumbar degenerative disc disease M51.36    Chest pain R07.9    Elevated troponin R77.8    Primary hypertension I10    Puerperal sepsis with acute hypoxic respiratory failure ([address] O85, R65.20, J96.01    Hyperlipidemia E78.5    Pulmonary fibrosis ([facility] J84.10       Isolation/Infection:   Isolation            No Isolation          Patient Infection Status       Infection Onset Added Last Indicated Last Indicated By Review Planned Expiration Resolved Resolved By    None active    Resolved    COVID-19 (Rule Out) 12/20/21 12/20/21 12/20/21 Respiratory Panel, Molecular, with COVID-19 (Restricted: peds pts or suitable admitted adults) (Ordered)   12/20/21 Rule-Out Test Resulted    COVID-19 (Rule Out) 12/17/21 12/17/21 12/17/21 COVID-19, Rapid (Ordered)   12/17/21 Rule-Out Test Resulted    COVID-19 (Rule Out) 12/09/21 12/09/21 12/09/21 Covid-19 Ambulatory (Ordered)   12/10/21 Rule-Out Test Resulted    COVID-19 (Rule Out) 03/18/21 03/18/21 03/18/21 Covid-19 Ambulatory (Ordered)   03/19/21 Rule-Out Test Resulted            Nurse Assessment:  Last Vital Signs: BP (!) 131/57   Pulse 86   Temp 97.5 °F (36.4 °C) (Oral)   Resp 16   Ht 6' 1\" (1.854 m)   Wt 200 lb (90.7 kg)   SpO2 92%   BMI 26.39 kg/m²     Last documented pain score (0-10 scale): Pain Level: 0  Last Weight:   Wt Readings from Last 1 Encounters:   12/14/21 200 lb (90.7 kg)     Mental Status:  {IP PT MENTAL STATUS:20030}    IV Access:  { GALA IV ACCESS:054247257}    Nursing Mobility/ADLs:  Walking   {CHP DME DTME:099189021}  Transfer  {CHP DME GEYC:849277575}  Bathing  {CHP DME QFIA:976826816}  Dressing  {CHP DME QZEN:100079174}  Toileting  {CHP DME BFYT:840674969}  Feeding  {CHP DME QDRS:684409821}  Med Admin  {CHP DME GMUL:556534309}  Med Delivery   { GALA MED Delivery:633652274}    Wound Care Documentation and Therapy:        Elimination:  Continence: Bowel: {YES / IN:84372}  Bladder: {YES / BA:31832}  Urinary Catheter: {Urinary Catheter:140818725}   Colostomy/Ileostomy/Ileal Conduit: {YES / MR:39726}       Date of Last BM: ***    Intake/Output Summary (Last 24 hours) at 12/22/2021 0800  Last data filed at 12/22/2021 0530  Gross per 24 hour   Intake 196.5 ml   Output 475 ml   Net -278.5 ml     I/O last 3 completed shifts:   In: 196.5 [I.V.:96.5; IV Piggyback:100]  Out: 475 [Urine:475]    Safety Concerns:     [address] Safety Concerns:707874114}    Impairments/Disabilities:      [address] Impairments/Disabilities:309451837}    Nutrition Therapy:  Current Nutrition Therapy:   [address] GALA Diet List:544301662}    Routes of Feeding: {P DME Other Feedings:780743023}  Liquids: {Slp liquid thickness:13414}  Daily Fluid Restriction: {CHP DME Yes amt example:383543515}  Last Modified Barium Swallow with Video (Video Swallowing Test): {Done Not Done LQY}    Treatments at the Time of Hospital Discharge:   Respiratory Treatments: ***  Oxygen Therapy:  {Therapy; copd oxygen:20088}  Ventilator:    {[facility] Vent OUYA:492490137}    Rehab Therapies: {THERAPEUTIC INTERVENTION:8553910660}  Weight Bearing Status/Restrictions: {[facility] Weight Bearin}  Other Medical Equipment (for information only, NOT a DME order):  {EQUIPMENT:854732364}  Other Treatments: ***    Patient's personal belongings (please select all that are sent with patient):  {[facility] DME Belongings:226884977}    RN SIGNATURE:  {Esignature:178606147}    CASE MANAGEMENT/SOCIAL WORK SECTION    Inpatient Status Date: ***    Readmission Risk Assessment Score:  Readmission Risk              Risk of Unplanned Readmission:  16           Discharging to Facility/ Agency   Name:   Address:  Phone:  Fax:    Dialysis Facility (if applicable)   Name:  Address:  Dialysis Schedule:  Phone:  Fax:    / signature: {Esignature:261799025}    PHYSICIAN SECTION    Prognosis: {Prognosis:4041292779}    Condition at Discharge: [address] Patient Condition:518229624}    Rehab Potential (if transferring to Rehab): {Prognosis:8607171553}    Recommended Labs or Other Treatments After Discharge: ***    Physician Certification: I certify the above information and transfer of Gail Mak  is necessary for the continuing treatment of the diagnosis listed and that he requires {Admit to Appropriate Level of Care:54338} for {GREATER/LESS:387064534} 30 days.      Update Admission H&P: {P DME Changes in KYKMK:221853509}    PHYSICIAN SIGNATURE:  Electronically signed by RADHA Cardenas on 12/22/21 at 8:00 AM EST

## 2021-12-22 NOTE — PROGRESS NOTES
Department of Neurosurgery  Progress Note    CHIEF COMPLAINT: lumbar fusion    SUBJECTIVE:  No CP or SOB    REVIEW OF SYSTEMS :  Constitutional: Negative for chills and fever. Neurological: Negative for dizziness, tremors and speech change. OBJECTIVE:   VITALS:  BP (!) 131/57   Pulse 86   Temp 97.5 °F (36.4 °C) (Oral)   Resp 16   Ht 6' 1\" (1.854 m)   Wt 200 lb (90.7 kg)   SpO2 92%   BMI 26.39 kg/m²   PHYSICAL:  CONSTITUTIONAL:  awake, alert, cooperative, no apparent distress, and appears stated age and BECKHAM.   FC    DATA:  CBC:   Lab Results   Component Value Date    WBC 11.6 12/22/2021    RBC 3.35 12/22/2021    HGB 10.1 12/22/2021    HCT 31.5 12/22/2021    MCV 94.0 12/22/2021    MCH 30.1 12/22/2021    MCHC 32.1 12/22/2021    RDW 13.5 12/22/2021     12/22/2021    MPV 9.6 12/22/2021     BMP:    Lab Results   Component Value Date     12/21/2021    K 3.8 12/21/2021    K 5.6 12/15/2021     12/21/2021    CO2 27 12/21/2021    BUN 47 12/21/2021    LABALBU 3.1 12/21/2021    CREATININE 1.4 12/21/2021    CALCIUM 8.3 12/21/2021    GFRAA 58 12/21/2021    LABGLOM 48 12/21/2021    GLUCOSE 123 12/21/2021     PT/INR:    Lab Results   Component Value Date    PROTIME 12.7 12/09/2021    INR 1.2 12/09/2021     PTT:  No results found for: APTT, PTT[APTT}    Current Inpatient Medications  Current Facility-Administered Medications: 0.9 % sodium chloride infusion, , IntraVENous, Continuous  sodium chloride flush 0.9 % injection 5-40 mL, 5-40 mL, IntraVENous, 2 times per day  sodium chloride flush 0.9 % injection 5-40 mL, 5-40 mL, IntraVENous, PRN  0.9 % sodium chloride infusion, 25 mL, IntraVENous, PRN  apixaban (ELIQUIS) tablet 5 mg, 5 mg, Oral, BID  phenol 1.4 % mouth spray 1 spray, 1 spray, Mouth/Throat, Q2H PRN  ipratropium (ATROVENT) 0.02 % nebulizer solution 0.5 mg, 0.5 mg, Nebulization, 4x daily  dilTIAZem (CARDIZEM) tablet 30 mg, 30 mg, Oral, 4 times per day  ampicillin-sulbactam (UNASYN) 3000 mg ivpb minibag, 3,000 mg, IntraVENous, Q6H  [COMPLETED] dilTIAZem injection 20 mg, 20 mg, IntraVENous, Once **FOLLOWED BY** dilTIAZem 100 mg in dextrose 5 % 100 mL infusion (ADD-[place]), 5-15 mg/hr, IntraVENous, Continuous  metoprolol tartrate (LOPRESSOR) tablet 50 mg, 50 mg, Oral, BID  perflutren lipid microspheres (DEFINITY) injection 1.65 mg, 1.5 mL, IntraVENous, ONCE PRN  acetaminophen (TYLENOL) tablet 650 mg, 650 mg, Oral, Q4H PRN  simethicone (MYLICON) chewable tablet 80 mg, 80 mg, Oral, Q6H PRN  pravastatin (PRAVACHOL) tablet 10 mg, 10 mg, Oral, Daily  sodium chloride flush 0.9 % injection 5-40 mL, 5-40 mL, IntraVENous, 2 times per day  sodium chloride flush 0.9 % injection 5-40 mL, 5-40 mL, IntraVENous, PRN  0.9 % sodium chloride infusion, 25 mL, IntraVENous, PRN  ondansetron (ZOFRAN-ODT) disintegrating tablet 4 mg, 4 mg, Oral, Q8H PRN **OR** ondansetron (ZOFRAN) injection 4 mg, 4 mg, IntraVENous, Q6H PRN  oxyCODONE (ROXICODONE) immediate release tablet 5 mg, 5 mg, Oral, Q4H PRN **OR** oxyCODONE HCl (OXY-IR) immediate release tablet 10 mg, 10 mg, Oral, Q4H PRN  HYDROmorphone (DILAUDID) injection 0.5 mg, 0.5 mg, IntraVENous, Q3H PRN  cyclobenzaprine (FLEXERIL) tablet 10 mg, 10 mg, Oral, TID PRN  polyethylene glycol (GLYCOLAX) packet 17 g, 17 g, Oral, Daily  bisacodyl (DULCOLAX) EC tablet 5 mg, 5 mg, Oral, Daily  sennosides-docusate sodium (SENOKOT-S) 8.6-50 MG tablet 1 tablet, 1 tablet, Oral, BID  magnesium hydroxide (MILK OF MAGNESIA) 400 MG/5ML suspension 30 mL, 30 mL, Oral, Daily PRN  fleet rectal enema 1 enema, 1 enema, Rectal, Daily PRN    ASSESSMENT:   · S/p L2-5 PLF on 12/14 - stable  · Ileus - improving  · A-fib  · pneumonia    PLAN:  · PT/OT  · WBAT with brace  · Pain control  · AB per pulmonology  · Ok for full anticoagulation if needed        Electronically signed by RADHA Chiu on 12/22/2021 at 8:52 AM

## 2021-12-22 NOTE — PROGRESS NOTES
[facility]  Department of Internal Medicine  Division of Pulmonary, Critical Care and Sleep Medicine  Consult Note    Gerard Ratliff DO, MPH, Marcha Curling, Chon Kaufman MD, [facility]  [place]  [facility]      Patient: Bhakti Price  MRN: [id number]  : 1935    Encounter Time: 1:33 PM     Date of Admission: 2021  5:17 AM    Primary Care Physician: Ponce Miles MD    Reason for Consultation: Post Op respiratory failure    SUBJECTIVE:    Still on 9 liters  CXR shows new infiltrate in RML and elevated hemidiaphragm which is chronic - Abx day 4  ECHO shows [facility] with SHUNT (intrapulmonary)  No cough or hemoptysis  CT with chronic changes - knonw fibrosis per family  Had similar anesthesia issue in last surgery  Cr normalized       OBJECTIVE:     PHYSICAL EXAM:   VITALS:   Vitals:    21 0420 21 0529 21 0730 21 1100   BP:  (!) 144/70 (!) 131/57 (!) 154/86   Pulse: 78  86 96   Resp:   16 16   Temp:   97.5 °F (36.4 °C) 97.3 °F (36.3 °C)   TempSrc:   Temporal Temporal   SpO2: 91%  92% 96%   Weight:       Height:            Intake/Output Summary (Last 24 hours) at 2021 1333  Last data filed at 2021 0530  Gross per 24 hour   Intake 196.5 ml   Output 475 ml   Net -278.5 ml        CONSTITUTIONAL:   A&O x 3, NAD  SKIN:     No rash, Fair skin with shin discoloration  HEENT:     EOMI, MMM, No thrush  NECK:    No bruits, No JVP apprechiated  CV:      Afib TR murmur, No rubs, No gallops  PULMONARY:   Couse BS,  No Wheezing, No Rales, No Rhonchi  ABDOMEN:     Soft, non-tender. BS normal. No R/R/G  EXT:    No deformities . No clubbing. No lower extremity edema, + venous stasis  PULSE:   Appears equal and palpable.   PSYCHIATRIC:  Seems appropriate, No acute psycosis  MS:    Gross weakness- stable  NEUROLOGIC:   The clinical assessment is non-focal     DATA: IMAGING & TESTING:     LABORATORY TESTS:    ECHOCARDIOGRAM:    Normal left ventricular chamber size. Normal left ventricular systolic    function. Visually estimated LVEF is 60 %.    No wall motion abnormalities.    Grade II diastolic dysfunction.    Normal right ventricle structure and function.    Normal left atrium.    Normal right atrium.    Mild tricuspid regurgitation.    Mild-to-moderate pulmonic regurgitation present.    Unable to estimate PA pressure.    Agitated saline contrast study shows late right to left shunting    suggestive of an intra-pulmonary shunt.    No comparison study available. CBC:   Lab Results   Component Value Date    WBC 11.6 12/22/2021    RBC 3.35 12/22/2021    HGB 10.1 12/22/2021    HCT 31.5 12/22/2021    MCV 94.0 12/22/2021    MCH 30.1 12/22/2021    MCHC 32.1 12/22/2021    RDW 13.5 12/22/2021     12/22/2021    MPV 9.6 12/22/2021     CMP:    Lab Results   Component Value Date     12/22/2021    K 3.8 12/22/2021    K 5.6 12/15/2021     12/22/2021    CO2 28 12/22/2021    BUN 47 12/22/2021    CREATININE 1.3 12/22/2021    GFRAA >60 12/22/2021    LABGLOM 53 12/22/2021    GLUCOSE 113 12/22/2021    PROT 5.9 12/21/2021    LABALBU 3.1 12/21/2021    CALCIUM 8.7 12/22/2021    BILITOT 0.6 12/21/2021    ALKPHOS 68 12/21/2021    AST 17 12/21/2021    ALT 18 12/21/2021     Magnesium:    Lab Results   Component Value Date    MG 2.3 12/19/2021     Phosphorus:  No results found for: PHOS     PRO-BNP: No results found for: PROBNP   ABGs:   Lab Results   Component Value Date    PH 7.394 12/16/2021    PO2 51.0 12/16/2021    PCO2 45.8 12/16/2021     Hemoglobin A1C: No components found for: HGBA1C    IMAGING:  Imaging tests were completed and reviewed and discussed radiology and care team involved and reveals     CHEST FILM COMPARED 12/18:            XR ABDOMEN (KUB) (SINGLE AP VIEW)  Result Date: 12/16/2021Dilated small and large bowel loops suggest generalized ileus. Clinical correlation and follow-up to resolution recommended.      MRI LUMBAR SPINE WO CONTRAST  Result Date: 11/24/2021  Multilevel degenerative disc disease with associated facet and ligamentous hypertrophy resulting in canal stenosis at L3-4 and L4-5. Bilateral foraminal narrowing as described above. XR CHEST PORTABLE  Result Date: 12/16/2021   FINDINGS: Right lower lobe atelectasis and/or infiltrate. .  There is no effusion or pneumothorax. The cardiomediastinal silhouette is without acute process. The osseous structures are without acute process. Right lower lobe atelectasis and/or infiltrate. CTA PULMONARY W CONTRAST  Result Date: 12/16/2021  INDINGS: No thoracic aortic aneurysm or dissection. Mild-to-moderate scattered calcified plaque in the thoracic aorta. No evidence of pulmonary embolism. Mild cardiomegaly. Coronary atherosclerosis. No pericardial effusion. Shotty subcentimeter short axis mediastinal hilar nodes. No pleural effusion or pneumothorax. Low lung volumes. There are a few small peripheral densities in the lungs which may represent scarring or infection (for example series 4, image 73). Another curvilinear density is noted the medial left lower lobe (series 4 image 168). Mild dependent atelectasis, right greater than left. The central airways are patent. There is centrilobular and paraseptal emphysema. Degenerative changes of the spine. The visualized abdomen demonstrates changes of cholecystectomy and the splenic parenchymal calcification. No evidence of pulmonary embolism. No acute aortic injury or aneurysm. There are a few nonspecific densities in the peripheral lungs. Imaging features can be seen with COVID-19 pneumonia, though are nonspecific and can occur with a variety of infectious and noninfectious processes. Mild cardiomegaly. Atherosclerotic disease. Assessment:   1. Post op respiratory failure  2. Hypoxemic Respiratory Failure   3. CT evidence of disease of the lung  4. Plueral plaques ?  Old infection or possible asbestosis  5. Mild-to-moderate pulmonic regurgitation present. 6.  Agitated saline contrast study shows late right to left shunting    suggestive of an intra-pulmonary shunt. 7. CT show sutttble changes of pulmonary fibrosis (periphearl)  8. New onset atrial fibrillation RVR.  [id number] 4 (age, hypertension, PAD)  9. Atypical CP.  Recent preoperative stress test negative for ischemia  10. Post op respiratory failure, on 9 L high flow nasal cannula  11. Postoperative ileus  12. 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 Hypertension  15. Hx R CEA  16. GERD  17. Hx COVID 19 5/2021          Plan:   1. CTA negative for PE but noted atelectasis, new decrease sats?, CXR with change yet even thought procal is negative I will start Abx for aspiration pneumonia. 2. Most of the finding are chronic, some fibrosis for sure   3. Continue ICS,   4. Bronchodialators QID  5. Wean oxygen as able   6. Monitor status  7. Day 4 of antibiotics  8. May use steroids again for lung if oxygen doesn't improve after TOBIAS and cardioversion  9. Repeat CXR in am   10. Will need home oxygen   11.  Serologic testing for ISLD needed and ordered     Mane Bennett DO DO, MPH, Genesis Sifuentes  Professor of Internal Medicine  Pulmonary, Critical Care and Sleep Medicine

## 2021-12-22 NOTE — PROGRESS NOTES
Patient weaned off of Cardizem drip at 0030, after converting to NSR at 2345 12/21. PO Cardizem given per order. Will continue to monitor.

## 2021-12-22 NOTE — PROGRESS NOTES
Arrive [facility] Special procedures EKD Sinus rhythm with first degree AV block. TOBIAS/DCCV canceled. Surface ECHO with bubble study done.

## 2021-12-22 NOTE — PROGRESS NOTES
Physician Progress Note      Natasha Lockwood  CSN #:                  [id number]  :                       1935  ADMIT DATE:       2021 5:17 AM  DISCH DATE:  RESPONDING  PROVIDER #:        Bhargav Culp DO          QUERY TEXT:    Noted documentation of post-operative respiratory failure in progress notes. Please document in progress notes and discharge summary if you are   evaluating/treating any of the following: The medical record reflects the following:  Risk Factors: fusion, hx covid, possible aspiration  Clinical Indicators: Per notes \"#Acute postoperative hypoxic respiratory   failure. Possible aspiration. -CT scan of the chest was negative for PE.   -Pulmonologist ordered IV Solu-Medrol. -Resume incentive spirometer and   bronchodilator treatment. -History of COVID-19 infection May 2021   hospitalized. -Wean off oxygen as tolerated. -IV Unasyn started    pulmonary\". Treatment: CT chest, Solumedrol, IS, O2, Unasyn    Thank you,  Jennifer Wharton RN, BSN, CCDS, Clinical Documtation Improvement  [phone]  Options provided:  -- Respiratory failure due to chronic underlying condition and is not a   complication of the procedure  -- Respiratory failure is not a complication of the procedure but was due to,   Please specify. -- Acute Postoperative Pulmonary Insufficiency, Postoperative Respiratory   failure is ruled out  -- Postoperative Respiratory failure is a complication of the procedure  -- Postoperative Respiratory failure ruled out after study  -- Other - I will add my own diagnosis  -- Disagree - Not applicable / Not valid  -- Disagree - Clinically unable to determine / Unknown  -- Refer to Clinical Documentation Reviewer    PROVIDER RESPONSE TEXT:    Patient with respiratory failure due to chronic underlying condition and is   not a complication of the procedure.     Query created by: Brian Mohan on 2021 9:29 AM      Electronically signed by: Geronimo COTTON DO 12/22/2021 4:45 PM

## 2021-12-22 NOTE — PROGRESS NOTES
Hospitalist Progress Note      SYNOPSIS: Mr. Pastor Plascencia, a 82y.o. year old male  who  has a past medical history of Hypertension. Patient was admitted under the neurosurgery service. He underwent L2-L5 laminectomy and fusion. Patient is postop, he states that his pain is minimal.  He denies chest pain, no shortness of breath, no hematuria dysuria hematemesis or hematochezia. Patient states that he has hypertension which is fairly controlled at home. Postoperatively the patient developed acute respiratory failure requiring high oxygen patient was diagnosed with aspiration pneumonia. He developed new onset A. fib with rapid ventricular response on  and was for cardioversion on  but had spontaneously converted to sinus rhythm. .      SUBJECTIVE:  Patient seen and examined. Patient on 10L. Started overnight on Unasyn for aspiration pneumonia. Wife reports his intake has been poor during hospitalization. Pt more sleepy       Temp (24hrs), Av.7 °F (36.5 °C), Min:97.3 °F (36.3 °C), Max:98.2 °F (36.8 °C)    DIET: ADULT DIET; Regular; Low Fiber  ADULT ORAL NUTRITION SUPPLEMENT; Breakfast, Dinner; Low Calorie/High Protein Oral Supplement  ADULT ORAL NUTRITION SUPPLEMENT; Breakfast, Dinner; Wound Healing Oral Supplement  CODE: Full Code    Intake/Output Summary (Last 24 hours) at 2021 1123  Last data filed at 2021 0530  Gross per 24 hour   Intake 196.5 ml   Output 475 ml   Net -278.5 ml       OBJECTIVE:    BP (!) 154/86   Pulse 96   Temp 97.3 °F (36.3 °C) (Temporal)   Resp 16   Ht 6' 1\" (1.854 m)   Wt 200 lb (90.7 kg)   SpO2 96%   BMI 26.39 kg/m²     General appearance: No apparent distress, appears stated age and cooperative. HEENT:  Conjunctivae/corneas clear. Neck: Supple. No jugular venous distention.    Respiratory: Diminished air entry bilaterally  Cardiovascular: A. fib  Abdomen: Soft, nontender, nondistended  Musculoskeletal: No clubbing, cyanosis, no bilateral lower extremity edema. Brisk capillary refill. Skin:  No rashes  on visible skin  Neurologic: awake, alert and following commands     Assessment and plan:  #Acute postoperative hypoxic respiratory failure  #Possible aspiration  -CT scan of the chest was negative for PE  -Pulmonologist ordered IV Solu-Medrol  -Resume incentive spirometer and bronchodilator treatment  -History of COVID-19 infection May 2021 hospitalized  -Wean off oxygen as tolerated  -IV Unasyn started 12/19 pulmonary    #New onset atrial fibrillation RVR. [id number] 4 (age, hypertension, PAD)  #Atypical CP. Recent preoperative stress test negative for ischemia  -Appreciate cardiology input  -IV Cardizem drip  -Change Norvasc to p.o.  Cardizem for better rate control  -Cardiology plans for anticoagulation once cleared by neurosurgery    #EUGENIO  -Bolus as indicated, strict I/O, monitor renal function     #Post operative ileus  -Resolved    #Essential hypertension:   -Resume patient medications with metoprolol and Cardizem     #Hyperlipidemia:   -Continue statin     #Spinal stenosis:   -Status post L2-L5 laminectomy on 12/14, supportive care, neurosurgery following.  -Pain control  -PT/OT      DISPOSITION: TBD    Medications:  REVIEWED DAILY    Infusion Medications    sodium chloride 75 mL/hr at 12/22/21 0420    sodium chloride      sodium chloride       Scheduled Medications    sodium chloride flush  5-40 mL IntraVENous 2 times per day    apixaban  5 mg Oral BID    ipratropium  0.5 mg Nebulization 4x daily    dilTIAZem  30 mg Oral 4 times per day    ampicillin-sulbactam  3,000 mg IntraVENous Q6H    metoprolol tartrate  50 mg Oral BID    pravastatin  10 mg Oral Daily    sodium chloride flush  5-40 mL IntraVENous 2 times per day    polyethylene glycol  17 g Oral Daily    bisacodyl  5 mg Oral Daily    sennosides-docusate sodium  1 tablet Oral BID     PRN Meds: sodium chloride flush, sodium chloride, phenol, perflutren lipid microspheres, acetaminophen, simethicone, sodium chloride flush, sodium chloride, ondansetron **OR** ondansetron, oxyCODONE **OR** oxyCODONE, HYDROmorphone, cyclobenzaprine, magnesium hydroxide, fleet    Labs:     Recent Labs     12/20/21  0720 12/21/21  0628 12/22/21  0508   WBC 10.8 12.0* 11.6*   HGB 10.1* 10.0* 10.1*   HCT 31.2* 30.8* 31.5*    228 263       Recent Labs     12/20/21  0720 12/21/21  0628 12/22/21  1036    138 141   K 3.8 3.8 3.8    101 102   CO2 26 27 28   BUN 43* 47* 47*   CREATININE 1.2 1.4* 1.3*   CALCIUM 8.6 8.3* 8.7       Recent Labs     12/20/21  0720 12/21/21  0628   PROT 6.2* 5.9*   ALKPHOS 67 68   ALT 21 18   AST 25 17   BILITOT 0.5 0.6       No results for input(s): INR in the last 72 hours. No results for input(s): Ian Bares in the last 72 hours. Chronic labs:    Lab Results   Component Value Date    PSA SEE NOTE (AA) 08/14/2020    PSA 4.89 (H) 08/14/2020    INR 1.2 12/09/2021       Radiology: REVIEWED DAILY    +++++++++++++++++++++++++++++++++++++++++++++++++  Gracie Paige MD  [facility] Physician - [address], [place]  +++++++++++++++++++++++++++++++++++++++++++++++++  NOTE: This report was transcribed using voice recognition software. Every effort was made to ensure accuracy; however, inadvertent computerized transcription errors may be present.

## 2021-12-22 NOTE — PROGRESS NOTES
INPATIENT CARDIOLOGY FOLLOW-UP    Name: Nicolasa Sandifer    Age: 82 y.o. Date of Admission: 12/14/2021  5:17 AM    Date of Service: 12/22/2021    Primary Cardiologist: Dr. Cira Bloch    Chief Complaint: Follow-up for new onset atrial fibrillation with RVR    Interim History:  No new overnight cardiac complaints. Currently with no complaints of CP, SOB, palpitations, dizziness, or lightheadedness. Converted to normal sinus rhythm/sinus tachycardia at around midnight. TOBIAS guided cardioversion canceled for today. Now on 10 L of oxygen via nasal cannula.     Review of Systems:   Negative except as described above    Problem List:  Patient Active Problem List   Diagnosis    Spinal stenosis of lumbar region    Spondylolisthesis, lumbar region    Lumbar degenerative disc disease    Chest pain    Elevated troponin    Primary hypertension    Puerperal sepsis with acute hypoxic respiratory failure (HCC)    Hyperlipidemia    Pulmonary fibrosis (HCC)       Current Medications:    Current Facility-Administered Medications:     0.9 % sodium chloride infusion, , IntraVENous, Continuous, Juan Carlos Urbina MD, Last Rate: 75 mL/hr at 12/22/21 0420, Rate Verify at 12/22/21 0420    sodium chloride flush 0.9 % injection 5-40 mL, 5-40 mL, IntraVENous, 2 times per day, Juan Carlos Urbina MD    sodium chloride flush 0.9 % injection 5-40 mL, 5-40 mL, IntraVENous, PRN, Juan Carlos Urbina MD    0.9 % sodium chloride infusion, 25 mL, IntraVENous, PRN, Juan Carlos Urbina MD    apixaban (ELIQUIS) tablet 5 mg, 5 mg, Oral, BID, Juan Carlos Urbina MD, 5 mg at 12/22/21 1112    phenol 1.4 % mouth spray 1 spray, 1 spray, Mouth/Throat, Q2H PRN, Adel Opitz, PA    ipratropium (ATROVENT) 0.02 % nebulizer solution 0.5 mg, 0.5 mg, Nebulization, 4x daily, Fredi Avery DO, 0.5 mg at 12/21/21 2004    dilTIAZem (CARDIZEM) tablet 30 mg, 30 mg, Oral, 4 times per day, Christina Plummer MD, 30 mg at 12/22/21 1112    ampicillin-sulbactam (UNASYN) 3000 mg ivpb minibag, 3,000 mg, IntraVENous, Q6H, [employer], DO, Stopped at 12/22/21 1222    metoprolol tartrate (LOPRESSOR) tablet 50 mg, 50 mg, Oral, BID, Jeramy Sidhu MD, 50 mg at 12/22/21 1111    perflutren lipid microspheres (DEFINITY) injection 1.65 mg, 1.5 mL, IntraVENous, ONCE PRN, Jeramy Sidhu MD    acetaminophen (TYLENOL) tablet 650 mg, 650 mg, Oral, Q4H PRN, Tia Hinson MD, 650 mg at 12/18/21 1502    simethicone (MYLICON) chewable tablet 80 mg, 80 mg, Oral, Q6H PRN, Brien West PA-C, 80 mg at 12/21/21 8208    pravastatin (PRAVACHOL) tablet 10 mg, 10 mg, Oral, Daily, Ellen Grossman PA-C, 10 mg at 12/21/21 2213    sodium chloride flush 0.9 % injection 5-40 mL, 5-40 mL, IntraVENous, 2 times per day, Ellen Grossman PA-C, 10 mL at 12/21/21 2222    sodium chloride flush 0.9 % injection 5-40 mL, 5-40 mL, IntraVENous, PRN, Ellen Grossman PA-C, 10 mL at 12/19/21 1142    0.9 % sodium chloride infusion, 25 mL, IntraVENous, PRN, Ellen Grossman PA-C    ondansetron (ZOFRAN-ODT) disintegrating tablet 4 mg, 4 mg, Oral, Q8H PRN **OR** ondansetron (ZOFRAN) injection 4 mg, 4 mg, IntraVENous, Q6H PRN, Brien West PA-C, 4 mg at 12/16/21 2242    oxyCODONE (ROXICODONE) immediate release tablet 5 mg, 5 mg, Oral, Q4H PRN, 5 mg at 12/21/21 1826 **OR** oxyCODONE HCl (OXY-IR) immediate release tablet 10 mg, 10 mg, Oral, Q4H PRN, Brien West PA-C, 10 mg at 12/22/21 1529    HYDROmorphone (DILAUDID) injection 0.5 mg, 0.5 mg, IntraVENous, Q3H PRN, Brien West PA-C, 0.5 mg at 12/20/21 8113    cyclobenzaprine (FLEXERIL) tablet 10 mg, 10 mg, Oral, TID PRN, Brien West PA-C, 10 mg at 12/22/21 1416    polyethylene glycol (GLYCOLAX) packet 17 g, 17 g, Oral, Daily, Ellen Grossman PA-C, 17 g at 12/21/21 0914    bisacodyl (DULCOLAX) EC tablet 5 mg, 5 mg, Oral, Daily, Ellen Grossman PA-C, 5 mg at 12/22/21 1111    sennosides-docusate sodium (SENOKOT-S) 8.6-50 MG tablet 1 tablet, 1 tablet, Oral, BID, Moi Duarte PA-C, 1 tablet at 12/22/21 1111    magnesium hydroxide (MILK OF MAGNESIA) 400 MG/5ML suspension 30 mL, 30 mL, Oral, Daily PRN, Moi Duarte PA-C    fleet rectal enema 1 enema, 1 enema, Rectal, Daily PRN, Moi Duarte PA-C    Physical Exam:  BP (!) 154/86   Pulse 96   Temp 97.3 °F (36.3 °C) (Temporal)   Resp 16   Ht 6' 1\" (1.854 m)   Wt 200 lb (90.7 kg)   SpO2 96%   BMI 26.39 kg/m²   Wt Readings from Last 3 Encounters:   12/14/21 200 lb (90.7 kg)   12/09/21 200 lb (90.7 kg)   11/04/21 200 lb (90.7 kg)     Appearance: Awake, alert, no acute respiratory distress  Skin: Intact, no rash  Head: Normocephalic, atraumatic  Eyes: EOMI, no conjunctival erythema  ENMT: No pharyngeal erythema, MMM, no rhinorrhea  Neck: Supple, no elevated JVP, no carotid bruits  Lungs: Clear to auscultation bilaterally. No wheezes, rales, or rhonchi. Cardiac: PMI nondisplaced, tachycardic with irregularly irregular rhythm, S1 & S2 normal, no murmurs  Abdomen: Soft, nontender, +bowel sounds  Extremities: Moves all extremities x 4, no lower extremity edema  Neurologic: No focal motor deficits apparent, normal mood and affect  Peripheral Pulses: Intact posterior tibial pulses bilaterally    Intake/Output:    Intake/Output Summary (Last 24 hours) at 12/22/2021 1627  Last data filed at 12/22/2021 0530  Gross per 24 hour   Intake 196.5 ml   Output 225 ml   Net -28.5 ml     No intake/output data recorded.     Laboratory Tests:  Recent Labs     12/20/21  0720 12/21/21  0628 12/22/21  1036    138 141   K 3.8 3.8 3.8    101 102   CO2 26 27 28   BUN 43* 47* 47*   CREATININE 1.2 1.4* 1.3*   GLUCOSE 138* 123* 113*   CALCIUM 8.6 8.3* 8.7     Lab Results   Component Value Date    MG 2.3 12/19/2021     Recent Labs     12/20/21  0720 12/21/21  0628   ALKPHOS 67 68   ALT 21 18   AST 25 17   PROT 6.2* 5.9*   BILITOT 0.5 0.6   LABALBU 2.9* 3.1*     Recent Labs     12/20/21  0720 12/21/21  0628 12/22/21  0508   WBC 10.8 12.0* 11.6*   RBC 3.42* 3.29* 3.35*   HGB 10.1* 10.0* 10.1*   HCT 31.2* 30.8* 31.5*   MCV 91.2 93.6 94.0   MCH 29.5 30.4 30.1   MCHC 32.4 32.5 32.1   RDW 13.6 13.5 13.5    228 263   MPV 9.6 9.6 9.6     No results found for: CKTOTAL, CKMB, CKMBINDEX, TROPONINI  Lab Results   Component Value Date    INR 1.2 12/09/2021    INR 1.1 03/23/2021    PROTIME 12.7 (H) 12/09/2021    PROTIME 13.0 (H) 03/23/2021     No results found for: TSHFT4, TSH  No results found for: LABA1C  No results found for: EAG  No results found for: CHOL  No results found for: TRIG  No results found for: HDL  No results found for: LDLCALC, LDLCHOLESTEROL  No results found for: LABVLDL, VLDL  No results found for: CHOLHDLRATIO  No results for input(s): PROBNP in the last 72 hours. Stress test:    Adenosine stress test: 11/18/2021: LVEF 50%, nonischemic.     Cardiac catheterization:   Reported normal >10 year ago     ----------------------------------------------------------------------------------------------------------------------------------------------------------------  IMPRESSION:  1. New onset atrial fibrillation RVR. [id number] 4 (age, hypertension, PAD)-spontaneous conversion to normal sinus rhythm earlier today. 2. Atypical CP. Recent preoperative stress test negative for ischemia  3. Post op respiratory failure, on 9 L high flow nasal cannula  4. Postoperative ileus  5. S/p Laminectomy 12/14/21  6. ILD/fibrosis  7. Hypertension  8. Hx R CEA  9. GERD  10. Hx COVID 19 5/2021     RECOMMENDATIONS:     · Cardizem drip was discontinued yesterday. Also on oral Cardizem 30 every 6.  · Metoprolol SR 50 mg twice daily. · Back on Eliquis 5 mg twice daily. · Echocardiogram done today. Results as above. Does have late shunting from right to left suggestive of a possible intrapulmonary shunt. · Currently being treated for presumed aspiration pneumonia. · Further care per primary service neurosurgery, pulmonary, general surgery.    · I believe his hypoxia is mainly pulmonary in origin. He does have a possible intrapulmonary shunt but I do not think it is contributing significantly to his hypoxia. Will defer further management to pulmonary medicine. Can consolidate Cardizem to 120 mg daily when able. I will sign off today. Please do not hesitate to call me with any questions or concerns. Consider transient use of amiodarone should there be recurrence of atrial fibrillation with RVR.

## 2021-12-22 NOTE — ANESTHESIA PRE PROCEDURE
Department of Anesthesiology  Preprocedure Note       Name:  Janet Day   Age:  82 y.o.  :  1935                                          MRN:  [id number]         Date:  2021      Surgeon: Vanessa Ding):  Kimo Mariscal MD    Procedure:     Medications prior to admission:   Prior to Admission medications    Medication Sig Start Date End Date Taking? Authorizing Provider   oxyCODONE (ROXICODONE) 5 MG immediate release tablet Take 1 tablet by mouth every 4 hours as needed for Pain for up to 7 days.  21 Yes RADHA Keyes   cyclobenzaprine (FLEXERIL) 10 MG tablet Take 1 tablet by mouth 3 times daily as needed for Muscle spasms 21 Yes RADHA Keyes   Pediatric Multivitamins-Fl (MULTIVITAMINS/FL PO) Take by mouth   Yes Historical Provider, MD   Pediatric Multivitamins-Fl (MULTIVITAMINS/FL PO) Take by mouth   Yes Historical Provider, MD   amLODIPine (NORVASC) 10 MG tablet daily  9/3/21  Yes Historical Provider, MD   metoprolol tartrate (LOPRESSOR) 25 MG tablet take 1 tablet twice a day 08  Yes Historical Provider, MD   pravastatin (PRAVACHOL) 10 MG tablet daily  21  Yes Historical Provider, MD       Current medications:    Current Facility-Administered Medications   Medication Dose Route Frequency Provider Last Rate Last Admin    0.9 % sodium chloride infusion   IntraVENous Continuous Alize Jesus MD 75 mL/hr at 21 0420 Rate Verify at 21 0420    sodium chloride flush 0.9 % injection 5-40 mL  5-40 mL IntraVENous 2 times per day Alize Jesus MD        sodium chloride flush 0.9 % injection 5-40 mL  5-40 mL IntraVENous PRN Alize Jesus MD        0.9 % sodium chloride infusion  25 mL IntraVENous PRN Alize Jesus MD        apixaban (ELIQUIS) tablet 5 mg  5 mg Oral BID Alize Jesus MD   5 mg at 21 0334    phenol 1.4 % mouth spray 1 spray  1 spray Mouth/Throat Q2H PRN RADHA Keyes        ipratropium (ATROVENT) 0.02 % nebulizer solution 0.5 mg  0.5 mg Nebulization 4x daily Isauramiracle Nava DO   0.5 mg at 12/21/21 2004    dilTIAZem (CARDIZEM) tablet 30 mg  30 mg Oral 4 times per day Caridad Negrete MD   30 mg at 12/22/21 0529    ampicillin-sulbactam (UNASYN) 3000 mg ivpb minibag  3,000 mg IntraVENous Q6H Isauramiracle Nava DO   Stopped at 12/22/21 7787    dilTIAZem 100 mg in dextrose 5 % 100 mL infusion ([facility])  5-15 mg/hr IntraVENous Continuous Valerie Capone MD   Stopped at 12/22/21 0028    metoprolol tartrate (LOPRESSOR) tablet 50 mg  50 mg Oral BID Valerie Capone MD   50 mg at 12/21/21 2212    perflutren lipid microspheres (DEFINITY) injection 1.65 mg  1.5 mL IntraVENous ONCE PRN Valerie Capone MD        acetaminophen (TYLENOL) tablet 650 mg  650 mg Oral Q4H PRN April Whiteside MD   650 mg at 12/18/21 1502    simethicone (MYLICON) chewable tablet 80 mg  80 mg Oral Q6H PRN Trisha Sorto PA-C   80 mg at 12/21/21 9770    pravastatin (PRAVACHOL) tablet 10 mg  10 mg Oral Daily Trisha Sorto PA-C   10 mg at 12/21/21 2213    sodium chloride flush 0.9 % injection 5-40 mL  5-40 mL IntraVENous 2 times per day Trisha Sorto PA-C   10 mL at 12/21/21 2222    sodium chloride flush 0.9 % injection 5-40 mL  5-40 mL IntraVENous PRN TOBY ArmstrongC   10 mL at 12/19/21 1142    0.9 % sodium chloride infusion  25 mL IntraVENous PRN Trisha Sorto PA-C        ondansetron (ZOFRAN-ODT) disintegrating tablet 4 mg  4 mg Oral Q8H PRN Trisha Sorto PA-C        Or    ondansetron [facility]) injection 4 mg  4 mg IntraVENous Q6H PRN Trisha Sorto PA-C   4 mg at 12/16/21 2242    oxyCODONE (ROXICODONE) immediate release tablet 5 mg  5 mg Oral Q4H PRN RADHA Armstrong-C   5 mg at 12/21/21 1826    Or    oxyCODONE HCl (OXY-IR) immediate release tablet 10 mg  10 mg Oral Q4H PRN RADHA Armstrong-C   10 mg at 12/22/21 5168    HYDROmorphone (DILAUDID) injection 0.5 mg  0.5 mg IntraVENous Q3H PRN RADHA Armstrong-DEANNE   0.5 mg at 12/20/21 1820  cyclobenzaprine (FLEXERIL) tablet 10 mg  10 mg Oral TID PRN Rick White PA-C   10 mg at 12/21/21 0913    polyethylene glycol (GLYCOLAX) packet 17 g  17 g Oral Daily Rick White PA-C   17 g at 12/21/21 0914    bisacodyl (DULCOLAX) EC tablet 5 mg  5 mg Oral Daily Rick White PA-C   5 mg at 12/21/21 6432    sennosides-docusate sodium (SENOKOT-S) 8.6-50 MG tablet 1 tablet  1 tablet Oral BID Rick White PA-C   1 tablet at 12/21/21 2225    magnesium hydroxide (MILK OF MAGNESIA) 400 MG/5ML suspension 30 mL  30 mL Oral Daily PRN Rick White PA-C        fleet rectal enema 1 enema  1 enema Rectal Daily PRN Rick White PA-C           Allergies:     Allergies   Allergen Reactions    Demerol [Meperidine Hcl] Shortness Of Breath    Morphine        Problem List:    Patient Active Problem List   Diagnosis Code    Spinal stenosis of lumbar region M48.061    Spondylolisthesis, lumbar region M43.16    Lumbar degenerative disc disease M51.36    Chest pain R07.9    Elevated troponin R77.8    Primary hypertension I10    Puerperal sepsis with acute hypoxic respiratory failure ([address] O85, R65.20, J96.01    Hyperlipidemia E78.5    Pulmonary fibrosis ([facility] [address] J84.10       Past Medical History:        Diagnosis Date    Hypertension        Past Surgical History:        Procedure Laterality Date    APPENDECTOMY      CAROTID ENDARTERECTOMY Right     CARPAL TUNNEL RELEASE Bilateral     JOINT REPLACEMENT Right     LUMBAR SPINE SURGERY N/A 12/14/2021    L2-L5 LAMINECTOMY AND FUSION performed by Renzo Bradley MD at [address] [place] [address] History:    Social History     Tobacco Use    Smoking status: Former Smoker    Smokeless tobacco: Never Used   Substance Use Topics    Alcohol use: Not Currently                                Counseling given: Not Answered      Vital Signs (Current):   Vitals:    12/21/21 2358 12/22/21 0420 12/22/21 0529 12/22/21 0730   BP: (!) 150/68  (!) 144/70 (!) 131/57   Pulse:  78 86   Resp:    16   Temp:    97.5 °F (36.4 °C)   TempSrc:    Oral   SpO2:  91%  92%   Weight:       Height:                                                  BP Readings from Last 3 Encounters:   12/22/21 (!) 131/57   12/14/21 135/72   12/09/21 (!) 142/64       NPO Status: Time of last liquid consumption: 0830                        Time of last solid consumption: 0830                        Date of last liquid consumption: 12/13/21                        Date of last solid food consumption: 12/13/21    BMI:   Wt Readings from Last 3 Encounters:   12/14/21 200 lb (90.7 kg)   12/09/21 200 lb (90.7 kg)   11/04/21 200 lb (90.7 kg)     Body mass index is 26.39 kg/m². CBC:   Lab Results   Component Value Date    WBC 11.6 12/22/2021    RBC 3.35 12/22/2021    HGB 10.1 12/22/2021    HCT 31.5 12/22/2021    MCV 94.0 12/22/2021    RDW 13.5 12/22/2021     12/22/2021       CMP:   Lab Results   Component Value Date     12/21/2021    K 3.8 12/21/2021    K 5.6 12/15/2021     12/21/2021    CO2 27 12/21/2021    BUN 47 12/21/2021    CREATININE 1.4 12/21/2021    GFRAA 58 12/21/2021    LABGLOM 48 12/21/2021    GLUCOSE 123 12/21/2021    PROT 5.9 12/21/2021    CALCIUM 8.3 12/21/2021    BILITOT 0.6 12/21/2021    ALKPHOS 68 12/21/2021    AST 17 12/21/2021    ALT 18 12/21/2021       POC Tests: No results for input(s): POCGLU, POCNA, POCK, POCCL, POCBUN, POCHEMO, POCHCT in the last 72 hours.     Coags:   Lab Results   Component Value Date    PROTIME 12.7 12/09/2021    INR 1.2 12/09/2021       HCG (If Applicable): No results found for: PREGTESTUR, PREGSERUM, HCG, HCGQUANT     ABGs: No results found for: PHART, PO2ART, TPJ9EWR, NRD6QPI, BEART, E4XNUIRG     Type & Screen (If Applicable):  No results found for: LABABO, LABRH    Drug/Infectious Status (If Applicable):  No results found for: HIV, HEPCAB    COVID-19 Screening (If Applicable):   Lab Results   Component Value Date    COVID19 Not Detected 12/20/2021 Anesthesia Evaluation  Patient summary reviewed and Nursing notes reviewed no history of anesthetic complications:   Airway: Mallampati: II  TM distance: >3 FB   Neck ROM: full  Mouth opening: > = 3 FB Dental:    (+) lower dentures and upper dentures      Pulmonary:Negative Pulmonary ROS and normal exam  breath sounds clear to auscultation                             Cardiovascular:  Exercise tolerance: good (>4 METS),   (+) hypertension:, dysrhythmias: atrial fibrillation,         Rhythm: irregular  Rate: normal                 ROS comment: Walks his dog every morning     Neuro/Psych:   Negative Neuro/Psych ROS              GI/Hepatic/Renal: Neg GI/Hepatic/Renal ROS            Endo/Other: Negative Endo/Other ROS                    Abdominal:             Vascular: negative vascular ROS. Other Findings:               Anesthesia Plan      MAC     ASA 2       Induction: intravenous. MIPS: Prophylactic antiemetics administered. Anesthetic plan and risks discussed with patient. Plan discussed with CRNA.                   Ila Todd DO   12/22/2021

## 2021-12-22 NOTE — PLAN OF CARE
Problem: Pain - Acute:  Goal: Pain level will decrease  Outcome: Met This Shift     Problem: Falls - Risk of:  Goal: Will remain free from falls  Outcome: Met This Shift  Goal: Absence of physical injury  Outcome: Met This Shift

## 2021-12-22 NOTE — PROGRESS NOTES
Occupational Therapy  OT BEDSIDE TREATMENT NOTE      Date:2021  Patient Name: Hannah Ramirez  MRN: [id number]  : 1935  Room: [address] [place] [address]     Per OT Eval:    Rosmery Bales OT: Raquel Valencia. Felicia OTR/L License #  [id number]        Referring Kylie Gee PA-C     Specific Provider Orders/Date: OT evaluation & treatment        Diagnosis:  STENOSIS     Surgery: 21: L2-L5 LAMINECTOMY AND FUSION       Past Medical History           Past Medical History:   Diagnosis Date    Hypertension           Past Surgical History             Past Surgical History:   Procedure Laterality Date    APPENDECTOMY        CAROTID ENDARTERECTOMY Right      CARPAL TUNNEL RELEASE Bilateral      JOINT REPLACEMENT Right            Precautions:  Fall Risk, neutral spine, LSO brace,  IV      Assessment of current deficits    []? ? Functional mobility            [x]? ?ADLs           [x]? ? Strength                  [x]? ? Cognition    [x]? ? Functional transfers          [x]? ? IADLs         [x]? ? Safety Awareness   [x]? ?Endurance    [x]? ? Fine Coordination                         [x]? ? Balance      []? ? Vision/perception   [x]? ? Sensation      []? ?Gross Motor Coordination             []? ? ROM           []? ? Delirium                   []? ? Motor Control      OT PLAN OF CARE   OT POC based on physician orders, patient diagnosis and results of clinical assessment     Frequency/Duration: 2-4 days/wk for 2 weeks PRN   Specific OT Treatment Interventions to include:   Instruction/training on adapted ADL techniques and AE recommendations to increase functional independence within precautions  Training on energy conservation strategies, correct breathing pattern and techniques to improve independence/tolerance for self-care routine  Functional transfer/mobility training/DME recommendations for increased independence, safety, and fall prevention  Patient/Family education to increase follow through with safety techniques and functional independence  Recommendation of environmental modifications for increased safety with functional transfers/mobility and ADLs  Cognitive retraining/development of therapeutic activities to improve problem solving, judgement, memory, and attention for increased safety/participation in ADL/IADL tasks  Therapeutic exercise to improve motor endurance, ROM, and functional strength for ADLs/functional transfers  Therapeutic activities to facilitate/challenge dynamic balance, stand tolerance for increased safety and independence with ADLs  Therapeutic activities to facilitate gross/fine motor skills for increased independence with ADLs  Positioning to improve skin integrity, interaction with environment and functional independence     Recommended Adaptive Equipment:  BSC to raise commode, ww, A.E.     Home Living: Pt lives with wife in a 1 story trailer with 2 platform steps to enter with 1 HR.  B&B on main level. Bathroom setup: walk in shower, std. commode   Equipment owned: shower chair     Prior Level of Function: Ind. with ADLs , Ind. with IADLs; ambulated golf club  Driving: active  Occupation: worked [employer]     Pain Level: back- moderate  Cognition: A&O: 4/4, impulsive , pt was attempting to get OOB without LSO to use bedside commode that was not near bed, pt demonstrated poor safety awareness of IV line and O2 line                  Functional Assessment:  AM-PAC Daily Activity Raw Score: 15/24    Initial Eval Status  Date: 12-14-21 Treatment Status  Date:  12/22/21  STGs = LTGs  Time frame: 10-14 days   Feeding Ind.  Independent          Grooming Set up seated EOB Set up    Modified [place]    UB Dressing Max A with gown & LSO brace, instruction provided to pt. & wife throughout Mod A  To manage gown and LSO brace, poor safety awareness     Modified [place]    LB Dressing Mod/Max A with attempt cross   over  technique  Mod A  For LE clothing management Modified [place]    Bathing Mod A with sim. Group Therapy      Orthotic manage/training  [id number]     Non-Billable Time       Time In: 8:40  Time Out: 8:;55  Total Time: 15 minutes    Spencer Peñaloza, OTR/L [id number]

## 2021-12-23 LAB
ANTI JO-1 IGG: NEGATIVE
ANTI-NUCLEAR ANTIBODY (ANA): NEGATIVE
ENA TO RNP ANTIBODY: NEGATIVE
ENA TO SSA (RO) ANTIBODY: NEGATIVE
ENA TO SSB (LA) ANTIBODY: NEGATIVE
RHEUMATOID FACTOR: 13 IU/ML (ref 0–13)

## 2021-12-23 PROCEDURE — 2580000003 HC RX 258: Performed by: INTERNAL MEDICINE

## 2021-12-23 PROCEDURE — 86235 NUCLEAR ANTIGEN ANTIBODY: CPT

## 2021-12-23 PROCEDURE — 94640 AIRWAY INHALATION TREATMENT: CPT

## 2021-12-23 PROCEDURE — 86431 RHEUMATOID FACTOR QUANT: CPT

## 2021-12-23 PROCEDURE — 6370000000 HC RX 637 (ALT 250 FOR IP): Performed by: INTERNAL MEDICINE

## 2021-12-23 PROCEDURE — 97530 THERAPEUTIC ACTIVITIES: CPT

## 2021-12-23 PROCEDURE — 2700000000 HC OXYGEN THERAPY PER DAY

## 2021-12-23 PROCEDURE — 36415 COLL VENOUS BLD VENIPUNCTURE: CPT

## 2021-12-23 PROCEDURE — 2500000003 HC RX 250 WO HCPCS: Performed by: INTERNAL MEDICINE

## 2021-12-23 PROCEDURE — 97535 SELF CARE MNGMENT TRAINING: CPT

## 2021-12-23 PROCEDURE — 86331 IMMUNODIFFUSION OUCHTERLONY: CPT

## 2021-12-23 PROCEDURE — 86812 HLA TYPING A B OR C: CPT

## 2021-12-23 PROCEDURE — 6370000000 HC RX 637 (ALT 250 FOR IP): Performed by: PHYSICIAN ASSISTANT

## 2021-12-23 PROCEDURE — 86038 ANTINUCLEAR ANTIBODIES: CPT

## 2021-12-23 PROCEDURE — 86606 ASPERGILLUS ANTIBODY: CPT

## 2021-12-23 PROCEDURE — 6360000002 HC RX W HCPCS: Performed by: INTERNAL MEDICINE

## 2021-12-23 PROCEDURE — 2060000000 HC ICU INTERMEDIATE R&B

## 2021-12-23 RX ORDER — DILTIAZEM HYDROCHLORIDE 5 MG/ML
10 INJECTION INTRAVENOUS ONCE
Status: COMPLETED | OUTPATIENT
Start: 2021-12-23 | End: 2021-12-23

## 2021-12-23 RX ADMIN — METOPROLOL TARTRATE 50 MG: 50 TABLET, FILM COATED ORAL at 20:47

## 2021-12-23 RX ADMIN — IPRATROPIUM BROMIDE 0.5 MG: 0.5 SOLUTION RESPIRATORY (INHALATION) at 11:31

## 2021-12-23 RX ADMIN — SODIUM CHLORIDE: 9 INJECTION, SOLUTION INTRAVENOUS at 06:30

## 2021-12-23 RX ADMIN — AMPICILLIN SODIUM AND SULBACTAM SODIUM 3000 MG: 2; 1 INJECTION, POWDER, FOR SOLUTION INTRAMUSCULAR; INTRAVENOUS at 14:04

## 2021-12-23 RX ADMIN — AMPICILLIN SODIUM AND SULBACTAM SODIUM 3000 MG: 2; 1 INJECTION, POWDER, FOR SOLUTION INTRAMUSCULAR; INTRAVENOUS at 18:02

## 2021-12-23 RX ADMIN — METOPROLOL TARTRATE 5 MG: 5 INJECTION, SOLUTION INTRAVENOUS at 01:03

## 2021-12-23 RX ADMIN — DEXTROSE MONOHYDRATE 5 MG/HR: 50 INJECTION, SOLUTION INTRAVENOUS at 19:06

## 2021-12-23 RX ADMIN — DILTIAZEM HYDROCHLORIDE 30 MG: 30 TABLET, FILM COATED ORAL at 06:03

## 2021-12-23 RX ADMIN — IPRATROPIUM BROMIDE 0.5 MG: 0.5 SOLUTION RESPIRATORY (INHALATION) at 16:50

## 2021-12-23 RX ADMIN — OXYCODONE HYDROCHLORIDE 10 MG: 10 TABLET ORAL at 03:06

## 2021-12-23 RX ADMIN — DILTIAZEM HYDROCHLORIDE 30 MG: 30 TABLET, FILM COATED ORAL at 14:04

## 2021-12-23 RX ADMIN — DILTIAZEM HYDROCHLORIDE 10 MG: 5 INJECTION INTRAVENOUS at 18:49

## 2021-12-23 RX ADMIN — AMPICILLIN SODIUM AND SULBACTAM SODIUM 3000 MG: 2; 1 INJECTION, POWDER, FOR SOLUTION INTRAMUSCULAR; INTRAVENOUS at 06:03

## 2021-12-23 RX ADMIN — APIXABAN 5 MG: 5 TABLET, FILM COATED ORAL at 20:47

## 2021-12-23 RX ADMIN — OXYCODONE HYDROCHLORIDE 10 MG: 10 TABLET ORAL at 11:26

## 2021-12-23 RX ADMIN — DILTIAZEM HYDROCHLORIDE 30 MG: 30 TABLET, FILM COATED ORAL at 18:02

## 2021-12-23 RX ADMIN — OXYCODONE HYDROCHLORIDE 10 MG: 10 TABLET ORAL at 16:46

## 2021-12-23 RX ADMIN — APIXABAN 5 MG: 5 TABLET, FILM COATED ORAL at 10:17

## 2021-12-23 RX ADMIN — METOPROLOL TARTRATE 50 MG: 50 TABLET, FILM COATED ORAL at 10:19

## 2021-12-23 RX ADMIN — SENNOSIDES AND DOCUSATE SODIUM 1 TABLET: 50; 8.6 TABLET ORAL at 20:47

## 2021-12-23 RX ADMIN — PRAVASTATIN SODIUM 10 MG: 10 TABLET ORAL at 20:46

## 2021-12-23 RX ADMIN — IPRATROPIUM BROMIDE 0.5 MG: 0.5 SOLUTION RESPIRATORY (INHALATION) at 21:37

## 2021-12-23 ASSESSMENT — PAIN SCALES - GENERAL
PAINLEVEL_OUTOF10: 0
PAINLEVEL_OUTOF10: 0
PAINLEVEL_OUTOF10: 7
PAINLEVEL_OUTOF10: 5
PAINLEVEL_OUTOF10: 0
PAINLEVEL_OUTOF10: 7

## 2021-12-23 NOTE — CARE COORDINATION
Case reviewed with Regis Gallardo of stay specialist.  Backdoor referral made to Stella Avina, liaison with [employer]. He will review the case and will have to have the insurance verified. He will get back to us if the patient is appropriate for LT[facility]. Await return call with appropriateness. Will continue to follow.       Adam Aleman RN.  Michel Crenshaw  [phone]

## 2021-12-23 NOTE — PROGRESS NOTES
Occupational Therapy  OT BEDSIDE TREATMENT NOTE      Date:2021  Patient Name: Nathaly Modi  MRN: [id number]  : 1935  Room: [address] [place] [address]     Per OT Eval:    Placido Ann OT: Peter Duarte OTR/L License #  [id number]        Referring Hung Gonzalez PA-C     Specific Provider Orders/Date: OT evaluation & treatment        Diagnosis:  STENOSIS     Surgery: 21: L2-L5 LAMINECTOMY AND FUSION       Past Medical History           Past Medical History:   Diagnosis Date    Hypertension           Past Surgical History             Past Surgical History:   Procedure Laterality Date    APPENDECTOMY        CAROTID ENDARTERECTOMY Right      CARPAL TUNNEL RELEASE Bilateral      JOINT REPLACEMENT Right            Precautions:  Fall Risk, neutral spine, LSO brace,  IV      Assessment of current deficits    []? ? Functional mobility            [x]? ?ADLs           [x]? ? Strength                  [x]? ? Cognition    [x]? ? Functional transfers          [x]? ? IADLs         [x]? ? Safety Awareness   [x]? ?Endurance    [x]? ? Fine Coordination                         [x]? ? Balance      []? ? Vision/perception   [x]? ? Sensation      []? ?Gross Motor Coordination             []? ? ROM           []? ? Delirium                   []? ? Motor Control      OT PLAN OF CARE   OT POC based on physician orders, patient diagnosis and results of clinical assessment     Frequency/Duration: 2-4 days/wk for 2 weeks PRN   Specific OT Treatment Interventions to include:   Instruction/training on adapted ADL techniques and AE recommendations to increase functional independence within precautions  Training on energy conservation strategies, correct breathing pattern and techniques to improve independence/tolerance for self-care routine  Functional transfer/mobility training/DME recommendations for increased independence, safety, and fall prevention  Patient/Family education to increase follow through with safety techniques and functional independence  Recommendation of environmental modifications for increased safety with functional transfers/mobility and ADLs  Cognitive retraining/development of therapeutic activities to improve problem solving, judgement, memory, and attention for increased safety/participation in ADL/IADL tasks  Therapeutic exercise to improve motor endurance, ROM, and functional strength for ADLs/functional transfers  Therapeutic activities to facilitate/challenge dynamic balance, stand tolerance for increased safety and independence with ADLs  Therapeutic activities to facilitate gross/fine motor skills for increased independence with ADLs  Positioning to improve skin integrity, interaction with environment and functional independence     Recommended Adaptive Equipment:  BSC to raise commode, ww, A.E.     Home Living: Pt lives with wife in a 1 story trailer with 2 platform steps to enter with 1 HR.  B&B on main level. Bathroom setup: walk in shower, std. commode   Equipment owned: shower chair     Prior Level of Function: Ind. with ADLs , Ind. with IADLs; ambulated golf club  Driving: active  Occupation: worked [employer]     Pain Level: back- moderate \"tolerable today\"  Cognition: A&O: 4/4, impulsive, moves before awareness of placement of IV line and O2 line                  Functional Assessment:  AM-PAC Daily Activity Raw Score: 15/24    Initial Eval Status  Date: 12-14-21 Treatment Status  Date:  12/23/21  STGs = LTGs  Time frame: 10-14 days   Feeding Ind.  Independent          Grooming Set up seated EOB Set up   washing off face & hands, wetting down hair & brushing  Modified [place]    UB Dressing Max A with gown & LSO brace, instruction provided to pt.  & wife throughout Mod A  To manage gown around brace, TLSO in place upon arrival      Modified [place]    LB Dressing Mod/Max A with attempt cross   over  technique  Mod A  Doff soiled boxer ml sanders brief with instruction on use of AE to ease tasks Modified [place]    Bathing Mod A with sim. task UB: Min A  LB: Mod A  Completed sponge bathing while seated on BSC  Recommending LH sponge Modified [place]    Toileting NT Mod A  Unable to complete posterior hygiene but able to wash mino-area       Modified [place]    Bed Mobility  Logroll: Min A  Supine to sit: Minimal Assist   Sit to supine: Minimal Assist  NT   seated on BSC upon arrival  Supine to sit: Modified [place]   Sit to supine: Modified [place]    Functional Transfers Minimal Assist with sit <> stand, SPT using ww Min A  Sit < > stand  Cues for safety, watching IV lines hand placement & technique  Modified [place]    Functional Mobility Min A with ww few steps towards HOB Min-Mod A  Stand pivot with walker, taking a few steps from [facility] to chair assist to manage lines Modified [place]    Balance Sitting:     Static:  SUP    Dynamic:SBA  Standing: Min A Sitting Supported:  SBA     Standing:  Min A with walker      Activity Tolerance Fair- lt. Ax. Fair Good with mod. Ax. Visual/  Perceptual Glasses: yes          Vitals spO2 & HR remained WFL   on 3-4L O2  WFL     Education: Functional transfers & mobility along with walker management, maintaining spinal precautions, LSO management and benefits of OOB activity  to progress with ADL tasks. Comment: Upon arrival pt was seated on [facility] & agreeable for therapy, nsg approval, with pt in sinus rhythm at this time. At end of session pt was seated in chair, tray table in front with breakfast meal, with pt agreeable to stay up 45-60 minutes. Nsg aware pt was up in chair. · Pt has made fair progress towards set goals. Treatment Charges: Mins Units   Ther Ex  [id number]     Manual Therapy Kisha Robison [phone]   ADL/Home Mgt [id number] 30 2   Neuro Re-ed [id number]     Group Therapy      Orthotic manage/training  [id number]     Non-Billable Time       Time In: 9:40  Time Out: 10:18  Total Time: 38 minutes    Eleni Mccarthy, JIAME/L [id number]

## 2021-12-23 NOTE — PROGRESS NOTES
Department of Neurosurgery  Progress Note    CHIEF COMPLAINT: lumbar fusion    SUBJECTIVE:  No CP or SOB. alexis op site pain ok    REVIEW OF SYSTEMS :  Constitutional: Negative for chills and fever. Neurological: Negative for dizziness, tremors and speech change. OBJECTIVE:   VITALS:  BP (!) 148/68   Pulse 88   Temp 97.8 °F (36.6 °C) (Temporal)   Resp 18   Ht 6' 1\" (1.854 m)   Wt 200 lb (90.7 kg)   SpO2 92%   BMI 26.39 kg/m²   PHYSICAL:  CONSTITUTIONAL:  awake, alert, cooperative, no apparent distress, and appears stated age and BECKHAM.   FC    DATA:  CBC:   Lab Results   Component Value Date    WBC 11.6 12/22/2021    RBC 3.35 12/22/2021    HGB 10.1 12/22/2021    HCT 31.5 12/22/2021    MCV 94.0 12/22/2021    MCH 30.1 12/22/2021    MCHC 32.1 12/22/2021    RDW 13.5 12/22/2021     12/22/2021    MPV 9.6 12/22/2021     BMP:    Lab Results   Component Value Date     12/22/2021    K 3.8 12/22/2021    K 5.6 12/15/2021     12/22/2021    CO2 28 12/22/2021    BUN 47 12/22/2021    LABALBU 3.1 12/21/2021    CREATININE 1.3 12/22/2021    CALCIUM 8.7 12/22/2021    GFRAA >60 12/22/2021    LABGLOM 53 12/22/2021    GLUCOSE 113 12/22/2021     PT/INR:    Lab Results   Component Value Date    PROTIME 12.7 12/09/2021    INR 1.2 12/09/2021     PTT:  No results found for: APTT, PTT[APTT}    Current Inpatient Medications  Current Facility-Administered Medications: 0.9 % sodium chloride infusion, , IntraVENous, Continuous  sodium chloride flush 0.9 % injection 5-40 mL, 5-40 mL, IntraVENous, 2 times per day  sodium chloride flush 0.9 % injection 5-40 mL, 5-40 mL, IntraVENous, PRN  0.9 % sodium chloride infusion, 25 mL, IntraVENous, PRN  metoprolol (LOPRESSOR) injection 5 mg, 5 mg, IntraVENous, Q6H PRN  apixaban (ELIQUIS) tablet 5 mg, 5 mg, Oral, BID  phenol 1.4 % mouth spray 1 spray, 1 spray, Mouth/Throat, Q2H PRN  ipratropium (ATROVENT) 0.02 % nebulizer solution 0.5 mg, 0.5 mg, Nebulization, 4x daily  dilTIAZem (CARDIZEM) tablet 30 mg, 30 mg, Oral, 4 times per day  ampicillin-sulbactam (UNASYN) 3000 mg ivpb minibag, 3,000 mg, IntraVENous, Q6H  metoprolol tartrate (LOPRESSOR) tablet 50 mg, 50 mg, Oral, BID  perflutren lipid microspheres (DEFINITY) injection 1.65 mg, 1.5 mL, IntraVENous, ONCE PRN  acetaminophen (TYLENOL) tablet 650 mg, 650 mg, Oral, Q4H PRN  simethicone (MYLICON) chewable tablet 80 mg, 80 mg, Oral, Q6H PRN  pravastatin (PRAVACHOL) tablet 10 mg, 10 mg, Oral, Daily  sodium chloride flush 0.9 % injection 5-40 mL, 5-40 mL, IntraVENous, 2 times per day  sodium chloride flush 0.9 % injection 5-40 mL, 5-40 mL, IntraVENous, PRN  0.9 % sodium chloride infusion, 25 mL, IntraVENous, PRN  ondansetron (ZOFRAN-ODT) disintegrating tablet 4 mg, 4 mg, Oral, Q8H PRN **OR** ondansetron (ZOFRAN) injection 4 mg, 4 mg, IntraVENous, Q6H PRN  oxyCODONE (ROXICODONE) immediate release tablet 5 mg, 5 mg, Oral, Q4H PRN **OR** oxyCODONE HCl (OXY-IR) immediate release tablet 10 mg, 10 mg, Oral, Q4H PRN  HYDROmorphone (DILAUDID) injection 0.5 mg, 0.5 mg, IntraVENous, Q3H PRN  cyclobenzaprine (FLEXERIL) tablet 10 mg, 10 mg, Oral, TID PRN  polyethylene glycol (GLYCOLAX) packet 17 g, 17 g, Oral, Daily  bisacodyl (DULCOLAX) EC tablet 5 mg, 5 mg, Oral, Daily  sennosides-docusate sodium (SENOKOT-S) 8.6-50 MG tablet 1 tablet, 1 tablet, Oral, BID  magnesium hydroxide (MILK OF MAGNESIA) 400 MG/5ML suspension 30 mL, 30 mL, Oral, Daily PRN  fleet rectal enema 1 enema, 1 enema, Rectal, Daily PRN    ASSESSMENT:   · S/p L2-5 PLF on 12/14 - stable  · A-fib  · pneumonia    PLAN:  · PT/OT  · WBAT with brace  · Pain control  · AB per pulmonology  · Ok for full anticoagulation if needed        Electronically signed by RADHA Rico on 12/23/2021 at 7:52 AM

## 2021-12-23 NOTE — PROGRESS NOTES
Hospitalist progress note    Patient:  Bill Patiño  YOB: 1935  Date of Service: 12/23/21  MRN: [id number]   Acct:  [de-identified]   Primary Care Physician: Promise Davidson MD  12/14/2021     Patient Seen, Chart, Consults notes, Labs, Radiology, family and social history were reviewed. This is an 82years old male patient with past medical history of hypertension patient was admitted because of L2-L5 laminectomy and fusion. Postoperative the pain was getting better by time. Patient history of hypertension which was controlled with medication  Postoperative the patient developed acute respiratory failure requiring high oxygen and the patient was diagnosed with aspiration pneumonia. Patient has new onset A. fib with RVR on December 19 and he was cardioverted on December 22 to normal sinus rhythm    Chief Complaint: Back pain        Subjective     The patient is a 82 y.o. male who presents with pain. The pain is sharp in nature continuous pain the pain is radiated to the right leg and there is no associated symptoms with the pain, the pain is moderate to mild. There is no fever, or chills or sweating.   The patient denied any chest pain, shortness of breath , palpitations, or irregular heart beats   the patient denied any cough, hemoptysis, orthopnea, pleuritic pain, shortness of breath, sputum changes, stridor, tachypnea or wheezing               Review of systems:    All 10 review of system were negative unless what is mentioned in the present history               PHYSICAL EXAM:  BP (!) 148/68   Pulse 88   Temp 97.8 °F (36.6 °C) (Temporal)   Resp 18   Ht 6' 1\" (1.854 m)   Wt 200 lb (90.7 kg)   SpO2 92%   BMI 26.39 kg/m²     General appearance - alert, well appearing, and in no distress   Head: atraumatic   Pupil: equal, round reactive to light   Neck: Supple, trachea is midline   Chest - clear to auscultation, no wheezes, rales or rhonchi, symmetric air entry   Distant Breath Sounds: No   Heart - S1 and S2 normal   Abdomen - soft, nontender, nondistended, no masses or organomegaly   normal without focal findings, mental status, speech normal, alert and oriented x3, AIDEN and reflexes normal and symmetric   Integumentary - Skin color, texture, turgor normal. No rashes or lesions. Musculoskeletal -back tenderness  Extremities - peripheral pulses normal, no pedal edema, no clubbing or cyanosis times 4         Review of Labs and Diagnostic Testing:         Echo Complete    Result Date: 12/22/2021  Transthoracic Echocardiography Report (TTE)  Demographics   Patient Name    Prabhjot Peoples  Gender            Male                  W   Medical Record  [id number]     Room Number       [facility]  Number   Account #       [de-identified]    Procedure Date    12/22/2021   Corporate ID                 Ordering          Carmelo Borden MD                               Physician   Accession       [id number]   Referring  Number                       Physician   Date of Birth   1935   Sonographer       Riley MIRANDA   Age             82 year(s)   Interpreting      [address]                               Physician         Physician Cardiology                                                 Sherrie Buck MD                                Any Other  Procedure Type of Study   TTE procedure:Echo Complete W/Doppler & Color Flow, Echo W/Bubble Study. Procedure Date Date: 12/22/2021 Start: 09:40 AM Study Location: CVL Technical Quality: Adequate visualization Indications:Atrial fibrillation. Patient Status: Routine Height: 73 inches Weight: 200 pounds BSA: 2.15 m^2 BMI: 26.39 kg/m^2 Rhythm: Within normal limits HR: 89 bpm BP: 155/91 mmHg  Findings   Left Ventricle  Normal left ventricular chamber size. Normal left ventricular systolic  function. Visually estimated LVEF is 60 %. No wall motion abnormalities. Grade II diastolic dysfunction. Right Ventricle  Normal right ventricle structure and function.    Left Atrium Normal left atrium. Right Atrium  Normal right atrium. Mitral Valve  Physiologic and/or trace mitral regurgitation is present. No evidence of hemodynamically significant mitral stenosis. Tricuspid Valve  Mild tricuspid regurgitation. Aortic Valve  Trileaflet aortic valve. Normal leaflet mobility. No aortic stenosis. Trace aortic regurgitation. Pulmonic Valve  Mild-to-moderate pulmonic regurgitation present. Pericardial Effusion  No evidence for hemodynamically significant pericardial effusion. Pleural Effusion  No evidence of pleural effusion. Aorta  Normal aortic root and ascending aorta. Miscellaneous  The inferior vena cava diameter is normal with normal respiratory  variation. Conclusions   Summary  Normal left ventricular chamber size. Normal left ventricular systolic  function. Visually estimated LVEF is 60 %. No wall motion abnormalities. Grade II diastolic dysfunction. Normal right ventricle structure and function. Normal left atrium. Normal right atrium. Mild tricuspid regurgitation. Mild-to-moderate pulmonic regurgitation present. Unable to estimate PA pressure. Agitated saline contrast study shows late right to left shunting  suggestive of an intra-pulmonary shunt. No comparison study available.    Signature   ----------------------------------------------------------------  Electronically signed by Magalis Barbour MD(Interpreting  physician) on 12/22/2021 10:57 AM  ----------------------------------------------------------------  M-Mode/2D Measurements & Calculations   LV Diastolic     LV Systolic Dimension: 2.7 cm   AV Cusp Separation: 2.1  Dimension: 3.7   LV Volume Diastolic: 01.0 ml    cmAO Root Dimension: 3.7  cm               LV Volume Systolic: 19.2 ml     cm  LV FS:27 %       LV EDV/LV EDV Index: 57.5 OG/46  LV PW Diastolic: VD/C^2VU ESV/LV ESV Index: 25.9  0.9 cm           ml/12ml/ m^2  LV PW Systolic:  EF Calculated: 55 %             RV Diastolic Dimension:  1.4 cm LV Mass Index: 92 l/min*m^2     3.7 cm  Septum           LV Length: 8.9 cm  Diastolic: 2 cm                                  Ascending Aorta: 3.8 cm  Septum Systolic: LVOT: 2.4 cm                    LA volume/Index: 68.6 ml  1.6 cm                                           /31.92ml/m^2  CO: 10.3 l/min                                   RA Area: 14.2 cm^2  CI: 4.79 l/m*m^2  LV Mass: 197.65  g  Doppler Measurements & Calculations   MV Peak E-Wave: 1.16 AV Peak Velocity: 1.37 LVOT Peak Velocity: 1.22 m/s  m/s                  m/s                    LVOT Mean Velocity: 0.86 m/s  MV Peak A-Wave: 0.96 AV Peak Gradient: 7.55 LVOT Peak Gradient: 6 mmHgLVOT  m/s                  mmHg                   Mean Gradient: 3.3 mmHg  MV E/A Ratio: 1.21   AV Mean Velocity: 0.98  MV Peak Gradient:    m/s  7.9 mmHg             AV Mean Gradient: 4.3  MV Mean Gradient:    mmHg                   TR Velocity:4.13 m/s  3.7 mmHg             AV VTI: 28.9 cm        TR Gradient:68.26 mmHg  MV Mean Velocity:    AV Area  0.92 m/s             (Continuity):4.01 cm^2  MV Deceleration  Time: 217 msec       LVOT VTI: 25.6 cm      VA ED Velocity: 1.3 m/s  MV P1/2t: 86 msec  MVA by PHT:2.56 cm^2  MV Area  (continuity): 3.5  cm^2  MV E' Septal  Velocity: 0.05 m/s  MV E' Lateral  Velocity: 6 m/s  [email or web address] [id number]    XR CHEST (2 VW)    Result Date: 12/22/2021  EXAMINATION: TWO XRAY VIEWS OF THE CHEST 12/22/2021 3:17 pm COMPARISON: December 22, 2021 at 15:04, December 18, 2021 HISTORY: ORDERING SYSTEM PROVIDED HISTORY: reevaluate right infiltrate TECHNOLOGIST PROVIDED HISTORY: Reason for exam:->reevaluate right infiltrate What reading provider will be dictating this exam?->CRC FINDINGS: Heart size is unable to be accurately assessed on this single portable view of the chest, but appears to be stable.   Previously described right lung airspace opacities with a reticular and patchy appearance have increased since December 18, 2021. No evidence of a sizable pleural effusion. No pneumothorax. No acute osseous abnormality. Increased reticular and patchy airspace opacities in the right lung. These are nonspecific and may be on the basis of multifocal pneumonia or (less likely) asymmetric pulmonary edema. Atypical/viral pneumonia not excluded. XR CHEST (2 VW)    Result Date: 12/9/2021  EXAMINATION: TWO XRAY VIEWS OF THE CHEST 12/9/2021 11:09 am COMPARISON: None. HISTORY: ORDERING SYSTEM PROVIDED HISTORY: Pre-op evaluation TECHNOLOGIST PROVIDED HISTORY: Reason for exam:->pre op What reading provider will be dictating this exam?->CRC FINDINGS: Slight peripheral coarsening of interstitial markings may indicate mild fibrosis. Heart and pulmonary vascularity are normal.  Neither costophrenic angle is blunted. Subtle findings which may indicate mild interstitial fibrosis. XR ABDOMEN (KUB) (SINGLE AP VIEW)    Result Date: 12/16/2021  EXAMINATION: ONE SUPINE XRAY VIEW(S) OF THE ABDOMEN 12/16/2021 2:41 pm COMPARISON: None. HISTORY: ORDERING SYSTEM PROVIDED HISTORY: post-op ileus TECHNOLOGIST PROVIDED HISTORY: Reason for exam:->post-op ileus What reading provider will be dictating this exam?->CRC FINDINGS: Air scattered small and large bowel loops. Dilated small bowel measures 3.6 cm. Dilated colon measures 8.7 cm at the level the transverse. Skin in seizure in with skin staples vertically noted over the mid lower abdomen. Postoperative change spinal fusion and laminectomy noted lumbar spine. Surgical clips right upper quadrant. Dilated small and large bowel loops suggest generalized ileus. Clinical correlation and follow-up to resolution recommended.      XR ACUTE ABD SERIES CHEST 1 VW    Result Date: 12/22/2021  EXAMINATION: TWO XRAY VIEWS OF THE ABDOMEN AND SINGLE  XRAY VIEW OF THE CHEST 12/22/2021 3:16 pm COMPARISON: Chest radiograph December 22, 2021 HISTORY: ORDERING SYSTEM PROVIDED HISTORY: repeat/assess respiratory status TECHNOLOGIST PROVIDED HISTORY: Reason for exam:->repeat/assess respiratory status What reading provider will be dictating this exam?->CRC FINDINGS: The evaluation is limited due to low lung volumes. Trachea is midline. Cardiomediastinal silhouette is stable in size. Persistent airspace opacity identified in right lung lower lung. Multiple air-filled loops of colon identified in the abdomen. No abnormal abdominal calcifications. Bowel gas pattern is nonspecific nonobstructive. No evidence of bowel obstruction. The evaluation is limited due to low lung volumes. Opacities in right lung may represent pneumonia versus atelectasis in the proper clinical setting. MRI LUMBAR SPINE WO CONTRAST    Result Date: 11/24/2021  EXAMINATION: MRI OF THE LUMBAR SPINE WITHOUT CONTRAST, 11/24/2021 10:41 am TECHNIQUE: Multiplanar multisequence MRI of the lumbar spine was performed without the administration of intravenous contrast. COMPARISON: None. HISTORY: ORDERING SYSTEM PROVIDED HISTORY: Acute midline low back pain with bilateral sciatica TECHNOLOGIST PROVIDED HISTORY: Reason for exam:->back pain and surgical planning FINDINGS: BONES/ALIGNMENT: The vertebral body heights are maintained. There is age-appropriate bone marrow signal.  There is multilevel degenerative disc disease with loss of disc signal.  There is mild disc space narrowing most pronounced at the L2-3 and L3-4 levels. There is degenerative grade 1 anterolisthesis of L3 on L4 and L4 on L5. SPINAL CORD: The conus is normal in caliber and signal and terminates at the L1 level. The cauda equina is unremarkable. SOFT TISSUES: The posterior paraspinal soft tissues are unremarkable. The visualized abdominal structures are unremarkable. L1-L2: There is a circumferential disc bulge with facet and ligamentous hypertrophy. There is no canal stenosis.   There is mild bilateral foraminal narrowing. L2-L3: There is a circumferential disc bulge with facet and ligamentous hypertrophy. There is no significant canal stenosis. There is moderate bilateral foraminal narrowing. L3-L4: There is a circumferential disc bulge with facet and ligamentous hypertrophy. There is canal stenosis measuring 6 mm in AP dimension. There is severe bilateral foraminal narrowing. L4-L5: There is a circumferential disc bulge with facet and ligamentous hypertrophy. There is canal stenosis measuring 6 mm in AP dimension. There is moderate left and severe right foraminal narrowing. L5-S1: There is a circumferential disc bulge with facet hypertrophy. There is no canal stenosis or foraminal narrowing. Multilevel degenerative disc disease with associated facet and ligamentous hypertrophy resulting in canal stenosis at L3-4 and L4-5. Bilateral foraminal narrowing as described above. XR CHEST PORTABLE    Result Date: 12/18/2021  EXAMINATION: ONE XRAY VIEW OF THE CHEST 12/18/2021 8:38 pm COMPARISON: 12/16/2021 HISTORY: ORDERING SYSTEM PROVIDED HISTORY: Chest Pain TECHNOLOGIST PROVIDED HISTORY: Reason for exam:->Chest Pain What reading provider will be dictating this exam?->CRC FINDINGS: EKG leads overlie the chest.  Stable elevation of the right diaphragm with patchy opacification of the right lung base, appearing slightly worsened. Minimal left basal opacities are also increased. Moderate gaseous distention of bowel in the visualized upper abdomen. No pneumothorax. No other change. Slight worsening of right greater than left basal opacities which may represent atelectasis or infiltrates from pneumonia. Continued follow-up recommended.      XR CHEST PORTABLE    Result Date: 12/16/2021  EXAMINATION: ONE XRAY VIEW OF THE CHEST 12/16/2021 4:19 pm COMPARISON: 12/09/2021 HISTORY: ORDERING SYSTEM PROVIDED HISTORY: hypoxia TECHNOLOGIST PROVIDED HISTORY: Reason for exam:->hypoxia What reading provider will be dictating this exam?->CRC FINDINGS: Right lower lobe atelectasis and/or infiltrate. .  There is no effusion or pneumothorax. The cardiomediastinal silhouette is without acute process. The osseous structures are without acute process. Right lower lobe atelectasis and/or infiltrate. CTA PULMONARY W CONTRAST    Result Date: 12/16/2021  EXAMINATION: CTA OF THE CHEST 12/16/2021 3:32 pm TECHNIQUE: CTA of the chest was performed after the administration of intravenous contrast.  Multiplanar reformatted images are provided for review. MIP images are provided for review. Dose modulation, iterative reconstruction, and/or weight based adjustment of the mA/kV was utilized to reduce the radiation dose to as low as reasonably achievable. COMPARISON: None. HISTORY: ORDERING SYSTEM PROVIDED HISTORY: CHEST PAIN AND SOB R/O PE TECHNOLOGIST PROVIDED HISTORY: Reason for exam:->CHEST PAIN AND SOB R/O PE What reading provider will be dictating this exam?->CRC FINDINGS: No thoracic aortic aneurysm or dissection. Mild-to-moderate scattered calcified plaque in the thoracic aorta. No evidence of pulmonary embolism. Mild cardiomegaly. Coronary atherosclerosis. No pericardial effusion. Shotty subcentimeter short axis mediastinal hilar nodes. No pleural effusion or pneumothorax. Low lung volumes. There are a few small peripheral densities in the lungs which may represent scarring or infection (for example series 4, image 73). Another curvilinear density is noted the medial left lower lobe (series 4 image 168). Mild dependent atelectasis, right greater than left. The central airways are patent. There is centrilobular and paraseptal emphysema. Degenerative changes of the spine. The visualized abdomen demonstrates changes of cholecystectomy and the splenic parenchymal calcification. No evidence of pulmonary embolism. No acute aortic injury or aneurysm.  There are a few nonspecific densities in the peripheral lungs.  Imaging features can be seen with COVID-19 pneumonia, though are nonspecific and can occur with a variety of infectious and noninfectious processes. Mild cardiomegaly. Atherosclerotic disease. FLUORO FOR SURGICAL PROCEDURES    Result Date: 12/14/2021  EXAMINATION: SPOT FLUOROSCOPIC IMAGES 12/14/2021 10:42 am TECHNIQUE: Fluoroscopy was provided by the radiology department for procedure. Radiologist was not present during examination. FLUOROSCOPY DOSE AND TYPE OR TIME AND EXPOSURES: Fluoroscopy time equals 10.8 seconds. Total dose equals 15.92 mGy COMPARISON: None HISTORY: ORDERING SYSTEM PROVIDED HISTORY: Back pain, unspecified back location, unspecified back pain laterality, unspecified chronicity TECHNOLOGIST PROVIDED HISTORY: Reason for exam:->Back pain, unspecified back location, unspecified back pain laterality, unspecified chronicity What reading provider will be dictating this exam?->CRC Intraprocedural imaging. FINDINGS: 2 spot images of the lumbar spine were obtained. Intraprocedural fluoroscopic spot images as above. See separate procedure report for more information.      Recent Results (from the past 24 hour(s))   EKG 12 Lead    Collection Time: 12/22/21  9:13 AM   Result Value Ref Range    Ventricular Rate 85 BPM    Atrial Rate 85 BPM    P-R Interval 218 ms    QRS Duration 106 ms    Q-T Interval 380 ms    QTc Calculation (Bazett) 452 ms    P Axis 55 degrees    R Axis 54 degrees    T Axis 94 degrees   Basic metabolic panel    Collection Time: 12/22/21 10:36 AM   Result Value Ref Range    Sodium 141 132 - 146 mmol/L    Potassium 3.8 3.5 - 5.0 mmol/L    Chloride 102 98 - 107 mmol/L    CO2 28 22 - 29 mmol/L    Anion Gap 11 7 - 16 mmol/L    Glucose 113 (H) 74 - 99 mg/dL    BUN 47 (H) 6 - 23 mg/dL    CREATININE 1.3 (H) 0.7 - 1.2 mg/dL    GFR Non-African American 53 >=60 mL/min/1.73    GFR African American >60     Calcium 8.7 8.6 - 10.2 mg/dL   Rheumatoid Factor    Collection Time: 12/23/21 5:44 AM   Result Value Ref Range    Rheumatoid Factor 13 0 - 13 IU/mL   ]     Current Facility-Administered Medications: 0.9 % sodium chloride infusion, , IntraVENous, Continuous  sodium chloride flush 0.9 % injection 5-40 mL, 5-40 mL, IntraVENous, 2 times per day  sodium chloride flush 0.9 % injection 5-40 mL, 5-40 mL, IntraVENous, PRN  0.9 % sodium chloride infusion, 25 mL, IntraVENous, PRN  metoprolol (LOPRESSOR) injection 5 mg, 5 mg, IntraVENous, Q6H PRN  apixaban (ELIQUIS) tablet 5 mg, 5 mg, Oral, BID  phenol 1.4 % mouth spray 1 spray, 1 spray, Mouth/Throat, Q2H PRN  ipratropium (ATROVENT) 0.02 % nebulizer solution 0.5 mg, 0.5 mg, Nebulization, 4x daily  dilTIAZem (CARDIZEM) tablet 30 mg, 30 mg, Oral, 4 times per day  ampicillin-sulbactam (UNASYN) 3000 mg ivpb minibag, 3,000 mg, IntraVENous, Q6H  metoprolol tartrate (LOPRESSOR) tablet 50 mg, 50 mg, Oral, BID  perflutren lipid microspheres (DEFINITY) injection 1.65 mg, 1.5 mL, IntraVENous, ONCE PRN  acetaminophen (TYLENOL) tablet 650 mg, 650 mg, Oral, Q4H PRN  simethicone (MYLICON) chewable tablet 80 mg, 80 mg, Oral, Q6H PRN  pravastatin (PRAVACHOL) tablet 10 mg, 10 mg, Oral, Daily  sodium chloride flush 0.9 % injection 5-40 mL, 5-40 mL, IntraVENous, 2 times per day  sodium chloride flush 0.9 % injection 5-40 mL, 5-40 mL, IntraVENous, PRN  0.9 % sodium chloride infusion, 25 mL, IntraVENous, PRN  ondansetron (ZOFRAN-ODT) disintegrating tablet 4 mg, 4 mg, Oral, Q8H PRN **OR** ondansetron (ZOFRAN) injection 4 mg, 4 mg, IntraVENous, Q6H PRN  oxyCODONE (ROXICODONE) immediate release tablet 5 mg, 5 mg, Oral, Q4H PRN **OR** oxyCODONE HCl (OXY-IR) immediate release tablet 10 mg, 10 mg, Oral, Q4H PRN  HYDROmorphone (DILAUDID) injection 0.5 mg, 0.5 mg, IntraVENous, Q3H PRN  cyclobenzaprine (FLEXERIL) tablet 10 mg, 10 mg, Oral, TID PRN  polyethylene glycol (GLYCOLAX) packet 17 g, 17 g, Oral, Daily  bisacodyl (DULCOLAX) EC tablet 5 mg, 5 mg, Oral, Daily  sennosides-docusate sodium (SENOKOT-S) 8.6-50 MG tablet 1 tablet, 1 tablet, Oral, BID  magnesium hydroxide (MILK OF MAGNESIA) 400 MG/5ML suspension 30 mL, 30 mL, Oral, Daily PRN  fleet rectal enema 1 enema, 1 enema, Rectal, Daily PRN   Hospital Problems           Last Modified POA    Spinal stenosis of lumbar region 12/14/2021 Yes    Spondylolisthesis, lumbar region 12/14/2021 Yes    Lumbar degenerative disc disease 12/15/2021 Yes    Chest pain 12/17/2021 Yes    Elevated troponin 12/17/2021 Yes    Primary hypertension 12/17/2021 Yes    Puerperal sepsis with acute hypoxic respiratory failure ([facility] [address] 12/17/2021 Yes    Hyperlipidemia 12/17/2021 Yes    Pulmonary fibrosis ([facility] [address] 12/17/2021 Yes                  Assessment and Plan:     Postoperative aspiration pneumonia, patient on Unasyn   Hyperlipidemia, continue statin   Postoperative ileus, resolved   History of Covid   Hypoxia, resolved   New-onset A. fib with RVR, continue Cardizem and Lopressor and Eliquis   Hypertension, well controlled   History of spinal stenosis, postoperative tenonectomy   Continue DVT &  GERD prophylaxis    Back pain, continue pain medication      Electronically signed by Anusha Erickson MD on 12/23/2021 at 7:26 AM

## 2021-12-23 NOTE — PROGRESS NOTES
Pt converted back to a-fib 1845. Pt sustaining -150. Messaged Cardiology via perfect serve. Awaiting new orders.

## 2021-12-23 NOTE — PROGRESS NOTES
Physical Therapy Initial Evaluation    Name: Nicolasa Sandifer  : 1935  MRN: [id number]      Date of Service: 2021    Evaluating PT:  Oswaldo Bolden, PT [id number]    Room #:  8505/8505-B  Diagnosis:  Spinal stenosis of lumbar region, unspecified whether neurogenic claudication present [M48.061]  Lumbar degenerative disc disease [M51.36]  PMHx/PSHx:     has a past medical history of Hypertension. has a past surgical history that includes joint replacement (Right); Carotid endarterectomy (Right); Appendectomy; Carpal tunnel release (Bilateral); and Lumbar spine surgery (N/A, 2021). Procedure/Surgery: L2-L5 LAMINECTOMY AND FUSION   Precautions:  Falls, falls, alarm and O2 , FWB (full weight bearing), Hemovac drain, Spinal precautions no \"BLT\" and LSO on when greater than 45degrees (elective)  Equipment Needs: Wheeled Walker    SUBJECTIVE:  Patient lives with spouse  in a trailer with 2 steps without rail and a platform between the two to enter. Patient ambulated with a  [employer] PTA. Equipment owned: None    OBJECTIVE:   Initial Evaluation  Date: 21 Treatment  21 Short Term/ Long Term   Goals   AM-PAC 6 Clicks 65/88 75/54    Was pt agreeable to Eval/treatment? Yes yes    Does pt have pain? Yes; 9/10 LBP at level of incision Pain with movement, does not quantify    Bed Mobility  Rolling: Min   Supine to sit:   Min   Sit to supine: Min   Scooting: Min  Rolling: Brain  Supine to sit:   SBA   Sit to supine: NT  Scooting: SBA to EOB Rolling: Ind  Supine to sit: Ind  Sit to supine: Ind  Scooting: Ind   Transfers Sit to stand: Min  Stand to sit: Min  Stand pivot: Min  Sit to stand: Brain  Stand to sit: Brain  Stand pivot: Brain with [employer] Sit to stand: Mod Ind    Stand to sit: Mod Ind    Stand pivot:  Mod Ind     Ambulation    NT  feet with Mod Ind     Stair negotiation: ascended and descended  NT NT 3 steps with Mod Ind  1 rail    ROM BUE:  Defer to OT eval  BLE:  wfl     Strength BUE: Defer to OT eval  RLE:  Grossly 4-/5  LLE:  Grossly  4-/5  4/5   Balance Sitting EOB:  SBA  Dynamic Standing:  NT Sitting EOB:  SBA  Dynamic Standing:  Brain with [employer] Sitting EOB:  Ind  Dynamic Standing:  Ind     Pt is A & O x 4  Sensation:  Pt denies numbness and tingling to extremities  Edema:  None noted    Vitals:  SPO2 monitored throughout session via HFNC:  On 12L at EOB: 86%  On 15L at EOB; 95%  After stand pivot transfer to bedside commode on 15L: 87% with 1-2 minute recovery to 96%  Patient education  Pt educated on role of PT, PLB, importance of pacing activity, O2 saturation levels and requirements    Patient response to education:   Pt verbalized understanding Pt demonstrated skill Pt requires further education in this area   yes yes yes     ASSESSMENT:    Comments:  Patient in R sidelying in bed upon entry and agreeable to PT treatment with son present in room. Patient with pain during rolling to donne brace requesting light assist to roll to L. Patient able to roll to R with no assist as well as complete supine> sit. SPO2 at EOB on 12L sitting at 86% consistently -- increased supplemental O2 to 15L with saturation improving to 95%. Patient able to perform stand pivot to bedside commode with [employer] and light lift assist as well as steadying assist during transfer. Patient left on bedside commode with increased O2 level to end session with son present in room and education to push call light when ready. RN aware of patient performance and increased O2. Treatment:  Patient practiced and was instructed in the following treatment:     Bed Mobility: VCs provided for sequencing and safety during mobility. Manual assist provided for completion of task.  Transfer Training: Verbal and tactile cueing provided for sequencing and safety during mobility.  Manual assist provided for completion of task   Patient Education: Education provided on saturation fluctuation during activity, PLB, safe use of [employer]    PLAN:    Patient is making good progress towards established goals. Will continue with current POC.       Time in  1555  Time out  1610    Total Treatment Time  15 minutes     CPT codes:  [] Gait training 63630 - minutes  [] Manual therapy 59997 - minutes  [x] Therapeutic activities 46276 15 minutes  [] Therapeutic exercises 64530 - minutes  [] Neuromuscular reeducation 01996 - minutes    Alexandru Martin PT, DPT  [id number]

## 2021-12-23 NOTE — PLAN OF CARE
Problem: Discharge Planning:  Goal: Discharged to appropriate level of care  Description: Discharged to appropriate level of care  Outcome: Ongoing     Problem: Infection - Surgical Site:  Goal: Will show no infection signs and symptoms  Description: Will show no infection signs and symptoms  Outcome: Met This Shift     Problem: Mobility - Impaired:  Goal: Mobility will improve to maximum level  Description: Mobility will improve to maximum level  Outcome: Met This Shift     Problem: Pain - Acute:  Goal: Pain level will decrease  Description: Pain level will decrease  Outcome: Met This Shift     Problem: Sensory Perception - Impaired:  Goal: Sensory function intact, lower extremity  Description: Sensory function intact, lower extremity  Outcome: Met This Shift     Problem: Skin Integrity:  Goal: Will show no infection signs and symptoms  Description: Will show no infection signs and symptoms  Outcome: Met This Shift  Goal: Absence of new skin breakdown  Description: Absence of new skin breakdown  Outcome: Met This Shift  Goal: Demonstration of wound healing without infection will improve  Description: Demonstration of wound healing without infection will improve  Outcome: Met This Shift  Goal: Complications related to intravenous access or infusion will be avoided or minimized  Description: Complications related to intravenous access or infusion will be avoided or minimized  Outcome: Met This Shift     Problem: Falls - Risk of:  Goal: Will remain free from falls  Description: Will remain free from falls  Outcome: Met This Shift  Goal: Absence of physical injury  Description: Absence of physical injury  Outcome: Met This Shift     Problem: Nutritional:  Goal: Nutritional status will improve  Description: Nutritional status will improve  Outcome: Met This Shift     Problem: Respiratory:  Goal: Ability to maintain normal respiratory secretions will improve  Description: Ability to maintain normal respiratory

## 2021-12-24 ENCOUNTER — APPOINTMENT (OUTPATIENT)
Dept: GENERAL RADIOLOGY | Age: 86
DRG: 459 | End: 2021-12-24
Attending: NEUROLOGICAL SURGERY
Payer: MEDICARE

## 2021-12-24 LAB
% CKMB: 0 % (ref 0–4)
CK-BB: 0 % (ref 0–0)
CK-MACRO TYPE I: 0 % (ref 0–0)
CK-MACRO TYPE II: 0 % (ref 0–0)
CK-MM: 100 % (ref 96–100)
HLA B27: NEGATIVE
TOTAL CK: 67 U/L (ref 20–200)

## 2021-12-24 PROCEDURE — 94640 AIRWAY INHALATION TREATMENT: CPT

## 2021-12-24 PROCEDURE — 6370000000 HC RX 637 (ALT 250 FOR IP): Performed by: INTERNAL MEDICINE

## 2021-12-24 PROCEDURE — 2580000003 HC RX 258: Performed by: PHYSICIAN ASSISTANT

## 2021-12-24 PROCEDURE — 2580000003 HC RX 258: Performed by: INTERNAL MEDICINE

## 2021-12-24 PROCEDURE — 71045 X-RAY EXAM CHEST 1 VIEW: CPT

## 2021-12-24 PROCEDURE — 2700000000 HC OXYGEN THERAPY PER DAY

## 2021-12-24 PROCEDURE — 2500000003 HC RX 250 WO HCPCS: Performed by: INTERNAL MEDICINE

## 2021-12-24 PROCEDURE — 6360000002 HC RX W HCPCS: Performed by: INTERNAL MEDICINE

## 2021-12-24 PROCEDURE — 99233 SBSQ HOSP IP/OBS HIGH 50: CPT | Performed by: INTERNAL MEDICINE

## 2021-12-24 PROCEDURE — 2060000000 HC ICU INTERMEDIATE R&B

## 2021-12-24 PROCEDURE — 6370000000 HC RX 637 (ALT 250 FOR IP): Performed by: PHYSICIAN ASSISTANT

## 2021-12-24 RX ORDER — FUROSEMIDE 20 MG/1
20 TABLET ORAL DAILY
Status: DISCONTINUED | OUTPATIENT
Start: 2021-12-24 | End: 2021-12-25

## 2021-12-24 RX ORDER — FLECAINIDE ACETATE 50 MG/1
50 TABLET ORAL 2 TIMES DAILY
Status: DISCONTINUED | OUTPATIENT
Start: 2021-12-24 | End: 2021-12-30 | Stop reason: HOSPADM

## 2021-12-24 RX ORDER — METHYLPREDNISOLONE SODIUM SUCCINATE 40 MG/ML
20 INJECTION, POWDER, LYOPHILIZED, FOR SOLUTION INTRAMUSCULAR; INTRAVENOUS EVERY 6 HOURS
Status: DISCONTINUED | OUTPATIENT
Start: 2021-12-24 | End: 2021-12-26

## 2021-12-24 RX ADMIN — FLECAINIDE ACETATE 50 MG: 50 TABLET ORAL at 20:24

## 2021-12-24 RX ADMIN — AMPICILLIN SODIUM AND SULBACTAM SODIUM 3000 MG: 2; 1 INJECTION, POWDER, FOR SOLUTION INTRAMUSCULAR; INTRAVENOUS at 12:32

## 2021-12-24 RX ADMIN — OXYCODONE HYDROCHLORIDE 10 MG: 10 TABLET ORAL at 00:37

## 2021-12-24 RX ADMIN — AMPICILLIN SODIUM AND SULBACTAM SODIUM 3000 MG: 2; 1 INJECTION, POWDER, FOR SOLUTION INTRAMUSCULAR; INTRAVENOUS at 00:37

## 2021-12-24 RX ADMIN — OXYCODONE 5 MG: 5 TABLET ORAL at 09:36

## 2021-12-24 RX ADMIN — AMPICILLIN SODIUM AND SULBACTAM SODIUM 3000 MG: 2; 1 INJECTION, POWDER, FOR SOLUTION INTRAMUSCULAR; INTRAVENOUS at 19:18

## 2021-12-24 RX ADMIN — SODIUM CHLORIDE: 9 INJECTION, SOLUTION INTRAVENOUS at 09:29

## 2021-12-24 RX ADMIN — METHYLPREDNISOLONE SODIUM SUCCINATE 20 MG: 40 INJECTION, POWDER, FOR SOLUTION INTRAMUSCULAR; INTRAVENOUS at 11:15

## 2021-12-24 RX ADMIN — DILTIAZEM HYDROCHLORIDE 30 MG: 30 TABLET, FILM COATED ORAL at 19:18

## 2021-12-24 RX ADMIN — DEXTROSE MONOHYDRATE 15 MG/HR: 50 INJECTION, SOLUTION INTRAVENOUS at 09:22

## 2021-12-24 RX ADMIN — IPRATROPIUM BROMIDE 0.5 MG: 0.5 SOLUTION RESPIRATORY (INHALATION) at 13:55

## 2021-12-24 RX ADMIN — AMPICILLIN SODIUM AND SULBACTAM SODIUM 3000 MG: 2; 1 INJECTION, POWDER, FOR SOLUTION INTRAMUSCULAR; INTRAVENOUS at 05:54

## 2021-12-24 RX ADMIN — FUROSEMIDE 20 MG: 20 TABLET ORAL at 11:15

## 2021-12-24 RX ADMIN — Medication 5 ML: at 20:25

## 2021-12-24 RX ADMIN — METHYLPREDNISOLONE SODIUM SUCCINATE 20 MG: 40 INJECTION, POWDER, FOR SOLUTION INTRAMUSCULAR; INTRAVENOUS at 16:47

## 2021-12-24 RX ADMIN — APIXABAN 5 MG: 5 TABLET, FILM COATED ORAL at 20:23

## 2021-12-24 RX ADMIN — DILTIAZEM HYDROCHLORIDE 30 MG: 30 TABLET, FILM COATED ORAL at 05:54

## 2021-12-24 RX ADMIN — APIXABAN 5 MG: 5 TABLET, FILM COATED ORAL at 09:22

## 2021-12-24 RX ADMIN — PRAVASTATIN SODIUM 10 MG: 10 TABLET ORAL at 20:24

## 2021-12-24 RX ADMIN — METOPROLOL TARTRATE 50 MG: 50 TABLET, FILM COATED ORAL at 09:22

## 2021-12-24 RX ADMIN — IPRATROPIUM BROMIDE 0.5 MG: 0.5 SOLUTION RESPIRATORY (INHALATION) at 17:48

## 2021-12-24 RX ADMIN — DEXTROSE MONOHYDRATE 5 MG/HR: 50 INJECTION, SOLUTION INTRAVENOUS at 15:46

## 2021-12-24 RX ADMIN — IPRATROPIUM BROMIDE 0.5 MG: 0.5 SOLUTION RESPIRATORY (INHALATION) at 09:55

## 2021-12-24 RX ADMIN — METOPROLOL TARTRATE 50 MG: 50 TABLET, FILM COATED ORAL at 20:24

## 2021-12-24 RX ADMIN — SODIUM CHLORIDE, PRESERVATIVE FREE 5 ML: 5 INJECTION INTRAVENOUS at 20:25

## 2021-12-24 RX ADMIN — DILTIAZEM HYDROCHLORIDE 30 MG: 30 TABLET, FILM COATED ORAL at 00:37

## 2021-12-24 ASSESSMENT — PAIN DESCRIPTION - PROGRESSION: CLINICAL_PROGRESSION: NOT CHANGED

## 2021-12-24 ASSESSMENT — PAIN SCALES - GENERAL
PAINLEVEL_OUTOF10: 10
PAINLEVEL_OUTOF10: 0
PAINLEVEL_OUTOF10: 9
PAINLEVEL_OUTOF10: 0

## 2021-12-24 NOTE — PROGRESS NOTES
Titrated cardizem drip to 2.5 mg/hr for HR sustaining in the 80's. After an hour turned drip off, pt in SR and HR sustaining in the 80's.

## 2021-12-24 NOTE — PROGRESS NOTES
Hospitalist progress note    Patient:  Kathleen Diane  YOB: 1935  Date of Service: 12/24/21  MRN: [id number]   Acct:  [de-identified]   Primary Care Physician: Adela Blunt MD  12/14/2021     Patient Seen, Chart, Consults notes, Labs, Radiology, family and social history were reviewed. This is an 82years old male patient with past medical history of hypertension patient was admitted because of L2-L5 laminectomy and fusion. Postoperative the pain was getting better by time. Patient history of hypertension which was controlled with medication  Postoperative the patient developed acute respiratory failure requiring high oxygen and the patient was diagnosed with aspiration pneumonia. Patient has new onset A. fib with RVR on December 19 and he was cardioverted on December 22 to normal sinus rhythm       Chief Complaint: cough        Subjective     The patient is a 82 y.o. male who presents with back pain  Patient still complains of back pain the pain is sharp in nature continuous pain radiated to the right leg the pain is mild to moderate  Patient still complains of cough condition is moderate the cough is dry there is no shortness of breath at rest and he denies any chest pain  There is no abdominal pain, change in bowel habits, or black or bloody stools, there is no upper GI bleeding, no nausea, no vomiting, or jaundice.                        Review of systems:    All 10 review of system were negative unless what is mentioned in the present history               PHYSICAL EXAM:  BP (!) 151/80   Pulse 91   Temp 98.2 °F (36.8 °C) (Temporal)   Resp 16   Ht 6' 1\" (1.854 m)   Wt 200 lb (90.7 kg)   SpO2 93%   BMI 26.39 kg/m²     General appearance - alert, well appearing, and in no distress   Head: atraumatic   Pupil: equal, round reactive to light   Neck: Supple, trachea is midline   Chest - + rales    Distant Breath Sounds: No   Heart - S1 and S2 normal   Abdomen - soft, nontender, nondistended, no masses or organomegaly      Integumentary - Skin color, texture, turgor normal. No rashes or lesions. Musculoskeletal - no joint tenderness, deformity or swelling   Extremities - peripheral pulses normal, no pedal edema, no clubbing or cyanosis times 4         Review of Labs and Diagnostic Testing:         Echo Complete    Result Date: 12/22/2021  Transthoracic Echocardiography Report (TTE)  Demographics   Patient Name    Hayden Bianchi  Gender            Male                  W   Medical Record  [id number]     Room Number       [facility]  Number   Account #       [de-identified]    Procedure Date    12/22/2021   Corporate ID                 Ordering          Carlos Gusman MD                               Physician   Accession       [id number]   Referring  Number                       Physician   Date of Birth   1935   Sonographer       Lauren Angelucci RCS   Age             82 year(s)   Interpreting      [address] [place] [address]                               Physician         Physician Cardiology                                                 Renu Celaya MD                                Any Other  Procedure Type of Study   TTE procedure:Echo Complete W/Doppler & Color Flow, Echo W/Bubble Study. Procedure Date Date: 12/22/2021 Start: 09:40 AM Study Location: CVL Technical Quality: Adequate visualization Indications:Atrial fibrillation. Patient Status: Routine Height: 73 inches Weight: 200 pounds BSA: 2.15 m^2 BMI: 26.39 kg/m^2 Rhythm: Within normal limits HR: 89 bpm BP: 155/91 mmHg  Findings   Left Ventricle  Normal left ventricular chamber size. Normal left ventricular systolic  function. Visually estimated LVEF is 60 %. No wall motion abnormalities. Grade II diastolic dysfunction. Right Ventricle  Normal right ventricle structure and function. Left Atrium  Normal left atrium. Right Atrium  Normal right atrium. Mitral Valve  Physiologic and/or trace mitral regurgitation is present.   No evidence of hemodynamically significant mitral stenosis. Tricuspid Valve  Mild tricuspid regurgitation. Aortic Valve  Trileaflet aortic valve. Normal leaflet mobility. No aortic stenosis. Trace aortic regurgitation. Pulmonic Valve  Mild-to-moderate pulmonic regurgitation present. Pericardial Effusion  No evidence for hemodynamically significant pericardial effusion. Pleural Effusion  No evidence of pleural effusion. Aorta  Normal aortic root and ascending aorta. Miscellaneous  The inferior vena cava diameter is normal with normal respiratory  variation. Conclusions   Summary  Normal left ventricular chamber size. Normal left ventricular systolic  function. Visually estimated LVEF is 60 %. No wall motion abnormalities. Grade II diastolic dysfunction. Normal right ventricle structure and function. Normal left atrium. Normal right atrium. Mild tricuspid regurgitation. Mild-to-moderate pulmonic regurgitation present. Unable to estimate PA pressure. Agitated saline contrast study shows late right to left shunting  suggestive of an intra-pulmonary shunt. No comparison study available.    Signature   ----------------------------------------------------------------  Electronically signed by Jeniffer Smalls MD(Interpreting  physician) on 12/22/2021 10:57 AM  ----------------------------------------------------------------  M-Mode/2D Measurements & Calculations   LV Diastolic     LV Systolic Dimension: 2.7 cm   AV Cusp Separation: 2.1  Dimension: 3.7   LV Volume Diastolic: 64.0 ml    cmAO Root Dimension: 3.7  cm               LV Volume Systolic: 57.1 ml     cm  LV FS:27 %       LV EDV/LV EDV Index: 57.5 TS/46  LV PW Diastolic: AP/R^3SE ESV/LV ESV Index: 25.9  0.9 cm           ml/12ml/ m^2  LV PW Systolic:  EF Calculated: 55 %             RV Diastolic Dimension:  1.4 cm           LV Mass Index: 92 l/min*m^2     3.7 cm  Septum           LV Length: 8.9 cm  Diastolic: 2 cm                                  Ascending Aorta: 3.8 cm  Septum Systolic: LVOT: 2.4 cm                    LA volume/Index: 68.6 ml  1.6 cm                                           /31.92ml/m^2  CO: 10.3 l/min                                   RA Area: 14.2 cm^2  CI: 4.79 l/m*m^2  LV Mass: 197.65  g  Doppler Measurements & Calculations   MV Peak E-Wave: 1.16 AV Peak Velocity: 1.37 LVOT Peak Velocity: 1.22 m/s  m/s                  m/s                    LVOT Mean Velocity: 0.86 m/s  MV Peak A-Wave: 0.96 AV Peak Gradient: 7.55 LVOT Peak Gradient: 6 mmHgLVOT  m/s                  mmHg                   Mean Gradient: 3.3 mmHg  MV E/A Ratio: 1.21   AV Mean Velocity: 0.98  MV Peak Gradient:    m/s  7.9 mmHg             AV Mean Gradient: 4.3  MV Mean Gradient:    mmHg                   TR Velocity:4.13 m/s  3.7 mmHg             AV VTI: 28.9 cm        TR Gradient:68.26 mmHg  MV Mean Velocity:    AV Area  0.92 m/s             (Continuity):4.01 cm^2  MV Deceleration  Time: 217 msec       LVOT VTI: 25.6 cm      MS ED Velocity: 1.3 m/s  MV P1/2t: 86 msec  MVA by PHT:2.56 cm^2  MV Area  (continuity): 3.5  cm^2  MV E' Septal  Velocity: 0.05 m/s  MV E' Lateral  Velocity: 6 m/s  [email or web address] [id number]    XR CHEST (2 VW)    Result Date: 12/22/2021  EXAMINATION: TWO XRAY VIEWS OF THE CHEST 12/22/2021 3:17 pm COMPARISON: December 22, 2021 at 15:04, December 18, 2021 HISTORY: ORDERING SYSTEM PROVIDED HISTORY: reevaluate right infiltrate TECHNOLOGIST PROVIDED HISTORY: Reason for exam:->reevaluate right infiltrate What reading provider will be dictating this exam?->CRC FINDINGS: Heart size is unable to be accurately assessed on this single portable view of the chest, but appears to be stable. Previously described right lung airspace opacities with a reticular and patchy appearance have increased since December 18, 2021.   No evidence of a sizable pleural effusion. No pneumothorax. No acute osseous abnormality. Increased reticular and patchy airspace opacities in the right lung. These are nonspecific and may be on the basis of multifocal pneumonia or (less likely) asymmetric pulmonary edema. Atypical/viral pneumonia not excluded. XR CHEST (2 VW)    Result Date: 12/9/2021  EXAMINATION: TWO XRAY VIEWS OF THE CHEST 12/9/2021 11:09 am COMPARISON: None. HISTORY: ORDERING SYSTEM PROVIDED HISTORY: Pre-op evaluation TECHNOLOGIST PROVIDED HISTORY: Reason for exam:->pre op What reading provider will be dictating this exam?->CRC FINDINGS: Slight peripheral coarsening of interstitial markings may indicate mild fibrosis. Heart and pulmonary vascularity are normal.  Neither costophrenic angle is blunted. Subtle findings which may indicate mild interstitial fibrosis. XR ABDOMEN (KUB) (SINGLE AP VIEW)    Result Date: 12/16/2021  EXAMINATION: ONE SUPINE XRAY VIEW(S) OF THE ABDOMEN 12/16/2021 2:41 pm COMPARISON: None. HISTORY: ORDERING SYSTEM PROVIDED HISTORY: post-op ileus TECHNOLOGIST PROVIDED HISTORY: Reason for exam:->post-op ileus What reading provider will be dictating this exam?->CRC FINDINGS: Air scattered small and large bowel loops. Dilated small bowel measures 3.6 cm. Dilated colon measures 8.7 cm at the level the transverse. Skin in seizure in with skin staples vertically noted over the mid lower abdomen. Postoperative change spinal fusion and laminectomy noted lumbar spine. Surgical clips right upper quadrant. Dilated small and large bowel loops suggest generalized ileus. Clinical correlation and follow-up to resolution recommended.      XR ACUTE ABD SERIES CHEST 1 VW    Result Date: 12/22/2021  EXAMINATION: TWO XRAY VIEWS OF THE ABDOMEN AND SINGLE  XRAY VIEW OF THE CHEST 12/22/2021 3:16 pm COMPARISON: Chest radiograph December 22, 2021 HISTORY: ORDERING SYSTEM PROVIDED HISTORY: repeat/assess respiratory status TECHNOLOGIST PROVIDED HISTORY: Reason for exam:->repeat/assess respiratory status What reading provider will be dictating this exam?->CRC FINDINGS: The evaluation is limited due to low lung volumes. Trachea is midline. Cardiomediastinal silhouette is stable in size. Persistent airspace opacity identified in right lung lower lung. Multiple air-filled loops of colon identified in the abdomen. No abnormal abdominal calcifications. Bowel gas pattern is nonspecific nonobstructive. No evidence of bowel obstruction. The evaluation is limited due to low lung volumes. Opacities in right lung may represent pneumonia versus atelectasis in the proper clinical setting. MRI LUMBAR SPINE WO CONTRAST    Result Date: 11/24/2021  EXAMINATION: MRI OF THE LUMBAR SPINE WITHOUT CONTRAST, 11/24/2021 10:41 am TECHNIQUE: Multiplanar multisequence MRI of the lumbar spine was performed without the administration of intravenous contrast. COMPARISON: None. HISTORY: ORDERING SYSTEM PROVIDED HISTORY: Acute midline low back pain with bilateral sciatica TECHNOLOGIST PROVIDED HISTORY: Reason for exam:->back pain and surgical planning FINDINGS: BONES/ALIGNMENT: The vertebral body heights are maintained. There is age-appropriate bone marrow signal.  There is multilevel degenerative disc disease with loss of disc signal.  There is mild disc space narrowing most pronounced at the L2-3 and L3-4 levels. There is degenerative grade 1 anterolisthesis of L3 on L4 and L4 on L5. SPINAL CORD: The conus is normal in caliber and signal and terminates at the L1 level. The cauda equina is unremarkable. SOFT TISSUES: The posterior paraspinal soft tissues are unremarkable. The visualized abdominal structures are unremarkable. L1-L2: There is a circumferential disc bulge with facet and ligamentous hypertrophy. There is no canal stenosis. There is mild bilateral foraminal narrowing. L2-L3: There is a circumferential disc bulge with facet and ligamentous hypertrophy.   There is no significant canal stenosis. There is moderate bilateral foraminal narrowing. L3-L4: There is a circumferential disc bulge with facet and ligamentous hypertrophy. There is canal stenosis measuring 6 mm in AP dimension. There is severe bilateral foraminal narrowing. L4-L5: There is a circumferential disc bulge with facet and ligamentous hypertrophy. There is canal stenosis measuring 6 mm in AP dimension. There is moderate left and severe right foraminal narrowing. L5-S1: There is a circumferential disc bulge with facet hypertrophy. There is no canal stenosis or foraminal narrowing. Multilevel degenerative disc disease with associated facet and ligamentous hypertrophy resulting in canal stenosis at L3-4 and L4-5. Bilateral foraminal narrowing as described above. XR CHEST PORTABLE    Result Date: 12/18/2021  EXAMINATION: ONE XRAY VIEW OF THE CHEST 12/18/2021 8:38 pm COMPARISON: 12/16/2021 HISTORY: ORDERING SYSTEM PROVIDED HISTORY: Chest Pain TECHNOLOGIST PROVIDED HISTORY: Reason for exam:->Chest Pain What reading provider will be dictating this exam?->CRC FINDINGS: EKG leads overlie the chest.  Stable elevation of the right diaphragm with patchy opacification of the right lung base, appearing slightly worsened. Minimal left basal opacities are also increased. Moderate gaseous distention of bowel in the visualized upper abdomen. No pneumothorax. No other change. Slight worsening of right greater than left basal opacities which may represent atelectasis or infiltrates from pneumonia. Continued follow-up recommended. XR CHEST PORTABLE    Result Date: 12/16/2021  EXAMINATION: ONE XRAY VIEW OF THE CHEST 12/16/2021 4:19 pm COMPARISON: 12/09/2021 HISTORY: ORDERING SYSTEM PROVIDED HISTORY: hypoxia TECHNOLOGIST PROVIDED HISTORY: Reason for exam:->hypoxia What reading provider will be dictating this exam?->CRC FINDINGS: Right lower lobe atelectasis and/or infiltrate. .  There is no effusion or pneumothorax. The cardiomediastinal silhouette is without acute process. The osseous structures are without acute process. Right lower lobe atelectasis and/or infiltrate. CTA PULMONARY W CONTRAST    Result Date: 12/16/2021  EXAMINATION: CTA OF THE CHEST 12/16/2021 3:32 pm TECHNIQUE: CTA of the chest was performed after the administration of intravenous contrast.  Multiplanar reformatted images are provided for review. MIP images are provided for review. Dose modulation, iterative reconstruction, and/or weight based adjustment of the mA/kV was utilized to reduce the radiation dose to as low as reasonably achievable. COMPARISON: None. HISTORY: ORDERING SYSTEM PROVIDED HISTORY: CHEST PAIN AND SOB R/O PE TECHNOLOGIST PROVIDED HISTORY: Reason for exam:->CHEST PAIN AND SOB R/O PE What reading provider will be dictating this exam?->CRC FINDINGS: No thoracic aortic aneurysm or dissection. Mild-to-moderate scattered calcified plaque in the thoracic aorta. No evidence of pulmonary embolism. Mild cardiomegaly. Coronary atherosclerosis. No pericardial effusion. Shotty subcentimeter short axis mediastinal hilar nodes. No pleural effusion or pneumothorax. Low lung volumes. There are a few small peripheral densities in the lungs which may represent scarring or infection (for example series 4, image 73). Another curvilinear density is noted the medial left lower lobe (series 4 image 168). Mild dependent atelectasis, right greater than left. The central airways are patent. There is centrilobular and paraseptal emphysema. Degenerative changes of the spine. The visualized abdomen demonstrates changes of cholecystectomy and the splenic parenchymal calcification. No evidence of pulmonary embolism. No acute aortic injury or aneurysm. There are a few nonspecific densities in the peripheral lungs.   Imaging features can be seen with COVID-19 pneumonia, though are nonspecific and can occur with a variety of infectious and noninfectious processes. Mild cardiomegaly. Atherosclerotic disease. FLUORO FOR SURGICAL PROCEDURES    Result Date: 12/14/2021  EXAMINATION: SPOT FLUOROSCOPIC IMAGES 12/14/2021 10:42 am TECHNIQUE: Fluoroscopy was provided by the radiology department for procedure. Radiologist was not present during examination. FLUOROSCOPY DOSE AND TYPE OR TIME AND EXPOSURES: Fluoroscopy time equals 10.8 seconds. Total dose equals 15.92 mGy COMPARISON: None HISTORY: ORDERING SYSTEM PROVIDED HISTORY: Back pain, unspecified back location, unspecified back pain laterality, unspecified chronicity TECHNOLOGIST PROVIDED HISTORY: Reason for exam:->Back pain, unspecified back location, unspecified back pain laterality, unspecified chronicity What reading provider will be dictating this exam?->CRC Intraprocedural imaging. FINDINGS: 2 spot images of the lumbar spine were obtained. Intraprocedural fluoroscopic spot images as above. See separate procedure report for more information.      No results found for this or any previous visit (from the past 24 hour(s)).]     Current Facility-Administered Medications: [COMPLETED] dilTIAZem injection 10 mg, 10 mg, IntraVENous, Once **FOLLOWED BY** dilTIAZem 100 mg in dextrose 5 % 100 mL infusion (ADD-[place]), 5-15 mg/hr, IntraVENous, Continuous  0.9 % sodium chloride infusion, , IntraVENous, Continuous  sodium chloride flush 0.9 % injection 5-40 mL, 5-40 mL, IntraVENous, 2 times per day  sodium chloride flush 0.9 % injection 5-40 mL, 5-40 mL, IntraVENous, PRN  0.9 % sodium chloride infusion, 25 mL, IntraVENous, PRN  metoprolol (LOPRESSOR) injection 5 mg, 5 mg, IntraVENous, Q6H PRN  apixaban (ELIQUIS) tablet 5 mg, 5 mg, Oral, BID  phenol 1.4 % mouth spray 1 spray, 1 spray, Mouth/Throat, Q2H PRN  ipratropium (ATROVENT) 0.02 % nebulizer solution 0.5 mg, 0.5 mg, Nebulization, 4x daily  dilTIAZem (CARDIZEM) tablet 30 mg, 30 mg, Oral, 4 times per day  ampicillin-sulbactam (UNASYN) 3000 mg ivpb minibag, 3,000 mg, IntraVENous, Q6H  metoprolol tartrate (LOPRESSOR) tablet 50 mg, 50 mg, Oral, BID  perflutren lipid microspheres (DEFINITY) injection 1.65 mg, 1.5 mL, IntraVENous, ONCE PRN  acetaminophen (TYLENOL) tablet 650 mg, 650 mg, Oral, Q4H PRN  simethicone (MYLICON) chewable tablet 80 mg, 80 mg, Oral, Q6H PRN  pravastatin (PRAVACHOL) tablet 10 mg, 10 mg, Oral, Daily  sodium chloride flush 0.9 % injection 5-40 mL, 5-40 mL, IntraVENous, 2 times per day  sodium chloride flush 0.9 % injection 5-40 mL, 5-40 mL, IntraVENous, PRN  0.9 % sodium chloride infusion, 25 mL, IntraVENous, PRN  ondansetron (ZOFRAN-ODT) disintegrating tablet 4 mg, 4 mg, Oral, Q8H PRN **OR** ondansetron (ZOFRAN) injection 4 mg, 4 mg, IntraVENous, Q6H PRN  oxyCODONE (ROXICODONE) immediate release tablet 5 mg, 5 mg, Oral, Q4H PRN **OR** oxyCODONE HCl (OXY-IR) immediate release tablet 10 mg, 10 mg, Oral, Q4H PRN  HYDROmorphone (DILAUDID) injection 0.5 mg, 0.5 mg, IntraVENous, Q3H PRN  cyclobenzaprine (FLEXERIL) tablet 10 mg, 10 mg, Oral, TID PRN  polyethylene glycol (GLYCOLAX) packet 17 g, 17 g, Oral, Daily  bisacodyl (DULCOLAX) EC tablet 5 mg, 5 mg, Oral, Daily  sennosides-docusate sodium (SENOKOT-S) 8.6-50 MG tablet 1 tablet, 1 tablet, Oral, BID  magnesium hydroxide (MILK OF MAGNESIA) 400 MG/5ML suspension 30 mL, 30 mL, Oral, Daily PRN  fleet rectal enema 1 enema, 1 enema, Rectal, Daily PRN   Hospital Problems           Last Modified POA    Spinal stenosis of lumbar region 12/14/2021 Yes    Spondylolisthesis, lumbar region 12/14/2021 Yes    Lumbar degenerative disc disease 12/15/2021 Yes    Chest pain 12/17/2021 Yes    Elevated troponin 12/17/2021 Yes    Primary hypertension 12/17/2021 Yes    Puerperal sepsis with acute hypoxic respiratory failure ([address] 12/17/2021 Yes    Hyperlipidemia 12/17/2021 Yes    Pulmonary fibrosis ([facility] [address] 12/17/2021 Yes                  Assessment and Plan:     Postoperative aspiration pneumonia, I ordered x-ray continue Unasyn   History of Covid, no change   History of pulmonary fibrosis   Hyperlipidemia, continue statin   Hypertension stable on beta-blocker and Cardizem   Continue DVT &  GERD prophylaxis   Spinal stenosis, postoperative   Pain onset A. fib with RVR, continue Cardizem and Eliquis and beta-blocker      Electronically signed by Deborah Simpson MD on 12/24/2021 at 7:20 AM

## 2021-12-24 NOTE — PROGRESS NOTES
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  Had similar anesthesia issue in last surgery  Cr normalized       OBJECTIVE:     PHYSICAL EXAM:   VITALS:   Vitals:    21 2330 21 0030 21 0733 21 0955   BP:  (!) 151/80 133/78    Pulse: 82 91 85    Resp:   16    Temp:   96.4 °F (35.8 °C)    TempSrc:   Temporal    SpO2:   94% 93%   Weight:       Height:            Intake/Output Summary (Last 24 hours) at 2021 1020  Last data filed at 2021 4579  Gross per 24 hour   Intake 360 ml   Output 300 ml   Net 60 ml        CONSTITUTIONAL:   A&O x 3  SKIN:     Kelechi, Fair skin with shin discoloration  HEENT:     EOMI, MMM, No thrush  NECK:    No bruits, No JVP apprechiated  CV:      Sinus TR murmur, No rubs, No gallops  PULMONARY:   Couse BS,  Dry crackles right lung  ABDOMEN:     Soft, non-tender. BS normal. No R/R/G  EXT:    No deformities . No clubbing. Trace lower extremity edema, + venous stasis  PULSE:   Appears equal and palpable. PSYCHIATRIC:  No acute psycosis  MS:    Gross weakness- stable  NEUROLOGIC:   The clinical assessment is non-focal     DATA: IMAGING & TESTING:     LABORATORY TESTS:    ECHOCARDIOGRAM:    Normal left ventricular chamber size. Normal left ventricular systolic    function.  Visually estimated LVEF is 60 %.    No wall motion abnormalities.    Grade II diastolic dysfunction.    Normal right ventricle structure and function.    Normal left atrium.    Normal right atrium.    Mild tricuspid regurgitation.    Mild-to-moderate pulmonic regurgitation present.    Unable to estimate PA pressure.    Agitated saline contrast study shows late right to left shunting    suggestive of an intra-pulmonary shunt.    No comparison study available. CBC:   Lab Results   Component Value Date    WBC 11.6 12/22/2021    RBC 3.35 12/22/2021    HGB 10.1 12/22/2021    HCT 31.5 12/22/2021    MCV 94.0 12/22/2021    MCH 30.1 12/22/2021    MCHC 32.1 12/22/2021    RDW 13.5 12/22/2021     12/22/2021    MPV 9.6 12/22/2021     CMP:    Lab Results   Component Value Date     12/22/2021    K 3.8 12/22/2021    K 5.6 12/15/2021     12/22/2021    CO2 28 12/22/2021    BUN 47 12/22/2021    CREATININE 1.3 12/22/2021    GFRAA >60 12/22/2021    LABGLOM 53 12/22/2021    GLUCOSE 113 12/22/2021    PROT 5.9 12/21/2021    LABALBU 3.1 12/21/2021    CALCIUM 8.7 12/22/2021    BILITOT 0.6 12/21/2021    ALKPHOS 68 12/21/2021    AST 17 12/21/2021    ALT 18 12/21/2021     Magnesium:    Lab Results   Component Value Date    MG 2.3 12/19/2021     Phosphorus:  No results found for: PHOS     PRO-BNP: No results found for: PROBNP   ABGs:   Lab Results   Component Value Date    PH 7.394 12/16/2021    PO2 51.0 12/16/2021    PCO2 45.8 12/16/2021     Hemoglobin A1C: No components found for: HGBA1C    IMAGING:  Imaging tests were completed and reviewed and discussed radiology and care team involved and reveals       CHEST FILM 12/22  Increased reticular and patchy airspace opacities in the right lung.  These  are nonspecific and may be on the basis of multifocal pneumonia or (less  likely) asymmetric pulmonary edema.  Atypical/viral pneumonia not excluded.           CHEST FILM COMPARED 12/18:            XR ABDOMEN (KUB) (SINGLE AP VIEW)  Result Date: 12/16/2021Dilated small and large bowel loops suggest generalized ileus. Clinical correlation and follow-up to resolution recommended. MRI LUMBAR SPINE WO CONTRAST  Result Date: 11/24/2021  Multilevel degenerative disc disease with associated facet and ligamentous hypertrophy resulting in canal stenosis at L3-4 and L4-5. Bilateral foraminal narrowing as described above. CTA PULMONARY W CONTRAST  Result Date: 12/16/2021: FINDINGS: No thoracic aortic aneurysm or dissection. Mild-to-moderate scattered calcified plaque in the thoracic aorta. No evidence of pulmonary embolism. Mild cardiomegaly. Coronary atherosclerosis. No pericardial effusion. Shotty subcentimeter short axis mediastinal hilar nodes. No pleural effusion or pneumothorax. Low lung volumes. There are a few small peripheral densities in the lungs which may represent scarring or infection (for example series 4, image 73). Another curvilinear density is noted the medial left lower lobe (series 4 image 168). Mild dependent atelectasis, right greater than left. The central airways are patent. There is centrilobular and paraseptal emphysema. Degenerative changes of the spine. The visualized abdomen demonstrates changes of cholecystectomy and the splenic parenchymal calcification. No evidence of pulmonary embolism. No acute aortic injury or aneurysm. There are a few nonspecific densities in the peripheral lungs. Imaging features can be seen with COVID-19 pneumonia, though are nonspecific and can occur with a variety of infectious and noninfectious processes. Mild cardiomegaly. Atherosclerotic disease. Assessment:   1. Post op respiratory failure  2. Hypoxemic Respiratory Failure   3. CT evidence of disease of the lung  4. Plueral plaques ? Old infection or possible asbestosis  5. Mild-to-moderate pulmonic regurgitation present. 6.  Agitated saline contrast study shows late right to left shunting    suggestive of an intra-pulmonary shunt.    7. CT show sutttble changes of pulmonary fibrosis (periphearl)  8. New onset atrial fibrillation RVR.  [id number] 4 (age, hypertension, PAD)   9. Atypical CP.  Recent preoperative stress test negative for ischemia  10. Post op respiratory failure, on 9 L high flow nasal cannula  11. Postoperative ileus  12. 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 Hypertension  15. Hx R CEA  16. GERD  17. Hx COVID 19 5/2021          Plan:   1. CTA negative for PE but noted atelectasis, new decrease sats?, CXR with change yet even thought procal is negative I will start Abx for aspiration pneumonia. 2. Most of the finding are chronic, some fibrosis for sure   3. Continue ICS,   4. Bronchodialators QID  5. Wean oxygen as able   6. Day 5/7 of antibiotics - all cultures negative  7. CXR with HF changes  8. Start systemic steroids again for lung if oxygen doesn't improve after TOBIAS and cardioversion  9. Will need home oxygen   10. Serologic testing for ISLD needed and ordered  = negative  11.  Diuresis and daily BNP  12. Spoke with daughter and answered questions    Urszula Bennett DO DO, MPH, Ambreen Cage  Professor of Internal Medicine  Pulmonary, Critical Care and Sleep Medicine

## 2021-12-24 NOTE — PLAN OF CARE
Problem: Discharge Planning:  Goal: Discharged to appropriate level of care  Description: Discharged to appropriate level of care  12/24/2021 0218 by Laura Flores RN  Outcome: Ongoing  12/24/2021 0218 by Laura Flores RN  Outcome: Met This Shift     Problem: Infection - Surgical Site:  Goal: Will show no infection signs and symptoms  Description: Will show no infection signs and symptoms  12/24/2021 0218 by Laura Flores RN  Outcome: Met This Shift  12/24/2021 0218 by Laura Flores RN  Outcome: Met This Shift     Problem: Mobility - Impaired:  Goal: Mobility will improve to maximum level  Description: Mobility will improve to maximum level  12/24/2021 0218 by Laura Flores RN  Outcome: Met This Shift  12/24/2021 0218 by Laura Flores RN  Outcome: Met This Shift     Problem: Pain - Acute:  Goal: Pain level will decrease  Description: Pain level will decrease  12/24/2021 0218 by Laura Flores RN  Outcome: Met This Shift  12/24/2021 0218 by Laura Flores RN  Outcome: Met This Shift     Problem: Sensory Perception - Impaired:  Goal: Sensory function intact, lower extremity  Description: Sensory function intact, lower extremity  12/24/2021 0218 by Laura Flores RN  Outcome: Met This Shift  12/24/2021 0218 by Laura Flores RN  Outcome: Met This Shift     Problem: Skin Integrity:  Goal: Will show no infection signs and symptoms  Description: Will show no infection signs and symptoms  12/24/2021 0218 by Laura Flores RN  Outcome: Met This Shift  12/24/2021 0218 by Laura Flores RN  Outcome: Met This Shift  Goal: Absence of new skin breakdown  Description: Absence of new skin breakdown  12/24/2021 0218 by Laura Flores RN  Outcome: Met This Shift  12/24/2021 0218 by Laura Flores RN  Outcome: Met This Shift  Goal: Demonstration of wound healing without infection will improve  Description: Demonstration of wound healing without infection will improve  12/24/2021 0218 by Laura Flores RN  Outcome: Met This Shift  12/24/2021 0218 by Hollie Baird RN  Outcome: Met This Shift  Goal: Complications related to intravenous access or infusion will be avoided or minimized  Description: Complications related to intravenous access or infusion will be avoided or minimized  12/24/2021 0218 by Hollie Baird RN  Outcome: Met This Shift  12/24/2021 0218 by Hollie Baird RN  Outcome: Met This Shift     Problem: Falls - Risk of:  Goal: Will remain free from falls  Description: Will remain free from falls  12/24/2021 0218 by Hollie Baird RN  Outcome: Met This Shift  12/24/2021 0218 by Hollie Baird RN  Outcome: Met This Shift  Goal: Absence of physical injury  Description: Absence of physical injury  12/24/2021 0218 by Hollie Baird RN  Outcome: Met This Shift  12/24/2021 0218 by Hollie Baird RN  Outcome: Met This Shift     Problem: Nutritional:  Goal: Nutritional status will improve  Description: Nutritional status will improve  12/24/2021 0218 by Hollie Baird RN  Outcome: Met This Shift  12/24/2021 0218 by Hollie Baird RN  Outcome: Met This Shift     Problem: Physical Regulation:  Goal: Diagnostic test results will improve  Description: Diagnostic test results will improve  12/24/2021 0218 by Hollie Baird RN  Outcome: Met This Shift  12/24/2021 0218 by Hollie Baird RN  Outcome: Met This Shift  Goal: Will remain free from infection  Description: Will remain free from infection  12/24/2021 0218 by Hollie Baird RN  Outcome: Met This Shift  12/24/2021 0218 by Hollie Baird RN  Outcome: Met This Shift  Goal: Ability to maintain vital signs within normal range will improve  Description: Ability to maintain vital signs within normal range will improve  12/24/2021 0218 by Hollie Baird RN  Outcome: Met This Shift  12/24/2021 0218 by Hollie Baird RN  Outcome: Met This Shift     Problem: Respiratory:  Goal: Ability to maintain normal respiratory secretions will improve  Description: Ability to maintain normal respiratory secretions will improve  12/24/2021 0218 by Opal Arredondo RN  Outcome: Met This Shift  12/24/2021 0218 by Opal Arredondo RN  Outcome: Met This Shift     Problem: Pain:  Goal: Pain level will decrease  Description: Pain level will decrease  12/24/2021 0218 by Opal Arredondo RN  Outcome: Met This Shift  12/24/2021 0218 by Opal Arredondo RN  Outcome: Met This Shift  Goal: Control of acute pain  Description: Control of acute pain  12/24/2021 0218 by Opal Arredondo RN  Outcome: Met This Shift  12/24/2021 0218 by Opal Arredondo RN  Outcome: Met This Shift  Goal: Control of chronic pain  Description: Control of chronic pain  12/24/2021 0218 by Opal Arredondo RN  Outcome: Met This Shift  12/24/2021 0218 by Opal Arredondo RN  Outcome: Met This Shift

## 2021-12-25 ENCOUNTER — APPOINTMENT (OUTPATIENT)
Dept: GENERAL RADIOLOGY | Age: 86
DRG: 459 | End: 2021-12-25
Attending: NEUROLOGICAL SURGERY
Payer: MEDICARE

## 2021-12-25 LAB
ANION GAP SERPL CALCULATED.3IONS-SCNC: 12 MMOL/L (ref 7–16)
BLOOD CULTURE, ROUTINE: NORMAL
BUN BLDV-MCNC: 28 MG/DL (ref 6–23)
CALCIUM SERPL-MCNC: 8.9 MG/DL (ref 8.6–10.2)
CHLORIDE BLD-SCNC: 104 MMOL/L (ref 98–107)
CO2: 26 MMOL/L (ref 22–29)
CREAT SERPL-MCNC: 1 MG/DL (ref 0.7–1.2)
CULTURE, BLOOD 2: NORMAL
GFR AFRICAN AMERICAN: >60
GFR NON-AFRICAN AMERICAN: >60 ML/MIN/1.73
GLUCOSE BLD-MCNC: 163 MG/DL (ref 74–99)
POTASSIUM REFLEX MAGNESIUM: 3.9 MMOL/L (ref 3.5–5)
PRO-BNP: 5481 PG/ML (ref 0–450)
SARS-COV-2, NAAT: NOT DETECTED
SODIUM BLD-SCNC: 142 MMOL/L (ref 132–146)

## 2021-12-25 PROCEDURE — 6360000002 HC RX W HCPCS: Performed by: INTERNAL MEDICINE

## 2021-12-25 PROCEDURE — 6370000000 HC RX 637 (ALT 250 FOR IP): Performed by: INTERNAL MEDICINE

## 2021-12-25 PROCEDURE — 6370000000 HC RX 637 (ALT 250 FOR IP): Performed by: NEUROLOGICAL SURGERY

## 2021-12-25 PROCEDURE — 2580000003 HC RX 258: Performed by: INTERNAL MEDICINE

## 2021-12-25 PROCEDURE — 6370000000 HC RX 637 (ALT 250 FOR IP): Performed by: FAMILY MEDICINE

## 2021-12-25 PROCEDURE — 36415 COLL VENOUS BLD VENIPUNCTURE: CPT

## 2021-12-25 PROCEDURE — 83880 ASSAY OF NATRIURETIC PEPTIDE: CPT

## 2021-12-25 PROCEDURE — 2700000000 HC OXYGEN THERAPY PER DAY

## 2021-12-25 PROCEDURE — 74018 RADEX ABDOMEN 1 VIEW: CPT

## 2021-12-25 PROCEDURE — 2060000000 HC ICU INTERMEDIATE R&B

## 2021-12-25 PROCEDURE — 2580000003 HC RX 258: Performed by: FAMILY MEDICINE

## 2021-12-25 PROCEDURE — 87635 SARS-COV-2 COVID-19 AMP PRB: CPT

## 2021-12-25 PROCEDURE — 71045 X-RAY EXAM CHEST 1 VIEW: CPT

## 2021-12-25 PROCEDURE — 51798 US URINE CAPACITY MEASURE: CPT

## 2021-12-25 PROCEDURE — 6360000002 HC RX W HCPCS: Performed by: FAMILY MEDICINE

## 2021-12-25 PROCEDURE — 6370000000 HC RX 637 (ALT 250 FOR IP): Performed by: PHYSICIAN ASSISTANT

## 2021-12-25 PROCEDURE — 80048 BASIC METABOLIC PNL TOTAL CA: CPT

## 2021-12-25 PROCEDURE — 94640 AIRWAY INHALATION TREATMENT: CPT

## 2021-12-25 PROCEDURE — 99232 SBSQ HOSP IP/OBS MODERATE 35: CPT | Performed by: INTERNAL MEDICINE

## 2021-12-25 PROCEDURE — 2580000003 HC RX 258: Performed by: PHYSICIAN ASSISTANT

## 2021-12-25 PROCEDURE — 99024 POSTOP FOLLOW-UP VISIT: CPT | Performed by: NEUROLOGICAL SURGERY

## 2021-12-25 RX ORDER — FUROSEMIDE 20 MG/1
20 TABLET ORAL 2 TIMES DAILY
Status: DISCONTINUED | OUTPATIENT
Start: 2021-12-26 | End: 2021-12-30 | Stop reason: HOSPADM

## 2021-12-25 RX ORDER — DEXTROSE AND SODIUM CHLORIDE 5; .45 G/100ML; G/100ML
INJECTION, SOLUTION INTRAVENOUS CONTINUOUS
Status: DISCONTINUED | OUTPATIENT
Start: 2021-12-25 | End: 2021-12-28

## 2021-12-25 RX ORDER — LIDOCAINE 4 G/G
1 PATCH TOPICAL DAILY
Status: DISCONTINUED | OUTPATIENT
Start: 2021-12-25 | End: 2021-12-30 | Stop reason: HOSPADM

## 2021-12-25 RX ADMIN — METOPROLOL TARTRATE 50 MG: 50 TABLET, FILM COATED ORAL at 20:47

## 2021-12-25 RX ADMIN — SENNOSIDES AND DOCUSATE SODIUM 1 TABLET: 50; 8.6 TABLET ORAL at 20:46

## 2021-12-25 RX ADMIN — ACETAMINOPHEN 650 MG: 325 TABLET ORAL at 16:14

## 2021-12-25 RX ADMIN — PRAVASTATIN SODIUM 10 MG: 10 TABLET ORAL at 20:47

## 2021-12-25 RX ADMIN — IPRATROPIUM BROMIDE 0.5 MG: 0.5 SOLUTION RESPIRATORY (INHALATION) at 11:03

## 2021-12-25 RX ADMIN — FLECAINIDE ACETATE 50 MG: 50 TABLET ORAL at 20:47

## 2021-12-25 RX ADMIN — POLYETHYLENE GLYCOL 3350 17 G: 17 POWDER, FOR SOLUTION ORAL at 10:11

## 2021-12-25 RX ADMIN — DILTIAZEM HYDROCHLORIDE 30 MG: 30 TABLET, FILM COATED ORAL at 20:46

## 2021-12-25 RX ADMIN — SENNOSIDES AND DOCUSATE SODIUM 1 TABLET: 50; 8.6 TABLET ORAL at 11:18

## 2021-12-25 RX ADMIN — SODIUM CHLORIDE, PRESERVATIVE FREE 10 ML: 5 INJECTION INTRAVENOUS at 04:22

## 2021-12-25 RX ADMIN — SODIUM CHLORIDE, PRESERVATIVE FREE 10 ML: 5 INJECTION INTRAVENOUS at 07:07

## 2021-12-25 RX ADMIN — METHYLPREDNISOLONE SODIUM SUCCINATE 20 MG: 40 INJECTION, POWDER, FOR SOLUTION INTRAMUSCULAR; INTRAVENOUS at 22:44

## 2021-12-25 RX ADMIN — METOPROLOL TARTRATE 50 MG: 50 TABLET, FILM COATED ORAL at 10:10

## 2021-12-25 RX ADMIN — SODIUM CHLORIDE, PRESERVATIVE FREE 10 ML: 5 INJECTION INTRAVENOUS at 00:24

## 2021-12-25 RX ADMIN — SODIUM CHLORIDE, PRESERVATIVE FREE 10 ML: 5 INJECTION INTRAVENOUS at 10:14

## 2021-12-25 RX ADMIN — METHYLPREDNISOLONE SODIUM SUCCINATE 20 MG: 40 INJECTION, POWDER, FOR SOLUTION INTRAMUSCULAR; INTRAVENOUS at 16:59

## 2021-12-25 RX ADMIN — DILTIAZEM HYDROCHLORIDE 30 MG: 30 TABLET, FILM COATED ORAL at 07:07

## 2021-12-25 RX ADMIN — PIPERACILLIN AND TAZOBACTAM 3375 MG: 3; .375 INJECTION, POWDER, LYOPHILIZED, FOR SOLUTION INTRAVENOUS at 15:50

## 2021-12-25 RX ADMIN — APIXABAN 5 MG: 5 TABLET, FILM COATED ORAL at 10:10

## 2021-12-25 RX ADMIN — AMPICILLIN SODIUM AND SULBACTAM SODIUM 3000 MG: 2; 1 INJECTION, POWDER, FOR SOLUTION INTRAMUSCULAR; INTRAVENOUS at 00:23

## 2021-12-25 RX ADMIN — ACETAMINOPHEN 650 MG: 325 TABLET ORAL at 11:18

## 2021-12-25 RX ADMIN — ACETAMINOPHEN 650 MG: 325 TABLET ORAL at 00:36

## 2021-12-25 RX ADMIN — BISACODYL 5 MG: 5 TABLET, COATED ORAL at 10:10

## 2021-12-25 RX ADMIN — FUROSEMIDE 20 MG: 20 TABLET ORAL at 10:11

## 2021-12-25 RX ADMIN — AMPICILLIN SODIUM AND SULBACTAM SODIUM 3000 MG: 2; 1 INJECTION, POWDER, FOR SOLUTION INTRAMUSCULAR; INTRAVENOUS at 07:07

## 2021-12-25 RX ADMIN — METHYLPREDNISOLONE SODIUM SUCCINATE 20 MG: 40 INJECTION, POWDER, FOR SOLUTION INTRAMUSCULAR; INTRAVENOUS at 10:09

## 2021-12-25 RX ADMIN — APIXABAN 5 MG: 5 TABLET, FILM COATED ORAL at 20:47

## 2021-12-25 RX ADMIN — DILTIAZEM HYDROCHLORIDE 30 MG: 30 TABLET, FILM COATED ORAL at 14:14

## 2021-12-25 RX ADMIN — DILTIAZEM HYDROCHLORIDE 30 MG: 30 TABLET, FILM COATED ORAL at 00:23

## 2021-12-25 RX ADMIN — FLECAINIDE ACETATE 50 MG: 50 TABLET ORAL at 10:11

## 2021-12-25 RX ADMIN — DEXTROSE AND SODIUM CHLORIDE: 5; 450 INJECTION, SOLUTION INTRAVENOUS at 15:45

## 2021-12-25 RX ADMIN — IPRATROPIUM BROMIDE 0.5 MG: 0.5 SOLUTION RESPIRATORY (INHALATION) at 16:02

## 2021-12-25 RX ADMIN — METHYLPREDNISOLONE SODIUM SUCCINATE 20 MG: 40 INJECTION, POWDER, FOR SOLUTION INTRAMUSCULAR; INTRAVENOUS at 04:22

## 2021-12-25 RX ADMIN — SODIUM PHOSPHATE 1 ENEMA: 7; 19 ENEMA RECTAL at 13:01

## 2021-12-25 ASSESSMENT — PAIN DESCRIPTION - PROGRESSION: CLINICAL_PROGRESSION: NOT CHANGED

## 2021-12-25 ASSESSMENT — PAIN SCALES - GENERAL
PAINLEVEL_OUTOF10: 8
PAINLEVEL_OUTOF10: 4
PAINLEVEL_OUTOF10: 8
PAINLEVEL_OUTOF10: 5
PAINLEVEL_OUTOF10: 8

## 2021-12-25 ASSESSMENT — PAIN DESCRIPTION - ORIENTATION: ORIENTATION: LOWER

## 2021-12-25 ASSESSMENT — PAIN DESCRIPTION - LOCATION: LOCATION: BACK

## 2021-12-25 NOTE — PROGRESS NOTES
Hospitalist progress note    Patient:  Leona Salguero  YOB: 1935  Date of Service: 12/25/21  MRN: [id number]   Acct:  [de-identified]   Primary Care Physician: Bryan Busch MD  12/14/2021     Patient Seen, Chart, Consults notes, Labs, Radiology, family and social history were reviewed. This is an 82years old male patient with past medical history of hypertension patient was admitted because of L2-L5 laminectomy and fusion. Postoperative the pain was getting better by time. Patient history of hypertension which was controlled with medication  Postoperative the patient developed acute respiratory failure requiring high oxygen and the patient was diagnosed with aspiration pneumonia. Patient has new onset A. fib with RVR on December 19 and he was cardioverted on December 22 to normal sinus rhythm      Chief Complaint: Constipation and hypoxia        Subjective     The patient is a 82 y.o. male who presents with back pain, postoperative. Patient still complains of back pain but the pain is getting markedly better by time. Patient complains of shortness of breath with minimal exertion and he is hypoxic.   Patient still complains of cough  Today the patient also complains of abdominal discomfort and unable to urinate                      Review of systems:    All 10 review of system were negative unless what is mentioned in the present history               PHYSICAL EXAM:  BP (!) 152/68   Pulse 88   Temp 97 °F (36.1 °C) (Temporal)   Resp 16   Ht 6' 1\" (1.854 m)   Wt 200 lb (90.7 kg)   SpO2 92%   BMI 26.39 kg/m²     General appearance - alert, well appearing, and in no distress   Head: atraumatic   Pupil: equal, round reactive to light   Neck: Supple, trachea is midline   Chest -++ wheezes,    Distant Breath Sounds: No   Heart - S1 and S2 normal   Abdomen - soft, nontender, nondistended, no masses or organomegaly   normal without focal findings, mental status, speech normal, alert and oriented x3, AIDEN and reflexes normal and symmetric   Integumentary - Skin color, texture, turgor normal. No rashes or lesions. Musculoskeletal -tender back  Extremities - peripheral pulses normal, no pedal edema, no clubbing or cyanosis times 4         Review of Labs and Diagnostic Testing:         Echo Complete    Result Date: 12/22/2021  Transthoracic Echocardiography Report (TTE)  Demographics   Patient Name    Jeri Byers  Gender            Male                  W   Medical Record  [id number]     Room Number       [address] [facility] [place]  Number   Account #       [[id number]]    Procedure Date    12/22/2021   Corporate ID                 Ordering          Ozzy Garsia MD                               Physician   Accession       [id number]   Referring  Number                       Physician   Date of Birth   1935   Sonographer       Gil Rodgers RCS   Age             82 year(s)   Interpreting      [address]                               Physician         Physician Cardiology                                                 Josh Luna MD                                Any Other  Procedure Type of Study   TTE procedure:Echo Complete W/Doppler & Color Flow, Echo W/Bubble Study. Procedure Date Date: 12/22/2021 Start: 09:40 AM Study Location: CVL Technical Quality: Adequate visualization Indications:Atrial fibrillation. Patient Status: Routine Height: 73 inches Weight: 200 pounds BSA: 2.15 m^2 BMI: 26.39 kg/m^2 Rhythm: Within normal limits HR: 89 bpm BP: 155/91 mmHg  Findings   Left Ventricle  Normal left ventricular chamber size. Normal left ventricular systolic  function. Visually estimated LVEF is 60 %. No wall motion abnormalities. Grade II diastolic dysfunction. Right Ventricle  Normal right ventricle structure and function. Left Atrium  Normal left atrium. Right Atrium  Normal right atrium. Mitral Valve  Physiologic and/or trace mitral regurgitation is present.   No evidence of hemodynamically significant mitral stenosis. Tricuspid Valve  Mild tricuspid regurgitation. Aortic Valve  Trileaflet aortic valve. Normal leaflet mobility. No aortic stenosis. Trace aortic regurgitation. Pulmonic Valve  Mild-to-moderate pulmonic regurgitation present. Pericardial Effusion  No evidence for hemodynamically significant pericardial effusion. Pleural Effusion  No evidence of pleural effusion. Aorta  Normal aortic root and ascending aorta. Miscellaneous  The inferior vena cava diameter is normal with normal respiratory  variation. Conclusions   Summary  Normal left ventricular chamber size. Normal left ventricular systolic  function. Visually estimated LVEF is 60 %. No wall motion abnormalities. Grade II diastolic dysfunction. Normal right ventricle structure and function. Normal left atrium. Normal right atrium. Mild tricuspid regurgitation. Mild-to-moderate pulmonic regurgitation present. Unable to estimate PA pressure. Agitated saline contrast study shows late right to left shunting  suggestive of an intra-pulmonary shunt. No comparison study available.    Signature   ----------------------------------------------------------------  Electronically signed by Ankita Najera MD(Interpreting  physician) on 12/22/2021 10:57 AM  ----------------------------------------------------------------  M-Mode/2D Measurements & Calculations   LV Diastolic     LV Systolic Dimension: 2.7 cm   AV Cusp Separation: 2.1  Dimension: 3.7   LV Volume Diastolic: 80.0 ml    cmAO Root Dimension: 3.7  cm               LV Volume Systolic: 22.8 ml     cm  LV FS:27 %       LV EDV/LV EDV Index: 57.5 CI/20  LV PW Diastolic: NP/L^7QH ESV/LV ESV Index: 25.9  0.9 cm           ml/12ml/ m^2  LV PW Systolic:  EF Calculated: 55 %             RV Diastolic Dimension:  1.4 cm           LV Mass Index: 92 l/min*m^2     3.7 cm  Septum           LV Length: 8.9 cm  Diastolic: 2 cm                                  Ascending Aorta: 3.8 cm  Septum Systolic: LVOT: 2.4 cm                    LA volume/Index: 68.6 ml  1.6 cm                                           /31.92ml/m^2  CO: 10.3 l/min                                   RA Area: 14.2 cm^2  CI: 4.79 l/m*m^2  LV Mass: 197.65  g  Doppler Measurements & Calculations   MV Peak E-Wave: 1.16 AV Peak Velocity: 1.37 LVOT Peak Velocity: 1.22 m/s  m/s                  m/s                    LVOT Mean Velocity: 0.86 m/s  MV Peak A-Wave: 0.96 AV Peak Gradient: 7.55 LVOT Peak Gradient: 6 mmHgLVOT  m/s                  mmHg                   Mean Gradient: 3.3 mmHg  MV E/A Ratio: 1.21   AV Mean Velocity: 0.98  MV Peak Gradient:    m/s  7.9 mmHg             AV Mean Gradient: 4.3  MV Mean Gradient:    mmHg                   TR Velocity:4.13 m/s  3.7 mmHg             AV VTI: 28.9 cm        TR Gradient:68.26 mmHg  MV Mean Velocity:    AV Area  0.92 m/s             (Continuity):4.01 cm^2  MV Deceleration  Time: 217 msec       LVOT VTI: 25.6 cm      VT ED Velocity: 1.3 m/s  MV P1/2t: 86 msec  MVA by PHT:2.56 cm^2  MV Area  (continuity): 3.5  cm^2  MV E' Septal  Velocity: 0.05 m/s  MV E' Lateral  Velocity: 6 m/s  [email or web address] [id number]    XR CHEST (2 VW)    Result Date: 12/22/2021  EXAMINATION: TWO XRAY VIEWS OF THE CHEST 12/22/2021 3:17 pm COMPARISON: December 22, 2021 at 15:04, December 18, 2021 HISTORY: ORDERING SYSTEM PROVIDED HISTORY: reevaluate right infiltrate TECHNOLOGIST PROVIDED HISTORY: Reason for exam:->reevaluate right infiltrate What reading provider will be dictating this exam?->CRC FINDINGS: Heart size is unable to be accurately assessed on this single portable view of the chest, but appears to be stable. Previously described right lung airspace opacities with a reticular and patchy appearance have increased since December 18, 2021. No evidence of a sizable pleural effusion. No pneumothorax.   No acute osseous abnormality. Increased reticular and patchy airspace opacities in the right lung. These are nonspecific and may be on the basis of multifocal pneumonia or (less likely) asymmetric pulmonary edema. Atypical/viral pneumonia not excluded. XR CHEST (2 VW)    Result Date: 12/9/2021  EXAMINATION: TWO XRAY VIEWS OF THE CHEST 12/9/2021 11:09 am COMPARISON: None. HISTORY: ORDERING SYSTEM PROVIDED HISTORY: Pre-op evaluation TECHNOLOGIST PROVIDED HISTORY: Reason for exam:->pre op What reading provider will be dictating this exam?->CRC FINDINGS: Slight peripheral coarsening of interstitial markings may indicate mild fibrosis. Heart and pulmonary vascularity are normal.  Neither costophrenic angle is blunted. Subtle findings which may indicate mild interstitial fibrosis. XR ABDOMEN (KUB) (SINGLE AP VIEW)    Result Date: 12/16/2021  EXAMINATION: ONE SUPINE XRAY VIEW(S) OF THE ABDOMEN 12/16/2021 2:41 pm COMPARISON: None. HISTORY: ORDERING SYSTEM PROVIDED HISTORY: post-op ileus TECHNOLOGIST PROVIDED HISTORY: Reason for exam:->post-op ileus What reading provider will be dictating this exam?->CRC FINDINGS: Air scattered small and large bowel loops. Dilated small bowel measures 3.6 cm. Dilated colon measures 8.7 cm at the level the transverse. Skin in seizure in with skin staples vertically noted over the mid lower abdomen. Postoperative change spinal fusion and laminectomy noted lumbar spine. Surgical clips right upper quadrant. Dilated small and large bowel loops suggest generalized ileus. Clinical correlation and follow-up to resolution recommended.      XR ACUTE ABD SERIES CHEST 1 VW    Result Date: 12/22/2021  EXAMINATION: TWO XRAY VIEWS OF THE ABDOMEN AND SINGLE  XRAY VIEW OF THE CHEST 12/22/2021 3:16 pm COMPARISON: Chest radiograph December 22, 2021 HISTORY: ORDERING SYSTEM PROVIDED HISTORY: repeat/assess respiratory status TECHNOLOGIST PROVIDED HISTORY: Reason for exam:->repeat/assess respiratory status What reading provider will be dictating this exam?->CRC FINDINGS: The evaluation is limited due to low lung volumes. Trachea is midline. Cardiomediastinal silhouette is stable in size. Persistent airspace opacity identified in right lung lower lung. Multiple air-filled loops of colon identified in the abdomen. No abnormal abdominal calcifications. Bowel gas pattern is nonspecific nonobstructive. No evidence of bowel obstruction. The evaluation is limited due to low lung volumes. Opacities in right lung may represent pneumonia versus atelectasis in the proper clinical setting. XR CHEST PORTABLE    Result Date: 12/24/2021  EXAMINATION: ONE XRAY VIEW OF THE CHEST 12/24/2021 12:31 pm COMPARISON: December 22, 2021 HISTORY: ORDERING SYSTEM PROVIDED HISTORY: cough TECHNOLOGIST PROVIDED HISTORY: Reason for exam:->cough What reading provider will be dictating this exam?->CRC FINDINGS: Redemonstration of interstitial and hazy opacities bilaterally notable in mid right lung field and right lung base. Opacities have increased in right lung base now silhouetting right hemidiaphragm. The heart appears prominent in size. No pneumothorax. Redemonstration of bilateral pneumonia or interstitial pulmonary edema. Opacities have increased in right lung base. XR CHEST PORTABLE    Result Date: 12/18/2021  EXAMINATION: ONE XRAY VIEW OF THE CHEST 12/18/2021 8:38 pm COMPARISON: 12/16/2021 HISTORY: ORDERING SYSTEM PROVIDED HISTORY: Chest Pain TECHNOLOGIST PROVIDED HISTORY: Reason for exam:->Chest Pain What reading provider will be dictating this exam?->CRC FINDINGS: EKG leads overlie the chest.  Stable elevation of the right diaphragm with patchy opacification of the right lung base, appearing slightly worsened. Minimal left basal opacities are also increased. Moderate gaseous distention of bowel in the visualized upper abdomen. No pneumothorax. No other change.      Slight worsening of right greater than left basal opacities which may represent atelectasis or infiltrates from pneumonia. Continued follow-up recommended. XR CHEST PORTABLE    Result Date: 12/16/2021  EXAMINATION: ONE XRAY VIEW OF THE CHEST 12/16/2021 4:19 pm COMPARISON: 12/09/2021 HISTORY: ORDERING SYSTEM PROVIDED HISTORY: hypoxia TECHNOLOGIST PROVIDED HISTORY: Reason for exam:->hypoxia What reading provider will be dictating this exam?->CRC FINDINGS: Right lower lobe atelectasis and/or infiltrate. .  There is no effusion or pneumothorax. The cardiomediastinal silhouette is without acute process. The osseous structures are without acute process. Right lower lobe atelectasis and/or infiltrate. CTA PULMONARY W CONTRAST    Result Date: 12/16/2021  EXAMINATION: CTA OF THE CHEST 12/16/2021 3:32 pm TECHNIQUE: CTA of the chest was performed after the administration of intravenous contrast.  Multiplanar reformatted images are provided for review. MIP images are provided for review. Dose modulation, iterative reconstruction, and/or weight based adjustment of the mA/kV was utilized to reduce the radiation dose to as low as reasonably achievable. COMPARISON: None. HISTORY: ORDERING SYSTEM PROVIDED HISTORY: CHEST PAIN AND SOB R/O PE TECHNOLOGIST PROVIDED HISTORY: Reason for exam:->CHEST PAIN AND SOB R/O PE What reading provider will be dictating this exam?->CRC FINDINGS: No thoracic aortic aneurysm or dissection. Mild-to-moderate scattered calcified plaque in the thoracic aorta. No evidence of pulmonary embolism. Mild cardiomegaly. Coronary atherosclerosis. No pericardial effusion. Shotty subcentimeter short axis mediastinal hilar nodes. No pleural effusion or pneumothorax. Low lung volumes. There are a few small peripheral densities in the lungs which may represent scarring or infection (for example series 4, image 73). Another curvilinear density is noted the medial left lower lobe (series 4 image 168).   Mild dependent atelectasis, right greater than left. The central airways are patent. There is centrilobular and paraseptal emphysema. Degenerative changes of the spine. The visualized abdomen demonstrates changes of cholecystectomy and the splenic parenchymal calcification. No evidence of pulmonary embolism. No acute aortic injury or aneurysm. There are a few nonspecific densities in the peripheral lungs. Imaging features can be seen with COVID-19 pneumonia, though are nonspecific and can occur with a variety of infectious and noninfectious processes. Mild cardiomegaly. Atherosclerotic disease. FLUORO FOR SURGICAL PROCEDURES    Result Date: 12/14/2021  EXAMINATION: SPOT FLUOROSCOPIC IMAGES 12/14/2021 10:42 am TECHNIQUE: Fluoroscopy was provided by the radiology department for procedure. Radiologist was not present during examination. FLUOROSCOPY DOSE AND TYPE OR TIME AND EXPOSURES: Fluoroscopy time equals 10.8 seconds. Total dose equals 15.92 mGy COMPARISON: None HISTORY: ORDERING SYSTEM PROVIDED HISTORY: Back pain, unspecified back location, unspecified back pain laterality, unspecified chronicity TECHNOLOGIST PROVIDED HISTORY: Reason for exam:->Back pain, unspecified back location, unspecified back pain laterality, unspecified chronicity What reading provider will be dictating this exam?->CRC Intraprocedural imaging. FINDINGS: 2 spot images of the lumbar spine were obtained. Intraprocedural fluoroscopic spot images as above. See separate procedure report for more information.      Recent Results (from the past 24 hour(s))   Brain Natriuretic Peptide    Collection Time: 12/25/21  5:08 AM   Result Value Ref Range    Pro-BNP 5,481 (H) 0 - 450 pg/mL   Basic Metabolic Panel w/ Reflex to MG    Collection Time: 12/25/21  5:08 AM   Result Value Ref Range    Sodium 142 132 - 146 mmol/L    Potassium reflex Magnesium 3.9 3.5 - 5.0 mmol/L    Chloride 104 98 - 107 mmol/L    CO2 26 22 - 29 mmol/L    Anion Gap 12 7 - 16 mmol/L    Glucose 163 (H) PRN  oxyCODONE (ROXICODONE) immediate release tablet 5 mg, 5 mg, Oral, Q4H PRN **OR** oxyCODONE HCl (OXY-IR) immediate release tablet 10 mg, 10 mg, Oral, Q4H PRN  HYDROmorphone (DILAUDID) injection 0.5 mg, 0.5 mg, IntraVENous, Q3H PRN  cyclobenzaprine (FLEXERIL) tablet 10 mg, 10 mg, Oral, TID PRN  polyethylene glycol (GLYCOLAX) packet 17 g, 17 g, Oral, Daily  bisacodyl (DULCOLAX) EC tablet 5 mg, 5 mg, Oral, Daily  sennosides-docusate sodium (SENOKOT-S) 8.6-50 MG tablet 1 tablet, 1 tablet, Oral, BID  magnesium hydroxide (MILK OF MAGNESIA) 400 MG/5ML suspension 30 mL, 30 mL, Oral, Daily PRN  fleet rectal enema 1 enema, 1 enema, Rectal, Daily PRN   Hospital Problems           Last Modified POA    Spinal stenosis of lumbar region 12/14/2021 Yes    Spondylolisthesis, lumbar region 12/14/2021 Yes    Lumbar degenerative disc disease 12/15/2021 Yes    Chest pain 12/17/2021 Yes    Elevated troponin 12/17/2021 Yes    Primary hypertension 12/17/2021 Yes    Puerperal sepsis with acute hypoxic respiratory failure ([address] 12/17/2021 Yes    Hyperlipidemia 12/17/2021 Yes    Pulmonary fibrosis ([address] 12/17/2021 Yes                  Assessment and Plan:     Postoperative aspiration pneumonia, x-ray shows worsening of the pneumonia so I start Zosyn, cultures were negative, appreciated pulmonary input   Patient has history of Covid pneumonia, I reorder Covid test for possible omicron variant   Ileus, hold Dilaudid and use Tylenol and Lidoderm patch if there is no help we will add tramadol   History of pulmonary fibrosis which make the pneumonia and the Covid worse   Hypertension stable   Atrial fibrillation with rapid ventricular response, continue Cardizem Eliquis and Lopressor   Continue DVT &  GERD prophylaxis        Electronically signed by Yael Coleman MD on 12/25/2021 at 7:23 AM

## 2021-12-25 NOTE — PROGRESS NOTES
Notified Dr Eloy Lucas of pulmonology consult      Dr Shemar Marr already seeing patient, consult discontinued

## 2021-12-25 NOTE — PROGRESS NOTES
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  Start on systemic steroids for fibrosis      OBJECTIVE:     PHYSICAL EXAM:   VITALS:   Vitals:    21 0033 21 0740 21 1010   BP: 120/74 (!) 152/68 (!) 172/77    Pulse: 123 88 91 95   Resp: 15 16 22    Temp: 97.7 °F (36.5 °C) 97 °F (36.1 °C) 97.5 °F (36.4 °C)    TempSrc: Temporal Temporal Temporal    SpO2: 93% 92% 95%    Weight:       Height:            Intake/Output Summary (Last 24 hours) at 2021 1027  Last data filed at 2021 0100  Gross per 24 hour   Intake --   Output 150 ml   Net -150 ml        CONSTITUTIONAL:   A&O x 3  SKIN:     Kelechi, Fair skin with shin discoloration  HEENT:     EOMI, MMM, No thrush  NECK:    No bruits, No JVP apprechiated  CV:      Sinus TR murmur, No rubs, No gallops  PULMONARY:   Couse BS,  Dry crackles right lung  ABDOMEN:     Soft, non-tender. BS normal. No R/R/G  EXT:    No deformities . No clubbing. Trace lower extremity edema, + venous stasis  PULSE:   Appears equal and palpable. PSYCHIATRIC:  No acute psycosis  MS:    Gross weakness- stable  NEUROLOGIC:   The clinical assessment is non-focal     DATA: IMAGING & TESTING:     LABORATORY TESTS:    ECHOCARDIOGRAM:    Normal left ventricular chamber size. Normal left ventricular systolic    function.  Visually estimated LVEF is 60 %.    No wall motion abnormalities.    Grade II diastolic dysfunction.    Normal right ventricle structure and function.    Normal left atrium.    Normal right atrium.    Mild tricuspid regurgitation.    Mild-to-moderate pulmonic regurgitation present.    Unable to estimate PA pressure.    Agitated saline contrast study shows late right to left shunting    suggestive of an intra-pulmonary shunt.    No comparison study available. CBC:   Lab Results   Component Value Date    WBC 11.6 12/22/2021    RBC 3.35 12/22/2021    HGB 10.1 12/22/2021    HCT 31.5 12/22/2021    MCV 94.0 12/22/2021    MCH 30.1 12/22/2021    MCHC 32.1 12/22/2021    RDW 13.5 12/22/2021     12/22/2021    MPV 9.6 12/22/2021     CMP:    Lab Results   Component Value Date     12/25/2021    K 3.9 12/25/2021     12/25/2021    CO2 26 12/25/2021    BUN 28 12/25/2021    CREATININE 1.0 12/25/2021    GFRAA >60 12/25/2021    LABGLOM >60 12/25/2021    GLUCOSE 163 12/25/2021    PROT 5.9 12/21/2021    LABALBU 3.1 12/21/2021    CALCIUM 8.9 12/25/2021    BILITOT 0.6 12/21/2021    ALKPHOS 68 12/21/2021    AST 17 12/21/2021    ALT 18 12/21/2021     Magnesium:    Lab Results   Component Value Date    MG 2.3 12/19/2021     Phosphorus:  No results found for: PHOS     PRO-BNP:   Lab Results   Component Value Date    PROBNP 5,481 (H) 12/25/2021      ABGs:   Lab Results   Component Value Date    PH 7.394 12/16/2021    PO2 51.0 12/16/2021    PCO2 45.8 12/16/2021     Hemoglobin A1C: No components found for: HGBA1C    IMAGING:  Imaging tests were completed and reviewed and discussed radiology and care team involved and reveals       CHEST FILM 12/22  Increased reticular and patchy airspace opacities in the right lung.  These  are nonspecific and may be on the basis of multifocal pneumonia or (less  likely) asymmetric pulmonary edema.  Atypical/viral pneumonia not excluded.           CHEST FILM COMPARED 12/18:            XR ABDOMEN (KUB) (SINGLE AP VIEW)  Result Date: 12/16/2021Dilated small and large bowel loops suggest generalized ileus. Clinical correlation and follow-up to resolution recommended. MRI LUMBAR SPINE WO CONTRAST  Result Date: 11/24/2021  Multilevel degenerative disc disease with associated facet and ligamentous hypertrophy resulting in canal stenosis at L3-4 and L4-5. Bilateral foraminal narrowing as described above. CTA PULMONARY W CONTRAST  Result Date: 12/16/2021: FINDINGS: No thoracic aortic aneurysm or dissection. Mild-to-moderate scattered calcified plaque in the thoracic aorta. No evidence of pulmonary embolism. Mild cardiomegaly. Coronary atherosclerosis. No pericardial effusion. Shotty subcentimeter short axis mediastinal hilar nodes. No pleural effusion or pneumothorax. Low lung volumes. There are a few small peripheral densities in the lungs which may represent scarring or infection (for example series 4, image 73). Another curvilinear density is noted the medial left lower lobe (series 4 image 168). Mild dependent atelectasis, right greater than left. The central airways are patent. There is centrilobular and paraseptal emphysema. Degenerative changes of the spine. The visualized abdomen demonstrates changes of cholecystectomy and the splenic parenchymal calcification. No evidence of pulmonary embolism. No acute aortic injury or aneurysm. There are a few nonspecific densities in the peripheral lungs. Imaging features can be seen with COVID-19 pneumonia, though are nonspecific and can occur with a variety of infectious and noninfectious processes. Mild cardiomegaly. Atherosclerotic disease. Assessment:   1. Post op respiratory failure  2. Hypoxemic Respiratory Failure   3. CT evidence of disease of the lung  4. Plueral plaques ? Old infection or possible asbestosis  5. Mild-to-moderate pulmonic regurgitation present.    6.  Agitated saline contrast study shows late right to left shunting    suggestive of an intra-pulmonary shunt. 7. CT show sutttble changes of pulmonary fibrosis (periphearl)  8. New onset atrial fibrillation RVR.  [id number] 4 (age, hypertension, PAD)   9. Atypical CP.  Recent preoperative stress test negative for ischemia  10. Post op respiratory failure, on 9 L high flow nasal cannula  11. Postoperative ileus  12. 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 Hypertension  15. Hx R CEA  16. GERD  17. Hx COVID 19 5/2021          Plan:   1. CTA negative for PE but noted atelectasis, new decrease sats?, CXR with change yet even thought procal is negative I will start Abx for aspiration pneumonia. 2. Most of the finding are chronic, some fibrosis for sure   3. Continue ICS,   4. Bronchodialators QID  5. Wean oxygen as able   6. Day 7/7 of antibiotics - all cultures negative  7. CXR with HF changes - increase lasix with BNP > 5 K  8. Start systemic steroids again for lung if oxygen doesn't improve after TOBIAS and cardioversion  9. Will need home oxygen   10. Serologic testing for ISLD needed and ordered  = negative  11.  Diuresis and daily BNP  12. Spoke with daughter and answered questions    Marvin Parker DO DO, MPH, [facility]  Professor of Internal Medicine  Pulmonary, Critical Care and Sleep Medicine

## 2021-12-25 NOTE — PROGRESS NOTES
Perfect serve sent to Dr. Hero Eid. Patient ordered christie insertion for post void residual 691 but voided 375, christie order discontinued.

## 2021-12-25 NOTE — PROGRESS NOTES
Have called central supply and multiple floors to obtain bladder scanner.  Unable to obtain at this time

## 2021-12-25 NOTE — PROGRESS NOTES
Department of Neurosurgery  Progress Note    CHIEF COMPLAINT: lumbar fusion    SUBJECTIVE:  C/o abdominal distention    REVIEW OF SYSTEMS :  Constitutional: Negative for chills and fever. Neurological: Negative for dizziness, tremors and speech change. OBJECTIVE:   VITALS:  BP (!) 172/77   Pulse 95   Temp 97.5 °F (36.4 °C) (Temporal)   Resp 22   Ht 6' 1\" (1.854 m)   Wt 200 lb (90.7 kg)   SpO2 95%   BMI 26.39 kg/m²   PHYSICAL:  CONSTITUTIONAL:  awake, alert, cooperative, no apparent distress, and appears stated age and BECKHAM.   FC  Incision: c/d/i  Abdomen distended with tympany  DATA:  CBC:   Lab Results   Component Value Date    WBC 11.6 12/22/2021    RBC 3.35 12/22/2021    HGB 10.1 12/22/2021    HCT 31.5 12/22/2021    MCV 94.0 12/22/2021    MCH 30.1 12/22/2021    MCHC 32.1 12/22/2021    RDW 13.5 12/22/2021     12/22/2021    MPV 9.6 12/22/2021     BMP:    Lab Results   Component Value Date     12/25/2021    K 3.9 12/25/2021     12/25/2021    CO2 26 12/25/2021    BUN 28 12/25/2021    LABALBU 3.1 12/21/2021    CREATININE 1.0 12/25/2021    CALCIUM 8.9 12/25/2021    GFRAA >60 12/25/2021    LABGLOM >60 12/25/2021    GLUCOSE 163 12/25/2021     PT/INR:    Lab Results   Component Value Date    PROTIME 12.7 12/09/2021    INR 1.2 12/09/2021     PTT:  No results found for: APTT, PTT[APTT}    Current Inpatient Medications  Current Facility-Administered Medications: [START ON 12/26/2021] furosemide (LASIX) tablet 20 mg, 20 mg, Oral, BID  methylPREDNISolone sodium (SOLU-MEDROL) injection 20 mg, 20 mg, IntraVENous, Q6H  flecainide (TAMBOCOR) tablet 50 mg, 50 mg, Oral, BID  sodium chloride flush 0.9 % injection 5-40 mL, 5-40 mL, IntraVENous, 2 times per day  sodium chloride flush 0.9 % injection 5-40 mL, 5-40 mL, IntraVENous, PRN  0.9 % sodium chloride infusion, 25 mL, IntraVENous, PRN  metoprolol (LOPRESSOR) injection 5 mg, 5 mg, IntraVENous, Q6H PRN  apixaban (ELIQUIS) tablet 5 mg, 5 mg, Oral, BID  phenol 1.4 % mouth spray 1 spray, 1 spray, Mouth/Throat, Q2H PRN  ipratropium (ATROVENT) 0.02 % nebulizer solution 0.5 mg, 0.5 mg, Nebulization, 4x daily  dilTIAZem (CARDIZEM) tablet 30 mg, 30 mg, Oral, 4 times per day  metoprolol tartrate (LOPRESSOR) tablet 50 mg, 50 mg, Oral, BID  perflutren lipid microspheres (DEFINITY) injection 1.65 mg, 1.5 mL, IntraVENous, ONCE PRN  acetaminophen (TYLENOL) tablet 650 mg, 650 mg, Oral, Q4H PRN  simethicone (MYLICON) chewable tablet 80 mg, 80 mg, Oral, Q6H PRN  pravastatin (PRAVACHOL) tablet 10 mg, 10 mg, Oral, Daily  sodium chloride flush 0.9 % injection 5-40 mL, 5-40 mL, IntraVENous, 2 times per day  sodium chloride flush 0.9 % injection 5-40 mL, 5-40 mL, IntraVENous, PRN  0.9 % sodium chloride infusion, 25 mL, IntraVENous, PRN  ondansetron (ZOFRAN-ODT) disintegrating tablet 4 mg, 4 mg, Oral, Q8H PRN **OR** ondansetron (ZOFRAN) injection 4 mg, 4 mg, IntraVENous, Q6H PRN  oxyCODONE (ROXICODONE) immediate release tablet 5 mg, 5 mg, Oral, Q4H PRN **OR** oxyCODONE HCl (OXY-IR) immediate release tablet 10 mg, 10 mg, Oral, Q4H PRN  HYDROmorphone (DILAUDID) injection 0.5 mg, 0.5 mg, IntraVENous, Q3H PRN  cyclobenzaprine (FLEXERIL) tablet 10 mg, 10 mg, Oral, TID PRN  polyethylene glycol (GLYCOLAX) packet 17 g, 17 g, Oral, Daily  bisacodyl (DULCOLAX) EC tablet 5 mg, 5 mg, Oral, Daily  sennosides-docusate sodium (SENOKOT-S) 8.6-50 MG tablet 1 tablet, 1 tablet, Oral, BID  magnesium hydroxide (MILK OF MAGNESIA) 400 MG/5ML suspension 30 mL, 30 mL, Oral, Daily PRN  fleet rectal enema 1 enema, 1 enema, Rectal, Daily PRN    ASSESSMENT:   · S/p L2-5 PLF on 12/14 - stable  · Ileus - improving  · A-fib  · pneumonia    PLAN:  · LSO at bedside for use when upright; PT/OT  · Bowel regimen with FU xray  · Bladder scan        Electronically signed by Jem Mclean MD on 12/25/2021 at 10:32 AM

## 2021-12-26 LAB
ANION GAP SERPL CALCULATED.3IONS-SCNC: 10 MMOL/L (ref 7–16)
BUN BLDV-MCNC: 24 MG/DL (ref 6–23)
CALCIUM SERPL-MCNC: 7.8 MG/DL (ref 8.6–10.2)
CHLORIDE BLD-SCNC: 104 MMOL/L (ref 98–107)
CO2: 26 MMOL/L (ref 22–29)
CREAT SERPL-MCNC: 0.8 MG/DL (ref 0.7–1.2)
GFR AFRICAN AMERICAN: >60
GFR NON-AFRICAN AMERICAN: >60 ML/MIN/1.73
GLUCOSE BLD-MCNC: 492 MG/DL (ref 74–99)
MAGNESIUM: 1.9 MG/DL (ref 1.6–2.6)
POTASSIUM REFLEX MAGNESIUM: 3.4 MMOL/L (ref 3.5–5)
PRO-BNP: 4452 PG/ML (ref 0–450)
SODIUM BLD-SCNC: 140 MMOL/L (ref 132–146)

## 2021-12-26 PROCEDURE — 80048 BASIC METABOLIC PNL TOTAL CA: CPT

## 2021-12-26 PROCEDURE — 6370000000 HC RX 637 (ALT 250 FOR IP): Performed by: INTERNAL MEDICINE

## 2021-12-26 PROCEDURE — 6360000002 HC RX W HCPCS: Performed by: FAMILY MEDICINE

## 2021-12-26 PROCEDURE — 99232 SBSQ HOSP IP/OBS MODERATE 35: CPT | Performed by: INTERNAL MEDICINE

## 2021-12-26 PROCEDURE — 99232 SBSQ HOSP IP/OBS MODERATE 35: CPT | Performed by: SURGERY

## 2021-12-26 PROCEDURE — 6370000000 HC RX 637 (ALT 250 FOR IP): Performed by: NEUROLOGICAL SURGERY

## 2021-12-26 PROCEDURE — 36415 COLL VENOUS BLD VENIPUNCTURE: CPT

## 2021-12-26 PROCEDURE — 94640 AIRWAY INHALATION TREATMENT: CPT

## 2021-12-26 PROCEDURE — 97530 THERAPEUTIC ACTIVITIES: CPT

## 2021-12-26 PROCEDURE — 6370000000 HC RX 637 (ALT 250 FOR IP): Performed by: FAMILY MEDICINE

## 2021-12-26 PROCEDURE — 6360000002 HC RX W HCPCS: Performed by: INTERNAL MEDICINE

## 2021-12-26 PROCEDURE — 83735 ASSAY OF MAGNESIUM: CPT

## 2021-12-26 PROCEDURE — 2580000003 HC RX 258: Performed by: FAMILY MEDICINE

## 2021-12-26 PROCEDURE — 2060000000 HC ICU INTERMEDIATE R&B

## 2021-12-26 PROCEDURE — 6370000000 HC RX 637 (ALT 250 FOR IP): Performed by: PHYSICIAN ASSISTANT

## 2021-12-26 PROCEDURE — 83880 ASSAY OF NATRIURETIC PEPTIDE: CPT

## 2021-12-26 PROCEDURE — 2580000003 HC RX 258: Performed by: PHYSICIAN ASSISTANT

## 2021-12-26 PROCEDURE — 2700000000 HC OXYGEN THERAPY PER DAY

## 2021-12-26 RX ORDER — METHYLPREDNISOLONE SODIUM SUCCINATE 40 MG/ML
20 INJECTION, POWDER, LYOPHILIZED, FOR SOLUTION INTRAMUSCULAR; INTRAVENOUS EVERY 8 HOURS
Status: DISCONTINUED | OUTPATIENT
Start: 2021-12-26 | End: 2021-12-27

## 2021-12-26 RX ADMIN — BISACODYL 5 MG: 5 TABLET, COATED ORAL at 08:48

## 2021-12-26 RX ADMIN — DILTIAZEM HYDROCHLORIDE 30 MG: 30 TABLET, FILM COATED ORAL at 08:44

## 2021-12-26 RX ADMIN — APIXABAN 5 MG: 5 TABLET, FILM COATED ORAL at 08:44

## 2021-12-26 RX ADMIN — ACETAMINOPHEN 650 MG: 325 TABLET ORAL at 21:11

## 2021-12-26 RX ADMIN — FUROSEMIDE 20 MG: 20 TABLET ORAL at 17:16

## 2021-12-26 RX ADMIN — APIXABAN 5 MG: 5 TABLET, FILM COATED ORAL at 21:05

## 2021-12-26 RX ADMIN — ACETAMINOPHEN 650 MG: 325 TABLET ORAL at 01:14

## 2021-12-26 RX ADMIN — FLECAINIDE ACETATE 50 MG: 50 TABLET ORAL at 21:04

## 2021-12-26 RX ADMIN — PIPERACILLIN AND TAZOBACTAM 3375 MG: 3; .375 INJECTION, POWDER, LYOPHILIZED, FOR SOLUTION INTRAVENOUS at 17:16

## 2021-12-26 RX ADMIN — POLYETHYLENE GLYCOL 3350 17 G: 17 POWDER, FOR SOLUTION ORAL at 08:44

## 2021-12-26 RX ADMIN — SENNOSIDES AND DOCUSATE SODIUM 1 TABLET: 50; 8.6 TABLET ORAL at 21:05

## 2021-12-26 RX ADMIN — ACETAMINOPHEN 650 MG: 325 TABLET ORAL at 06:30

## 2021-12-26 RX ADMIN — METOPROLOL TARTRATE 50 MG: 50 TABLET, FILM COATED ORAL at 21:05

## 2021-12-26 RX ADMIN — METHYLPREDNISOLONE SODIUM SUCCINATE 20 MG: 40 INJECTION, POWDER, FOR SOLUTION INTRAMUSCULAR; INTRAVENOUS at 21:05

## 2021-12-26 RX ADMIN — METHYLPREDNISOLONE SODIUM SUCCINATE 20 MG: 40 INJECTION, POWDER, FOR SOLUTION INTRAMUSCULAR; INTRAVENOUS at 04:35

## 2021-12-26 RX ADMIN — DILTIAZEM HYDROCHLORIDE 30 MG: 30 TABLET, FILM COATED ORAL at 01:15

## 2021-12-26 RX ADMIN — SENNOSIDES AND DOCUSATE SODIUM 1 TABLET: 50; 8.6 TABLET ORAL at 08:43

## 2021-12-26 RX ADMIN — IPRATROPIUM BROMIDE 0.5 MG: 0.5 SOLUTION RESPIRATORY (INHALATION) at 11:04

## 2021-12-26 RX ADMIN — IPRATROPIUM BROMIDE 0.5 MG: 0.5 SOLUTION RESPIRATORY (INHALATION) at 18:07

## 2021-12-26 RX ADMIN — ACETAMINOPHEN 650 MG: 325 TABLET ORAL at 16:22

## 2021-12-26 RX ADMIN — DILTIAZEM HYDROCHLORIDE 30 MG: 30 TABLET, FILM COATED ORAL at 15:20

## 2021-12-26 RX ADMIN — METOPROLOL TARTRATE 50 MG: 50 TABLET, FILM COATED ORAL at 08:44

## 2021-12-26 RX ADMIN — PIPERACILLIN AND TAZOBACTAM 3375 MG: 3; .375 INJECTION, POWDER, LYOPHILIZED, FOR SOLUTION INTRAVENOUS at 06:30

## 2021-12-26 RX ADMIN — IPRATROPIUM BROMIDE 0.5 MG: 0.5 SOLUTION RESPIRATORY (INHALATION) at 22:23

## 2021-12-26 RX ADMIN — METHYLPREDNISOLONE SODIUM SUCCINATE 20 MG: 40 INJECTION, POWDER, FOR SOLUTION INTRAMUSCULAR; INTRAVENOUS at 11:57

## 2021-12-26 RX ADMIN — FLECAINIDE ACETATE 50 MG: 50 TABLET ORAL at 08:44

## 2021-12-26 RX ADMIN — PIPERACILLIN AND TAZOBACTAM 3375 MG: 3; .375 INJECTION, POWDER, LYOPHILIZED, FOR SOLUTION INTRAVENOUS at 00:10

## 2021-12-26 RX ADMIN — DILTIAZEM HYDROCHLORIDE 30 MG: 30 TABLET, FILM COATED ORAL at 21:11

## 2021-12-26 RX ADMIN — FUROSEMIDE 20 MG: 20 TABLET ORAL at 08:44

## 2021-12-26 RX ADMIN — PRAVASTATIN SODIUM 10 MG: 10 TABLET ORAL at 21:05

## 2021-12-26 RX ADMIN — Medication 10 ML: at 21:07

## 2021-12-26 RX ADMIN — SIMETHICONE 80 MG: 80 TABLET, CHEWABLE ORAL at 08:44

## 2021-12-26 ASSESSMENT — PAIN SCALES - GENERAL
PAINLEVEL_OUTOF10: 0
PAINLEVEL_OUTOF10: 8
PAINLEVEL_OUTOF10: 0
PAINLEVEL_OUTOF10: 3
PAINLEVEL_OUTOF10: 0

## 2021-12-26 NOTE — PROGRESS NOTES
Hospitalist progress note    Patient:  Mirela Garg  YOB: 1935  Date of Service: 12/26/21  MRN: [id number]   Acct:  [de-identified]   Primary Care Physician: Gertrude Lombardo MD  12/14/2021     Patient Seen, Chart, Consults notes, Labs, Radiology, family and social history were reviewed. This is an 82years old male patient with past medical history of hypertension patient was admitted because of L2-L5 laminectomy and fusion. Postoperative the pain was getting better by time. Patient history of hypertension which was controlled with medication  Postoperative the patient developed acute respiratory failure requiring high oxygen and the patient was diagnosed with aspiration pneumonia. Patient has new onset A. fib with RVR on December 19 and he was cardioverted on December 22 to normal sinus rhythm  Later on the patient has ileus and we consulted surgery           Chief Complaint: Abdominal distention        Subjective     The patient is a 82 y.o. male who presents with spinal stenosis and followed with L2 L5 laminectomy and fusion. Patient has ileus and we hold on his narcotics and keep the patient n.p.o.. Patient today mentioned that he is passing gas and he has desire to have a bowel movement.     Patient denies any cough or shortness of breath  And we tested him yesterday for Covid which came back negative                      Review of systems:    All 10 review of system were negative unless what is mentioned in the present history               PHYSICAL EXAM:  BP (!) 143/63   Pulse 79   Temp 97 °F (36.1 °C) (Temporal)   Resp 19   Ht 6' 1\" (1.854 m)   Wt 200 lb (90.7 kg)   SpO2 93%   BMI 26.39 kg/m²     General appearance - alert, well appearing, and in no distress   Head: atraumatic   Pupil: equal, round reactive to light   Neck: Supple, trachea is midline   Chest - clear to auscultation, no wheezes, rales or rhonchi, symmetric air entry   Distant Breath Sounds: No   Heart - S1 and S2 tricuspid regurgitation. Aortic Valve  Trileaflet aortic valve. Normal leaflet mobility. No aortic stenosis. Trace aortic regurgitation. Pulmonic Valve  Mild-to-moderate pulmonic regurgitation present. Pericardial Effusion  No evidence for hemodynamically significant pericardial effusion. Pleural Effusion  No evidence of pleural effusion. Aorta  Normal aortic root and ascending aorta. Miscellaneous  The inferior vena cava diameter is normal with normal respiratory  variation. Conclusions   Summary  Normal left ventricular chamber size. Normal left ventricular systolic  function. Visually estimated LVEF is 60 %. No wall motion abnormalities. Grade II diastolic dysfunction. Normal right ventricle structure and function. Normal left atrium. Normal right atrium. Mild tricuspid regurgitation. Mild-to-moderate pulmonic regurgitation present. Unable to estimate PA pressure. Agitated saline contrast study shows late right to left shunting  suggestive of an intra-pulmonary shunt. No comparison study available.    Signature   ----------------------------------------------------------------  Electronically signed by Hortensia Newsome MD(Interpreting  physician) on 12/22/2021 10:57 AM  ----------------------------------------------------------------  M-Mode/2D Measurements & Calculations   LV Diastolic     LV Systolic Dimension: 2.7 cm   AV Cusp Separation: 2.1  Dimension: 3.7   LV Volume Diastolic: 02.4 ml    cmAO Root Dimension: 3.7  cm               LV Volume Systolic: 78.6 ml     cm  LV FS:27 %       LV EDV/LV EDV Index: 57.5 KI/04  LV PW Diastolic: ZR/Q^1VT ESV/LV ESV Index: 25.9  0.9 cm           ml/12ml/ m^2  LV PW Systolic:  EF Calculated: 55 %             RV Diastolic Dimension:  1.4 cm           LV Mass Index: 92 l/min*m^2     3.7 cm  Septum           LV Length: 8.9 cm  Diastolic: 2 cm                                  Ascending Aorta: 3.8 cm  Septum Systolic: LVOT: 2.4 cm                    LA volume/Index: 68.6 ml  1.6 cm                                           /31.92ml/m^2  CO: 10.3 l/min                                   RA Area: 14.2 cm^2  CI: 4.79 l/m*m^2  LV Mass: 197.65  g  Doppler Measurements & Calculations   MV Peak E-Wave: 1.16 AV Peak Velocity: 1.37 LVOT Peak Velocity: 1.22 m/s  m/s                  m/s                    LVOT Mean Velocity: 0.86 m/s  MV Peak A-Wave: 0.96 AV Peak Gradient: 7.55 LVOT Peak Gradient: 6 mmHgLVOT  m/s                  mmHg                   Mean Gradient: 3.3 mmHg  MV E/A Ratio: 1.21   AV Mean Velocity: 0.98  MV Peak Gradient:    m/s  7.9 mmHg             AV Mean Gradient: 4.3  MV Mean Gradient:    mmHg                   TR Velocity:4.13 m/s  3.7 mmHg             AV VTI: 28.9 cm        TR Gradient:68.26 mmHg  MV Mean Velocity:    AV Area  0.92 m/s             (Continuity):4.01 cm^2  MV Deceleration  Time: 217 msec       LVOT VTI: 25.6 cm      TN ED Velocity: 1.3 m/s  MV P1/2t: 86 msec  MVA by PHT:2.56 cm^2  MV Area  (continuity): 3.5  cm^2  MV E' Septal  Velocity: 0.05 m/s  MV E' Lateral  Velocity: 6 m/s  [email or web address] [id number]    XR CHEST (2 VW)    Result Date: 12/22/2021  EXAMINATION: TWO XRAY VIEWS OF THE CHEST 12/22/2021 3:17 pm COMPARISON: December 22, 2021 at 15:04, December 18, 2021 HISTORY: ORDERING SYSTEM PROVIDED HISTORY: reevaluate right infiltrate TECHNOLOGIST PROVIDED HISTORY: Reason for exam:->reevaluate right infiltrate What reading provider will be dictating this exam?->CRC FINDINGS: Heart size is unable to be accurately assessed on this single portable view of the chest, but appears to be stable. Previously described right lung airspace opacities with a reticular and patchy appearance have increased since December 18, 2021. No evidence of a sizable pleural effusion. No pneumothorax. No acute osseous abnormality.      Increased reticular and patchy airspace opacities in the right lung. These are nonspecific and may be on the basis of multifocal pneumonia or (less likely) asymmetric pulmonary edema. Atypical/viral pneumonia not excluded. XR CHEST (2 VW)    Result Date: 12/9/2021  EXAMINATION: TWO XRAY VIEWS OF THE CHEST 12/9/2021 11:09 am COMPARISON: None. HISTORY: ORDERING SYSTEM PROVIDED HISTORY: Pre-op evaluation TECHNOLOGIST PROVIDED HISTORY: Reason for exam:->pre op What reading provider will be dictating this exam?->CRC FINDINGS: Slight peripheral coarsening of interstitial markings may indicate mild fibrosis. Heart and pulmonary vascularity are normal.  Neither costophrenic angle is blunted. Subtle findings which may indicate mild interstitial fibrosis. XR ABDOMEN (KUB) (SINGLE AP VIEW)    Result Date: 12/25/2021  EXAMINATION: ONE SUPINE XRAY VIEW(S) OF THE ABDOMEN 12/25/2021 11:05 am COMPARISON: Is comparison is dated December 22, 2021 HISTORY: ORDERING SYSTEM PROVIDED HISTORY: no bowel movement, distention TECHNOLOGIST PROVIDED HISTORY: Reason for exam:->no bowel movement, distention What reading provider will be dictating this exam?->CRC FINDINGS: Postoperative changes are present with surgical clips present There is been in single from L2 through L5 There is severe colonic distention most prominent at the comparison descending colon in the 15 cm range there is air in some mildly distended small bowel loops. Marked colonic distension unchanged from prior study with air in mildly distended small bowel suspect postoperative ileus     XR ABDOMEN (KUB) (SINGLE AP VIEW)    Result Date: 12/16/2021  EXAMINATION: ONE SUPINE XRAY VIEW(S) OF THE ABDOMEN 12/16/2021 2:41 pm COMPARISON: None. HISTORY: ORDERING SYSTEM PROVIDED HISTORY: post-op ileus TECHNOLOGIST PROVIDED HISTORY: Reason for exam:->post-op ileus What reading provider will be dictating this exam?->CRC FINDINGS: Air scattered small and large bowel loops. Dilated small bowel measures 3.6 cm. Dilated colon measures 8.7 cm at the level the transverse. Skin in seizure in with skin staples vertically noted over the mid lower abdomen. Postoperative change spinal fusion and laminectomy noted lumbar spine. Surgical clips right upper quadrant. Dilated small and large bowel loops suggest generalized ileus. Clinical correlation and follow-up to resolution recommended. XR ACUTE ABD SERIES CHEST 1 VW    Result Date: 12/22/2021  EXAMINATION: TWO XRAY VIEWS OF THE ABDOMEN AND SINGLE  XRAY VIEW OF THE CHEST 12/22/2021 3:16 pm COMPARISON: Chest radiograph December 22, 2021 HISTORY: ORDERING SYSTEM PROVIDED HISTORY: repeat/assess respiratory status TECHNOLOGIST PROVIDED HISTORY: Reason for exam:->repeat/assess respiratory status What reading provider will be dictating this exam?->CRC FINDINGS: The evaluation is limited due to low lung volumes. Trachea is midline. Cardiomediastinal silhouette is stable in size. Persistent airspace opacity identified in right lung lower lung. Multiple air-filled loops of colon identified in the abdomen. No abnormal abdominal calcifications. Bowel gas pattern is nonspecific nonobstructive. No evidence of bowel obstruction. The evaluation is limited due to low lung volumes. Opacities in right lung may represent pneumonia versus atelectasis in the proper clinical setting. XR CHEST PORTABLE    Result Date: 12/25/2021  EXAMINATION: ONE XRAY VIEW OF THE CHEST 12/25/2021 2:58 pm COMPARISON: Comparison is dated December 24, 2021 HISTORY: ORDERING SYSTEM PROVIDED HISTORY: hypoxia TECHNOLOGIST PROVIDED HISTORY: Reason for exam:->hypoxia FINDINGS: There is marked increase of right-sided confluent pulmonary a hazy opacity since the prior exam with small pleural effusion present. Cardiac and mediastinal contours are unchanged. Pulmonary vasculature is unremarkable.      Increasing right pleural effusion and infiltrate     XR CHEST PORTABLE    Result Date: 12/24/2021  EXAMINATION: ONE XRAY VIEW OF THE CHEST 12/24/2021 12:31 pm COMPARISON: December 22, 2021 HISTORY: ORDERING SYSTEM PROVIDED HISTORY: cough TECHNOLOGIST PROVIDED HISTORY: Reason for exam:->cough What reading provider will be dictating this exam?->CRC FINDINGS: Redemonstration of interstitial and hazy opacities bilaterally notable in mid right lung field and right lung base. Opacities have increased in right lung base now silhouetting right hemidiaphragm. The heart appears prominent in size. No pneumothorax. Redemonstration of bilateral pneumonia or interstitial pulmonary edema. Opacities have increased in right lung base. XR CHEST PORTABLE    Result Date: 12/18/2021  EXAMINATION: ONE XRAY VIEW OF THE CHEST 12/18/2021 8:38 pm COMPARISON: 12/16/2021 HISTORY: ORDERING SYSTEM PROVIDED HISTORY: Chest Pain TECHNOLOGIST PROVIDED HISTORY: Reason for exam:->Chest Pain What reading provider will be dictating this exam?->CRC FINDINGS: EKG leads overlie the chest.  Stable elevation of the right diaphragm with patchy opacification of the right lung base, appearing slightly worsened. Minimal left basal opacities are also increased. Moderate gaseous distention of bowel in the visualized upper abdomen. No pneumothorax. No other change. Slight worsening of right greater than left basal opacities which may represent atelectasis or infiltrates from pneumonia. Continued follow-up recommended. XR CHEST PORTABLE    Result Date: 12/16/2021  EXAMINATION: ONE XRAY VIEW OF THE CHEST 12/16/2021 4:19 pm COMPARISON: 12/09/2021 HISTORY: ORDERING SYSTEM PROVIDED HISTORY: hypoxia TECHNOLOGIST PROVIDED HISTORY: Reason for exam:->hypoxia What reading provider will be dictating this exam?->CRC FINDINGS: Right lower lobe atelectasis and/or infiltrate. .  There is no effusion or pneumothorax. The cardiomediastinal silhouette is without acute process.  The osseous structures are without acute process. Right lower lobe atelectasis and/or infiltrate. CTA PULMONARY W CONTRAST    Result Date: 12/16/2021  EXAMINATION: CTA OF THE CHEST 12/16/2021 3:32 pm TECHNIQUE: CTA of the chest was performed after the administration of intravenous contrast.  Multiplanar reformatted images are provided for review. MIP images are provided for review. Dose modulation, iterative reconstruction, and/or weight based adjustment of the mA/kV was utilized to reduce the radiation dose to as low as reasonably achievable. COMPARISON: None. HISTORY: ORDERING SYSTEM PROVIDED HISTORY: CHEST PAIN AND SOB R/O PE TECHNOLOGIST PROVIDED HISTORY: Reason for exam:->CHEST PAIN AND SOB R/O PE What reading provider will be dictating this exam?->CRC FINDINGS: No thoracic aortic aneurysm or dissection. Mild-to-moderate scattered calcified plaque in the thoracic aorta. No evidence of pulmonary embolism. Mild cardiomegaly. Coronary atherosclerosis. No pericardial effusion. Shotty subcentimeter short axis mediastinal hilar nodes. No pleural effusion or pneumothorax. Low lung volumes. There are a few small peripheral densities in the lungs which may represent scarring or infection (for example series 4, image 73). Another curvilinear density is noted the medial left lower lobe (series 4 image 168). Mild dependent atelectasis, right greater than left. The central airways are patent. There is centrilobular and paraseptal emphysema. Degenerative changes of the spine. The visualized abdomen demonstrates changes of cholecystectomy and the splenic parenchymal calcification. No evidence of pulmonary embolism. No acute aortic injury or aneurysm. There are a few nonspecific densities in the peripheral lungs. Imaging features can be seen with COVID-19 pneumonia, though are nonspecific and can occur with a variety of infectious and noninfectious processes. Mild cardiomegaly. Atherosclerotic disease.      FLUORO FOR SURGICAL PROCEDURES    Result Date: 12/14/2021  EXAMINATION: SPOT FLUOROSCOPIC IMAGES 12/14/2021 10:42 am TECHNIQUE: Fluoroscopy was provided by the radiology department for procedure. Radiologist was not present during examination. FLUOROSCOPY DOSE AND TYPE OR TIME AND EXPOSURES: Fluoroscopy time equals 10.8 seconds. Total dose equals 15.92 mGy COMPARISON: None HISTORY: ORDERING SYSTEM PROVIDED HISTORY: Back pain, unspecified back location, unspecified back pain laterality, unspecified chronicity TECHNOLOGIST PROVIDED HISTORY: Reason for exam:->Back pain, unspecified back location, unspecified back pain laterality, unspecified chronicity What reading provider will be dictating this exam?->CRC Intraprocedural imaging. FINDINGS: 2 spot images of the lumbar spine were obtained. Intraprocedural fluoroscopic spot images as above. See separate procedure report for more information.      Recent Results (from the past 24 hour(s))   COVID-19, Rapid    Collection Time: 12/25/21  5:46 PM    Specimen: Nasopharyngeal Swab   Result Value Ref Range    SARS-CoV-2, NAAT Not Detected Not Detected   Brain Natriuretic Peptide    Collection Time: 12/26/21  5:20 AM   Result Value Ref Range    Pro-BNP 4,452 (H) 0 - 450 pg/mL   Basic Metabolic Panel w/ Reflex to MG    Collection Time: 12/26/21  5:20 AM   Result Value Ref Range    Sodium 140 132 - 146 mmol/L    Potassium reflex Magnesium 3.4 (L) 3.5 - 5.0 mmol/L    Chloride 104 98 - 107 mmol/L    CO2 26 22 - 29 mmol/L    Anion Gap 10 7 - 16 mmol/L    Glucose 492 (H) 74 - 99 mg/dL    BUN 24 (H) 6 - 23 mg/dL    CREATININE 0.8 0.7 - 1.2 mg/dL    GFR Non-African American >60 >=60 mL/min/1.73    GFR African American >60     Calcium 7.8 (L) 8.6 - 10.2 mg/dL   Magnesium    Collection Time: 12/26/21  5:20 AM   Result Value Ref Range    Magnesium 1.9 1.6 - 2.6 mg/dL   ]     Current Facility-Administered Medications: furosemide (LASIX) tablet 20 mg, 20 mg, Oral, BID  dextrose 5 % and 0.45 % sodium chloride infusion, , IntraVENous, Continuous  lidocaine 4 % external patch 1 patch, 1 patch, TransDERmal, Daily  piperacillin-tazobactam (ZOSYN) 3,375 mg in dextrose 5 % 100 mL IVPB extended infusion (mini-bag), 3,375 mg, IntraVENous, Q8H  methylPREDNISolone sodium (SOLU-MEDROL) injection 20 mg, 20 mg, IntraVENous, Q6H  flecainide (TAMBOCOR) tablet 50 mg, 50 mg, Oral, BID  sodium chloride flush 0.9 % injection 5-40 mL, 5-40 mL, IntraVENous, 2 times per day  sodium chloride flush 0.9 % injection 5-40 mL, 5-40 mL, IntraVENous, PRN  0.9 % sodium chloride infusion, 25 mL, IntraVENous, PRN  metoprolol (LOPRESSOR) injection 5 mg, 5 mg, IntraVENous, Q6H PRN  apixaban (ELIQUIS) tablet 5 mg, 5 mg, Oral, BID  phenol 1.4 % mouth spray 1 spray, 1 spray, Mouth/Throat, Q2H PRN  ipratropium (ATROVENT) 0.02 % nebulizer solution 0.5 mg, 0.5 mg, Nebulization, 4x daily  dilTIAZem (CARDIZEM) tablet 30 mg, 30 mg, Oral, 4 times per day  metoprolol tartrate (LOPRESSOR) tablet 50 mg, 50 mg, Oral, BID  perflutren lipid microspheres (DEFINITY) injection 1.65 mg, 1.5 mL, IntraVENous, ONCE PRN  acetaminophen (TYLENOL) tablet 650 mg, 650 mg, Oral, Q4H PRN  simethicone (MYLICON) chewable tablet 80 mg, 80 mg, Oral, Q6H PRN  pravastatin (PRAVACHOL) tablet 10 mg, 10 mg, Oral, Daily  sodium chloride flush 0.9 % injection 5-40 mL, 5-40 mL, IntraVENous, 2 times per day  sodium chloride flush 0.9 % injection 5-40 mL, 5-40 mL, IntraVENous, PRN  0.9 % sodium chloride infusion, 25 mL, IntraVENous, PRN  ondansetron (ZOFRAN-ODT) disintegrating tablet 4 mg, 4 mg, Oral, Q8H PRN **OR** ondansetron (ZOFRAN) injection 4 mg, 4 mg, IntraVENous, Q6H PRN  HYDROmorphone (DILAUDID) injection 0.5 mg, 0.5 mg, IntraVENous, Q3H PRN  cyclobenzaprine (FLEXERIL) tablet 10 mg, 10 mg, Oral, TID PRN  polyethylene glycol (GLYCOLAX) packet 17 g, 17 g, Oral, Daily  bisacodyl (DULCOLAX) EC tablet 5 mg, 5 mg, Oral, Daily  sennosides-docusate sodium (SENOKOT-S) 8.6-50 MG tablet 1 tablet, 1 tablet, Oral, BID  magnesium hydroxide (MILK OF MAGNESIA) 400 MG/5ML suspension 30 mL, 30 mL, Oral, Daily PRN  fleet rectal enema 1 enema, 1 enema, Rectal, Daily PRN   Hospital Problems           Last Modified POA    Spinal stenosis of lumbar region 12/14/2021 Yes    Spondylolisthesis, lumbar region 12/14/2021 Yes    Lumbar degenerative disc disease 12/15/2021 Yes    Chest pain 12/17/2021 Yes    Elevated troponin 12/17/2021 Yes    Primary hypertension 12/17/2021 Yes    Puerperal sepsis with acute hypoxic respiratory failure ([facility] [address] 12/17/2021 Yes    Hyperlipidemia 12/17/2021 Yes    Pulmonary fibrosis ([address] 12/17/2021 Yes                  Assessment and Plan:     Ileus, surgery was consulted yesterday, insert rectal tube, n.p.o. hold narcotics   Postoperative pneumonia, continue Zosyn for possible aspiration, patient has history of pulmonary fibrosis which possibly could be seen on the imaging   Atrial fibrillation with RVR, continue Eliquis Cardizem and beta-blocker   Patient has Covid and retested him for Covid came back negative   Continue DVT &  GERD prophylaxis   Reviewed BNP, per cardiologist patient had echo and shows normal ejection fraction of 60% with grade 2 diastolic dysfunction and mild to moderate pulmonary regurgitation with intrapulmonary shunt seen, reconsult cardiologist      Electronically signed by Alycia Leahy MD on 12/26/2021 at 8:06 AM

## 2021-12-26 NOTE — PROGRESS NOTES
Physical Therapy  Treatment Note  Name: Gail Shaffer  : 1935  MRN: [id number]      Date of Service: 2021    Evaluating PT:  Cuba Garcia, PT [id number]    Room #:  8505/8505-B  Diagnosis:  Spinal stenosis of lumbar region, unspecified whether neurogenic claudication present [M48.061]  Lumbar degenerative disc disease [M51.36]  PMHx/PSHx:     has a past medical history of Hypertension. has a past surgical history that includes joint replacement (Right); Carotid endarterectomy (Right); Appendectomy; Carpal tunnel release (Bilateral); and Lumbar spine surgery (N/A, 2021). Procedure/Surgery: L2-L5 LAMINECTOMY AND FUSION   Precautions:  Falls, falls, alarm and O2 , FWB (full weight bearing), Hemovac drain, Spinal precautions no \"BLT\" and LSO on when greater than 45degrees (elective)  Equipment Needs: Wheeled Walker    SUBJECTIVE:  Patient lives with spouse  in a trailer with 2 steps without rail and a platform between the two to enter. Patient ambulated with a  7-iron golf club PTA. Equipment owned: None    OBJECTIVE:   Initial Evaluation  Date: 21 Treatment  2021 Short Term/ Long Term   Goals   AM-PAC 6 Clicks     Was pt agreeable to Eval/treatment? Yes yes    Does pt have pain? Yes; 9/10 LBP at level of incision Pain with movement, does not quantify    Bed Mobility  Rolling: Min   Supine to sit:   Min   Sit to supine: Min   Scooting: Min  NT this date Rolling: Ind  Supine to sit: Ind  Sit to supine: Ind  Scooting: Ind   Transfers Sit to stand: Min  Stand to sit: Min  Stand pivot: Min  Sit to stand: Brain  Stand to sit: Brain  Stand pivot: Brain with [employer] Sit to stand: Mod Ind    Stand to sit: Mod Ind    Stand pivot:  Mod Ind     Ambulation    NT 30 feet front [employer] Brain x 2 reps 120 feet with Mod Ind     Stair negotiation: ascended and descended  NT NT 3 steps with Mod Ind  1 rail    ROM BUE:  Defer to OT eval  BLE:  wfl     Strength BUE: Defer to OT eval  RLE:  Grossly 4-/5  LLE:  Grossly  4-/5  4/5   Balance Sitting EOB:  SBA  Dynamic Standing:  NT Sitting EOB:  SBA  Dynamic Standing:  Lucinda with [employer] Sitting EOB:  Ind  Dynamic Standing:  Ind     Pt is A & O x 4  Sensation:  Pt denies numbness and tingling to extremities  Edema:  unremarkable    Vitals:  15L HFNC rest 95%  15L HFNC 80% with ambulation  15L HFNC seated recovery 94% in 2 minutes    Patient education  Pt educated on role of PT intervention. Pt educated on safety in room with utilization of call light for assistance with mobility. Pt educated on importance of maximizing OOB time by transferring to bedside chair for meals and ambulating to bathroom/transferring to bedside commode with assistance from nursing and therapy staff to increase functional activity tolerance and overall functional independence. Patient response to education:   Pt verbalized understanding Pt demonstrated skill Pt requires further education in this area   yes yes yes     ASSESSMENT:    Comments:  RN cleared pt for activity prior to session. Pt received seated on bedside commode and agreeable to PT intervention at this time. Pt performed all functional mobility as noted above. Assisted with hygiene care post BM. Transferred to bedside chair and them completed multiple bouts of ambulation. Limited by SOB and low SPO2 values with activity. Pt returned to seated in chair at end of session and left with all needs met and call light in reach. Pt requires continued skilled PT intervention for the purposes of maximizing functional mobility and independence by addressing deficits described above. Treatment:  Patient practiced and was instructed in the following treatment:     Therapeutic Activities Completed:  o Functional mobility as noted above:   - Transfer training: Sit<>stand:  Lucinda from chair. min VC for proper hand placement and sequencing to promote safe and more independent completion of sit<>stand transition.   Stand pivot with front [employer] Lucinda. Min VC for proper sequencing when turning to sit with WW to promote safe and independent execution of stand pivot transfer. - Ambulation: 30 feet front [employer] Lucinda x 2 reps. Min VC throughout for safety. Emphasis on PLB. o Skilled repositioning in seated position for comfort and good posture to promote improved cardiorespiratory function. o Pt education as noted above.  o Skilled vitals monitoring as noted above. PLAN:    Patient is making fair progress towards established goals. Will continue with current POC.       Time in  0931  Time out  0955    Total Treatment Time  24 minutes     CPT codes:  [] Gait training 47715 0 minutes  [] Manual therapy 17778 0 minutes  [x] Therapeutic activities 18750 24 minutes  [] Therapeutic exercises 21537 0 minutes  [] Neuromuscular reeducation 73337 0 minutes    Meghna Martines, PT, DPT  [id number]

## 2021-12-26 NOTE — PROGRESS NOTES
[facility]  ATTENDING PHYSICIAN PROGRESS NOTE     I have examined the patient and  reviewed the record. I have reviewed all relevant labs and imaging data. The following summarizes my clinical findings and independent assessment. CC: post op ileus    S. Patient is sitting on the commode. He is having bowel movements    O. No apparent distress  Awake and alert x3  Lungs clear  Cardiovascular regular rate rhythm  Abdomen is soft distended tympanic but nontender    ASSESSMENT:  Active Problems:    Spinal stenosis of lumbar region    Spondylolisthesis, lumbar region    Lumbar degenerative disc disease    Chest pain    Elevated troponin    Primary hypertension    Puerperal sepsis with acute hypoxic respiratory failure (HCC)    Hyperlipidemia    Pulmonary fibrosis ([facility] [address]  Resolved Problems:    * No resolved hospital problems. *       PLAN:  Ileus status post L2-L5 posterior lumbar fusion  Despite having bowel function, patient's bowel regimen should not be held. He has an ileus and so he still has air gas and stool that needs to come out. I discussed the case with the patient as well as his son at bedside and stated that it is important that he continues aggressive bowel regiment. I have spoken to the nurse at bedside. He should remain on the the bowel regiment and increase activity. Kamron Campo MD, FACS  12/26/2021  3:49 PM    NOTE: This report was transcribed using voice recognition software. Every effort was made to ensure accuracy; however, inadvertent computerized transcription errors may be present.

## 2021-12-26 NOTE — PROGRESS NOTES
GENERAL SURGERY  DAILY PROGRESS NOTE  12/25/2021    CHIEF COMPLAINT:  Ileus    SUBJECTIVE:  Re-consulted for ileus, an issue the surgical team was previously following. Patient was started on aggressive bowel regimen that was held for the past few days. OBJECTIVE:  BP (!) 184/82   Pulse 95   Temp 97.2 °F (36.2 °C) (Temporal)   Resp 17   Ht 6' 1\" (1.854 m)   Wt 200 lb (90.7 kg)   SpO2 94%   BMI 26.39 kg/m²     GENERAL:  NAD. A&Ox3. LUNGS:  No increased work of breathing. CARDIOVASCULAR: RR  ABDOMEN:  Softly distended, tympanic but not tender. No guarding, rigidity, rebound. EXT: warm and well perfused    ASSESSMENT/PLAN:  82 y.o. male s/p L2-L5 PLF with post-operative ileus. Patient's aggressive bowel regimen was held for an undocumented reason and patient has been unable to have bowel movement for several days per family.      - please do not hold scheduled bowel regimen  - advance diet as tolerated  - minimize narcotics  - ambulate as able    Nirav Pinto MD  Surgery Resident PGY-2  12/25/2021  9:16 PM

## 2021-12-26 NOTE — PROGRESS NOTES
[facility]  Department of Internal Medicine  Division of Pulmonary, Critical Care and Sleep Medicine  Consult Note    Mich Adrian DO, MPH, Zainab Kline, Jeri Padilla MD, [id number] Kelly DO, [facility]      Patient: Pastor Plascencia  MRN: [id number]  : 1935    Encounter Time: 3:42 PM     Date of Admission: 2021  5:17 AM    Primary Care Physician: Ashtyn Rojas MD    Reason for Consultation: Post Op respiratory failure    SUBJECTIVE:  Remains on 15 liters  Abx day  - Unasyn stopped - now on Zosyn  IVF despite me trying to diuresis and BNP > 5 k   ECHO shows PH with SHUNT (intrapulmonary)  CT with chronic changes - known fibrosis per family  Start on systemic steroids for fibrosis changed to Q8    OBJECTIVE:     PHYSICAL EXAM:   VITALS:   Vitals:    21 0430 21 0830 21 1104 21 1515   BP:  (!) 171/81  (!) 166/57   Pulse:  90  85   Resp:  19  18   Temp:  97.8 °F (36.6 °C)  96.3 °F (35.7 °C)   TempSrc:  Temporal  Temporal   SpO2: 93% 98% 99% 96%   Weight:       Height:            Intake/Output Summary (Last 24 hours) at 2021 1542  Last data filed at 2021 4088  Gross per 24 hour   Intake --   Output 976 ml   Net -976 ml        CONSTITUTIONAL:   A&O x 3  SKIN:     Kelechi, Fair skin with shin discoloration  HEENT:     EOMI, MMM, No thrush  NECK:    No bruits, No JVP apprechiated  CV:      Sinus TR murmur, No rubs, No gallops  PULMONARY:   Couse BS,  Dry crackles right lung  ABDOMEN:     Soft, non-tender. BS normal. No R/R/G  EXT:    No deformities . No clubbing. Trace lower extremity edema, + venous stasis  PULSE:   Appears equal and palpable. PSYCHIATRIC:  No acute psycosis  MS:    Gross weakness- stable  NEUROLOGIC:   The clinical assessment is non-focal     DATA: IMAGING & TESTING:     LABORATORY TESTS:    ECHOCARDIOGRAM:    Normal left ventricular chamber size. Normal left ventricular systolic    function. Visually estimated LVEF is 60 %.    No wall motion abnormalities.    Grade II diastolic dysfunction.    Normal right ventricle structure and function.    Normal left atrium.    Normal right atrium.    Mild tricuspid regurgitation.    Mild-to-moderate pulmonic regurgitation present.    Unable to estimate PA pressure.    Agitated saline contrast study shows late right to left shunting    suggestive of an intra-pulmonary shunt.    No comparison study available. CBC:   Lab Results   Component Value Date    WBC 11.6 12/22/2021    RBC 3.35 12/22/2021    HGB 10.1 12/22/2021    HCT 31.5 12/22/2021    MCV 94.0 12/22/2021    MCH 30.1 12/22/2021    MCHC 32.1 12/22/2021    RDW 13.5 12/22/2021     12/22/2021    MPV 9.6 12/22/2021     CMP:    Lab Results   Component Value Date     12/26/2021    K 3.4 12/26/2021     12/26/2021    CO2 26 12/26/2021    BUN 24 12/26/2021    CREATININE 0.8 12/26/2021    GFRAA >60 12/26/2021    LABGLOM >60 12/26/2021    GLUCOSE 492 12/26/2021    PROT 5.9 12/21/2021    LABALBU 3.1 12/21/2021    CALCIUM 7.8 12/26/2021    BILITOT 0.6 12/21/2021    ALKPHOS 68 12/21/2021    AST 17 12/21/2021    ALT 18 12/21/2021     Magnesium:    Lab Results   Component Value Date    MG 1.9 12/26/2021     Phosphorus:  No results found for: PHOS     PRO-BNP:   Lab Results   Component Value Date    PROBNP 4,452 (H) 12/26/2021    PROBNP 5,481 (H) 12/25/2021      ABGs:   Lab Results   Component Value Date    PH 7.394 12/16/2021    PO2 51.0 12/16/2021    PCO2 45.8 12/16/2021     Hemoglobin A1C: No components found for: HGBA1C    IMAGING:  Imaging tests were completed and reviewed and discussed radiology and care team involved and reveals       CHEST FILM 12/22  Increased reticular and patchy airspace opacities in the right lung.  These  are nonspecific and may be on the basis of multifocal pneumonia or (less  likely) asymmetric pulmonary edema.  Atypical/viral pneumonia not excluded.           CHEST FILM COMPARED 12/18:            XR ABDOMEN (KUB) (SINGLE AP VIEW)  Result Date: 12/16/2021Dilated small and large bowel loops suggest generalized ileus. Clinical correlation and follow-up to resolution recommended. MRI LUMBAR SPINE WO CONTRAST  Result Date: 11/24/2021  Multilevel degenerative disc disease with associated facet and ligamentous hypertrophy resulting in canal stenosis at L3-4 and L4-5. Bilateral foraminal narrowing as described above. CTA PULMONARY W CONTRAST  Result Date: 12/16/2021: FINDINGS: No thoracic aortic aneurysm or dissection. Mild-to-moderate scattered calcified plaque in the thoracic aorta. No evidence of pulmonary embolism. Mild cardiomegaly. Coronary atherosclerosis. No pericardial effusion. Shotty subcentimeter short axis mediastinal hilar nodes. No pleural effusion or pneumothorax. Low lung volumes. There are a few small peripheral densities in the lungs which may represent scarring or infection (for example series 4, image 73). Another curvilinear density is noted the medial left lower lobe (series 4 image 168). Mild dependent atelectasis, right greater than left. The central airways are patent. There is centrilobular and paraseptal emphysema. Degenerative changes of the spine. The visualized abdomen demonstrates changes of cholecystectomy and the splenic parenchymal calcification. No evidence of pulmonary embolism. No acute aortic injury or aneurysm. There are a few nonspecific densities in the peripheral lungs. Imaging features can be seen with COVID-19 pneumonia, though are nonspecific and can occur with a variety of infectious and noninfectious processes. Mild cardiomegaly. Atherosclerotic disease. Assessment:   1. Post op respiratory failure  2. Hypoxemic Respiratory Failure   3. CT evidence of disease of the lung  4. Plueral plaques ? Old infection or possible asbestosis  5. Mild-to-moderate pulmonic regurgitation present.    6.  Agitated saline contrast study shows late right to left shunting    suggestive of an intra-pulmonary shunt. 7. CT show sutttble changes of pulmonary fibrosis (periphearl)  8. New onset atrial fibrillation RVR.  [id number] 4 (age, hypertension, PAD)   9. Atypical CP.  Recent preoperative stress test negative for ischemia  10. Post op respiratory failure, on 9 L high flow nasal cannula  11. Postoperative ileus  12. 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 Hypertension  15. Hx R CEA  16. GERD  17. Hx COVID 19 5/2021          Plan:   1. CTA negative for PE but noted atelectasis, new decrease sats?, CXR with change yet even thought procal is negative I will start Abx for aspiration pneumonia. 2. Most of the finding are chronic, some fibrosis for sure   3. Continue ICS,   4. Bronchodialators QID  5. Wean oxygen as able   6. Day 7/7 of antibiotics - all cultures negative  7. CXR with HF changes - increase lasix with BNP > 5 K  8. Start systemic steroids again for lung if oxygen doesn't improve after TOBIAS and cardioversion  9. Will need home oxygen   10. Serologic testing for ISLD needed and ordered  = negative  11.  Diuresis and daily BNP  12. Spoke with daughter and answered questions    Nakita Anand DO DO, MPH, Axel Gould  Professor of Internal Medicine  Pulmonary, Critical Care and Sleep Medicine

## 2021-12-27 ENCOUNTER — APPOINTMENT (OUTPATIENT)
Dept: GENERAL RADIOLOGY | Age: 86
DRG: 459 | End: 2021-12-27
Attending: NEUROLOGICAL SURGERY
Payer: MEDICARE

## 2021-12-27 LAB
ANION GAP SERPL CALCULATED.3IONS-SCNC: 11 MMOL/L (ref 7–16)
BUN BLDV-MCNC: 23 MG/DL (ref 6–23)
CALCIUM SERPL-MCNC: 8.4 MG/DL (ref 8.6–10.2)
CHLORIDE BLD-SCNC: 98 MMOL/L (ref 98–107)
CO2: 29 MMOL/L (ref 22–29)
CREAT SERPL-MCNC: 0.8 MG/DL (ref 0.7–1.2)
EKG ATRIAL RATE: 66 BPM
EKG P AXIS: 31 DEGREES
EKG P-R INTERVAL: 200 MS
EKG Q-T INTERVAL: 438 MS
EKG QRS DURATION: 118 MS
EKG QTC CALCULATION (BAZETT): 459 MS
EKG R AXIS: 30 DEGREES
EKG T AXIS: 67 DEGREES
EKG VENTRICULAR RATE: 66 BPM
GFR AFRICAN AMERICAN: >60
GFR NON-AFRICAN AMERICAN: >60 ML/MIN/1.73
GLUCOSE BLD-MCNC: 190 MG/DL (ref 74–99)
MAGNESIUM: 2 MG/DL (ref 1.6–2.6)
POTASSIUM REFLEX MAGNESIUM: 3.1 MMOL/L (ref 3.5–5)
PRO-BNP: 4993 PG/ML (ref 0–450)
SODIUM BLD-SCNC: 138 MMOL/L (ref 132–146)
TSH SERPL DL<=0.05 MIU/L-ACNC: 0.28 UIU/ML (ref 0.27–4.2)

## 2021-12-27 PROCEDURE — 2580000003 HC RX 258: Performed by: INTERNAL MEDICINE

## 2021-12-27 PROCEDURE — APPSS60 APP SPLIT SHARED TIME 46-60 MINUTES: Performed by: PHYSICIAN ASSISTANT

## 2021-12-27 PROCEDURE — 83735 ASSAY OF MAGNESIUM: CPT

## 2021-12-27 PROCEDURE — 83880 ASSAY OF NATRIURETIC PEPTIDE: CPT

## 2021-12-27 PROCEDURE — 6370000000 HC RX 637 (ALT 250 FOR IP): Performed by: FAMILY MEDICINE

## 2021-12-27 PROCEDURE — 6370000000 HC RX 637 (ALT 250 FOR IP): Performed by: NEUROLOGICAL SURGERY

## 2021-12-27 PROCEDURE — 97530 THERAPEUTIC ACTIVITIES: CPT

## 2021-12-27 PROCEDURE — 80048 BASIC METABOLIC PNL TOTAL CA: CPT

## 2021-12-27 PROCEDURE — 36415 COLL VENOUS BLD VENIPUNCTURE: CPT

## 2021-12-27 PROCEDURE — 6370000000 HC RX 637 (ALT 250 FOR IP): Performed by: INTERNAL MEDICINE

## 2021-12-27 PROCEDURE — 6370000000 HC RX 637 (ALT 250 FOR IP): Performed by: PHYSICIAN ASSISTANT

## 2021-12-27 PROCEDURE — 99232 SBSQ HOSP IP/OBS MODERATE 35: CPT | Performed by: INTERNAL MEDICINE

## 2021-12-27 PROCEDURE — 99222 1ST HOSP IP/OBS MODERATE 55: CPT | Performed by: INTERNAL MEDICINE

## 2021-12-27 PROCEDURE — 74018 RADEX ABDOMEN 1 VIEW: CPT

## 2021-12-27 PROCEDURE — 6360000002 HC RX W HCPCS: Performed by: INTERNAL MEDICINE

## 2021-12-27 PROCEDURE — 6360000002 HC RX W HCPCS: Performed by: FAMILY MEDICINE

## 2021-12-27 PROCEDURE — 2060000000 HC ICU INTERMEDIATE R&B

## 2021-12-27 PROCEDURE — 94640 AIRWAY INHALATION TREATMENT: CPT

## 2021-12-27 PROCEDURE — 2580000003 HC RX 258: Performed by: FAMILY MEDICINE

## 2021-12-27 PROCEDURE — 97535 SELF CARE MNGMENT TRAINING: CPT

## 2021-12-27 PROCEDURE — 84443 ASSAY THYROID STIM HORMONE: CPT

## 2021-12-27 PROCEDURE — 71045 X-RAY EXAM CHEST 1 VIEW: CPT

## 2021-12-27 PROCEDURE — 2700000000 HC OXYGEN THERAPY PER DAY

## 2021-12-27 PROCEDURE — 99232 SBSQ HOSP IP/OBS MODERATE 35: CPT | Performed by: SURGERY

## 2021-12-27 RX ORDER — METHYLPREDNISOLONE SODIUM SUCCINATE 40 MG/ML
20 INJECTION, POWDER, LYOPHILIZED, FOR SOLUTION INTRAMUSCULAR; INTRAVENOUS EVERY 12 HOURS
Status: DISCONTINUED | OUTPATIENT
Start: 2021-12-27 | End: 2021-12-29

## 2021-12-27 RX ORDER — CARVEDILOL 25 MG/1
25 TABLET ORAL 2 TIMES DAILY WITH MEALS
Status: DISCONTINUED | OUTPATIENT
Start: 2021-12-27 | End: 2021-12-30 | Stop reason: HOSPADM

## 2021-12-27 RX ADMIN — FUROSEMIDE 20 MG: 20 TABLET ORAL at 08:49

## 2021-12-27 RX ADMIN — CARVEDILOL 25 MG: 25 TABLET, FILM COATED ORAL at 17:16

## 2021-12-27 RX ADMIN — IPRATROPIUM BROMIDE 0.5 MG: 0.5 SOLUTION RESPIRATORY (INHALATION) at 21:34

## 2021-12-27 RX ADMIN — METHYLPREDNISOLONE SODIUM SUCCINATE 20 MG: 40 INJECTION, POWDER, FOR SOLUTION INTRAMUSCULAR; INTRAVENOUS at 11:47

## 2021-12-27 RX ADMIN — DILTIAZEM HYDROCHLORIDE 30 MG: 30 TABLET, FILM COATED ORAL at 14:49

## 2021-12-27 RX ADMIN — SODIUM CHLORIDE, PRESERVATIVE FREE 10 ML: 5 INJECTION INTRAVENOUS at 00:50

## 2021-12-27 RX ADMIN — IPRATROPIUM BROMIDE 0.5 MG: 0.5 SOLUTION RESPIRATORY (INHALATION) at 17:49

## 2021-12-27 RX ADMIN — FLECAINIDE ACETATE 50 MG: 50 TABLET ORAL at 08:49

## 2021-12-27 RX ADMIN — METOPROLOL TARTRATE 50 MG: 50 TABLET, FILM COATED ORAL at 08:50

## 2021-12-27 RX ADMIN — ACETAMINOPHEN 650 MG: 325 TABLET ORAL at 23:18

## 2021-12-27 RX ADMIN — ACETAMINOPHEN 650 MG: 325 TABLET ORAL at 08:49

## 2021-12-27 RX ADMIN — SENNOSIDES AND DOCUSATE SODIUM 1 TABLET: 50; 8.6 TABLET ORAL at 08:49

## 2021-12-27 RX ADMIN — DILTIAZEM HYDROCHLORIDE 30 MG: 30 TABLET, FILM COATED ORAL at 08:49

## 2021-12-27 RX ADMIN — METHYLPREDNISOLONE SODIUM SUCCINATE 20 MG: 40 INJECTION, POWDER, FOR SOLUTION INTRAMUSCULAR; INTRAVENOUS at 03:41

## 2021-12-27 RX ADMIN — SENNOSIDES AND DOCUSATE SODIUM 1 TABLET: 50; 8.6 TABLET ORAL at 20:56

## 2021-12-27 RX ADMIN — PRAVASTATIN SODIUM 10 MG: 10 TABLET ORAL at 20:56

## 2021-12-27 RX ADMIN — ACETAMINOPHEN 650 MG: 325 TABLET ORAL at 13:23

## 2021-12-27 RX ADMIN — POLYETHYLENE GLYCOL 3350 17 G: 17 POWDER, FOR SOLUTION ORAL at 08:49

## 2021-12-27 RX ADMIN — PIPERACILLIN AND TAZOBACTAM 3375 MG: 3; .375 INJECTION, POWDER, LYOPHILIZED, FOR SOLUTION INTRAVENOUS at 00:49

## 2021-12-27 RX ADMIN — FLECAINIDE ACETATE 50 MG: 50 TABLET ORAL at 20:56

## 2021-12-27 RX ADMIN — BISACODYL 5 MG: 5 TABLET, COATED ORAL at 08:51

## 2021-12-27 RX ADMIN — DILTIAZEM HYDROCHLORIDE 30 MG: 30 TABLET, FILM COATED ORAL at 20:56

## 2021-12-27 RX ADMIN — PIPERACILLIN AND TAZOBACTAM 3375 MG: 3; .375 INJECTION, POWDER, LYOPHILIZED, FOR SOLUTION INTRAVENOUS at 17:16

## 2021-12-27 RX ADMIN — PIPERACILLIN AND TAZOBACTAM 3375 MG: 3; .375 INJECTION, POWDER, LYOPHILIZED, FOR SOLUTION INTRAVENOUS at 09:14

## 2021-12-27 RX ADMIN — FUROSEMIDE 20 MG: 20 TABLET ORAL at 17:16

## 2021-12-27 RX ADMIN — APIXABAN 5 MG: 5 TABLET, FILM COATED ORAL at 20:56

## 2021-12-27 RX ADMIN — APIXABAN 5 MG: 5 TABLET, FILM COATED ORAL at 08:49

## 2021-12-27 RX ADMIN — DILTIAZEM HYDROCHLORIDE 30 MG: 30 TABLET, FILM COATED ORAL at 02:30

## 2021-12-27 ASSESSMENT — PAIN SCALES - GENERAL
PAINLEVEL_OUTOF10: 5
PAINLEVEL_OUTOF10: 0
PAINLEVEL_OUTOF10: 3
PAINLEVEL_OUTOF10: 6
PAINLEVEL_OUTOF10: 0

## 2021-12-27 NOTE — PROGRESS NOTES
Hospitalist progress note    Patient:  Chad Pugh  YOB: 1935  Date of Service: 12/27/21  MRN: [id number]   Acct:  [de-identified]   Primary Care Physician: Soraya Dennison MD  12/14/2021     Patient Seen, Chart, Consults notes, Labs, Radiology, family and social history were reviewed. This is an 82years old male patient with past medical history of hypertension patient was admitted because of L2-L5 laminectomy and fusion. Postoperative the pain was getting better by time. Patient history of hypertension which was controlled with medication  Postoperative the patient developed acute respiratory failure requiring high oxygen and the patient was diagnosed with aspiration pneumonia. Patient has new onset A. fib with RVR on December 19 and he was cardioverted on December 22 to normal sinus rhythm  Later on the patient has ileus and we consulted surgery      Chief Complaint: Abdominal distention        Subjective     The patient is a 82 y.o. male who presents with spinal stenosis followed with laminectomy the patient and up was ileus and x-ray came back positive for ileus so consulted surgery patient mentioned that he is started having bowel movement. There is no nausea, no vomiting patient is n.p.o.. He is passing gas and bowel movement today. There is no fever, or chills or sweating.   the patient denied any cough, hemoptysis, orthopnea, pleuritic pain, shortness of breath, sputum changes, stridor, tachypnea or wheezing         Review of systems:    All 10 review of system were negative unless what is mentioned in the present history               PHYSICAL EXAM:  BP (!) 167/89   Pulse 100   Temp 97.2 °F (36.2 °C) (Temporal)   Resp 16   Ht 6' 1\" (1.854 m)   Wt 200 lb (90.7 kg)   SpO2 95%   BMI 26.39 kg/m²     General appearance - alert, well appearing, and in no distress   Head: atraumatic   Pupil: equal, round reactive to light   Neck: Supple, trachea is midline   Chest - clear to auscultation, no wheezes, rales or rhonchi, symmetric air entry   Distant Breath Sounds: No   Heart - S1 and S2 normal   Abdomen -  distended  Tender      Musculoskeletal - no joint tenderness, deformity or swelling   Extremities - peripheral pulses normal, no pedal edema, no clubbing or cyanosis times 4         Review of Labs and Diagnostic Testing:         Echo Complete    Result Date: 12/22/2021  Transthoracic Echocardiography Report (TTE)  Demographics   Patient Name    Barbi Miramontes  Gender            Male                  W   Medical Record  [id number]     Room Number       [facility]  Number   Account #       [de-identified]    Procedure Date    12/22/2021   Corporate ID                 Ordering          Mj Marquez MD                               Physician   Accession       [id number]   Referring  Number                       Physician   Date of Birth   1935   Sonographer       Jordan MIRANDA   Age             82 year(s)   Interpreting      [address]                               Physician         Physician Cardiology                                                 Toshia Fleming MD                                Any Other  Procedure Type of Study   TTE procedure:Echo Complete W/Doppler & Color Flow, Echo W/Bubble Study. Procedure Date Date: 12/22/2021 Start: 09:40 AM Study Location: CV[facility] Technical Quality: Adequate visualization Indications:Atrial fibrillation. Patient Status: Routine Height: 73 inches Weight: 200 pounds BSA: 2.15 m^2 BMI: 26.39 kg/m^2 Rhythm: Within normal limits HR: 89 bpm BP: 155/91 mmHg  Findings   Left Ventricle  Normal left ventricular chamber size. Normal left ventricular systolic  function. Visually estimated LVEF is 60 %. No wall motion abnormalities. Grade II diastolic dysfunction. Right Ventricle  Normal right ventricle structure and function. Left Atrium  Normal left atrium. Right Atrium  Normal right atrium.    Mitral Valve  Physiologic and/or trace mitral regurgitation is present. No evidence of hemodynamically significant mitral stenosis. Tricuspid Valve  Mild tricuspid regurgitation. Aortic Valve  Trileaflet aortic valve. Normal leaflet mobility. No aortic stenosis. Trace aortic regurgitation. Pulmonic Valve  Mild-to-moderate pulmonic regurgitation present. Pericardial Effusion  No evidence for hemodynamically significant pericardial effusion. Pleural Effusion  No evidence of pleural effusion. Aorta  Normal aortic root and ascending aorta. Miscellaneous  The inferior vena cava diameter is normal with normal respiratory  variation. Conclusions   Summary  Normal left ventricular chamber size. Normal left ventricular systolic  function. Visually estimated LVEF is 60 %. No wall motion abnormalities. Grade II diastolic dysfunction. Normal right ventricle structure and function. Normal left atrium. Normal right atrium. Mild tricuspid regurgitation. Mild-to-moderate pulmonic regurgitation present. Unable to estimate PA pressure. Agitated saline contrast study shows late right to left shunting  suggestive of an intra-pulmonary shunt. No comparison study available.    Signature   ----------------------------------------------------------------  Electronically signed by Melvi Martinez MD(Interpreting  physician) on 12/22/2021 10:57 AM  ----------------------------------------------------------------  M-Mode/2D Measurements & Calculations   LV Diastolic     LV Systolic Dimension: 2.7 cm   AV Cusp Separation: 2.1  Dimension: 3.7   LV Volume Diastolic: 58.6 ml    cmAO Root Dimension: 3.7  cm               LV Volume Systolic: 52.0 ml     cm  LV FS:27 %       LV EDV/LV EDV Index: 57.5 PL/97  LV PW Diastolic: XM/Y^0LL ESV/LV ESV Index: 25.9  0.9 cm           ml/12ml/ m^2  LV PW Systolic:  EF Calculated: 55 %             RV Diastolic Dimension:  1.4 cm           LV Mass Index: 92 l/min*m^2     3.7 cm  Septum           LV Length: 8.9 cm  Diastolic: 2 cm Ascending Aorta: 3.8 cm  Septum Systolic: LVOT: 2.4 cm                    LA volume/Index: 68.6 ml  1.6 cm                                           /31.92ml/m^2  CO: 10.3 l/min                                   RA Area: 14.2 cm^2  CI: 4.79 l/m*m^2  LV Mass: 197.65  g  Doppler Measurements & Calculations   MV Peak E-Wave: 1.16 AV Peak Velocity: 1.37 LVOT Peak Velocity: 1.22 m/s  m/s                  m/s                    LVOT Mean Velocity: 0.86 m/s  MV Peak A-Wave: 0.96 AV Peak Gradient: 7.55 LVOT Peak Gradient: 6 mmHgLVOT  m/s                  mmHg                   Mean Gradient: 3.3 mmHg  MV E/A Ratio: 1.21   AV Mean Velocity: 0.98  MV Peak Gradient:    m/s  7.9 mmHg             AV Mean Gradient: 4.3  MV Mean Gradient:    mmHg                   TR Velocity:4.13 m/s  3.7 mmHg             AV VTI: 28.9 cm        TR Gradient:68.26 mmHg  MV Mean Velocity:    AV Area  0.92 m/s             (Continuity):4.01 cm^2  MV Deceleration  Time: 217 msec       LVOT VTI: 25.6 cm      IL ED Velocity: 1.3 m/s  MV P1/2t: 86 msec  MVA by PHT:2.56 cm^2  MV Area  (continuity): 3.5  cm^2  MV E' Septal  Velocity: 0.05 m/s  MV E' Lateral  Velocity: 6 m/s  [id number]    XR CHEST (2 VW)    Result Date: 12/22/2021  EXAMINATION: TWO XRAY VIEWS OF THE CHEST 12/22/2021 3:17 pm COMPARISON: December 22, 2021 at 15:04, December 18, 2021 HISTORY: ORDERING SYSTEM PROVIDED HISTORY: reevaluate right infiltrate TECHNOLOGIST PROVIDED HISTORY: Reason for exam:->reevaluate right infiltrate What reading provider will be dictating this exam?->CRC FINDINGS: Heart size is unable to be accurately assessed on this single portable view of the chest, but appears to be stable. Previously described right lung airspace opacities with a reticular and patchy appearance have increased since December 18, 2021.   No evidence of a sizable pleural effusion. No pneumothorax. No acute osseous abnormality. Increased reticular and patchy airspace opacities in the right lung. These are nonspecific and may be on the basis of multifocal pneumonia or (less likely) asymmetric pulmonary edema. Atypical/viral pneumonia not excluded. XR CHEST (2 VW)    Result Date: 12/9/2021  EXAMINATION: TWO XRAY VIEWS OF THE CHEST 12/9/2021 11:09 am COMPARISON: None. HISTORY: ORDERING SYSTEM PROVIDED HISTORY: Pre-op evaluation TECHNOLOGIST PROVIDED HISTORY: Reason for exam:->pre op What reading provider will be dictating this exam?->CRC FINDINGS: Slight peripheral coarsening of interstitial markings may indicate mild fibrosis. Heart and pulmonary vascularity are normal.  Neither costophrenic angle is blunted. Subtle findings which may indicate mild interstitial fibrosis. XR ABDOMEN (KUB) (SINGLE AP VIEW)    Result Date: 12/25/2021  EXAMINATION: ONE SUPINE XRAY VIEW(S) OF THE ABDOMEN 12/25/2021 11:05 am COMPARISON: Is comparison is dated December 22, 2021 HISTORY: ORDERING SYSTEM PROVIDED HISTORY: no bowel movement, distention TECHNOLOGIST PROVIDED HISTORY: Reason for exam:->no bowel movement, distention What reading provider will be dictating this exam?->CRC FINDINGS: Postoperative changes are present with surgical clips present There is been in single from L2 through L5 There is severe colonic distention most prominent at the comparison descending colon in the 15 cm range there is air in some mildly distended small bowel loops. Marked colonic distension unchanged from prior study with air in mildly distended small bowel suspect postoperative ileus     XR ABDOMEN (KUB) (SINGLE AP VIEW)    Result Date: 12/16/2021  EXAMINATION: ONE SUPINE XRAY VIEW(S) OF THE ABDOMEN 12/16/2021 2:41 pm COMPARISON: None.  HISTORY: ORDERING SYSTEM PROVIDED HISTORY: post-op ileus TECHNOLOGIST PROVIDED HISTORY: Reason for exam:->post-op ileus What reading provider will be dictating this exam?->CRC FINDINGS: Air scattered small and large bowel loops. Dilated small bowel measures 3.6 cm. Dilated colon measures 8.7 cm at the level the transverse. Skin in seizure in with skin staples vertically noted over the mid lower abdomen. Postoperative change spinal fusion and laminectomy noted lumbar spine. Surgical clips right upper quadrant. Dilated small and large bowel loops suggest generalized ileus. Clinical correlation and follow-up to resolution recommended. XR ACUTE ABD SERIES CHEST 1 VW    Result Date: 12/22/2021  EXAMINATION: TWO XRAY VIEWS OF THE ABDOMEN AND SINGLE  XRAY VIEW OF THE CHEST 12/22/2021 3:16 pm COMPARISON: Chest radiograph December 22, 2021 HISTORY: ORDERING SYSTEM PROVIDED HISTORY: repeat/assess respiratory status TECHNOLOGIST PROVIDED HISTORY: Reason for exam:->repeat/assess respiratory status What reading provider will be dictating this exam?->CRC FINDINGS: The evaluation is limited due to low lung volumes. Trachea is midline. Cardiomediastinal silhouette is stable in size. Persistent airspace opacity identified in right lung lower lung. Multiple air-filled loops of colon identified in the abdomen. No abnormal abdominal calcifications. Bowel gas pattern is nonspecific nonobstructive. No evidence of bowel obstruction. The evaluation is limited due to low lung volumes. Opacities in right lung may represent pneumonia versus atelectasis in the proper clinical setting. XR CHEST PORTABLE    Result Date: 12/25/2021  EXAMINATION: ONE XRAY VIEW OF THE CHEST 12/25/2021 2:58 pm COMPARISON: Comparison is dated December 24, 2021 HISTORY: ORDERING SYSTEM PROVIDED HISTORY: hypoxia TECHNOLOGIST PROVIDED HISTORY: Reason for exam:->hypoxia FINDINGS: There is marked increase of right-sided confluent pulmonary a hazy opacity since the prior exam with small pleural effusion present. Cardiac and mediastinal contours are unchanged.   Pulmonary vasculature is silhouette is without acute process. The osseous structures are without acute process. Right lower lobe atelectasis and/or infiltrate. CTA PULMONARY W CONTRAST    Result Date: 12/16/2021  EXAMINATION: CTA OF THE CHEST 12/16/2021 3:32 pm TECHNIQUE: CTA of the chest was performed after the administration of intravenous contrast.  Multiplanar reformatted images are provided for review. MIP images are provided for review. Dose modulation, iterative reconstruction, and/or weight based adjustment of the mA/kV was utilized to reduce the radiation dose to as low as reasonably achievable. COMPARISON: None. HISTORY: ORDERING SYSTEM PROVIDED HISTORY: CHEST PAIN AND SOB R/O PE TECHNOLOGIST PROVIDED HISTORY: Reason for exam:->CHEST PAIN AND SOB R/O PE What reading provider will be dictating this exam?->CRC FINDINGS: No thoracic aortic aneurysm or dissection. Mild-to-moderate scattered calcified plaque in the thoracic aorta. No evidence of pulmonary embolism. Mild cardiomegaly. Coronary atherosclerosis. No pericardial effusion. Shotty subcentimeter short axis mediastinal hilar nodes. No pleural effusion or pneumothorax. Low lung volumes. There are a few small peripheral densities in the lungs which may represent scarring or infection (for example series 4, image 73). Another curvilinear density is noted the medial left lower lobe (series 4 image 168). Mild dependent atelectasis, right greater than left. The central airways are patent. There is centrilobular and paraseptal emphysema. Degenerative changes of the spine. The visualized abdomen demonstrates changes of cholecystectomy and the splenic parenchymal calcification. No evidence of pulmonary embolism. No acute aortic injury or aneurysm. There are a few nonspecific densities in the peripheral lungs. Imaging features can be seen with COVID-19 pneumonia, though are nonspecific and can occur with a variety of infectious and noninfectious processes.  Mild cardiomegaly. Atherosclerotic disease. FLUORO FOR SURGICAL PROCEDURES    Result Date: 12/14/2021  EXAMINATION: SPOT FLUOROSCOPIC IMAGES 12/14/2021 10:42 am TECHNIQUE: Fluoroscopy was provided by the radiology department for procedure. Radiologist was not present during examination. FLUOROSCOPY DOSE AND TYPE OR TIME AND EXPOSURES: Fluoroscopy time equals 10.8 seconds. Total dose equals 15.92 mGy COMPARISON: None HISTORY: ORDERING SYSTEM PROVIDED HISTORY: Back pain, unspecified back location, unspecified back pain laterality, unspecified chronicity TECHNOLOGIST PROVIDED HISTORY: Reason for exam:->Back pain, unspecified back location, unspecified back pain laterality, unspecified chronicity What reading provider will be dictating this exam?->CRC Intraprocedural imaging. FINDINGS: 2 spot images of the lumbar spine were obtained. Intraprocedural fluoroscopic spot images as above. See separate procedure report for more information.      Recent Results (from the past 24 hour(s))   Brain Natriuretic Peptide    Collection Time: 12/27/21  4:45 AM   Result Value Ref Range    Pro-BNP 4,993 (H) 0 - 450 pg/mL   Basic Metabolic Panel w/ Reflex to MG    Collection Time: 12/27/21  4:45 AM   Result Value Ref Range    Sodium 138 132 - 146 mmol/L    Potassium reflex Magnesium 3.1 (L) 3.5 - 5.0 mmol/L    Chloride 98 98 - 107 mmol/L    CO2 29 22 - 29 mmol/L    Anion Gap 11 7 - 16 mmol/L    Glucose 190 (H) 74 - 99 mg/dL    BUN 23 6 - 23 mg/dL    CREATININE 0.8 0.7 - 1.2 mg/dL    GFR Non-African American >60 >=60 mL/min/1.73    GFR African American >60     Calcium 8.4 (L) 8.6 - 10.2 mg/dL   Magnesium    Collection Time: 12/27/21  4:45 AM   Result Value Ref Range    Magnesium 2.0 1.6 - 2.6 mg/dL   ]     Current Facility-Administered Medications: methylPREDNISolone sodium (SOLU-MEDROL) injection 20 mg, 20 mg, IntraVENous, Q8H  furosemide (LASIX) tablet 20 mg, 20 mg, Oral, BID  dextrose 5 % and 0.45 % sodium chloride infusion, , Oral, Daily PRN  fleet rectal enema 1 enema, 1 enema, Rectal, Daily PRN   Hospital Problems           Last Modified POA    Spinal stenosis of lumbar region 12/14/2021 Yes    Spondylolisthesis, lumbar region 12/14/2021 Yes    Lumbar degenerative disc disease 12/15/2021 Yes    Chest pain 12/17/2021 Yes    Elevated troponin 12/17/2021 Yes    Primary hypertension 12/17/2021 Yes    Puerperal sepsis with acute hypoxic respiratory failure ([address] 12/17/2021 Yes    Hyperlipidemia 12/17/2021 Yes    Pulmonary fibrosis ([address] 12/17/2021 Yes                  Assessment and Plan:     Ileus, order x-ray and x-ray is getting better patient started having bowel movement   Pneumonia, continue antibiotics for aspiration pneumonia   Atrial fibrillation with rapid ventricular response, continue Eliquis beta-blocker and Cardizem   Covid came back negative   Elevated BNP with ejection fraction 60% with stage II diastolic dysfunction and moderate to mild pulmonary hypertension with intrapulmonary shunt seen, per cardiologist   BNP is getting better but is still high   Hypokalemia: replace     Continue DVT &  GERD prophylaxis        Electronically signed by Bree Francois MD on 12/27/2021 at 7:30 AM

## 2021-12-27 NOTE — CARE COORDINATION
Notified by therapy that the patient's wife Milvia would prefer to take the patient home when medically stable to transition. Possible TOBIAS planned for tomorrow per notes. Patient still on 9l O2 per [place] with resting sat of 93%. Will need ambulatory pulse ox notes and oxygen DME orders for transition of care. Nebulizer order noted to the chart. Met with the patient and his wife Milvia at the bedside, as well a their daughter Lazaro Cooley via phone to discuss transition of care plans. Explained above. Understanding expressed. Discussed need for home O2. Patient and family have no preference for provider. Discussed HHC as well. They would like HC and provider list reviewed. They would like 1.) [facility], 2.) [facility], 3.) [facility]. Call placed to suha, liaison with [facility] and they will follow the patient fr discharge. HC orders received. Call placed to Fernando Gutierrez, liaison with [facility] DME re: home O2. They are currently closed to home oxygen referrals at this time. Call placed to [facility], unable to reach anyone in the office. Call placed to [facility], spoke with Librado Hebert who is out of the office and directed to the answering service. Call placed to [facility] answering service and referral made. Will need DMe order for O2 with Dx of Acute hypoxic respiratory failure, pneumonia, and/or CHF to qualify for Home O2. She will send the referral to the office in th morning and they will follow for discharge. DME for nebulizer already on the chart. Will continue to follow. Binh Gardner RN.  P:  [phone]    The Plan for Transition of Care is related to the following treatment goals: Improve Functional mobility at home and provide oxygen and nebulizer for home use. The Patient and/or patient representative, wife Quinton Sharma, was provided with a choice of provider and agrees with the discharge plan.  [x] Yes [] No    Freedom of choice list was provided with basic dialogue that supports the patient's individualized plan of care/goals, treatment preferences and shares the quality data associated with the providers.  [x] Yes [] No

## 2021-12-27 NOTE — PROGRESS NOTES
Notified Dr. Deanna Jackson of patient's K 3.1 and possible need to check phosphorus per cardiology.

## 2021-12-27 NOTE — CONSULTS
Inpatient Cardiology Consultation      Reason for Consult:  A. fib RVR, CHF, abnormal echo    Consulting Physician:Dr. ESTELITA Ruvalcaba    Requesting Physician:  Jeremiah Gomez MD    Date of Consultation: 12/27/2021    HISTORY OF PRESENT ILLNESS OF Chad Pugh located in  room [id number]/[id number]:     Chad Pugh is a 82 y.o. male known to Dr. Concha Jara since first consult on 12/17/2021    Patient has h/o HTN, interstitial lung fibrosis, GERD, arthritis, spinal stenosis,  h/o smoking. Patient was admitted on 12/14/2021 for neurosurgical treatment of his spinal stenosis and underwent on 12/14/2021 L2-L5 LAMINECTOMY AND FUSION. On 12/16/2021 he developed shortness of breath and chest pain and was started on 6 L oxygen via nasal cannula also he has had an ABG suggestive significant hypoxemia, and chest x-ray showed infiltrates versus atelectasis in the right lower lobe. Cardiology was consulted for chest pain and patient has been seen by Dr. Concha Jara on 12/17/2021. Since patient had a recent negative preop Lexiscan stress (11/18/2021) and his troponins having a flat pattern no further ischemic cardiac work-up was recommended. On 12/19/2021 patient developed atrial fibrillation and was seen by Dr. Agustina Cannon as a consult for new onset atrial fibrillation RVR- which was treated with IV diltiazem infusion. Patient underwent echo on 12/22/2021 and it was found that Agitated saline contrast study shows late right to left shunting  suggestive of an intra-pulmonary shunt and cardiology was consulted        Please note: past medical records were reviewed per electronic medical record (EMR) - see detailed reports under Past Medical/ Surgical History.    Past Medical History:    Past Medical History:   Diagnosis Date    Hypertension        Past Surgical History:    Past Surgical History:   Procedure Laterality Date    APPENDECTOMY      CAROTID ENDARTERECTOMY Right     CARPAL TUNNEL RELEASE Bilateral     JOINT REPLACEMENT Right  LUMBAR SPINE SURGERY N/A 12/14/2021    L2-L5 LAMINECTOMY AND FUSION performed by Kvng Tapia MD at [facility] OR       Medications Prior to admit:  Prior to Admission medications    Medication Sig Start Date End Date Taking? Authorizing Provider   oxyCODONE (ROXICODONE) 5 MG immediate release tablet Take 1 tablet by mouth every 4 hours as needed for Pain for up to 7 days.  12/22/21 12/29/21 Yes RADHA Haynes   cyclobenzaprine (FLEXERIL) 10 MG tablet Take 1 tablet by mouth 3 times daily as needed for Muscle spasms 12/22/21 1/1/22 Yes RADHA Haynes   Pediatric Multivitamins-Fl (MULTIVITAMINS/FL PO) Take by mouth   Yes Historical Provider, MD   Pediatric Multivitamins-Fl (MULTIVITAMINS/FL PO) Take by mouth   Yes Historical Provider, MD   amLODIPine (NORVASC) 10 MG tablet daily  9/3/21  Yes Historical Provider, MD   metoprolol tartrate (LOPRESSOR) 25 MG tablet take 1 tablet twice a day 6/5/08  Yes Historical Provider, MD   pravastatin (PRAVACHOL) 10 MG tablet daily  9/21/21  Yes Historical Provider, MD       Current Medications:    Current Facility-Administered Medications: methylPREDNISolone sodium (SOLU-MEDROL) injection 20 mg, 20 mg, IntraVENous, Q8H  furosemide (LASIX) tablet 20 mg, 20 mg, Oral, BID  dextrose 5 % and 0.45 % sodium chloride infusion, , IntraVENous, Continuous  lidocaine 4 % external patch 1 patch, 1 patch, TransDERmal, Daily  piperacillin-tazobactam (ZOSYN) 3,375 mg in dextrose 5 % 100 mL IVPB extended infusion (mini-bag), 3,375 mg, IntraVENous, Q8H  flecainide (TAMBOCOR) tablet 50 mg, 50 mg, Oral, BID  sodium chloride flush 0.9 % injection 5-40 mL, 5-40 mL, IntraVENous, 2 times per day  sodium chloride flush 0.9 % injection 5-40 mL, 5-40 mL, IntraVENous, PRN  0.9 % sodium chloride infusion, 25 mL, IntraVENous, PRN  metoprolol (LOPRESSOR) injection 5 mg, 5 mg, IntraVENous, Q6H PRN  apixaban (ELIQUIS) tablet 5 mg, 5 mg, Oral, BID  phenol 1.4 % mouth spray 1 spray, 1 spray, Mouth/Throat, Q2H PRN  ipratropium (ATROVENT) 0.02 % nebulizer solution 0.5 mg, 0.5 mg, Nebulization, 4x daily  dilTIAZem (CARDIZEM) tablet 30 mg, 30 mg, Oral, 4 times per day  metoprolol tartrate (LOPRESSOR) tablet 50 mg, 50 mg, Oral, BID  perflutren lipid microspheres (DEFINITY) injection 1.65 mg, 1.5 mL, IntraVENous, ONCE PRN  acetaminophen (TYLENOL) tablet 650 mg, 650 mg, Oral, Q4H PRN  simethicone (MYLICON) chewable tablet 80 mg, 80 mg, Oral, Q6H PRN  pravastatin (PRAVACHOL) tablet 10 mg, 10 mg, Oral, Daily  sodium chloride flush 0.9 % injection 5-40 mL, 5-40 mL, IntraVENous, 2 times per day  sodium chloride flush 0.9 % injection 5-40 mL, 5-40 mL, IntraVENous, PRN  0.9 % sodium chloride infusion, 25 mL, IntraVENous, PRN  ondansetron (ZOFRAN-ODT) disintegrating tablet 4 mg, 4 mg, Oral, Q8H PRN **OR** ondansetron (ZOFRAN) injection 4 mg, 4 mg, IntraVENous, Q6H PRN  HYDROmorphone (DILAUDID) injection 0.5 mg, 0.5 mg, IntraVENous, Q3H PRN  cyclobenzaprine (FLEXERIL) tablet 10 mg, 10 mg, Oral, TID PRN  polyethylene glycol (GLYCOLAX) packet 17 g, 17 g, Oral, Daily  bisacodyl (DULCOLAX) EC tablet 5 mg, 5 mg, Oral, Daily  sennosides-docusate sodium (SENOKOT-S) 8.6-50 MG tablet 1 tablet, 1 tablet, Oral, BID  magnesium hydroxide (MILK OF MAGNESIA) 400 MG/5ML suspension 30 mL, 30 mL, Oral, Daily PRN  fleet rectal enema 1 enema, 1 enema, Rectal, Daily PRN    Allergies:  Demerol [meperidine hcl] and Morphine    Social History:   Social History     Socioeconomic History    Marital status:      Spouse name: Not on file    Number of children: Not on file    Years of education: Not on file    Highest education level: Not on file   Occupational History    Not on file   Tobacco Use    Smoking status: Former Smoker    Smokeless tobacco: Never Used   Vaping Use    Vaping Use: Never used   Substance and Sexual Activity    Alcohol use: Not Currently    Drug use: Never    Sexual activity: Not on file   Other Topics Concern    Not on file   Social History Narrative    Not on file     Social Determinants of Health     Financial Resource Strain:     Difficulty of Paying Living Expenses: Not on file   Food Insecurity:     Worried About Running Out of Food in the Last Year: Not on file    Monik of Food in the Last Year: Not on file   Transportation Needs:     Lack of Transportation (Medical): Not on file    Lack of Transportation (Non-Medical): Not on file   Physical Activity:     Days of Exercise per Week: Not on file    Minutes of Exercise per Session: Not on file   Stress:     Feeling of Stress : Not on file   Social Connections:     Frequency of Communication with Friends and Family: Not on file    Frequency of Social Gatherings with Friends and Family: Not on file    Attends [employer]ism Services: Not on file    Active Member of [address] [place] [address] [employer] or Organizations: Not on file    Attends Club or Organization Meetings: Not on file    Marital Status: Not on file   Intimate Partner Violence:     Fear of Current or Ex-Partner: Not on file    Emotionally Abused: Not on file    Physically Abused: Not on file    Sexually Abused: Not on file   Housing Stability:     Unable to Pay for Housing in the Last Year: Not on file    Number of [place] in the Last Year: Not on file    Unstable Housing in the Last Year: Not on file       Family History:   History reviewed. No pertinent family history. REVIEW OF SYSTEMS:     Constitutional: Denies fatigue. Denies fevers, chills, night sweats, josy loss, josy gain  HEENT: Denies headaches, nose bleeds, and blurred vision, oral pain, oral lesion. Neurological: Feels lightheaded. Denies weakness, dizziness  numbness and tingling  Cardiovascular: Feels short of breath with minimal effort. Denies chest pain,  palpitations, and feelings of heart racing. Respiratory: Uses O2 on ventilator via nasal cannula. Denies cough, wheezing.   Gastrointestinal: Abdominal bloating, passes gases did not have stool. Deniesnausea, vomiting. Genitourinary:Denies pain on  Urination. Lymphatic: Denies lumps and bumps to neck, axilla, breast, and groin  Endocrine: Denies excessive thirst. Denies intolerance to heat or cold  Musculoskeletal: Denies falls, pain to BLE with ambulation and edema to BLE. Psychiatric: Denies anxiety and depression. Spencer Case PHYSICAL EXAM:   BP (!) 157/59   Pulse 78   Temp 97.8 °F (36.6 °C) (Temporal)   Resp 18   Ht 6' 1\" (1.854 m)   Wt 200 lb (90.7 kg)   SpO2 93%   BMI 07.79 kg/m²   Systolic (47QGC), MAS:248 , Min:157 , BWM:225    Diastolic (31TWR), JRF:36, Min:57, Max:89    CONST:  Well developed, well nourished who appears stated age. Awake, alert, cooperative, no apparent distress  HEENT:   Head- Normocephalic, atraumatic   Eyes- Conjunctivae pink, anicteric  Throat- Oral mucosa pink and moist  Neck-  No stridor, trachea midline, no jugular venous distention. No adenopathy   CHEST: Chest symmetrical and non-tender to palpation. No accessory muscle use or intercostal retractions  RESPIRATORY:Lung sounds -bilateral basilar crackles. CARDIOVASCULAR:     No carotid bruits  Heart Inspection- shows no noted pulsations  Heart Ausculation- Regular  rate and rhythm, no murmur. No s3, s4 or rub   PV: No lower extremity edema. No varicosities. Pedal pulses palpable, no clubbing or cyanosis   ABDOMEN: Soft, distended, slightly-tender to light palpation. Bowel sounds present. MS: Moves all extremities. No atrophy or abnormal movements. : Deferred  SKIN: Warm and dry,  no stasis dermatitis or ulcers on legs  NEURO / PSYCH: Oriented to person, place and time. Speech clear and appropriate. Follows all commands. Pleasant affect     DATA:    ECG reviewed with Dr. Akila De Luna strips: Sinus rhythm, PACs  Diagnostic:  ECHO 12/22/21  Summary   Normal left ventricular chamber size. Normal left ventricular systolic   function. Visually estimated LVEF is 60 %. No wall motion abnormalities.    Grade II diastolic dysfunction. Normal right ventricle structure and function. Normal left atrium. Normal right atrium. Mild tricuspid regurgitation. Mild-to-moderate pulmonic regurgitation present. Unable to estimate PA pressure. Agitated saline contrast study shows late right to left shunting   suggestive of an intra-pulmonary shunt. No comparison study available    XR CHEST (2 VW)    Result Date: 12/9/2021  EXAMINATION: TWO XRAY VIEWS OF THE CHEST 12/9/2021 11:09 am COMPARISON: None. HISTORY: ORDERING SYSTEM PROVIDED HISTORY: Pre-op evaluation TECHNOLOGIST PROVIDED HISTORY: Reason for exam:->pre op What reading provider will be dictating this exam?->CRC FINDINGS: Slight peripheral coarsening of interstitial markings may indicate mild fibrosis. Heart and pulmonary vascularity are normal.  Neither costophrenic angle is blunted. Subtle findings which may indicate mild interstitial fibrosis. Echo Complete    Result Date: 12/22/2021  Transthoracic Echocardiography Report (TTE)  Demographics   Patient Name    Jeimy Lu  Gender            Male                  W   Medical Record  [id number]     Room Number       [address] [facility]  Number   Account #       [de-identified]    Procedure Date    12/22/2021   Corporate ID                 Ordering          Osiris Cooley MD                               Physician   Accession       [id number]   Referring  Number                       Physician   Date of Birth   1935   Sonographer       Justyn MIRANDA   Age             82 year(s)   Interpreting      [address]                               Physician         Physician Cardiology                                                 Vanessa Choi MD                                Any Other  Procedure Type of Study   TTE procedure:Echo Complete W/Doppler & Color Flow, Echo W/Bubble Study.   Procedure Date Date: 12/22/2021 Start: 09:40 AM Study Location: CV Technical Quality: Adequate visualization Indications:Atrial fibrillation. Patient Status: Routine Height: 73 inches Weight: 200 pounds BSA: 2.15 m^2 BMI: 26.39 kg/m^2 Rhythm: Within normal limits HR: 89 bpm BP: 155/91 mmHg  Findings   Left Ventricle  Normal left ventricular chamber size. Normal left ventricular systolic  function. Visually estimated LVEF is 60 %. No wall motion abnormalities. Grade II diastolic dysfunction. Right Ventricle  Normal right ventricle structure and function. Left Atrium  Normal left atrium. Right Atrium  Normal right atrium. Mitral Valve  Physiologic and/or trace mitral regurgitation is present. No evidence of hemodynamically significant mitral stenosis. Tricuspid Valve  Mild tricuspid regurgitation. Aortic Valve  Trileaflet aortic valve. Normal leaflet mobility. No aortic stenosis. Trace aortic regurgitation. Pulmonic Valve  Mild-to-moderate pulmonic regurgitation present. Pericardial Effusion  No evidence for hemodynamically significant pericardial effusion. Pleural Effusion  No evidence of pleural effusion. Aorta  Normal aortic root and ascending aorta. Miscellaneous  The inferior vena cava diameter is normal with normal respiratory  variation. Conclusions   Summary  Normal left ventricular chamber size. Normal left ventricular systolic  function. Visually estimated LVEF is 60 %. No wall motion abnormalities. Grade II diastolic dysfunction. Normal right ventricle structure and function. Normal left atrium. Normal right atrium. Mild tricuspid regurgitation. Mild-to-moderate pulmonic regurgitation present. Unable to estimate PA pressure. Agitated saline contrast study shows late right to left shunting  suggestive of an intra-pulmonary shunt. No comparison study available.    Signature   ----------------------------------------------------------------  Electronically signed by Jeniffer Smalls MD(Interpreting  physician) on 12/22/2021 10:57 AM  ---------------------------------------------------------------- M-Mode/2D Measurements & Calculations   LV Diastolic     LV Systolic Dimension: 2.7 cm   AV Cusp Separation: 2.1  Dimension: 3.7   LV Volume Diastolic: 87.3 ml    cmAO Root Dimension: 3.7  cm               LV Volume Systolic: 30.9 ml     cm  LV FS:27 %       LV EDV/LV EDV Index: 57.5 ZV/15  LV PW Diastolic: VD/F^3FS ESV/LV ESV Index: 25.9  0.9 cm           ml/12ml/ m^2  LV PW Systolic:  EF Calculated: 55 %             RV Diastolic Dimension:  1.4 cm           LV Mass Index: 92 l/min*m^2     3.7 cm  Septum           LV Length: 8.9 cm  Diastolic: 2 cm                                  Ascending Aorta: 3.8 cm  Septum Systolic: LVOT: 2.4 cm                    LA volume/Index: 68.6 ml  1.6 cm                                           /31.92ml/m^2  CO: 10.3 l/min                                   RA Area: 14.2 cm^2  CI: 4.79 l/m*m^2  LV Mass: 197.65  g  Doppler Measurements & Calculations   MV Peak E-Wave: 1.16 AV Peak Velocity: 1.37 LVOT Peak Velocity: 1.22 m/s  m/s                  m/s                    LVOT Mean Velocity: 0.86 m/s  MV Peak A-Wave: 0.96 AV Peak Gradient: 7.55 LVOT Peak Gradient: 6 mmHgLVOT  m/s                  mmHg                   Mean Gradient: 3.3 mmHg  MV E/A Ratio: 1.21   AV Mean Velocity: 0.98  MV Peak Gradient:    m/s  7.9 mmHg             AV Mean Gradient: 4.3  MV Mean Gradient:    mmHg                   TR Velocity:4.13 m/s  3.7 mmHg             AV VTI: 28.9 cm        TR Gradient:68.26 mmHg  MV Mean Velocity:    AV Area  0.92 m/s             (Continuity):4.01 cm^2  MV Deceleration  Time: 217 msec       LVOT VTI: 25.6 cm      [place] ED Velocity: 1.3 m/s  MV P1/2t: 86 msec  MVA by PHT:2.56 cm^2  MV Area  (continuity): 3.5  cm^2  MV E' Septal  Velocity: 0.05 m/s  MV E' Lateral  Velocity: 6 m/s        Labs:   CARDIAC ENZYMES:  No results for input(s): CKTOTAL, CKMB, CKMBINDEX, TROPHS in the last 72 hours.   H/O TROPONIN levels    Lab Results   Component Value Date    TROPHS 29 12/19/2021    TROPHS 28 12/18/2021    TROPHS 30 12/18/2021    TROPHS 36 12/17/2021    TROPHS 38 12/17/2021    TROPHS 39 12/17/2021    TROPHS 39 12/16/2021     Recent Labs     12/25/21  0508 12/26/21  0520 12/27/21  0445   PROBNP 5,481* 4,452* 4,993*     Lab Results   Component Value Date    PROBNP 4,993 12/27/2021    PROBNP 4,452 12/26/2021    PROBNP 5,481 12/25/2021     BMP:   Recent Labs     12/26/21  0520 12/27/21  0445    138   K 3.4* 3.1*   CO2 26 29   BUN 24* 23   CREATININE 0.8 0.8   LABGLOM >60 >60   CALCIUM 7.8* 8.4*     Lab Results   Component Value Date    CREATININE 0.8 12/27/2021    CREATININE 0.8 12/26/2021    CREATININE 1.0 12/25/2021    CREATININE 1.3 12/22/2021    CREATININE 1.4 12/21/2021    CREATININE 1.2 12/20/2021    CREATININE 1.0 12/19/2021    CREATININE 1.0 12/18/2021    CREATININE 1.2 12/17/2021    CREATININE 1.3 12/16/2021    CREATININE 1.1 12/15/2021    CREATININE 1.2 12/09/2021     CBC: No results for input(s): WBC, HGB, HCT, PLT in the last 72 hours. Mag:   Recent Labs     12/26/21  0520 12/27/21  0445   MG 1.9 2.0     Phos: No results for input(s): PHOS in the last 72 hours. TSH: No results for input(s): TSH in the last 72 hours. HgA1c: No results found for: LABA1C  No results found for: EAG  BNP: No results for input(s): BNP in the last 72 hours. PT/INR: No results for input(s): PROTIME, INR in the last 72 hours. APTT:No results for input(s): APTT in the last 72 hours. FASTING LIPID PANEL:No results found for: CHOL, HDL, LDLDIRECT, LDLCALC, TRIG  LIVER PROFILE:No results for input(s): AST, ALT, LABALBU in the last 72 hours.     COVID-19 Labs:  Lab Results   Component Value Date    COVID19 Not Detected 12/25/2021    COVID19 Not Detected 12/20/2021     Recent Labs     12/25/21  1746   COVID19 Not Detected             ASSESSMENT:  Suspicion  of an intra-pulmonary shunt since  late right to left shunting on Agitated saline contrast study shows  Normal biventricular  systolic function systolic function  Grade II diastolic dysfunction. Tricuspid regurgitation, mild  Pulmonic regurgitation, mild-to-moderate   New onset atrial fibrillation RVR (12/19/2021)- postop,  [id number] 4 (age,  hypertension,  PAD)-spontaneous conversion to normal sinus rhythm earlier today. HTN, which needs better control  HLD  S/p  L2-L5 LAMINECTOMY AND FUSION on 12/14/2021   Acute respiratory failure, postop-12/16/2021 per pulmonology  Atypical chest pain, postop,  in context of acute respiratory failure with flat pattern   troponins, resolved  Postop ileus  H/o pulmonary fibrosis  H/o COVID-19 infection in May 2021  H/o R CEA          PLAN:  Continue Telemetry  Metoprolol discontinued and started Coreg 25 mg twice daily p.o. Continue current treatment  Electrolytes check and correction per primary service  I&O, weights  BP control -Coreg  TSH  Plans for TOBIAS to confirm TTE findings when ileus resolves    Further cardiac recommendations will be forthcoming pending patient clinical course and  TOBIAS findings. Assessment and plan discussed with Dr.K. Ruvalcaba    Assessment and Plan to follow as per Dr. Naga Grace. Jordon    Electronically signed by RADHA Hays on 12/27/2021 at 9:17 AM     Patient seen and examined case discussed in detail with cardiology nurse practitioner and agree with assessment and plan and history physical examination as discussed in detail and documented above. Elective TOBIAS to evaluate for possible intracardiac shunt once patient is stable and ileus resolved.

## 2021-12-27 NOTE — CARE COORDINATION
Late call received from tung Ruelas with [facility] Thursday 12/23. Patient did not have enough criteria to meet LTAC admission at that time. Previously choiced for [facility] for transition of care. Patient will need to be on 6l O2 per NC to be able to transition to [facility] for rehab. Pt note from yesterday still appropriate for [facility] for transition of care. Will need updated OT note. However, patient remains on 11L HF O2 per NC at 95% sat at this time. O2 will need to be weaned for transition of care. Will continue to follow.      Talib Baker RN,  P:  [phone]

## 2021-12-27 NOTE — PROGRESS NOTES
[place]  Department of Internal Medicine  Division of Pulmonary, Critical Care and Sleep Medicine  Consult Note    Tyrone Carlton DO, MPH, Anne Macias, Destiny Rawls MD, [address] Jerry Kelly DO, [facility]      Patient: Christine Darnell  MRN: [id number]  : 1935    Encounter Time: 5:08 PM     Date of Admission: 2021  5:17 AM    Primary Care Physician: Kat Mendez MD    Reason for Consultation: Post Op respiratory failure    SUBJECTIVE:  Reduced to 8 liters  Abx day  - Unasyn stopped - now on Zosyn d3  ECHO shows PH with SHUNT (intrapulmonary) - likely from his fibrosis and could be from liver disease ? CT with chronic changes - known fibrosis per family  Start on systemic steroids for fibrosis changed to Q8    OBJECTIVE:     PHYSICAL EXAM:   VITALS:   Vitals:    21 0015 21 0830 21 1445   BP: (!) 180/86 (!) 167/89 (!) 157/59 (!) 154/86   Pulse: 87 100 78 68   Resp: 18 16 18 18   Temp: 96.3 °F (35.7 °C) 97.2 °F (36.2 °C) 97.8 °F (36.6 °C) 97.3 °F (36.3 °C)   TempSrc: Temporal Temporal Temporal Temporal   SpO2: 95% 95% 93% 97%   Weight:       Height:            Intake/Output Summary (Last 24 hours) at 2021 1708  Last data filed at 2021 8001  Gross per 24 hour   Intake 850 ml   Output 850 ml   Net 0 ml        CONSTITUTIONAL:   A&O x 3  SKIN:     Kelechi, Fair skin with shin discoloration  HEENT:     EOMI, MMM, No thrush  NECK:    No bruits, No JVP apprechiated  CV:      Sinus TR murmur, No rubs, No gallops  PULMONARY:   Couse BS,  Dry crackles right lung  ABDOMEN:     Soft, non-tender. BS normal. No R/R/G  EXT:    No deformities . No clubbing. Trace lower extremity edema, + venous stasis  PULSE:   Appears equal and palpable.   PSYCHIATRIC:  No acute psycosis  MS:    Gross weakness- stable  NEUROLOGIC:   The clinical assessment is non-focal     DATA: IMAGING & TESTING:     LABORATORY TESTS:    ECHOCARDIOGRAM: Normal left ventricular chamber size. Normal left ventricular systolic    function. Visually estimated LVEF is 60 %.    No wall motion abnormalities.    Grade II diastolic dysfunction.    Normal right ventricle structure and function.    Normal left atrium.    Normal right atrium.    Mild tricuspid regurgitation.    Mild-to-moderate pulmonic regurgitation present.    Unable to estimate PA pressure.    Agitated saline contrast study shows late right to left shunting    suggestive of an intra-pulmonary shunt.    No comparison study available.        CBC:   Lab Results   Component Value Date    WBC 11.6 12/22/2021    RBC 3.35 12/22/2021    HGB 10.1 12/22/2021    HCT 31.5 12/22/2021    MCV 94.0 12/22/2021    MCH 30.1 12/22/2021    MCHC 32.1 12/22/2021    RDW 13.5 12/22/2021     12/22/2021    MPV 9.6 12/22/2021     CMP:    Lab Results   Component Value Date     12/27/2021    K 3.1 12/27/2021    CL 98 12/27/2021    CO2 29 12/27/2021    BUN 23 12/27/2021    CREATININE 0.8 12/27/2021    GFRAA >60 12/27/2021    LABGLOM >60 12/27/2021    GLUCOSE 190 12/27/2021    PROT 5.9 12/21/2021    LABALBU 3.1 12/21/2021    CALCIUM 8.4 12/27/2021    BILITOT 0.6 12/21/2021    ALKPHOS 68 12/21/2021    AST 17 12/21/2021    ALT 18 12/21/2021     Magnesium:    Lab Results   Component Value Date    MG 2.0 12/27/2021     Phosphorus:  No results found for: PHOS     PRO-BNP:   Lab Results   Component Value Date    PROBNP 4,993 (H) 12/27/2021    PROBNP 4,452 (H) 12/26/2021      ABGs:   Lab Results   Component Value Date    PH 7.394 12/16/2021    PO2 51.0 12/16/2021    PCO2 45.8 12/16/2021     Hemoglobin A1C: No components found for: HGBA1C    IMAGING:  Imaging tests were completed and reviewed and discussed radiology and care team involved and reveals       CHEST FILM 12/22  Increased reticular and patchy airspace opacities in the right lung.  These  are nonspecific and may be on the basis of multifocal pneumonia or (less  likely) asymmetric pulmonary edema.  Atypical/viral pneumonia not excluded. CHEST FILM COMPARED 12/18:            XR ABDOMEN (KUB) (SINGLE AP VIEW)  Result Date: 12/16/2021Dilated small and large bowel loops suggest generalized ileus. Clinical correlation and follow-up to resolution recommended. MRI LUMBAR SPINE WO CONTRAST  Result Date: 11/24/2021  Multilevel degenerative disc disease with associated facet and ligamentous hypertrophy resulting in canal stenosis at L3-4 and L4-5. Bilateral foraminal narrowing as described above. CTA PULMONARY W CONTRAST  Result Date: 12/16/2021: FINDINGS: No thoracic aortic aneurysm or dissection. Mild-to-moderate scattered calcified plaque in the thoracic aorta. No evidence of pulmonary embolism. Mild cardiomegaly. Coronary atherosclerosis. No pericardial effusion. Shotty subcentimeter short axis mediastinal hilar nodes. No pleural effusion or pneumothorax. Low lung volumes. There are a few small peripheral densities in the lungs which may represent scarring or infection (for example series 4, image 73). Another curvilinear density is noted the medial left lower lobe (series 4 image 168). Mild dependent atelectasis, right greater than left. The central airways are patent. There is centrilobular and paraseptal emphysema. Degenerative changes of the spine. The visualized abdomen demonstrates changes of cholecystectomy and the splenic parenchymal calcification. No evidence of pulmonary embolism. No acute aortic injury or aneurysm. There are a few nonspecific densities in the peripheral lungs. Imaging features can be seen with COVID-19 pneumonia, though are nonspecific and can occur with a variety of infectious and noninfectious processes. Mild cardiomegaly. Atherosclerotic disease. Assessment:   1. Post op respiratory failure  2. Hypoxemic Respiratory Failure   3. CT evidence of disease of the lung  4. Plueral plaques ?  Old infection or possible asbestosis  5. Mild-to-moderate pulmonic regurgitation present. 6.  Agitated saline contrast study shows late right to left shunting    suggestive of an intra-pulmonary shunt. 7. CT show sutttble changes of pulmonary fibrosis (periphearl)  8. New onset atrial fibrillation RVR.  [id number] 4 (age, hypertension, PAD)   9. Atypical CP.  Recent preoperative stress test negative for ischemia  10. Post op respiratory failure, on 9 L high flow nasal cannula  11. Postoperative ileus  12. 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 Hypertension  15. Hx R CEA  16. GERD  17. Hx COVID 19 5/2021          Plan:    1. Most of the finding are chronic, some fibrosis for sure   2. Continue ICS,   3. Bronchodialators QID  4. Wean oxygen as able   5. CXR with HF changes - increase lasix with BNP > 5 K  6. Started systemic steroids again for lung if oxygen doesn't improve after TOBIAS and cardioversion, 20 mg IV Q12 taper  7. Will need home oxygen   8. Serologic testing for ISLD needed and ordered  = negative  9. Diuresis and daily BNP  10. Spoke with daughter and answered questions  11.  Ileus per GI/Surgery    Marvin Parker DO DO, MPH, [facility]  Professor of Internal Medicine  Pulmonary, Critical Care and Sleep Medicine

## 2021-12-27 NOTE — PROGRESS NOTES
Occupational Therapy  OT BEDSIDE TREATMENT NOTE      Date:2021  Patient Name: Yunier Arreola  MRN: [id number]  : 1935  Room: [address] [place] [address]     Per OT Eval:    Jolanta Zamudio OT: Jono Mayfield. Felicia OTR/L License #  [id number]        Referring Bonnie Umana PA-C     Specific Provider Orders/Date: OT evaluation & treatment        Diagnosis:  STENOSIS     Surgery: 21: L2-L5 LAMINECTOMY AND FUSION       Past Medical History           Past Medical History:   Diagnosis Date    Hypertension           Past Surgical History             Past Surgical History:   Procedure Laterality Date    APPENDECTOMY        CAROTID ENDARTERECTOMY Right      CARPAL TUNNEL RELEASE Bilateral      JOINT REPLACEMENT Right            Precautions:  Fall Risk, neutral spine, LSO brace,  IV      Assessment of current deficits    []? ? Functional mobility            [x]? ?ADLs           [x]? ? Strength                  [x]? ? Cognition    [x]? ? Functional transfers          [x]? ? IADLs         [x]? ? Safety Awareness   [x]? ?Endurance    [x]? ? Fine Coordination                         [x]? ? Balance      []? ? Vision/perception   [x]? ? Sensation      []? ?Gross Motor Coordination             []? ? ROM           []? ? Delirium                   []? ? Motor Control      OT PLAN OF CARE   OT POC based on physician orders, patient diagnosis and results of clinical assessment     Frequency/Duration: 2-4 days/wk for 2 weeks PRN   Specific OT Treatment Interventions to include:   Instruction/training on adapted ADL techniques and AE recommendations to increase functional independence within precautions  Training on energy conservation strategies, correct breathing pattern and techniques to improve independence/tolerance for self-care routine  Functional transfer/mobility training/DME recommendations for increased independence, safety, and fall prevention  Patient/Family education to increase follow through with safety techniques and functional independence  Recommendation of environmental modifications for increased safety with functional transfers/mobility and ADLs  Cognitive retraining/development of therapeutic activities to improve problem solving, judgement, memory, and attention for increased safety/participation in ADL/IADL tasks  Therapeutic exercise to improve motor endurance, ROM, and functional strength for ADLs/functional transfers  Therapeutic activities to facilitate/challenge dynamic balance, stand tolerance for increased safety and independence with ADLs  Therapeutic activities to facilitate gross/fine motor skills for increased independence with ADLs  Positioning to improve skin integrity, interaction with environment and functional independence     Recommended Adaptive Equipment:  BSC, ww,( A.E. already been issued)      Home Living: Pt lives with wife in a 1 story trailer with 2 platform steps to enter with 1 HR.  B&B on main level. Bathroom setup: walk in shower, std. commode   Equipment owned: shower chair     Prior Level of Function: Ind. with ADLs , Ind. with IADLs; ambulated golf club  Driving: active  Occupation: worked [employer]     Pain Level: back-  \"tolerable today\" pt took tylenol an hour before session  Cognition: A&O: 4/4, improved safety this date, following instructions                 Functional Assessment:  AM-PAC Daily Activity Raw Score: 17/24    Initial Eval Status  Date: 12-14-21 Treatment Status  Date:  12/27/21  STGs = LTGs  Time frame: 10-14 days   Feeding Ind.  Independent          Grooming Set up seated EOB Set up   with seated tasks  Modified Erhard    UB Dressing Max A with gown & LSO brace, instruction provided to pt.  & wife throughout Set up with gown  Min A simulated  LSO brace in place upon arrival   Good understanding of placement per pt & spouse      Modified Erhard    LB Dressing Mod/Max A with attempt cross   over  technique  Mod A  donned brief with instruction on use of AE to ease tasks, spouse reports she will be available to assist as well Modified [place]    Bathing Mod A with sim. task NT  Per last session   UB: Min A  LB: Mod A  Recommending LH sponge Modified [place]an    Toileting NT Min A  Pt able to complete posterior hygiene, assist with brief around brace Modified [place]an    Bed Mobility  Logroll: Min A  Supine to sit: Minimal Assist   Sit to supine: Minimal Assist  NT   seated on BSC upon arrival  Supine to sit: Modified [place]ennan   Sit to supine: Modified [place]ennan    Functional Transfers Minimal Assist with sit <> stand, SPT using ww SBA  Sit < > stand  Stand pivot   Cues for safety & hand placement Modified [place]ennan    Functional Mobility Min A with ww few steps towards HOB CGA/SBA  With walker household distance, assist to manage IV & O2 line   Modified [place]ennan    Balance Sitting:     Static:  SUP    Dynamic:SBA  Standing: Min A Sitting Supported:  Indep     Standing:  SBA/CGA with walker      Activity Tolerance Fair- lt. Ax. Fair Good with mod. Ax. Visual/  Perceptual Glasses: yes          Vitals spO2 & HR remained WFL   on 3-4L O2 O2 8L at rest 93-97%  During mobility dropped 87% quickly recovered 89-93%   [facility]     Education: Functional transfers & mobility along with walker management, maintaining spinal precautions, LSO management and benefits of OOB activity  to progress with ADL tasks. Energy conservation, purse lip breathing techniques to improve O2 stats with good results this date. Spouse reports they have pulse ox at home. Discussed pt's level of assist with spouse, good understanding, reporting she can provide assist as needed, no concerns at this time, they have walk in shower & shower chair. Again discussed importance of OOB activity, getting up to chair, changing positions through out the day & mobility to progress towards goals.      Comment: Upon arrival pt was seated on [facility] & agreeable for therapy, nsg approval. At end of session pt was seated in chair, tray table in front with spouse present, Nsg aware pt was up in chair. Informed nsg of O2 stats during therapy. · Pt has made fair progress towards set goals. Treatment Charges: Mins Units   Ther Ex  69981     Manual Therapy 42872     Thera Activities 62887 10 1   ADL/Home Mgt 13000 30 2   Neuro Re-ed 82966     Group Therapy      Orthotic manage/training  02453     Non-Billable Time       Time In: 1:35  Time Out: 2:15  Total Time: 40 minutes    Roc Dials.  [address], [address] [place] [address]

## 2021-12-27 NOTE — PROGRESS NOTES
Physical Therapy  Treatment Note  Name: Rick Thompson  : 1935  MRN: [id number]      Date of Service: 2021    Evaluating PT:  Waldo Morales, PT [id number]    Room #:  8505/8505-B  Diagnosis:  Spinal stenosis of lumbar region, unspecified whether neurogenic claudication present [M48.061]  Lumbar degenerative disc disease [M51.36]  PMHx/PSHx:     has a past medical history of Hypertension and New onset atrial fibrillation ([facility] [address] has a past surgical history that includes joint replacement (Right); Carotid endarterectomy (Right); Appendectomy; Carpal tunnel release (Bilateral); and Lumbar spine surgery (N/A, 2021). Procedure/Surgery: L2-L5 LAMINECTOMY AND FUSION   Precautions:  Falls, falls, alarm and O2 , FWB (full weight bearing), Hemovac drain, Spinal precautions no \"BLT\" and LSO on when greater than 45degrees (elective)  Equipment Needs: Wheeled Walker    SUBJECTIVE:  Patient lives with spouse  in a trailer with 2 steps without rail and a platform between the two to enter. Patient ambulated with a  7-iron golf club PTA. Equipment owned: None    OBJECTIVE:   Initial Evaluation  Date: 21 Treatment  2021 Short Term/ Long Term   Goals   AM-PAC 6 Clicks     Was pt agreeable to Eval/treatment? Yes yes    Does pt have pain? Yes; 9/10 LBP at level of incision Pain with movement, does not quantify    Bed Mobility  Rolling: Min   Supine to sit:   Min   Sit to supine: Min   Scooting: Min  NT this date Rolling: Ind  Supine to sit: Ind  Sit to supine: Ind  Scooting: Ind   Transfers Sit to stand: Min  Stand to sit: Min  Stand pivot: Min  Sit to stand: SBA  Stand to sit: SBA  Stand pivot: SBA with [employer] Sit to stand: Mod Ind    Stand to sit: Mod Ind    Stand pivot:  Mod Ind     Ambulation    NT 60  feet front WW SBA x 2 reps 120 feet with Mod Ind     Stair negotiation: ascended and descended  NT NT 3 steps with Mod Ind  1 rail    ROM BUE:  Defer to OT eval  BLE:  wfl     Strength BUE: Defer to OT eval  RLE:  Grossly 4-/5  LLE:  Grossly  4-/5  4/5   Balance Sitting EOB:  SBA  Dynamic Standing:  NT Sitting EOB:  Supervision  Dynamic Standing:  SBA with [employer] Sitting EOB:  Ind  Dynamic Standing:  Ind     Pt is A & O x 4  Sensation:  Pt denies numbness and tingling to extremities  Edema:  unremarkable    Vitals:9 liters at rest 94%  15L HFNC rest 100%  8L HFNC 87% with ambulation  8L HFNC seated recovery 96% in less than 1  minute    Patient education  Pt educated on role of PT intervention. Educated on increase OOB activities. Patient response to education:   Pt verbalized understanding Pt demonstrated skill Pt requires further education in this area   yes yes yes     ASSESSMENT:    Comments:  RN cleared pt for activity prior to session. Pt received seated on bedside commode and agreeable to PT intervention at this time. Pt performed all functional mobility as noted above. Pt on 9 L O2 in room. Pt placed on 15 liters on O2 tank and pt's O2 increased to 100%. Pt's O2 decreased to 8 liters . Pt amb with ww SBA. Increased amb distance. Pt's O2 sat decreased to 87% on 8 liters but quickly increased to 92% during amb. Pt returned to chair. Pt placed back on wall unit of 9 Liters O2 and pt's O2 incrwased to 96%. No LOB during amb with ww. Cues for hand placement during transfers. Treatment:  Patient practiced and was instructed in the following treatment:     Therapeutic Activities Completed:  o Functional mobility as noted above:   - Transfer training: Sit<>stand:  SBA from chair. min VC for proper hand placement and sequencing to promote safe and more independent completion of sit<>stand transition.    - Ambulation: 60 feet front WW SBA x 2 reps. Min VC throughout for safety. Emphasis on PLB. o Skilled repositioning in seated position for comfort and good posture to promote improved cardiorespiratory function. o Pt education as noted above.  o Skilled vitals monitoring as noted above. PLAN:    Patient is making fair progress towards established goals. Will continue with current POC.       Time in 1330  Time out  14:00    Total Treatment Time  30 minutes     CPT codes:  [] Gait training 13829 0 minutes  [] Manual therapy 41201 0 minutes  [x] Therapeutic activities 98631 30 minutes  [] Therapeutic exercises 50723 0 minutes  [] Neuromuscular reeducation 93978 0 minutes    Estrellita Isaac [id number]

## 2021-12-28 LAB
ANION GAP SERPL CALCULATED.3IONS-SCNC: 9 MMOL/L (ref 7–16)
BUN BLDV-MCNC: 22 MG/DL (ref 6–23)
CALCIUM SERPL-MCNC: 8.2 MG/DL (ref 8.6–10.2)
CHLORIDE BLD-SCNC: 97 MMOL/L (ref 98–107)
CO2: 30 MMOL/L (ref 22–29)
CREAT SERPL-MCNC: 1 MG/DL (ref 0.7–1.2)
GFR AFRICAN AMERICAN: >60
GFR NON-AFRICAN AMERICAN: >60 ML/MIN/1.73
GLUCOSE BLD-MCNC: 265 MG/DL (ref 74–99)
HBA1C MFR BLD: 5.8 % (ref 4–5.6)
MAGNESIUM: 1.9 MG/DL (ref 1.6–2.6)
METER GLUCOSE: 229 MG/DL (ref 74–99)
POTASSIUM REFLEX MAGNESIUM: 3.1 MMOL/L (ref 3.5–5)
PRO-BNP: 3619 PG/ML (ref 0–450)
SODIUM BLD-SCNC: 136 MMOL/L (ref 132–146)

## 2021-12-28 PROCEDURE — 80048 BASIC METABOLIC PNL TOTAL CA: CPT

## 2021-12-28 PROCEDURE — 97530 THERAPEUTIC ACTIVITIES: CPT

## 2021-12-28 PROCEDURE — 6360000002 HC RX W HCPCS: Performed by: INTERNAL MEDICINE

## 2021-12-28 PROCEDURE — 2580000003 HC RX 258: Performed by: PHYSICIAN ASSISTANT

## 2021-12-28 PROCEDURE — 94640 AIRWAY INHALATION TREATMENT: CPT

## 2021-12-28 PROCEDURE — 36415 COLL VENOUS BLD VENIPUNCTURE: CPT

## 2021-12-28 PROCEDURE — 82962 GLUCOSE BLOOD TEST: CPT

## 2021-12-28 PROCEDURE — 2060000000 HC ICU INTERMEDIATE R&B

## 2021-12-28 PROCEDURE — 97535 SELF CARE MNGMENT TRAINING: CPT

## 2021-12-28 PROCEDURE — 6370000000 HC RX 637 (ALT 250 FOR IP): Performed by: PHYSICIAN ASSISTANT

## 2021-12-28 PROCEDURE — 6370000000 HC RX 637 (ALT 250 FOR IP): Performed by: NEUROLOGICAL SURGERY

## 2021-12-28 PROCEDURE — 83880 ASSAY OF NATRIURETIC PEPTIDE: CPT

## 2021-12-28 PROCEDURE — 2700000000 HC OXYGEN THERAPY PER DAY

## 2021-12-28 PROCEDURE — 6370000000 HC RX 637 (ALT 250 FOR IP): Performed by: FAMILY MEDICINE

## 2021-12-28 PROCEDURE — 2580000003 HC RX 258: Performed by: FAMILY MEDICINE

## 2021-12-28 PROCEDURE — 99233 SBSQ HOSP IP/OBS HIGH 50: CPT | Performed by: INTERNAL MEDICINE

## 2021-12-28 PROCEDURE — 6370000000 HC RX 637 (ALT 250 FOR IP): Performed by: INTERNAL MEDICINE

## 2021-12-28 PROCEDURE — 83036 HEMOGLOBIN GLYCOSYLATED A1C: CPT

## 2021-12-28 PROCEDURE — APPSS60 APP SPLIT SHARED TIME 46-60 MINUTES: Performed by: PHYSICIAN ASSISTANT

## 2021-12-28 PROCEDURE — 83735 ASSAY OF MAGNESIUM: CPT

## 2021-12-28 PROCEDURE — 2500000003 HC RX 250 WO HCPCS: Performed by: FAMILY MEDICINE

## 2021-12-28 PROCEDURE — 6360000002 HC RX W HCPCS: Performed by: FAMILY MEDICINE

## 2021-12-28 RX ORDER — NICOTINE POLACRILEX 4 MG
15 LOZENGE BUCCAL PRN
Status: DISCONTINUED | OUTPATIENT
Start: 2021-12-28 | End: 2021-12-30 | Stop reason: HOSPADM

## 2021-12-28 RX ORDER — DEXTROSE MONOHYDRATE 25 G/50ML
12.5 INJECTION, SOLUTION INTRAVENOUS PRN
Status: DISCONTINUED | OUTPATIENT
Start: 2021-12-28 | End: 2021-12-30 | Stop reason: HOSPADM

## 2021-12-28 RX ORDER — POTASSIUM CHLORIDE 20 MEQ/1
40 TABLET, EXTENDED RELEASE ORAL ONCE
Status: COMPLETED | OUTPATIENT
Start: 2021-12-28 | End: 2021-12-28

## 2021-12-28 RX ORDER — DEXTROSE, SODIUM CHLORIDE, AND POTASSIUM CHLORIDE 5; .45; .15 G/100ML; G/100ML; G/100ML
INJECTION INTRAVENOUS CONTINUOUS
Status: DISCONTINUED | OUTPATIENT
Start: 2021-12-28 | End: 2021-12-29

## 2021-12-28 RX ORDER — POTASSIUM CHLORIDE 20 MEQ/1
20 TABLET, EXTENDED RELEASE ORAL 2 TIMES DAILY WITH MEALS
Status: DISCONTINUED | OUTPATIENT
Start: 2021-12-28 | End: 2021-12-30 | Stop reason: HOSPADM

## 2021-12-28 RX ORDER — DEXTROSE MONOHYDRATE 50 MG/ML
100 INJECTION, SOLUTION INTRAVENOUS PRN
Status: DISCONTINUED | OUTPATIENT
Start: 2021-12-28 | End: 2021-12-30 | Stop reason: HOSPADM

## 2021-12-28 RX ADMIN — CARVEDILOL 25 MG: 25 TABLET, FILM COATED ORAL at 08:04

## 2021-12-28 RX ADMIN — APIXABAN 5 MG: 5 TABLET, FILM COATED ORAL at 08:04

## 2021-12-28 RX ADMIN — Medication 10 ML: at 08:05

## 2021-12-28 RX ADMIN — POTASSIUM CHLORIDE, DEXTROSE MONOHYDRATE AND SODIUM CHLORIDE: 150; 5; 450 INJECTION, SOLUTION INTRAVENOUS at 16:05

## 2021-12-28 RX ADMIN — SENNOSIDES AND DOCUSATE SODIUM 1 TABLET: 50; 8.6 TABLET ORAL at 20:24

## 2021-12-28 RX ADMIN — IPRATROPIUM BROMIDE 0.5 MG: 0.5 SOLUTION RESPIRATORY (INHALATION) at 13:53

## 2021-12-28 RX ADMIN — DILTIAZEM HYDROCHLORIDE 30 MG: 30 TABLET, FILM COATED ORAL at 14:16

## 2021-12-28 RX ADMIN — IPRATROPIUM BROMIDE 0.5 MG: 0.5 SOLUTION RESPIRATORY (INHALATION) at 21:18

## 2021-12-28 RX ADMIN — POTASSIUM CHLORIDE 20 MEQ: 1500 TABLET, EXTENDED RELEASE ORAL at 11:48

## 2021-12-28 RX ADMIN — POTASSIUM CHLORIDE 20 MEQ: 1500 TABLET, EXTENDED RELEASE ORAL at 17:48

## 2021-12-28 RX ADMIN — DILTIAZEM HYDROCHLORIDE 30 MG: 30 TABLET, FILM COATED ORAL at 02:47

## 2021-12-28 RX ADMIN — DILTIAZEM HYDROCHLORIDE 30 MG: 30 TABLET, FILM COATED ORAL at 08:04

## 2021-12-28 RX ADMIN — CARVEDILOL 25 MG: 25 TABLET, FILM COATED ORAL at 17:48

## 2021-12-28 RX ADMIN — PRAVASTATIN SODIUM 10 MG: 10 TABLET ORAL at 20:24

## 2021-12-28 RX ADMIN — PIPERACILLIN AND TAZOBACTAM 3375 MG: 3; .375 INJECTION, POWDER, LYOPHILIZED, FOR SOLUTION INTRAVENOUS at 18:32

## 2021-12-28 RX ADMIN — IPRATROPIUM BROMIDE 0.5 MG: 0.5 SOLUTION RESPIRATORY (INHALATION) at 17:11

## 2021-12-28 RX ADMIN — SENNOSIDES AND DOCUSATE SODIUM 1 TABLET: 50; 8.6 TABLET ORAL at 08:04

## 2021-12-28 RX ADMIN — FUROSEMIDE 20 MG: 20 TABLET ORAL at 17:48

## 2021-12-28 RX ADMIN — IPRATROPIUM BROMIDE 0.5 MG: 0.5 SOLUTION RESPIRATORY (INHALATION) at 09:29

## 2021-12-28 RX ADMIN — FLECAINIDE ACETATE 50 MG: 50 TABLET ORAL at 20:24

## 2021-12-28 RX ADMIN — ACETAMINOPHEN 650 MG: 325 TABLET ORAL at 04:07

## 2021-12-28 RX ADMIN — ACETAMINOPHEN 650 MG: 325 TABLET ORAL at 08:04

## 2021-12-28 RX ADMIN — ACETAMINOPHEN 650 MG: 325 TABLET ORAL at 14:17

## 2021-12-28 RX ADMIN — FUROSEMIDE 20 MG: 20 TABLET ORAL at 08:05

## 2021-12-28 RX ADMIN — METHYLPREDNISOLONE SODIUM SUCCINATE 20 MG: 40 INJECTION, POWDER, FOR SOLUTION INTRAMUSCULAR; INTRAVENOUS at 00:55

## 2021-12-28 RX ADMIN — DILTIAZEM HYDROCHLORIDE 30 MG: 30 TABLET, FILM COATED ORAL at 20:24

## 2021-12-28 RX ADMIN — METHYLPREDNISOLONE SODIUM SUCCINATE 20 MG: 40 INJECTION, POWDER, FOR SOLUTION INTRAMUSCULAR; INTRAVENOUS at 11:49

## 2021-12-28 RX ADMIN — POTASSIUM CHLORIDE 40 MEQ: 1500 TABLET, EXTENDED RELEASE ORAL at 14:15

## 2021-12-28 RX ADMIN — FLECAINIDE ACETATE 50 MG: 50 TABLET ORAL at 08:04

## 2021-12-28 RX ADMIN — BISACODYL 5 MG: 5 TABLET, COATED ORAL at 08:04

## 2021-12-28 RX ADMIN — APIXABAN 5 MG: 5 TABLET, FILM COATED ORAL at 20:24

## 2021-12-28 RX ADMIN — PIPERACILLIN AND TAZOBACTAM 3375 MG: 3; .375 INJECTION, POWDER, LYOPHILIZED, FOR SOLUTION INTRAVENOUS at 08:05

## 2021-12-28 RX ADMIN — PIPERACILLIN AND TAZOBACTAM 3375 MG: 3; .375 INJECTION, POWDER, LYOPHILIZED, FOR SOLUTION INTRAVENOUS at 00:56

## 2021-12-28 RX ADMIN — POLYETHYLENE GLYCOL 3350 17 G: 17 POWDER, FOR SOLUTION ORAL at 08:04

## 2021-12-28 ASSESSMENT — PAIN DESCRIPTION - ONSET
ONSET: ON-GOING
ONSET: ON-GOING

## 2021-12-28 ASSESSMENT — PAIN DESCRIPTION - ORIENTATION
ORIENTATION: LOWER;MID
ORIENTATION: MID;LOWER

## 2021-12-28 ASSESSMENT — PAIN DESCRIPTION - FREQUENCY
FREQUENCY: CONTINUOUS
FREQUENCY: CONTINUOUS

## 2021-12-28 ASSESSMENT — PAIN - FUNCTIONAL ASSESSMENT
PAIN_FUNCTIONAL_ASSESSMENT: PREVENTS OR INTERFERES SOME ACTIVE ACTIVITIES AND ADLS
PAIN_FUNCTIONAL_ASSESSMENT: PREVENTS OR INTERFERES SOME ACTIVE ACTIVITIES AND ADLS

## 2021-12-28 ASSESSMENT — PAIN DESCRIPTION - LOCATION
LOCATION: BACK
LOCATION: BACK

## 2021-12-28 ASSESSMENT — PAIN SCALES - GENERAL
PAINLEVEL_OUTOF10: 5
PAINLEVEL_OUTOF10: 5
PAINLEVEL_OUTOF10: 0
PAINLEVEL_OUTOF10: 3
PAINLEVEL_OUTOF10: 5

## 2021-12-28 ASSESSMENT — PAIN DESCRIPTION - PROGRESSION
CLINICAL_PROGRESSION: NOT CHANGED
CLINICAL_PROGRESSION: NOT CHANGED

## 2021-12-28 ASSESSMENT — PAIN DESCRIPTION - DESCRIPTORS
DESCRIPTORS: ACHING
DESCRIPTORS: ACHING

## 2021-12-28 ASSESSMENT — PAIN DESCRIPTION - PAIN TYPE
TYPE: SURGICAL PAIN
TYPE: SURGICAL PAIN

## 2021-12-28 NOTE — PROGRESS NOTES
Physical Therapy  Treatment Note  Name: Christine Darnell  : 1935  MRN: [id number]      Date of Service: 2021    Evaluating PT:  Eleanor Mcdonough, PT [id number]    Room #:  8505/8505-B  Diagnosis:  Spinal stenosis of lumbar region, unspecified whether neurogenic claudication present [M48.061]  Lumbar degenerative disc disease [M51.36]  PMHx/PSHx:     has a past medical history of Hypertension and New onset atrial fibrillation ([facility] [address] has a past surgical history that includes joint replacement (Right); Carotid endarterectomy (Right); Appendectomy; Carpal tunnel release (Bilateral); and Lumbar spine surgery (N/A, 2021). Procedure/Surgery: L2-L5 LAMINECTOMY AND FUSION   Precautions:  Falls, falls, alarm and O2 , FWB (full weight bearing), Hemovac drain, Spinal precautions no \"BLT\" and LSO on when greater than 45degrees (elective)  Equipment Needs: Wheeled Walker    SUBJECTIVE:  Patient lives with spouse  in a trailer with 2 steps without rail and a platform between the two to enter. Patient ambulated with a  7-iron golf club PTA. Equipment owned: None    OBJECTIVE:   Initial Evaluation  Date: 21 Treatment  2021 Short Term/ Long Term   Goals   AM-PAC 6 Clicks 62/56 82/90    Was pt agreeable to Eval/treatment? Yes yes    Does pt have pain? Yes; 9/10 LBP at level of incision Pain with movement, does not quantify    Bed Mobility  Rolling: Min   Supine to sit:   Min   Sit to supine: Min   Scooting: Min  NT this date Rolling: Ind  Supine to sit: Ind  Sit to supine: Ind  Scooting: Ind   Transfers Sit to stand: Min  Stand to sit: Min  Stand pivot: Min  Sit to stand: SBA  Stand to sit: SBA  Stand pivot: SBA with [employer] Sit to stand: Mod Ind    Stand to sit: Mod Ind    Stand pivot:  Mod Ind     Ambulation    NT 80  feet front WW SBA x 1 reps 120 feet with Mod Ind     Stair negotiation: ascended and descended  NT NT 3 steps with Mod Ind  1 rail    ROM BUE:  Defer to OT eval  BLE:  wfl     Strength BUE: Defer to OT eval  RLE:  Grossly 4-/5  LLE:  Grossly  4-/5  4/5   Balance Sitting EOB:  SBA  Dynamic Standing:  NT Sitting EOB:  Supervision  Dynamic Standing:  SBA with [address] Sitting EOB:  Ind  Dynamic Standing:  Ind     Pt is A & O x 4  Sensation:  Pt denies numbness and tingling to extremities  Edema:  unremarkable    Vitals:8 liters at rest 91% to 95%    8L HFNC 87% with ambulation  8L HFNC seated recovery 92% in less than 1  minute    Patient education  Pt educated on role of PT intervention. Educated on increase OOB activities. Patient response to education:   Pt verbalized understanding Pt demonstrated skill Pt requires further education in this area   yes yes yes     ASSESSMENT:    Comments:  RN cleared pt for activity prior to session. Pt seated in chair and agreed to tx session. Pt On 8 liters throughout. Pt cont to do well. Increased amb distance this a.m. Cues to improve posture and keep inside ww especially during turns. Pt returned to sit in chair. Pt rested then performed ankle pumps and LAQS 10x. Pt given call light. Treatment:  Patient practiced and was instructed in the following treatment:     Therapeutic Activities Completed:  o Functional mobility as noted above:   - Transfer training: Sit<>stand:  SBA from chair. min VC for proper hand placement and sequencing to promote safe and more independent completion of sit<>stand transition.    - Ambulation: 80 feet front WW SBA x 1 reps. Min VC throughout for safety. Emphasis on PLB. o Skilled repositioning in seated position for comfort and good posture to promote improved cardiorespiratory function. o Pt education as noted above.  o Skilled vitals monitoring as noted above. PLAN:    Patient is making fair progress towards established goals. Will continue with current POC.       Time in9:00  Time out  9:23    Total Treatment Time  23 minutes     CPT codes:  [] Gait training 51223 0 minutes  [] Manual therapy 52646 0 minutes  [x] Therapeutic activities 03753 23 minutes  [] Therapeutic exercises 10370 0 minutes  [] Neuromuscular reeducation 48467 0 minutes    Mynor Rush [id number]

## 2021-12-28 NOTE — PROGRESS NOTES
Inpatient Cardiology Progress note     PATIENT IS BEING FOLLOWED FOR:A. fib RVR, CHF, abnormal echo    Christina Wong located in  room [id number]-[id number] is a 82 y.o. male      SUBJECTIVE: Does not feel short of breath at rest on O2 8 L via nasal cannula. Has had a bowel movement  Denies chest pain, palpitations. OBJECTIVE: No apparent distress     ROS:  Consist: Denies fevers, chills or night sweats  Heart: Denies chest pain, palpitations, lightheadedness, dizziness or syncope  Lungs: Denies cough, wheezing, orthopnea or PND  GI: Denies abdominal pain, vomiting or diarrhea    PHYSICAL EXAM:   BP (!) 168/74   Pulse 74   Temp 97.9 °F (36.6 °C) (Temporal)   Resp 21   Ht 6' 1\" (1.854 m)   Wt 200 lb (90.7 kg)   SpO2 92%   BMI 26.39 kg/m²    B/P Range last 24 hours: Systolic (39WWJ), CKC:354 , Min:147 , FJD:260    Diastolic (17YUW), JDK:21, Min:53, Max:86    CONST: Well developed, well nourished who appears stated age. Awake, alert and cooperative. No apparent distress  HEENT:   Head- Normocephalic, atraumatic   Eyes- Conjunctivae pink, anicteric  Throat- Oral mucosa pink and moist  Neck-  No stridor, trachea midline, no jugular venous distention. No carotid bruit  CHEST: Chest symmetrical and non-tender to palpation. No accessory muscle use or intercostal retractions  RESPIRATORY:  Lung sounds -bilateral basilar crackles  CARDIOVASCULAR:     Heart Inspection- shows no noted pulsations  Heart Ausculation- Regular rate and rhythm, no murmur. No s3, s4 or rub   PV: No lower extremity edema. No varicosities. Pedal pulses palpable, no clubbing or cyanosis   ABDOMEN: Soft, non-tender to light palpation. Bowel sounds present. MS: Good muscle strength and tone. No atrophy or abnormal movements. : Deferred  SKIN: Warm and dry no statis dermatitis or ulcers   NEURO / PSYCH: Oriented to person, place and time. Speech clear and appropriate. Follows all commands.  Pleasant affect       Intake/Output Summary (Last 24 hours) at 12/28/2021 1148  Last data filed at 12/28/2021 0946  Gross per 24 hour   Intake 797.5 ml   Output 515 ml   Net 282.5 ml       Weight:   Wt Readings from Last 3 Encounters:   12/14/21 200 lb (90.7 kg)   12/09/21 200 lb (90.7 kg)   11/04/21 200 lb (90.7 kg)     Current Inpatient Medications:   potassium chloride  20 mEq Oral BID WC    carvedilol  25 mg Oral BID WC    methylPREDNISolone  20 mg IntraVENous Q12H    furosemide  20 mg Oral BID    lidocaine  1 patch TransDERmal Daily    piperacillin-tazobactam  3,375 mg IntraVENous Q8H    flecainide  50 mg Oral BID    sodium chloride flush  5-40 mL IntraVENous 2 times per day    apixaban  5 mg Oral BID    ipratropium  0.5 mg Nebulization 4x daily    dilTIAZem  30 mg Oral 4 times per day    pravastatin  10 mg Oral Daily    sodium chloride flush  5-40 mL IntraVENous 2 times per day    polyethylene glycol  17 g Oral Daily    bisacodyl  5 mg Oral Daily    sennosides-docusate sodium  1 tablet Oral BID       IV Infusions (if any):   dextrose      dextrose 5 % and 0.45 % NaCl 50 mL/hr at 12/27/21 0608    sodium chloride      sodium chloride         DIAGNOSTIC/ LABORATORY DATA:  Labs:   CBC: No results for input(s): WBC, HGB, HCT, PLT in the last 72 hours. BMP:   Recent Labs     12/27/21  0445 12/28/21  0536    136   K 3.1* 3.1*   CO2 29 30*   BUN 23 22   CREATININE 0.8 1.0   LABGLOM >60 >60   CALCIUM 8.4* 8.2*     Mag:   Recent Labs     12/27/21  0445 12/28/21  0536   MG 2.0 1.9     Phos: No results for input(s): PHOS in the last 72 hours. TSH:   Recent Labs     12/27/21 0445   TSH 0.280     HgA1c:   Lab Results   Component Value Date    LABA1C 5.8 (H) 12/28/2021     No results found for: EAG    BNP: No results for input(s): BNP in the last 72 hours. PT/INR: No results for input(s): PROTIME, INR in the last 72 hours. APTT:No results for input(s): APTT in the last 72 hours.   CARDIAC ENZYMES:No results for input(s): CKTOTAL, CKMB, CKMBINDEX, on 12/14/2021   Acute respiratory failure, postop-12/16/2021, improving, per pulmonology  Atypical chest pain, postop,  in context of acute respiratory failure with flat pattern             troponins, resolved  Postop ileus-resolved  H/o pulmonary fibrosis  H/o COVID-19 infection in May 2021  H/o R CEA  TSH low borderline           PLAN:  Continue Telemetry  Continue. Continue current treatment  Electrolytes check and correction per primary service  I&O, weights  BP control -Coreg  Plans for TOBIAS to confirm TTE shunt findings -on Thursday   Further cardiac recommendations will be forthcoming pending patient clinical course and  TOBIAS findings.     Assessment and plan discussed with Dr. Hazel Cameron. Jordon     Assessment and Plan to follow as per Dr. Haezl Cameron. Jordon      Electronically signed by RADHA Montesinos on 12/28/2021 at 11:48 AM       Patient seen and examined and case discussed in detail with cardiology nurse practitioner/PA and agree with assessment and plan and history and physical examination discussed in detail and documented above. I spoke with patient and family at length. They would like to have a mildly meeting to discuss whether they would like to proceed with TOBIAS. If family decides to proceed with TOBIAS will arrange for Thursday.

## 2021-12-28 NOTE — CARE COORDINATION
Followed up with Shane Pro, liaison with [address] [place] [address]. Explained patient is not discharging today, but hopefully in the next day or two and notified of order for a nebulizer to the chart for discharge as well. Per Shane Pro, they will follow for discharge. Will continue to follow.      Owen Barbosa RN.  [facility]  [phone]

## 2021-12-28 NOTE — PROGRESS NOTES
Comprehensive Nutrition Assessment    Type and Reason for Visit:  Reassess    Nutrition Recommendations/Plan: Continue current diet, Start Ensure HP BID    Nutrition Assessment:  Pt remains nutritionally at risk 2/2 resolving post op ileus s/p lami 2/2 spinal stenosis. Hx pulmonary fibrosis. Will provide ONS and monitor    Malnutrition Assessment:  Malnutrition Status: At risk for malnutrition (Comment)    Context:  Acute Illness     Findings of the 6 clinical characteristics of malnutrition:  Energy Intake:  1 - 75% or less of estimated energy requirements for 7 or more days  Weight Loss:  Unable to assess     Body Fat Loss:  No significant body fat loss     Muscle Mass Loss:  No significant muscle mass loss    Fluid Accumulation:  Unable to assess     Strength:  Not Performed    Estimated Daily Nutrient Needs:  Energy (kcal):   (MSJ 1651 x 1.2 SF); Weight Used for Energy Requirements:  Current     Protein (g):  110-125; Weight Used for Protein Requirements:  Ideal (1.3-1.5)        Fluid (ml/day):  ; Method Used for Fluid Requirements:  1 ml/kcal      Nutrition Related Findings:  pt alert, abd distention, hypoactive BS, resolving ileus, +1 edema, fluids WNL      Wounds:  Surgical Incision       Current Nutrition Therapies:    ADULT DIET; Regular    Anthropometric Measures:  · Height: 6' 1\" (185.4 cm)  · Current Body Weight: 200 lb (90.7 kg) (no method on adm, UTO updated CBW)   · Admission Body Weight: 200 lb (90.7 kg) (unknown method 12/14)    · Usual Body Weight: 200 lb (90.7 kg) (stated per EMR hx)     · Ideal Body Weight: 184 lbs; % Ideal Body Weight 108.7 %   · BMI: 26.4  · BMI Categories: Overweight (BMI 25.0-29. 9)       Nutrition Diagnosis:   · Inadequate oral intake related to altered GI function (resolving post op ileus) as evidenced by intake 51-75%      Nutrition Interventions:   Food and/or Nutrient Delivery:  Continue Current Diet,Start Oral Nutrition Supplement (Ensure HP BID)  Nutrition Education/Counseling:  Education not indicated   Coordination of Nutrition Care:  Continue to monitor while inpatient    Goals:  PO intake >75% of meals/ONS. Nutrition Monitoring and Evaluation:   Behavioral-Environmental Outcomes:  None Identified   Food/Nutrient Intake Outcomes:  Diet Advancement/Tolerance,Food and Nutrient Intake,Supplement Intake  Physical Signs/Symptoms Outcomes:  Biochemical Data,GI Status,Fluid Status or Edema,Nutrition Focused Physical Findings,Skin,Weight     Discharge Planning:     Too soon to determine     Electronically signed by Daphne Su MS, RD, LD on 12/28/21 at 12:53 PM EST    Contact: [phone]

## 2021-12-28 NOTE — PLAN OF CARE
Problem: Discharge Planning:  Goal: Discharged to appropriate level of care  Description: Discharged to appropriate level of care  Outcome: Ongoing     Problem: Infection - Surgical Site:  Goal: Will show no infection signs and symptoms  Description: Will show no infection signs and symptoms  Outcome: Met This Shift     Problem: Mobility - Impaired:  Goal: Mobility will improve to maximum level  Description: Mobility will improve to maximum level  Outcome: Met This Shift     Problem: Pain - Acute:  Goal: Pain level will decrease  Description: Pain level will decrease  Outcome: Met This Shift     Problem: Sensory Perception - Impaired:  Goal: Sensory function intact, lower extremity  Description: Sensory function intact, lower extremity  Outcome: Met This Shift     Problem: Skin Integrity:  Goal: Will show no infection signs and symptoms  Description: Will show no infection signs and symptoms  Outcome: Met This Shift  Goal: Absence of new skin breakdown  Description: Absence of new skin breakdown  Outcome: Met This Shift  Goal: Demonstration of wound healing without infection will improve  Description: Demonstration of wound healing without infection will improve  Outcome: Met This Shift  Goal: Complications related to intravenous access or infusion will be avoided or minimized  Description: Complications related to intravenous access or infusion will be avoided or minimized  Outcome: Met This Shift     Problem: Physical Regulation:  Goal: Diagnostic test results will improve  Description: Diagnostic test results will improve  Outcome: Met This Shift  Goal: Will remain free from infection  Description: Will remain free from infection  Outcome: Met This Shift  Goal: Ability to maintain vital signs within normal range will improve  Description: Ability to maintain vital signs within normal range will improve  Outcome: Met This Shift     Problem: Nutritional:  Goal: Nutritional status will improve  Description: Nutritional status will improve  Outcome: Met This Shift     Problem: Falls - Risk of:  Goal: Will remain free from falls  Description: Will remain free from falls  Outcome: Met This Shift  Goal: Absence of physical injury  Description: Absence of physical injury  Outcome: Met This Shift     Problem: Respiratory:  Goal: Ability to maintain normal respiratory secretions will improve  Description: Ability to maintain normal respiratory secretions will improve  Outcome: Met This Shift     Problem: Pain:  Goal: Pain level will decrease  Description: Pain level will decrease  Outcome: Met This Shift  Goal: Control of acute pain  Description: Control of acute pain  Outcome: Met This Shift  Goal: Control of chronic pain  Description: Control of chronic pain  Outcome: Met This Shift

## 2021-12-28 NOTE — PROGRESS NOTES
Occupational Therapy  OT BEDSIDE TREATMENT NOTE      Date:2021  Patient Name: Gail Shaffer  MRN: [id number]  : 1935  Room: [address] [place] [address]     Per OT Eval:    Jens Zapien OT: Willi Duarte OTR/L License #  [id number]        Referring Jose Bone PA-C     Specific Provider Orders/Date: OT evaluation & treatment        Diagnosis:  STENOSIS     Surgery: 21: L2-L5 LAMINECTOMY AND FUSION       Past Medical History           Past Medical History:   Diagnosis Date    Hypertension           Past Surgical History             Past Surgical History:   Procedure Laterality Date    APPENDECTOMY        CAROTID ENDARTERECTOMY Right      CARPAL TUNNEL RELEASE Bilateral      JOINT REPLACEMENT Right            Precautions:  Fall Risk, neutral spine, LSO brace,  IV, O2 HF 8L      Assessment of current deficits    []? ? Functional mobility            [x]? ?ADLs           [x]? ? Strength                  [x]? ? Cognition    [x]? ? Functional transfers          [x]? ? IADLs         [x]? ? Safety Awareness   [x]? ?Endurance    [x]? ? Fine Coordination                         [x]? ? Balance      []? ? Vision/perception   [x]? ? Sensation      []? ?Gross Motor Coordination             []? ? ROM           []? ? Delirium                   []? ? Motor Control      OT PLAN OF CARE   OT POC based on physician orders, patient diagnosis and results of clinical assessment     Frequency/Duration: 2-4 days/wk for 2 weeks PRN   Specific OT Treatment Interventions to include:   Instruction/training on adapted ADL techniques and AE recommendations to increase functional independence within precautions  Training on energy conservation strategies, correct breathing pattern and techniques to improve independence/tolerance for self-care routine  Functional transfer/mobility training/DME recommendations for increased independence, safety, and fall prevention  Patient/Family education to increase follow through with safety techniques and functional independence  Recommendation of environmental modifications for increased safety with functional transfers/mobility and ADLs  Cognitive retraining/development of therapeutic activities to improve problem solving, judgement, memory, and attention for increased safety/participation in ADL/IADL tasks  Therapeutic exercise to improve motor endurance, ROM, and functional strength for ADLs/functional transfers  Therapeutic activities to facilitate/challenge dynamic balance, stand tolerance for increased safety and independence with ADLs  Therapeutic activities to facilitate gross/fine motor skills for increased independence with ADLs  Positioning to improve skin integrity, interaction with environment and functional independence     Recommended Adaptive Equipment:  BSC, ww,( A.E. already been issued)      Home Living: Pt lives with wife in a 1 story trailer with 2 platform steps to enter with 1 HR.  B&B on main level. Bathroom setup: walk in shower, std. commode   Equipment owned: shower chair     Prior Level of Function: Ind. with ADLs , Ind. with IADLs; ambulated golf club  Driving: active  Occupation: worked [employer]     Pain Level: back-  \"tolerable today\" reports incisional pain only, tylenol on board   Cognition: A&O: 4/4, improved safety this date, following instructions                 Functional Assessment:  AM-PAC Daily Activity Raw Score: 17/24    Initial Eval Status  Date: 12-14-21 Treatment Status  Date:  12/28/21  STGs = LTGs  Time frame: 10-14 days   Feeding Ind.  Independent   Completed meal upon arrival     Grooming Set up seated EOB Set up   with seated tasks washing off face, hands, grooming hair  Modified [place]    UB Dressing Max A with gown & LSO brace, instruction provided to pt.  & wife throughout Set up with gown  Min A simulated  LSO brace in place upon arrival, tightened brace with improved fit  Good understanding of placement & fit of brace     Modified [place]    LB Dressing Mod/Max A with attempt cross   over  technique  Mod A  Simulated this date Modified [place]    Bathing Mod A with sim. task UB: Min A  LB: Mod A  Simulated this date   Recommending LH sponge Modified [place]    Toileting NT Min A  Pt able to complete posterior hygiene, assist with brief around brace Modified [place]    Bed Mobility  Logroll: Min A  Supine to sit: Minimal Assist   Sit to supine: Minimal Assist  NT   seated on BSC upon arrival  Supine to sit: Modified [place]   Sit to supine: Modified [place]    Functional Transfers Minimal Assist with sit <> stand, SPT using ww SBA  Sit < > stand  Stand pivot   Cues for safety & hand placement Modified [place]    Functional Mobility Min A with ww few steps towards HOB CGA  With walker household distance, assist to manage IV & O2 line, one standing rest brake required this date then completed distance   Modified [place]    Balance Sitting:     Static:  SUP    Dynamic:SBA  Standing: Min A Sitting Supported:  Indep     Standing:  CGA with walker      Activity Tolerance Fair- lt. Ax. Fair  Improvement continues with O2 stats & SOB, good recall of PLB techniques  Good with mod. Ax. Visual/  Perceptual Glasses: yes          Vitals spO2 & HR remained WFL   on 3-4L O2 O2 8L at rest 91-95%  During mobility dropped 87% quickly recovered 89-92%   [facility]     Education: Functional transfers & mobility along with walker management, maintaining spinal precautions, LSO management and benefits of OOB activity  to progress with ADL tasks. Energy conservation, purse lip breathing techniques to improve O2 stats with good results this date. Comment: Upon arrival pt was seated in chair & agreeable for therapy, nsg approval. At end of session pt was seated in chair, tray table in front with Nsg aware pt was up in chair. Informed nsg of O2 stats during therapy. · Pt has made fair+ progress towards set goals.      Treatment Charges: Mins Units   Ther Ex  [id number]     Manual Therapy [id number]     Thera Activities [id number] 10 1   ADL/Home Mgt [id number] 15 1   Neuro Re-ed [id number]     Group Therapy      Orthotic manage/training  [id number]     Non-Billable Time       Time In: 8:55  Time Out: 9:20  Total Time: 25 minutes    Jay Harris.  [address] [place] [address], [address] [place] [address]

## 2021-12-28 NOTE — PROGRESS NOTES
Hospitalist progress note    Patient:  Jyoti Stewart  YOB: 1935  Date of Service: 12/28/21  MRN: [id number]   Acct:  [de-identified]   Primary Care Physician: Kojo Heard MD  12/14/2021     Patient Seen, Chart, Consults notes, Labs, Radiology, family and social history were reviewed. This is an 82years old male patient with past medical history of hypertension patient was admitted because of L2-L5 laminectomy and fusion. Postoperative the pain was getting better by time. Patient history of hypertension which was controlled with medication  Postoperative the patient developed acute respiratory failure requiring high oxygen and the patient was diagnosed with aspiration pneumonia. Patient has new onset A. fib with RVR on December 19 and he was cardioverted on December 22 to normal sinus rhythm  Later on the patient has ileus and we consulted surgery    Came back positive for ileus and we were planning to put rectal tube but then the patient started having bowel movement but the ileus is still there condition is improving very slowly. Chief Complaint: None        Subjective     Patient is having bowel movement but small amount. There is no abdominal pain, change in bowel habits, or black or bloody stools, there is no upper GI bleeding, no nausea, no vomiting, or jaundice.    She still complains of abdominal distention but is getting better slowly  He denies any chest pain or shortness of breath or heart racing or sweating or palpitation        Review of systems:    All 10 review of system were negative unless what is mentioned in the present history               PHYSICAL EXAM:  BP (!) 168/84   Pulse 70   Temp 97.5 °F (36.4 °C) (Temporal)   Resp 18   Ht 6' 1\" (1.854 m)   Wt 200 lb (90.7 kg)   SpO2 94%   BMI 26.39 kg/m²     General appearance - alert, well appearing, and in no distress   Head: atraumatic   Pupil: equal, round reactive to light   Neck: Supple, trachea is midline   Chest - clear to auscultation, no wheezes, rales or rhonchi, symmetric air entry   Distant Breath Sounds: No   Heart - S1 and S2 normal   Abdomen - soft, nontender,  distended      Integumentary - Skin color, texture, turgor normal. No rashes or lesions. Musculoskeletal - no joint tenderness, deformity or swelling   Extremities - peripheral pulses normal, no pedal edema, no clubbing or cyanosis times 4         Review of Labs and Diagnostic Testing:         Echo Complete    Result Date: 12/22/2021  Transthoracic Echocardiography Report (TTE)  Demographics   Patient Name    Mike Lr  Gender            Male                  W   Medical Record  [id number]     Room Number       [facility]  Number   Account #       [de-identified]    Procedure Date    12/22/2021   Corporate ID                 Ordering          Jennifer Morris MD                               Physician   Accession       [id number]   Referring  Number                       Physician   Date of Birth   1935   Sonographer       Kanu MIRANDA   Age             82 year(s)   Interpreting      [address]                               Physician         Physician Cardiology                                                 Jose Maria Shannon MD                                Any Other  Procedure Type of Study   TTE procedure:Echo Complete W/Doppler & Color Flow, Echo W/Bubble Study. Procedure Date Date: 12/22/2021 Start: 09:40 AM Study Location: CVL Technical Quality: Adequate visualization Indications:Atrial fibrillation. Patient Status: Routine Height: 73 inches Weight: 200 pounds BSA: 2.15 m^2 BMI: 26.39 kg/m^2 Rhythm: Within normal limits HR: 89 bpm BP: 155/91 mmHg  Findings   Left Ventricle  Normal left ventricular chamber size. Normal left ventricular systolic  function. Visually estimated LVEF is 60 %. No wall motion abnormalities. Grade II diastolic dysfunction. Right Ventricle  Normal right ventricle structure and function. Left Atrium  Normal left atrium. Right Atrium  Normal right atrium. Mitral Valve  Physiologic and/or trace mitral regurgitation is present. No evidence of hemodynamically significant mitral stenosis. Tricuspid Valve  Mild tricuspid regurgitation. Aortic Valve  Trileaflet aortic valve. Normal leaflet mobility. No aortic stenosis. Trace aortic regurgitation. Pulmonic Valve  Mild-to-moderate pulmonic regurgitation present. Pericardial Effusion  No evidence for hemodynamically significant pericardial effusion. Pleural Effusion  No evidence of pleural effusion. Aorta  Normal aortic root and ascending aorta. Miscellaneous  The inferior vena cava diameter is normal with normal respiratory  variation. Conclusions   Summary  Normal left ventricular chamber size. Normal left ventricular systolic  function. Visually estimated LVEF is 60 %. No wall motion abnormalities. Grade II diastolic dysfunction. Normal right ventricle structure and function. Normal left atrium. Normal right atrium. Mild tricuspid regurgitation. Mild-to-moderate pulmonic regurgitation present. Unable to estimate PA pressure. Agitated saline contrast study shows late right to left shunting  suggestive of an intra-pulmonary shunt. No comparison study available.    Signature   ----------------------------------------------------------------  Electronically signed by Lester Whalen MD(Interpreting  physician) on 12/22/2021 10:57 AM  ----------------------------------------------------------------  M-Mode/2D Measurements & Calculations   LV Diastolic     LV Systolic Dimension: 2.7 cm   AV Cusp Separation: 2.1  Dimension: 3.7   LV Volume Diastolic: 54.0 ml    cmAO Root Dimension: 3.7  cm               LV Volume Systolic: 04.2 ml     cm  LV FS:27 %       LV EDV/LV EDV Index: 57.5 NP/02  LV PW Diastolic: VN/B^8XB ESV/LV ESV Index: 25.9  0.9 cm           ml/12ml/ m^2  LV PW Systolic:  EF Calculated: 55 %             RV Diastolic Dimension:  1.4 cm           LV Mass Index: 92 l/min*m^2     3.7 cm  Septum           LV Length: 8.9 cm  Diastolic: 2 cm                                  Ascending Aorta: 3.8 cm  Septum Systolic: LVOT: 2.4 cm                    LA volume/Index: 68.6 ml  1.6 cm                                           /31.92ml/m^2  CO: 10.3 l/min                                   RA Area: 14.2 cm^2  CI: 4.79 l/m*m^2  LV Mass: 197.65  g  Doppler Measurements & Calculations   MV Peak E-Wave: 1.16 AV Peak Velocity: 1.37 LVOT Peak Velocity: 1.22 m/s  m/s                  m/s                    LVOT Mean Velocity: 0.86 m/s  MV Peak A-Wave: 0.96 AV Peak Gradient: 7.55 LVOT Peak Gradient: 6 mmHgLVOT  m/s                  mmHg                   Mean Gradient: 3.3 mmHg  MV E/A Ratio: 1.21   AV Mean Velocity: 0.98  MV Peak Gradient:    m/s  7.9 mmHg             AV Mean Gradient: 4.3  MV Mean Gradient:    mmHg                   TR Velocity:4.13 m/s  3.7 mmHg             AV VTI: 28.9 cm        TR Gradient:68.26 mmHg  MV Mean Velocity:    AV Area  0.92 m/s             (Continuity):4.01 cm^2  MV Deceleration  Time: 217 msec       LVOT VTI: 25.6 cm      FL ED Velocity: 1.3 m/s  MV P1/2t: 86 msec  MVA by PHT:2.56 cm^2  MV Area  (continuity): 3.5  cm^2  MV E' Septal  Velocity: 0.05 m/s  MV E' Lateral  Velocity: 6 m/s  [email or web address] [id number]    XR CHEST (2 VW)    Result Date: 12/22/2021  EXAMINATION: TWO XRAY VIEWS OF THE CHEST 12/22/2021 3:17 pm COMPARISON: December 22, 2021 at 15:04, December 18, 2021 HISTORY: ORDERING SYSTEM PROVIDED HISTORY: reevaluate right infiltrate TECHNOLOGIST PROVIDED HISTORY: Reason for exam:->reevaluate right infiltrate What reading provider will be dictating this exam?->CRC FINDINGS: Heart size is unable to be accurately assessed on this single portable view of the chest, but appears to be stable.   Previously described right lung airspace opacities with a reticular and patchy appearance have increased since December 18, 2021. No evidence of a sizable pleural effusion. No pneumothorax. No acute osseous abnormality. Increased reticular and patchy airspace opacities in the right lung. These are nonspecific and may be on the basis of multifocal pneumonia or (less likely) asymmetric pulmonary edema. Atypical/viral pneumonia not excluded. XR CHEST (2 VW)    Result Date: 12/9/2021  EXAMINATION: TWO XRAY VIEWS OF THE CHEST 12/9/2021 11:09 am COMPARISON: None. HISTORY: ORDERING SYSTEM PROVIDED HISTORY: Pre-op evaluation TECHNOLOGIST PROVIDED HISTORY: Reason for exam:->pre op What reading provider will be dictating this exam?->CRC FINDINGS: Slight peripheral coarsening of interstitial markings may indicate mild fibrosis. Heart and pulmonary vascularity are normal.  Neither costophrenic angle is blunted. Subtle findings which may indicate mild interstitial fibrosis. XR ABDOMEN (KUB) (SINGLE AP VIEW)    Result Date: 12/27/2021  EXAMINATION: ONE SUPINE XRAY VIEW(S) OF THE ABDOMEN 12/27/2021 10:37 am COMPARISON: Radiographs from December 25, 2021 HISTORY: ORDERING SYSTEM PROVIDED HISTORY: ileus TECHNOLOGIST PROVIDED HISTORY: Reason for exam:->ileus What reading provider will be dictating this exam?->CRC FINDINGS: Redemonstration of dilated air-filled loops of bowel measuring up to 7.8 cm in the left upper quadrant. Findings favor dilated small and large bowel loops. This is moderately improved from prior study. Air-fluid levels in the right abdomen. Spondylosis of the spine with fusion hardware. Improved ileus pattern with persistent dilated bowel loops.      XR ABDOMEN (KUB) (SINGLE AP VIEW)    Result Date: 12/25/2021  EXAMINATION: ONE SUPINE XRAY VIEW(S) OF THE ABDOMEN 12/25/2021 11:05 am COMPARISON: Is comparison is dated December 22, 2021 HISTORY: ORDERING SYSTEM PROVIDED HISTORY: no bowel movement, distention TECHNOLOGIST PROVIDED HISTORY: Reason for exam:->no bowel movement, distention What reading provider will be dictating this exam?->CRC FINDINGS: Postoperative changes are present with surgical clips present There is been in single from L2 through L5 There is severe colonic distention most prominent at the comparison descending colon in the 15 cm range there is air in some mildly distended small bowel loops. Marked colonic distension unchanged from prior study with air in mildly distended small bowel suspect postoperative ileus     XR ABDOMEN (KUB) (SINGLE AP VIEW)    Result Date: 12/16/2021  EXAMINATION: ONE SUPINE XRAY VIEW(S) OF THE ABDOMEN 12/16/2021 2:41 pm COMPARISON: None. HISTORY: ORDERING SYSTEM PROVIDED HISTORY: post-op ileus TECHNOLOGIST PROVIDED HISTORY: Reason for exam:->post-op ileus What reading provider will be dictating this exam?->CRC FINDINGS: Air scattered small and large bowel loops. Dilated small bowel measures 3.6 cm. Dilated colon measures 8.7 cm at the level the transverse. Skin in seizure in with skin staples vertically noted over the mid lower abdomen. Postoperative change spinal fusion and laminectomy noted lumbar spine. Surgical clips right upper quadrant. Dilated small and large bowel loops suggest generalized ileus. Clinical correlation and follow-up to resolution recommended. XR ACUTE ABD SERIES CHEST 1 VW    Result Date: 12/22/2021  EXAMINATION: TWO XRAY VIEWS OF THE ABDOMEN AND SINGLE  XRAY VIEW OF THE CHEST 12/22/2021 3:16 pm COMPARISON: Chest radiograph December 22, 2021 HISTORY: ORDERING SYSTEM PROVIDED HISTORY: repeat/assess respiratory status TECHNOLOGIST PROVIDED HISTORY: Reason for exam:->repeat/assess respiratory status What reading provider will be dictating this exam?->CRC FINDINGS: The evaluation is limited due to low lung volumes. Trachea is midline. Cardiomediastinal silhouette is stable in size. Persistent airspace opacity identified in right lung lower lung.  Multiple air-filled loops of colon identified in the abdomen. No abnormal abdominal calcifications. Bowel gas pattern is nonspecific nonobstructive. No evidence of bowel obstruction. The evaluation is limited due to low lung volumes. Opacities in right lung may represent pneumonia versus atelectasis in the proper clinical setting. XR CHEST PORTABLE    Result Date: 12/27/2021  EXAMINATION: ONE XRAY VIEW OF THE CHEST 12/27/2021 10:38 am COMPARISON: December 22nd-25 reviewed the CT chest December 16 HISTORY: ORDERING SYSTEM PROVIDED HISTORY: chf TECHNOLOGIST PROVIDED HISTORY: Reason for exam:->chf What reading provider will be dictating this exam?->CRC FINDINGS: Persistent findings in the mid lower aspect of the right lung which is a combination of mild the pleural reaction/effusion with areas of multi segmental atelectasis and additional increased parenchymal opacities. There is an overall loss of volume of the right lower lung base. The right diaphragma is elevated the. Heart has upper normal size. Left lungs well expanded. No conspicuous infiltrates or consolidations in the left lung or pleural effusions. No conspicuous pneumothorax on the right on the left. No significant changes since the previous study of December 25. XR CHEST PORTABLE    Result Date: 12/25/2021  EXAMINATION: ONE XRAY VIEW OF THE CHEST 12/25/2021 2:58 pm COMPARISON: Comparison is dated December 24, 2021 HISTORY: ORDERING SYSTEM PROVIDED HISTORY: hypoxia TECHNOLOGIST PROVIDED HISTORY: Reason for exam:->hypoxia FINDINGS: There is marked increase of right-sided confluent pulmonary a hazy opacity since the prior exam with small pleural effusion present. Cardiac and mediastinal contours are unchanged. Pulmonary vasculature is unremarkable.      Increasing right pleural effusion and infiltrate     XR CHEST PORTABLE    Result Date: 12/24/2021  EXAMINATION: ONE XRAY VIEW OF THE CHEST 12/24/2021 12:31 pm COMPARISON: December 22, 2021 HISTORY: ORDERING SYSTEM PROVIDED HISTORY: cough TECHNOLOGIST PROVIDED HISTORY: Reason for exam:->cough What reading provider will be dictating this exam?->CRC FINDINGS: Redemonstration of interstitial and hazy opacities bilaterally notable in mid right lung field and right lung base. Opacities have increased in right lung base now silhouetting right hemidiaphragm. The heart appears prominent in size. No pneumothorax. Redemonstration of bilateral pneumonia or interstitial pulmonary edema. Opacities have increased in right lung base. XR CHEST PORTABLE    Result Date: 12/18/2021  EXAMINATION: ONE XRAY VIEW OF THE CHEST 12/18/2021 8:38 pm COMPARISON: 12/16/2021 HISTORY: ORDERING SYSTEM PROVIDED HISTORY: Chest Pain TECHNOLOGIST PROVIDED HISTORY: Reason for exam:->Chest Pain What reading provider will be dictating this exam?->CRC FINDINGS: EKG leads overlie the chest.  Stable elevation of the right diaphragm with patchy opacification of the right lung base, appearing slightly worsened. Minimal left basal opacities are also increased. Moderate gaseous distention of bowel in the visualized upper abdomen. No pneumothorax. No other change. Slight worsening of right greater than left basal opacities which may represent atelectasis or infiltrates from pneumonia. Continued follow-up recommended. XR CHEST PORTABLE    Result Date: 12/16/2021  EXAMINATION: ONE XRAY VIEW OF THE CHEST 12/16/2021 4:19 pm COMPARISON: 12/09/2021 HISTORY: ORDERING SYSTEM PROVIDED HISTORY: hypoxia TECHNOLOGIST PROVIDED HISTORY: Reason for exam:->hypoxia What reading provider will be dictating this exam?->CRC FINDINGS: Right lower lobe atelectasis and/or infiltrate. .  There is no effusion or pneumothorax. The cardiomediastinal silhouette is without acute process. The osseous structures are without acute process. Right lower lobe atelectasis and/or infiltrate.      CTA PULMONARY W CONTRAST    Result Date: 12/16/2021  EXAMINATION: CTA OF THE CHEST 12/16/2021 3:32 pm TECHNIQUE: CTA of the chest was performed after the administration of intravenous contrast.  Multiplanar reformatted images are provided for review. MIP images are provided for review. Dose modulation, iterative reconstruction, and/or weight based adjustment of the mA/kV was utilized to reduce the radiation dose to as low as reasonably achievable. COMPARISON: None. HISTORY: ORDERING SYSTEM PROVIDED HISTORY: CHEST PAIN AND SOB R/O PE TECHNOLOGIST PROVIDED HISTORY: Reason for exam:->CHEST PAIN AND SOB R/O PE What reading provider will be dictating this exam?->CRC FINDINGS: No thoracic aortic aneurysm or dissection. Mild-to-moderate scattered calcified plaque in the thoracic aorta. No evidence of pulmonary embolism. Mild cardiomegaly. Coronary atherosclerosis. No pericardial effusion. Shotty subcentimeter short axis mediastinal hilar nodes. No pleural effusion or pneumothorax. Low lung volumes. There are a few small peripheral densities in the lungs which may represent scarring or infection (for example series 4, image 73). Another curvilinear density is noted the medial left lower lobe (series 4 image 168). Mild dependent atelectasis, right greater than left. The central airways are patent. There is centrilobular and paraseptal emphysema. Degenerative changes of the spine. The visualized abdomen demonstrates changes of cholecystectomy and the splenic parenchymal calcification. No evidence of pulmonary embolism. No acute aortic injury or aneurysm. There are a few nonspecific densities in the peripheral lungs. Imaging features can be seen with COVID-19 pneumonia, though are nonspecific and can occur with a variety of infectious and noninfectious processes. Mild cardiomegaly. Atherosclerotic disease.      FLUORO FOR SURGICAL PROCEDURES    Result Date: 12/14/2021  EXAMINATION: SPOT FLUOROSCOPIC IMAGES 12/14/2021 10:42 am TECHNIQUE: Fluoroscopy was provided by the radiology department for procedure. Radiologist was not present during examination. FLUOROSCOPY DOSE AND TYPE OR TIME AND EXPOSURES: Fluoroscopy time equals 10.8 seconds. Total dose equals 15.92 mGy COMPARISON: None HISTORY: ORDERING SYSTEM PROVIDED HISTORY: Back pain, unspecified back location, unspecified back pain laterality, unspecified chronicity TECHNOLOGIST PROVIDED HISTORY: Reason for exam:->Back pain, unspecified back location, unspecified back pain laterality, unspecified chronicity What reading provider will be dictating this exam?->CRC Intraprocedural imaging. FINDINGS: 2 spot images of the lumbar spine were obtained. Intraprocedural fluoroscopic spot images as above. See separate procedure report for more information.      Recent Results (from the past 24 hour(s))   EKG 12 Lead    Collection Time: 12/27/21  2:21 PM   Result Value Ref Range    Ventricular Rate 66 BPM    Atrial Rate 66 BPM    P-R Interval 200 ms    QRS Duration 118 ms    Q-T Interval 438 ms    QTc Calculation (Bazett) 459 ms    P Axis 31 degrees    R Axis 30 degrees    T Axis 67 degrees   Brain Natriuretic Peptide    Collection Time: 12/28/21  5:36 AM   Result Value Ref Range    Pro-BNP 3,619 (H) 0 - 450 pg/mL   Basic Metabolic Panel w/ Reflex to MG    Collection Time: 12/28/21  5:36 AM   Result Value Ref Range    Sodium 136 132 - 146 mmol/L    Potassium reflex Magnesium 3.1 (L) 3.5 - 5.0 mmol/L    Chloride 97 (L) 98 - 107 mmol/L    CO2 30 (H) 22 - 29 mmol/L    Anion Gap 9 7 - 16 mmol/L    Glucose 265 (H) 74 - 99 mg/dL    BUN 22 6 - 23 mg/dL    CREATININE 1.0 0.7 - 1.2 mg/dL    GFR Non-African American >60 >=60 mL/min/1.73    GFR African American >60     Calcium 8.2 (L) 8.6 - 10.2 mg/dL   ]     Current Facility-Administered Medications: carvedilol (COREG) tablet 25 mg, 25 mg, Oral, BID WC  methylPREDNISolone sodium (SOLU-MEDROL) injection 20 mg, 20 mg, IntraVENous, Q12H  furosemide (LASIX) tablet 20 mg, 20 mg, Oral, BID  dextrose 5 % and 0.45 % sodium chloride infusion, , IntraVENous, Continuous  lidocaine 4 % external patch 1 patch, 1 patch, TransDERmal, Daily  piperacillin-tazobactam (ZOSYN) 3,375 mg in dextrose 5 % 100 mL IVPB extended infusion (mini-bag), 3,375 mg, IntraVENous, Q8H  flecainide (TAMBOCOR) tablet 50 mg, 50 mg, Oral, BID  sodium chloride flush 0.9 % injection 5-40 mL, 5-40 mL, IntraVENous, 2 times per day  sodium chloride flush 0.9 % injection 5-40 mL, 5-40 mL, IntraVENous, PRN  0.9 % sodium chloride infusion, 25 mL, IntraVENous, PRN  metoprolol (LOPRESSOR) injection 5 mg, 5 mg, IntraVENous, Q6H PRN  apixaban (ELIQUIS) tablet 5 mg, 5 mg, Oral, BID  phenol 1.4 % mouth spray 1 spray, 1 spray, Mouth/Throat, Q2H PRN  ipratropium (ATROVENT) 0.02 % nebulizer solution 0.5 mg, 0.5 mg, Nebulization, 4x daily  dilTIAZem (CARDIZEM) tablet 30 mg, 30 mg, Oral, 4 times per day  perflutren lipid microspheres (DEFINITY) injection 1.65 mg, 1.5 mL, IntraVENous, ONCE PRN  acetaminophen (TYLENOL) tablet 650 mg, 650 mg, Oral, Q4H PRN  simethicone (MYLICON) chewable tablet 80 mg, 80 mg, Oral, Q6H PRN  pravastatin (PRAVACHOL) tablet 10 mg, 10 mg, Oral, Daily  sodium chloride flush 0.9 % injection 5-40 mL, 5-40 mL, IntraVENous, 2 times per day  sodium chloride flush 0.9 % injection 5-40 mL, 5-40 mL, IntraVENous, PRN  0.9 % sodium chloride infusion, 25 mL, IntraVENous, PRN  ondansetron (ZOFRAN-ODT) disintegrating tablet 4 mg, 4 mg, Oral, Q8H PRN **OR** ondansetron (ZOFRAN) injection 4 mg, 4 mg, IntraVENous, Q6H PRN  HYDROmorphone (DILAUDID) injection 0.5 mg, 0.5 mg, IntraVENous, Q3H PRN  cyclobenzaprine (FLEXERIL) tablet 10 mg, 10 mg, Oral, TID PRN  polyethylene glycol (GLYCOLAX) packet 17 g, 17 g, Oral, Daily  bisacodyl (DULCOLAX) EC tablet 5 mg, 5 mg, Oral, Daily  sennosides-docusate sodium (SENOKOT-S) 8.6-50 MG tablet 1 tablet, 1 tablet, Oral, BID  magnesium hydroxide (MILK OF MAGNESIA) 400 MG/5ML suspension 30 mL, 30 mL, Oral, Daily PRN  fleet rectal enema 1 enema, 1 enema, Rectal, Daily PRN   Hospital Problems           Last Modified POA    Spinal stenosis of lumbar region 12/14/2021 Yes    Spondylolisthesis, lumbar region 12/14/2021 Yes    Lumbar degenerative disc disease 12/15/2021 Yes    Chest pain 12/17/2021 Yes    Elevated troponin 12/17/2021 Yes    Primary hypertension 12/17/2021 Yes    Puerperal sepsis with acute hypoxic respiratory failure ([address] 12/17/2021 Yes    Hyperlipidemia 12/17/2021 Yes    Pulmonary fibrosis ([address] 12/17/2021 Yes                  Assessment and Plan:     Ileus, patient is having bowel movements we will keep monitor for now no need for rectal tube keep holding narcotics   Hypertension, continue Coreg   Atrial fibrillation with rapid ventricular response, stable on Lopressor Cardizem and Eliquis   Patient has elevated BNP and ejection fraction was 60% echo shows stage II diastolic dysfunction and moderate to mild pulmonary hypertension with intrapulmonary shunt   Hypokalemia: replace keep monitor, change fluid to D5 half-normal with potassium   Continue DVT &  GERD prophylaxis    Elevated BNP, is improving by time possibly because of A. fib with RVR is controlled now as his ejection fraction was normal.         Electronically signed by Rod Mayes MD on 12/28/2021 at 7:27 AM

## 2021-12-29 ENCOUNTER — ANESTHESIA EVENT (OUTPATIENT)
Dept: CARDIAC CATH/INVASIVE PROCEDURES | Age: 86
DRG: 459 | End: 2021-12-29
Payer: MEDICARE

## 2021-12-29 ENCOUNTER — ANESTHESIA (OUTPATIENT)
Dept: CARDIAC CATH/INVASIVE PROCEDURES | Age: 86
DRG: 459 | End: 2021-12-29
Payer: MEDICARE

## 2021-12-29 ENCOUNTER — APPOINTMENT (OUTPATIENT)
Dept: CARDIAC CATH/INVASIVE PROCEDURES | Age: 86
DRG: 459 | End: 2021-12-29
Attending: NEUROLOGICAL SURGERY
Payer: MEDICARE

## 2021-12-29 VITALS
SYSTOLIC BLOOD PRESSURE: 118 MMHG | RESPIRATION RATE: 19 BRPM | OXYGEN SATURATION: 88 % | DIASTOLIC BLOOD PRESSURE: 60 MMHG

## 2021-12-29 LAB
ANION GAP SERPL CALCULATED.3IONS-SCNC: 10 MMOL/L (ref 7–16)
ASPERGILLUS FUMIGATUS #1: NORMAL
ASPERGILLUS FUMIGATUS #6: NORMAL
AUREOBASIDIUM PULLULANS: NORMAL
BUN BLDV-MCNC: 21 MG/DL (ref 6–23)
CALCIUM SERPL-MCNC: 8.3 MG/DL (ref 8.6–10.2)
CHLORIDE BLD-SCNC: 100 MMOL/L (ref 98–107)
CO2: 28 MMOL/L (ref 22–29)
CREAT SERPL-MCNC: 0.9 MG/DL (ref 0.7–1.2)
GFR AFRICAN AMERICAN: >60
GFR NON-AFRICAN AMERICAN: >60 ML/MIN/1.73
GLUCOSE BLD-MCNC: 167 MG/DL (ref 74–99)
LV EF: 63 %
LVEF MODALITY: NORMAL
MAGNESIUM: 2 MG/DL (ref 1.6–2.6)
METER GLUCOSE: 183 MG/DL (ref 74–99)
MICROPOLYSPORA FAENI: NORMAL
PIGEON SERUM ABS: NORMAL
POTASSIUM REFLEX MAGNESIUM: 3.5 MMOL/L (ref 3.5–5)
PRO-BNP: 2778 PG/ML (ref 0–450)
SODIUM BLD-SCNC: 138 MMOL/L (ref 132–146)
THERMOACTINOMYCES VULGARIS #1: NORMAL

## 2021-12-29 PROCEDURE — 99233 SBSQ HOSP IP/OBS HIGH 50: CPT | Performed by: INTERNAL MEDICINE

## 2021-12-29 PROCEDURE — 6370000000 HC RX 637 (ALT 250 FOR IP): Performed by: INTERNAL MEDICINE

## 2021-12-29 PROCEDURE — 2060000000 HC ICU INTERMEDIATE R&B

## 2021-12-29 PROCEDURE — 82962 GLUCOSE BLOOD TEST: CPT

## 2021-12-29 PROCEDURE — 2580000003 HC RX 258: Performed by: NURSE ANESTHETIST, CERTIFIED REGISTERED

## 2021-12-29 PROCEDURE — 6360000002 HC RX W HCPCS: Performed by: FAMILY MEDICINE

## 2021-12-29 PROCEDURE — 83880 ASSAY OF NATRIURETIC PEPTIDE: CPT

## 2021-12-29 PROCEDURE — 36415 COLL VENOUS BLD VENIPUNCTURE: CPT

## 2021-12-29 PROCEDURE — 93312 ECHO TRANSESOPHAGEAL: CPT

## 2021-12-29 PROCEDURE — 6370000000 HC RX 637 (ALT 250 FOR IP): Performed by: NEUROLOGICAL SURGERY

## 2021-12-29 PROCEDURE — 2580000003 HC RX 258: Performed by: FAMILY MEDICINE

## 2021-12-29 PROCEDURE — 94640 AIRWAY INHALATION TREATMENT: CPT

## 2021-12-29 PROCEDURE — 97535 SELF CARE MNGMENT TRAINING: CPT

## 2021-12-29 PROCEDURE — 2700000000 HC OXYGEN THERAPY PER DAY

## 2021-12-29 PROCEDURE — 6370000000 HC RX 637 (ALT 250 FOR IP): Performed by: PHYSICIAN ASSISTANT

## 2021-12-29 PROCEDURE — 2709999900 HC NON-CHARGEABLE SUPPLY

## 2021-12-29 PROCEDURE — 6360000002 HC RX W HCPCS: Performed by: NURSE ANESTHETIST, CERTIFIED REGISTERED

## 2021-12-29 PROCEDURE — 83735 ASSAY OF MAGNESIUM: CPT

## 2021-12-29 PROCEDURE — 93325 DOPPLER ECHO COLOR FLOW MAPG: CPT

## 2021-12-29 PROCEDURE — 93321 DOPPLER ECHO F-UP/LMTD STD: CPT

## 2021-12-29 PROCEDURE — 6370000000 HC RX 637 (ALT 250 FOR IP): Performed by: FAMILY MEDICINE

## 2021-12-29 PROCEDURE — 3700000001 HC ADD 15 MINUTES (ANESTHESIA)

## 2021-12-29 PROCEDURE — 6360000002 HC RX W HCPCS: Performed by: INTERNAL MEDICINE

## 2021-12-29 PROCEDURE — 80048 BASIC METABOLIC PNL TOTAL CA: CPT

## 2021-12-29 PROCEDURE — 2580000003 HC RX 258: Performed by: PHYSICIAN ASSISTANT

## 2021-12-29 PROCEDURE — 3700000000 HC ANESTHESIA ATTENDED CARE

## 2021-12-29 PROCEDURE — 97530 THERAPEUTIC ACTIVITIES: CPT

## 2021-12-29 RX ORDER — SODIUM CHLORIDE 9 MG/ML
INJECTION, SOLUTION INTRAVENOUS CONTINUOUS PRN
Status: DISCONTINUED | OUTPATIENT
Start: 2021-12-29 | End: 2021-12-29 | Stop reason: SDUPTHER

## 2021-12-29 RX ORDER — PREDNISONE 10 MG/1
10 TABLET ORAL DAILY
Status: DISCONTINUED | OUTPATIENT
Start: 2021-12-29 | End: 2021-12-29 | Stop reason: SDUPTHER

## 2021-12-29 RX ORDER — PREDNISONE 10 MG/1
10 TABLET ORAL DAILY
Qty: 3 TABLET | Refills: 0 | Status: SHIPPED | OUTPATIENT
Start: 2022-01-01 | End: 2022-01-04

## 2021-12-29 RX ORDER — PREDNISONE 20 MG/1
20 TABLET ORAL DAILY
Status: DISCONTINUED | OUTPATIENT
Start: 2021-12-29 | End: 2021-12-30 | Stop reason: HOSPADM

## 2021-12-29 RX ORDER — PROPOFOL 10 MG/ML
INJECTION, EMULSION INTRAVENOUS PRN
Status: DISCONTINUED | OUTPATIENT
Start: 2021-12-29 | End: 2021-12-29 | Stop reason: SDUPTHER

## 2021-12-29 RX ORDER — PREDNISONE 10 MG/1
10 TABLET ORAL DAILY
Status: DISCONTINUED | OUTPATIENT
Start: 2022-01-01 | End: 2021-12-30 | Stop reason: HOSPADM

## 2021-12-29 RX ADMIN — APIXABAN 5 MG: 5 TABLET, FILM COATED ORAL at 20:08

## 2021-12-29 RX ADMIN — Medication 10 ML: at 09:27

## 2021-12-29 RX ADMIN — FLECAINIDE ACETATE 50 MG: 50 TABLET ORAL at 09:26

## 2021-12-29 RX ADMIN — IPRATROPIUM BROMIDE 0.5 MG: 0.5 SOLUTION RESPIRATORY (INHALATION) at 21:31

## 2021-12-29 RX ADMIN — PRAVASTATIN SODIUM 10 MG: 10 TABLET ORAL at 21:23

## 2021-12-29 RX ADMIN — POTASSIUM CHLORIDE 20 MEQ: 1500 TABLET, EXTENDED RELEASE ORAL at 17:58

## 2021-12-29 RX ADMIN — DILTIAZEM HYDROCHLORIDE 30 MG: 30 TABLET, FILM COATED ORAL at 09:25

## 2021-12-29 RX ADMIN — FUROSEMIDE 20 MG: 20 TABLET ORAL at 09:26

## 2021-12-29 RX ADMIN — ACETAMINOPHEN 650 MG: 325 TABLET ORAL at 15:47

## 2021-12-29 RX ADMIN — IPRATROPIUM BROMIDE 0.5 MG: 0.5 SOLUTION RESPIRATORY (INHALATION) at 16:43

## 2021-12-29 RX ADMIN — SODIUM CHLORIDE: 9 INJECTION, SOLUTION INTRAVENOUS at 12:20

## 2021-12-29 RX ADMIN — PREDNISONE 20 MG: 20 TABLET ORAL at 18:38

## 2021-12-29 RX ADMIN — DILTIAZEM HYDROCHLORIDE 30 MG: 30 TABLET, FILM COATED ORAL at 15:45

## 2021-12-29 RX ADMIN — FUROSEMIDE 20 MG: 20 TABLET ORAL at 18:00

## 2021-12-29 RX ADMIN — CARVEDILOL 25 MG: 25 TABLET, FILM COATED ORAL at 09:26

## 2021-12-29 RX ADMIN — PIPERACILLIN AND TAZOBACTAM 3375 MG: 3; .375 INJECTION, POWDER, LYOPHILIZED, FOR SOLUTION INTRAVENOUS at 01:01

## 2021-12-29 RX ADMIN — PIPERACILLIN AND TAZOBACTAM 3375 MG: 3; .375 INJECTION, POWDER, LYOPHILIZED, FOR SOLUTION INTRAVENOUS at 17:59

## 2021-12-29 RX ADMIN — PROPOFOL 160 MG: 10 INJECTION, EMULSION INTRAVENOUS at 12:38

## 2021-12-29 RX ADMIN — ACETAMINOPHEN 650 MG: 325 TABLET ORAL at 00:06

## 2021-12-29 RX ADMIN — FLECAINIDE ACETATE 50 MG: 50 TABLET ORAL at 20:07

## 2021-12-29 RX ADMIN — METHYLPREDNISOLONE SODIUM SUCCINATE 20 MG: 40 INJECTION, POWDER, FOR SOLUTION INTRAMUSCULAR; INTRAVENOUS at 00:11

## 2021-12-29 RX ADMIN — IPRATROPIUM BROMIDE 0.5 MG: 0.5 SOLUTION RESPIRATORY (INHALATION) at 09:37

## 2021-12-29 RX ADMIN — PIPERACILLIN AND TAZOBACTAM 3375 MG: 3; .375 INJECTION, POWDER, LYOPHILIZED, FOR SOLUTION INTRAVENOUS at 09:25

## 2021-12-29 RX ADMIN — POTASSIUM CHLORIDE 20 MEQ: 1500 TABLET, EXTENDED RELEASE ORAL at 09:25

## 2021-12-29 RX ADMIN — SENNOSIDES AND DOCUSATE SODIUM 1 TABLET: 50; 8.6 TABLET ORAL at 20:08

## 2021-12-29 RX ADMIN — DILTIAZEM HYDROCHLORIDE 30 MG: 30 TABLET, FILM COATED ORAL at 01:47

## 2021-12-29 RX ADMIN — DILTIAZEM HYDROCHLORIDE 30 MG: 30 TABLET, FILM COATED ORAL at 20:07

## 2021-12-29 RX ADMIN — CARVEDILOL 25 MG: 25 TABLET, FILM COATED ORAL at 17:57

## 2021-12-29 ASSESSMENT — PAIN SCALES - GENERAL
PAINLEVEL_OUTOF10: 5

## 2021-12-29 ASSESSMENT — PAIN DESCRIPTION - PROGRESSION: CLINICAL_PROGRESSION: NOT CHANGED

## 2021-12-29 ASSESSMENT — LIFESTYLE VARIABLES: SMOKING_STATUS: 0

## 2021-12-29 NOTE — PROGRESS NOTES
Hospitalist Progress Note      SYNOPSIS: Patient admitted on 2021 for <principal problem not specified>      SUBJECTIVE:    Patient seen and examined. Patient still short of breath but she is improving currently on 5 L. He denies any chest pain. He scheduled for TOBIAS today. Otherwise no acute changes overnight  Records reviewed. Temp (24hrs), Av.7 °F (36.5 °C), Min:97.2 °F (36.2 °C), Max:98.1 °F (36.7 °C)    DIET: Diet NPO Exceptions are: Sips of Water with Meds  CODE: Full Code    Intake/Output Summary (Last 24 hours) at 2021 1118  Last data filed at 2021 0559  Gross per 24 hour   Intake 680 ml   Output 700 ml   Net -20 ml       OBJECTIVE:    BP (!) 168/76   Pulse 78   Temp 97.5 °F (36.4 °C) (Temporal)   Resp 20   Ht 6' 1\" (1.854 m)   Wt 200 lb (90.7 kg)   SpO2 95%   BMI 26.39 kg/m²     CONST: Well developed, well nourished who appears stated age. Awake, alert and cooperative. No apparent distress  HEENT:   Head- Normocephalic, atraumatic   Eyes- Conjunctivae pink, anicteric  Throat- Oral mucosa pink and moist  Neck-  No stridor, trachea midline, no jugular venous distention. No carotid bruit  CHEST: Chest symmetrical and non-tender to palpation. No accessory muscle use or intercostal retractions  RESPIRATORY:  Lung sounds -bilateral basilar crackles  CARDIOVASCULAR:     Heart Inspection- shows no noted pulsations  Heart Ausculation- Regular rate and rhythm, no murmur. No s3, s4 or rub   PV: No lower extremity edema. No varicosities. Pedal pulses palpable, no clubbing or cyanosis   ABDOMEN: Soft, non-tender to light palpation. Bowel sounds present. MS: Good muscle strength and tone. No atrophy or abnormal movements. : Deferred  SKIN: Warm and dry no statis dermatitis or ulcers   NEURO / PSYCH: Oriented to person, place and time. Speech clear and appropriate. Follows all commands.  Pleasant affect   Assessment and plan:  #Post op respiratory failure  #Hypoxemic Respiratory Failure   -Plueral plaques ? Old infection or possible asbestosis  -CT show sutttble changes of pulmonary fibrosis (periphearl)  -Continue ICS,   -Bronchodialators QID  -Wean oxygen as able   -Patient on IV steroids per pulmonary  -CXR with HF changes - increase lasix with BNP > 5     #Mild-to-moderate pulmonic regurgitation present. # Agitated saline contrast study shows late right to left shunting    suggestive of an intra-pulmonary shunt.   -Normal biventricular  systolic function systolic function  -Grade II diastolic dysfunction.  -Tricuspid regurgitation, mild  -Resume diuresis per cardiology  -Plans for TOBIAS to confirm TTE shunt findings -TOBIAS today    #Possible aspiration pneumonia  -Patient on Zosyn per pulmonary    #New onset atrial fibrillation RVR.     -[id number] 4 (age, hypertension, PAD)   -spontaneous conversion to normal sinus rhythm  -On Cardizem flecainide and Eliquis    #Atypical CP.  Recent preoperative stress test negative for ischemia      #Postoperative ileus  -Resolved    #Spinal stenosis status post Laminectomy 12/14/21    #Hypertension  -On Coreg and Cardizem    #Hx R CEA    #GERD    #Hx COVID 19 5/2021              DISPOSITION: Skilled nursing home  Medications:  REVIEWED DAILY    Infusion Medications    dextrose      dextrose 5% and 0.45% NaCl with KCl 20 mEq 50 mL/hr at 12/28/21 1605    sodium chloride      sodium chloride       Scheduled Medications    potassium chloride  20 mEq Oral BID WC    carvedilol  25 mg Oral BID WC    methylPREDNISolone  20 mg IntraVENous Q12H    furosemide  20 mg Oral BID    lidocaine  1 patch TransDERmal Daily    piperacillin-tazobactam  3,375 mg IntraVENous Q8H    flecainide  50 mg Oral BID    sodium chloride flush  5-40 mL IntraVENous 2 times per day    apixaban  5 mg Oral BID    ipratropium  0.5 mg Nebulization 4x daily    dilTIAZem  30 mg Oral 4 times per day    pravastatin  10 mg Oral Daily    sodium chloride flush  5-40 mL IntraVENous 2 times per day    polyethylene glycol  17 g Oral Daily    bisacodyl  5 mg Oral Daily    sennosides-docusate sodium  1 tablet Oral BID     PRN Meds: glucose, dextrose, glucagon (rDNA), dextrose, sodium chloride flush, sodium chloride, metoprolol, phenol, perflutren lipid microspheres, acetaminophen, simethicone, sodium chloride flush, sodium chloride, ondansetron **OR** ondansetron, [Held by provider] HYDROmorphone, cyclobenzaprine, magnesium hydroxide, fleet    Labs:     No results for input(s): WBC, HGB, HCT, PLT in the last 72 hours. Recent Labs     12/27/21  0445 12/28/21  0536 12/29/21  0450    136 138   K 3.1* 3.1* 3.5   CL 98 97* 100   CO2 29 30* 28   BUN 23 22 21   CREATININE 0.8 1.0 0.9   CALCIUM 8.4* 8.2* 8.3*       No results for input(s): PROT, ALB, ALKPHOS, ALT, AST, BILITOT, AMYLASE, LIPASE in the last 72 hours. No results for input(s): INR in the last 72 hours. No results for input(s): Jose Cruz Moselle in the last 72 hours. Chronic labs:    Lab Results   Component Value Date    TSH 0.280 12/27/2021    PSA SEE NOTE (AA) 08/14/2020    PSA 4.89 (H) 08/14/2020    INR 1.2 12/09/2021    LABA1C 5.8 (H) 12/28/2021       Radiology: REVIEWED DAILY    +++++++++++++++++++++++++++++++++++++++++++++++++  Baylee Nguyen MD  [facility] Emergency Department Physician - [address] [facility], [place]  +++++++++++++++++++++++++++++++++++++++++++++++++  NOTE: This report was transcribed using voice recognition software. Every effort was made to ensure accuracy; however, inadvertent computerized transcription errors may be present.

## 2021-12-29 NOTE — PROCEDURES
Janes was performed by Dr Sarah Rene with Emerson Rascon assisting Many bubble studies were done by the rn

## 2021-12-29 NOTE — ANESTHESIA POSTPROCEDURE EVALUATION
Department of Anesthesiology  Postprocedure Note    Patient: Ady Putnam  MRN: [id number]  YOB: 1935  Date of evaluation: 12/29/2021  Time:  2:32 PM     Procedure Summary     Date: 12/29/21 Room / Location: [facility] CATH LAB; [facility] ECHO    Anesthesia Start: 4904 Anesthesia Stop: 8324    Procedure: SEY TOBIAS Diagnosis:     Scheduled Providers: Alexandra Busch DO; YO Zuleta CRNA Responsible Provider: Alexandra Busch DO    Anesthesia Type: MAC ASA Status: 3          Anesthesia Type: MAC    Denisse Phase I: Denisse Score: 9    Denisse Phase II:      Last vitals: Reviewed and per EMR flowsheets.        Anesthesia Post Evaluation    Patient location during evaluation: PACU  Patient participation: complete - patient participated  Level of consciousness: awake and alert  Airway patency: patent  Nausea & Vomiting: no nausea and no vomiting  Complications: no  Cardiovascular status: blood pressure returned to baseline  Respiratory status: acceptable  Hydration status: euvolemic

## 2021-12-29 NOTE — PROGRESS NOTES
Physical Therapy  Treatment Note  Name: Jyoti Stewart  : 1935  MRN: [id number]      Date of Service: 2021    Evaluating PT:  Gela Najera, MELBA [id number]    Room #:  8505/8505-B  Diagnosis:  Spinal stenosis of lumbar region, unspecified whether neurogenic claudication present [M48.061]  Lumbar degenerative disc disease [M51.36]  PMHx/PSHx:     has a past medical history of Hypertension and New onset atrial fibrillation ([facility] [address] has a past surgical history that includes joint replacement (Right); Carotid endarterectomy (Right); Appendectomy; Carpal tunnel release (Bilateral); Lumbar spine surgery (N/A, 2021); and transesophageal echocardiogram (2021). Procedure/Surgery: L2-L5 LAMINECTOMY AND FUSION   Precautions:  Falls, falls, alarm and O2 , FWB (full weight bearing), Hemovac drain, Spinal precautions no \"BLT\" and LSO on when greater than 45degrees (elective)  Equipment Needs: Wheeled Walker    SUBJECTIVE:  Patient lives with spouse  in a trailer with 2 steps without rail and a platform between the two to enter. Patient ambulated with a  7-iron golf club PTA. Equipment owned: None    OBJECTIVE:   Initial Evaluation  Date: 21 Treatment  2021 Short Term/ Long Term   Goals   AM-PAC 6 Clicks 73/60 75/75    Was pt agreeable to Eval/treatment? Yes yes    Does pt have pain? Yes; 9/10 LBP at level of incision No c/o pain    Bed Mobility  Rolling: Min   Supine to sit:   Min   Sit to supine: Min   Scooting: Min  NT this date Rolling: Ind  Supine to sit: Ind  Sit to supine: Ind  Scooting: Ind   Transfers Sit to stand: Min  Stand to sit: Min  Stand pivot: Min  Sit to stand: SBA  Stand to sit: SBA  Stand pivot: SBA with [employer] Sit to stand: Mod Ind    Stand to sit: Mod Ind    Stand pivot:  Mod Ind     Ambulation     feet with [employer]  feet with Mod Ind     Stair negotiation: ascended and descended  NT 4 steps with 2 rail SBA  Step to pattern 3 steps with Mod Ind  1 rail    ROM BUE: Defer to OT eval  BLE:  wfl     Strength BUE: Defer to OT eval  RLE:  Grossly 4-/5  LLE:  Grossly  4-/5  4/5   Balance Sitting EOB:  SBA  Dynamic Standing:  NT Sitting EOB:  Supervision  Dynamic Standing:  SBA with [employer] Sitting EOB:  Ind  Dynamic Standing:  Ind     Pt is A & O x 4  Sensation:  Pt denies numbness and tingling to extremities  Edema:  None noted    Vitals:  SPO2 monitored throughout session: At rest on 5L: 95%  During activity on 6L: 88-93%  After activity on 6L: 95%  At rest on 5L: 94-95%  Patient education  Pt educated on role of PT, energy conservation, PLB, safe use of [employer], POC    Patient response to education:   Pt verbalized understanding Pt demonstrated skill Pt requires further education in this area   yes yes yes     ASSESSMENT:    Comments:  Patient sitting in bedside chair upon entry and agreeable to PT treatment with wife present in room. Patient able to complete all mobility without hands on assist this date. SPO2 monitored throughout session as listed above. During ambulation with WW, patient required several standing rest breaks to improve O2 saturation with PLB. Patient able to navigate 4 stairs with B hand rails with step to pattern. Patient returned to sitting EOB at end of session with all needs met. Education provided on continued mobility for improved functional independence with use of energy conservation/pacing techniques. Treatment:  Patient practiced and was instructed in the following treatment:     Transfer Training: Verbal and tactile cueing provided for sequencing and safety during mobility.  Gait Training: Ambulation with [employer] and verbal cues for proper technique and safety.  Patient Education: Education provided on energy conservation, PLB, safe use of [employer]    PLAN:    Patient is making good progress towards established goals. Will continue with current POC.       Time in  1400  Time out  1425    Total Treatment Time  25 minutes     CPT codes:  [] Gait training 46613 - minutes  [] Manual therapy 91978 - minutes  [x] Therapeutic activities 98357 25 minutes  [] Therapeutic exercises 43493 - minutes  [] Neuromuscular reeducation 75525 - minutes    Sandra Munoz PT, DPT  [id number]

## 2021-12-29 NOTE — PROGRESS NOTES
Occupational Therapy  OT BEDSIDE TREATMENT NOTE      Date:2021  Patient Name: Christine Darnell  MRN: [id number]  : 1935  Room: [address] [place], [place] [address]     Per OT Eval:    Eleni Costa OT: Deena Kauffman. Felicia OTR/L License #  [id number]        Referring Rashad Sexton PA-C     Specific Provider Orders/Date: OT evaluation & treatment        Diagnosis:  STENOSIS     Surgery: 21: L2-L5 LAMINECTOMY AND FUSION       Past Medical History           Past Medical History:   Diagnosis Date    Hypertension           Past Surgical History             Past Surgical History:   Procedure Laterality Date    APPENDECTOMY        CAROTID ENDARTERECTOMY Right      CARPAL TUNNEL RELEASE Bilateral      JOINT REPLACEMENT Right            Precautions:  Fall Risk, neutral spine, LSO brace,  IV, O2 HF 8L      Assessment of current deficits    []? ? Functional mobility            [x]? ?ADLs           [x]? ? Strength                  [x]? ? Cognition    [x]? ? Functional transfers          [x]? ? IADLs         [x]? ? Safety Awareness   [x]? ?Endurance    [x]? ? Fine Coordination                         [x]? ? Balance      []? ? Vision/perception   [x]? ? Sensation      []? ?Gross Motor Coordination             []? ? ROM           []? ? Delirium                   []? ? Motor Control      OT PLAN OF CARE   OT POC based on physician orders, patient diagnosis and results of clinical assessment     Frequency/Duration: 2-4 days/wk for 2 weeks PRN   Specific OT Treatment Interventions to include:   Instruction/training on adapted ADL techniques and AE recommendations to increase functional independence within precautions  Training on energy conservation strategies, correct breathing pattern and techniques to improve independence/tolerance for self-care routine  Functional transfer/mobility training/DME recommendations for increased independence, safety, and fall prevention  Patient/Family education to increase follow through with safety techniques and functional independence  Recommendation of environmental modifications for increased safety with functional transfers/mobility and ADLs  Cognitive retraining/development of therapeutic activities to improve problem solving, judgement, memory, and attention for increased safety/participation in ADL/IADL tasks  Therapeutic exercise to improve motor endurance, ROM, and functional strength for ADLs/functional transfers  Therapeutic activities to facilitate/challenge dynamic balance, stand tolerance for increased safety and independence with ADLs  Therapeutic activities to facilitate gross/fine motor skills for increased independence with ADLs  Positioning to improve skin integrity, interaction with environment and functional independence     Recommended Adaptive Equipment:  BSC, ww,( A.E. already been issued)      Home Living: Pt lives with wife in a 1 story trailer with 2 platform steps to enter with 1 HR.  B&B on main level. Bathroom setup: walk in shower, std. commode   Equipment owned: shower chair     Prior Level of Function: Ind. with ADLs , Ind. with IADLs; ambulated golf club  Driving: active  Occupation: worked [employer]     Pain Level: incisional pain, sore since stables were removed  Cognition: A&O: 4/4, pt tearful today regarding having to have another test today & just wants to go home, provided comfort & re-assurance                 Functional Assessment:  AM-PAC Daily Activity Raw Score: 17/24    Initial Eval Status  Date: 12-14-21 Treatment Status  Date:  12/29/21  STGs = LTGs  Time frame: 10-14 days   Feeding Ind.  Independent        Grooming Set up seated EOB Set up  seated Modified [place]    UB Dressing Max A with gown & LSO brace, instruction provided to pt. & wife throughout Min A  Doff LSO brace     Modified [place]    LB Dressing Mod/Max A with attempt cross   over  technique  Mod A  Simulated this date Modified [place]    Bathing Mod A with sim.  task UB: Min A  LB: Mod A  Simulated this date   Recommending LH sponge Modified [place]    Toileting NT Min A  Pt able to complete posterior hygiene, after using BSC Modified [place]    Bed Mobility  Logroll: Min A  Supine to sit: Minimal Assist   Sit to supine: Minimal Assist  Min A  Sit to supine  Supine to sit: Modified [place]   Sit to supine: Modified [place]    Functional Transfers Minimal Assist with sit <> stand, SPT using ww SBA  Sit < > stand  Stand pivot   Cues for safety & hand placement Modified [place]    Functional Mobility Min A with ww few steps towards HOB SBA  Only taking a few steps from [facility] to EOB with no walker   Modified [place]    Balance Sitting:     Static:  SUP    Dynamic:SBA  Standing: Min A Sitting Supported:  Indep     Standing:  SBA with walker      Activity Tolerance Fair- lt. Ax. Fair   Good with mod. Ax. Visual/  Perceptual Glasses: yes          Vitals spO2 & HR remained WFL   on 3-4L O2    WFL     Education: Functional transfers & maintaining spinal precautions, LSO management and benefits of OOB activity  to progress with ADL tasks. Energy conservation, purse lip breathing techniques. Provided re-assurance this date & encouragement, pt having a TOBIAS today and pt tearful reporting he just wants to go home. Pt able to take some deep breaths and relax once positioned in bed. Comment: Upon arrival pt was seated on [facility] & agreeable for therapy, nsg approval. At end of session pt was positioned in bed in Good neutral position, all lines & tubes in place. · Pt has made fair+ progress towards set goals. Treatment Charges: Mins Units   Ther Ex  [id number]     Manual Therapy 60028     Thera Activities 99458     ADL/Home Mgt 79173 16 1   Neuro Re-ed [id number]     Group Therapy      Orthotic manage/training  25706     Non-Billable Time       Time In: 10:20  Time Out: 10:36  Total Time: 16 minutes    Zofia Sessions.  [address], [address] [facility]

## 2021-12-29 NOTE — ANESTHESIA PRE PROCEDURE
Department of Anesthesiology  Preprocedure Note       Name:  Alo Arriaga   Age:  82 y.o.  :  1935                                          MRN:  [id number]         Date:  2021      Surgeon: * Surgery not found *    Procedure:     Medications prior to admission:   Prior to Admission medications    Medication Sig Start Date End Date Taking? Authorizing Provider   oxyCODONE (ROXICODONE) 5 MG immediate release tablet Take 1 tablet by mouth every 4 hours as needed for Pain for up to 7 days. 21  RADHA Wade   cyclobenzaprine (FLEXERIL) 10 MG tablet Take 1 tablet by mouth 3 times daily as needed for Muscle spasms 21  RADHA Wade   Pediatric Multivitamins-Fl (MULTIVITAMINS/FL PO) Take by mouth    Historical Provider, MD   Pediatric Multivitamins-Fl (MULTIVITAMINS/FL PO) Take by mouth    Historical Provider, MD   amLODIPine (NORVASC) 10 MG tablet daily  9/3/21   Historical Provider, MD   metoprolol tartrate (LOPRESSOR) 25 MG tablet take 1 tablet twice a day 08   Historical Provider, MD   pravastatin (PRAVACHOL) 10 MG tablet daily  21   Historical Provider, MD       Current medications:    No current facility-administered medications for this visit. Current Outpatient Medications   Medication Sig Dispense Refill    oxyCODONE (ROXICODONE) 5 MG immediate release tablet Take 1 tablet by mouth every 4 hours as needed for Pain for up to 7 days.  42 tablet 0    cyclobenzaprine (FLEXERIL) 10 MG tablet Take 1 tablet by mouth 3 times daily as needed for Muscle spasms 30 tablet 0     Facility-Administered Medications Ordered in Other Visits   Medication Dose Route Frequency Provider Last Rate Last Admin    potassium chloride (KLOR-CON M) extended release tablet 20 mEq  20 mEq Oral BID WC Denise Ly MD   20 mEq at 21 0925    glucose (GLUTOSE) 40 % oral gel 15 g  15 g Oral PRN Denise Ly MD        dextrose 50 % IV solution  12.5 g IntraVENous PRN Janett Bird MD        glucagon (rDNA) injection 1 mg  1 mg IntraMUSCular PRN Janett Bird MD        dextrose 5 % solution  100 mL/hr IntraVENous PRN Janett Bird MD        dextrose 5 % and 0.45 % NaCl with KCl 20 mEq infusion   IntraVENous Continuous Janett Bird MD 50 mL/hr at 12/28/21 1605 New Bag at 12/28/21 1605    carvedilol (COREG) tablet 25 mg  25 mg Oral BID  Renee Ruvalcaba MD   25 mg at 12/29/21 0926    methylPREDNISolone sodium (SOLU-MEDROL) injection 20 mg  20 mg IntraVENous Q12H Danielle Avery DO   20 mg at 12/29/21 0011    furosemide (LASIX) tablet 20 mg  20 mg Oral BID Danielle Avery DO   20 mg at 12/29/21 6978    lidocaine 4 % external patch 1 patch  1 patch TransDERmal Daily Janett Bird MD   1 patch at 12/27/21 0851    piperacillin-tazobactam (ZOSYN) 3,375 mg in dextrose 5 % 100 mL IVPB extended infusion (mini-bag)  3,375 mg IntraVENous Q8H Janett Bird MD 25 mL/hr at 12/29/21 0925 3,375 mg at 12/29/21 0925    flecainide (TAMBOCOR) tablet 50 mg  50 mg Oral BID Gus Villalobos MD   50 mg at 12/29/21 0926    sodium chloride flush 0.9 % injection 5-40 mL  5-40 mL IntraVENous 2 times per day Gus Villalobos MD   10 mL at 12/25/21 1014    sodium chloride flush 0.9 % injection 5-40 mL  5-40 mL IntraVENous PRN Gus Villalobos MD   10 mL at 12/27/21 0050    0.9 % sodium chloride infusion  25 mL IntraVENous PRN Gus Villalobos MD        metoprolol (LOPRESSOR) injection 5 mg  5 mg IntraVENous Q6H PRN Dory Rodgers MD   5 mg at 12/23/21 0103    apixaban (ELIQUIS) tablet 5 mg  5 mg Oral BID Gus Villalobos MD   5 mg at 12/28/21 2024    phenol 1.4 % mouth spray 1 spray  1 spray Mouth/Throat Q2H PRN RADHA Greene        ipratropium (ATROVENT) 0.02 % nebulizer solution 0.5 mg  0.5 mg Nebulization 4x daily Danielle Avery DO   0.5 mg at 12/29/21 0972    dilTIAZem (CARDIZEM) tablet 30 mg  30 mg Oral 4 times per day Mila Kim MD   30 mg at 12/29/21 4630    perflutren lipid microspheres (DEFINITY) injection 1.65 mg  1.5 mL IntraVENous ONCE PRN Freddy Olmedo MD        acetaminophen (TYLENOL) tablet 650 mg  650 mg Oral Q4H PRN Lisa Bennett MD   650 mg at 12/29/21 0006    simethicone (MYLICON) chewable tablet 80 mg  80 mg Oral Q6H PRN Elta Post, PA-C   80 mg at 12/26/21 0844    pravastatin (PRAVACHOL) tablet 10 mg  10 mg Oral Daily Elta Post, PA-C   10 mg at 12/28/21 2024    sodium chloride flush 0.9 % injection 5-40 mL  5-40 mL IntraVENous 2 times per day Elta Post, PA-C   10 mL at 12/29/21 5128    sodium chloride flush 0.9 % injection 5-40 mL  5-40 mL IntraVENous PRN Elta Post, PA-C   10 mL at 12/25/21 0707    0.9 % sodium chloride infusion  25 mL IntraVENous PRN Elta Post, PA-C        ondansetron (ZOFRAN-ODT) disintegrating tablet 4 mg  4 mg Oral Q8H PRN Elta Post, PA-C        Or    ondansetron [facility]) injection 4 mg  4 mg IntraVENous Q6H PRN Elta Post, PA-C   4 mg at 12/22/21 2337    [Held by provider] HYDROmorphone (DILAUDID) injection 0.5 mg  0.5 mg IntraVENous Q3H PRN Elta Post, PA-C   0.5 mg at 12/20/21 0837    cyclobenzaprine (FLEXERIL) tablet 10 mg  10 mg Oral TID PRN Elta Post, PA-C   10 mg at 12/22/21 1416    polyethylene glycol (GLYCOLAX) packet 17 g  17 g Oral Daily Elta Post, PA-C   17 g at 12/28/21 0804    bisacodyl (DULCOLAX) EC tablet 5 mg  5 mg Oral Daily Elta Post, PA-C   5 mg at 12/28/21 0804    sennosides-docusate sodium (SENOKOT-S) 8.6-50 MG tablet 1 tablet  1 tablet Oral BID Elta Post, PA-C   1 tablet at 12/28/21 2024    magnesium hydroxide (MILK OF MAGNESIA) 400 MG/5ML suspension 30 mL  30 mL Oral Daily PRN Elta Post, PA-C        fleet rectal enema 1 enema  1 enema Rectal Daily PRN Elta Post, PA-C   1 enema at 12/25/21 1301       Allergies:     Allergies   Allergen Reactions    Demerol [Meperidine Hcl] Shortness Of Breath    Morphine        Problem List: Patient Active Problem List   Diagnosis Code    Spinal stenosis of lumbar region M48.061    Spondylolisthesis, lumbar region M43.16    Lumbar degenerative disc disease M51.36    Chest pain R07.9    Elevated troponin R77.8    Primary hypertension I10    Puerperal sepsis with acute hypoxic respiratory failure ([facility] [address] O85, R65.20, J96.01    Hyperlipidemia E78.5    Pulmonary fibrosis ([facility] [address] J84.10       Past Medical History:        Diagnosis Date    Hypertension     New onset atrial fibrillation ([facility] [address] 12/19/2021       Past Surgical History:        Procedure Laterality Date    APPENDECTOMY      CAROTID ENDARTERECTOMY Right     CARPAL TUNNEL RELEASE Bilateral     JOINT REPLACEMENT Right     LUMBAR SPINE SURGERY N/A 12/14/2021    L2-L5 LAMINECTOMY AND FUSION performed by Angela Becerra MD at [address] [facility] History:    Social History     Tobacco Use    Smoking status: Former Smoker    Smokeless tobacco: Never Used   Substance Use Topics    Alcohol use: Not Currently                                Counseling given: Not Answered      Vital Signs (Current): There were no vitals filed for this visit.                                            BP Readings from Last 3 Encounters:   12/29/21 (!) 165/85   12/14/21 135/72   12/09/21 (!) 142/64       NPO Status:                                                                                 BMI:   Wt Readings from Last 3 Encounters:   12/14/21 200 lb (90.7 kg)   12/09/21 200 lb (90.7 kg)   11/04/21 200 lb (90.7 kg)     There is no height or weight on file to calculate BMI.    CBC:   Lab Results   Component Value Date    WBC 11.6 12/22/2021    RBC 3.35 12/22/2021    HGB 10.1 12/22/2021    HCT 31.5 12/22/2021    MCV 94.0 12/22/2021    RDW 13.5 12/22/2021     12/22/2021       CMP:   Lab Results   Component Value Date     12/29/2021    K 3.5 12/29/2021     12/29/2021    CO2 28 12/29/2021    BUN 21 12/29/2021    CREATININE 0.9 12/29/2021    GFRAA >60 12/29/2021    LABGLOM >60 12/29/2021    GLUCOSE 167 12/29/2021    PROT 5.9 12/21/2021    CALCIUM 8.3 12/29/2021    BILITOT 0.6 12/21/2021    ALKPHOS 68 12/21/2021    AST 17 12/21/2021    ALT 18 12/21/2021       POC Tests: No results for input(s): POCGLU, POCNA, POCK, POCCL, POCBUN, POCHEMO, POCHCT in the last 72 hours. Coags:   Lab Results   Component Value Date    PROTIME 12.7 12/09/2021    INR 1.2 12/09/2021       HCG (If Applicable): No results found for: PREGTESTUR, PREGSERUM, HCG, HCGQUANT     ABGs: No results found for: PHART, PO2ART, OKN5RYB, PDO1FRM, BEART, I7CUYGNN     Type & Screen (If Applicable):  No results found for: LABABO, LABRH    Drug/Infectious Status (If Applicable):  No results found for: HIV, HEPCAB    COVID-19 Screening (If Applicable):   Lab Results   Component Value Date    COVID19 Not Detected 12/25/2021    COVID19 Not Detected 12/20/2021       Echo Complete    Result Date: 12/22/2021  Transthoracic Echocardiography Report (TTE)  Demographics   Patient Name    Johanny Freeman  Gender            Male                  W   Medical Record  [id number]     Room Number       [facility]  Number   Account #       [de-identified]    Procedure Date    12/22/2021   Corporate ID                 Ordering          Shaquille Chester MD                               Physician   Accession       [id number]   Referring  Number                       Physician   Date of Birth   1935   Sonographer       Theresa MIRANDA   Age             82 year(s)   Interpreting      [address]                               Physician         Physician Cardiology                                                 Yashira Suarez MD                                Any Other  Procedure Type of Study   TTE procedure:Echo Complete W/Doppler & Color Flow, Echo W/Bubble Study. Procedure Date Date: 12/22/2021 Start: 09:40 AM Study Location: CVL Technical Quality: Adequate visualization Indications:Atrial fibrillation.  Patient Status: Routine Diastolic     LV Systolic Dimension: 2.7 cm   AV Cusp Separation: 2.1  Dimension: 3.7   LV Volume Diastolic: 36.4 ml    cmAO Root Dimension: 3.7  cm               LV Volume Systolic: 59.5 ml     cm  LV FS:27 %       LV EDV/LV EDV Index: 57.5 BN/86  LV PW Diastolic: BW/I^8ZT ESV/LV ESV Index: 25.9  0.9 cm           ml/12ml/ m^2  LV PW Systolic:  EF Calculated: 55 %             RV Diastolic Dimension:  1.4 cm           LV Mass Index: 92 l/min*m^2     3.7 cm  Septum           LV Length: 8.9 cm  Diastolic: 2 cm                                  Ascending Aorta: 3.8 cm  Septum Systolic: LVOT: 2.4 cm                    LA volume/Index: 68.6 ml  1.6 cm                                           /31.92ml/m^2  CO: 10.3 l/min                                   RA Area: 14.2 cm^2  CI: 4.79 l/m*m^2  LV Mass: 197.65  g  Doppler Measurements & Calculations   MV Peak E-Wave: 1.16 AV Peak Velocity: 1.37 LVOT Peak Velocity: 1.22 m/s  m/s                  m/s                    LVOT Mean Velocity: 0.86 m/s  MV Peak A-Wave: 0.96 AV Peak Gradient: 7.55 LVOT Peak Gradient: 6 mmHgLVOT  m/s                  mmHg                   Mean Gradient: 3.3 mmHg  MV E/A Ratio: 1.21   AV Mean Velocity: 0.98  MV Peak Gradient:    m/s  7.9 mmHg             AV Mean Gradient: 4.3  MV Mean Gradient:    mmHg                   TR Velocity:4.13 m/s  3.7 mmHg             AV VTI: 28.9 cm        TR Gradient:68.26 mmHg  MV Mean Velocity:    AV Area  0.92 m/s             (Continuity):4.01 cm^2  MV Deceleration  Time: 217 msec       LVOT VTI: 25.6 cm      [place] ED Velocity: 1.3 m/s  MV P1/2t: 86 msec  MVA by PHT:2.56 cm^2  MV Area  (continuity): 3.5  cm^2  MV E' Septal  Velocity: 0.05 m/s  MV E' Lateral  Velocity: 6 m/s  [email or web address] [id number]    XR CHEST (2 VW)    Result Date: 12/22/2021  EXAMINATION: TWO XRAY VIEWS OF THE CHEST 12/22/2021 3:17 pm COMPARISON: December 22, 2021 at 15:04, December 18, 2021 HISTORY: ORDERING SYSTEM PROVIDED HISTORY: reevaluate right infiltrate TECHNOLOGIST PROVIDED HISTORY: Reason for exam:->reevaluate right infiltrate What reading provider will be dictating this exam?->CRC FINDINGS: Heart size is unable to be accurately assessed on this single portable view of the chest, but appears to be stable. Previously described right lung airspace opacities with a reticular and patchy appearance have increased since December 18, 2021. No evidence of a sizable pleural effusion. No pneumothorax. No acute osseous abnormality. Increased reticular and patchy airspace opacities in the right lung. These are nonspecific and may be on the basis of multifocal pneumonia or (less likely) asymmetric pulmonary edema. Atypical/viral pneumonia not excluded. XR CHEST (2 VW)    Result Date: 12/9/2021  EXAMINATION: TWO XRAY VIEWS OF THE CHEST 12/9/2021 11:09 am COMPARISON: None. HISTORY: ORDERING SYSTEM PROVIDED HISTORY: Pre-op evaluation TECHNOLOGIST PROVIDED HISTORY: Reason for exam:->pre op What reading provider will be dictating this exam?->CRC FINDINGS: Slight peripheral coarsening of interstitial markings may indicate mild fibrosis. Heart and pulmonary vascularity are normal.  Neither costophrenic angle is blunted. Subtle findings which may indicate mild interstitial fibrosis. XR ABDOMEN (KUB) (SINGLE AP VIEW)    Result Date: 12/27/2021  EXAMINATION: ONE SUPINE XRAY VIEW(S) OF THE ABDOMEN 12/27/2021 10:37 am COMPARISON: Radiographs from December 25, 2021 HISTORY: ORDERING SYSTEM PROVIDED HISTORY: ileus TECHNOLOGIST PROVIDED HISTORY: Reason for exam:->ileus What reading provider will be dictating this exam?->CRC FINDINGS: Redemonstration of dilated air-filled loops of bowel measuring up to 7.8 cm in the left upper quadrant. Findings favor dilated small and large bowel loops. This is moderately improved from prior study. Air-fluid levels in the right abdomen. Spondylosis of the spine with fusion hardware. Improved ileus pattern with persistent dilated bowel loops. XR ABDOMEN (KUB) (SINGLE AP VIEW)    Result Date: 12/25/2021  EXAMINATION: ONE SUPINE XRAY VIEW(S) OF THE ABDOMEN 12/25/2021 11:05 am COMPARISON: Is comparison is dated December 22, 2021 HISTORY: ORDERING SYSTEM PROVIDED HISTORY: no bowel movement, distention TECHNOLOGIST PROVIDED HISTORY: Reason for exam:->no bowel movement, distention What reading provider will be dictating this exam?->CRC FINDINGS: Postoperative changes are present with surgical clips present There is been in single from L2 through L5 There is severe colonic distention most prominent at the comparison descending colon in the 15 cm range there is air in some mildly distended small bowel loops. Marked colonic distension unchanged from prior study with air in mildly distended small bowel suspect postoperative ileus     XR ABDOMEN (KUB) (SINGLE AP VIEW)    Result Date: 12/16/2021  EXAMINATION: ONE SUPINE XRAY VIEW(S) OF THE ABDOMEN 12/16/2021 2:41 pm COMPARISON: None. HISTORY: ORDERING SYSTEM PROVIDED HISTORY: post-op ileus TECHNOLOGIST PROVIDED HISTORY: Reason for exam:->post-op ileus What reading provider will be dictating this exam?->CRC FINDINGS: Air scattered small and large bowel loops. Dilated small bowel measures 3.6 cm. Dilated colon measures 8.7 cm at the level the transverse. Skin in seizure in with skin staples vertically noted over the mid lower abdomen. Postoperative change spinal fusion and laminectomy noted lumbar spine. Surgical clips right upper quadrant. Dilated small and large bowel loops suggest generalized ileus. Clinical correlation and follow-up to resolution recommended.      XR ACUTE ABD SERIES CHEST 1 VW    Result Date: 12/22/2021  EXAMINATION: TWO XRAY VIEWS OF THE ABDOMEN AND SINGLE  XRAY VIEW OF THE CHEST 12/22/2021 3:16 pm COMPARISON: Chest radiograph December 22, 2021 HISTORY: ORDERING SYSTEM PROVIDED HISTORY: repeat/assess respiratory status TECHNOLOGIST PROVIDED HISTORY: Reason for exam:->repeat/assess respiratory status What reading provider will be dictating this exam?->CRC FINDINGS: The evaluation is limited due to low lung volumes. Trachea is midline. Cardiomediastinal silhouette is stable in size. Persistent airspace opacity identified in right lung lower lung. Multiple air-filled loops of colon identified in the abdomen. No abnormal abdominal calcifications. Bowel gas pattern is nonspecific nonobstructive. No evidence of bowel obstruction. The evaluation is limited due to low lung volumes. Opacities in right lung may represent pneumonia versus atelectasis in the proper clinical setting. XR CHEST PORTABLE    Result Date: 12/27/2021  EXAMINATION: ONE XRAY VIEW OF THE CHEST 12/27/2021 10:38 am COMPARISON: December 22nd-25 reviewed the CT chest December 16 HISTORY: ORDERING SYSTEM PROVIDED HISTORY: chf TECHNOLOGIST PROVIDED HISTORY: Reason for exam:->chf What reading provider will be dictating this exam?->CRC FINDINGS: Persistent findings in the mid lower aspect of the right lung which is a combination of mild the pleural reaction/effusion with areas of multi segmental atelectasis and additional increased parenchymal opacities. There is an overall loss of volume of the right lower lung base. The right diaphragma is elevated the. Heart has upper normal size. Left lungs well expanded. No conspicuous infiltrates or consolidations in the left lung or pleural effusions. No conspicuous pneumothorax on the right on the left. No significant changes since the previous study of December 25.      XR CHEST PORTABLE    Result Date: 12/25/2021  EXAMINATION: ONE XRAY VIEW OF THE CHEST 12/25/2021 2:58 pm COMPARISON: Comparison is dated December 24, 2021 HISTORY: ORDERING SYSTEM PROVIDED HISTORY: hypoxia TECHNOLOGIST PROVIDED HISTORY: Reason for exam:->hypoxia FINDINGS: There is marked increase of right-sided confluent pulmonary a hazy opacity since the prior exam with small pleural effusion present. Cardiac and mediastinal contours are unchanged. Pulmonary vasculature is unremarkable. Increasing right pleural effusion and infiltrate     XR CHEST PORTABLE    Result Date: 12/24/2021  EXAMINATION: ONE XRAY VIEW OF THE CHEST 12/24/2021 12:31 pm COMPARISON: December 22, 2021 HISTORY: ORDERING SYSTEM PROVIDED HISTORY: cough TECHNOLOGIST PROVIDED HISTORY: Reason for exam:->cough What reading provider will be dictating this exam?->CRC FINDINGS: Redemonstration of interstitial and hazy opacities bilaterally notable in mid right lung field and right lung base. Opacities have increased in right lung base now silhouetting right hemidiaphragm. The heart appears prominent in size. No pneumothorax. Redemonstration of bilateral pneumonia or interstitial pulmonary edema. Opacities have increased in right lung base. XR CHEST PORTABLE    Result Date: 12/18/2021  EXAMINATION: ONE XRAY VIEW OF THE CHEST 12/18/2021 8:38 pm COMPARISON: 12/16/2021 HISTORY: ORDERING SYSTEM PROVIDED HISTORY: Chest Pain TECHNOLOGIST PROVIDED HISTORY: Reason for exam:->Chest Pain What reading provider will be dictating this exam?->CRC FINDINGS: EKG leads overlie the chest.  Stable elevation of the right diaphragm with patchy opacification of the right lung base, appearing slightly worsened. Minimal left basal opacities are also increased. Moderate gaseous distention of bowel in the visualized upper abdomen. No pneumothorax. No other change. Slight worsening of right greater than left basal opacities which may represent atelectasis or infiltrates from pneumonia. Continued follow-up recommended.      XR CHEST PORTABLE    Result Date: 12/16/2021  EXAMINATION: ONE XRAY VIEW OF THE CHEST 12/16/2021 4:19 pm COMPARISON: 12/09/2021 HISTORY: ORDERING SYSTEM PROVIDED HISTORY: hypoxia TECHNOLOGIST PROVIDED HISTORY: Reason for exam:->hypoxia What reading provider will be dictating this exam?->CRC FINDINGS: Right lower lobe atelectasis and/or infiltrate. .  There is no effusion or pneumothorax. The cardiomediastinal silhouette is without acute process. The osseous structures are without acute process. Right lower lobe atelectasis and/or infiltrate. CTA PULMONARY W CONTRAST    Result Date: 12/16/2021  EXAMINATION: CTA OF THE CHEST 12/16/2021 3:32 pm TECHNIQUE: CTA of the chest was performed after the administration of intravenous contrast.  Multiplanar reformatted images are provided for review. MIP images are provided for review. Dose modulation, iterative reconstruction, and/or weight based adjustment of the mA/kV was utilized to reduce the radiation dose to as low as reasonably achievable. COMPARISON: None. HISTORY: ORDERING SYSTEM PROVIDED HISTORY: CHEST PAIN AND SOB R/O PE TECHNOLOGIST PROVIDED HISTORY: Reason for exam:->CHEST PAIN AND SOB R/O PE What reading provider will be dictating this exam?->CRC FINDINGS: No thoracic aortic aneurysm or dissection. Mild-to-moderate scattered calcified plaque in the thoracic aorta. No evidence of pulmonary embolism. Mild cardiomegaly. Coronary atherosclerosis. No pericardial effusion. Shotty subcentimeter short axis mediastinal hilar nodes. No pleural effusion or pneumothorax. Low lung volumes. There are a few small peripheral densities in the lungs which may represent scarring or infection (for example series 4, image 73). Another curvilinear density is noted the medial left lower lobe (series 4 image 168). Mild dependent atelectasis, right greater than left. The central airways are patent. There is centrilobular and paraseptal emphysema. Degenerative changes of the spine. The visualized abdomen demonstrates changes of cholecystectomy and the splenic parenchymal calcification. No evidence of pulmonary embolism. No acute aortic injury or aneurysm. There are a few nonspecific densities in the peripheral lungs. Imaging features can be seen with COVID-19 pneumonia, though are nonspecific and can occur with a variety of infectious and noninfectious processes. Mild cardiomegaly. Atherosclerotic disease. FLUORO FOR SURGICAL PROCEDURES    Result Date: 12/14/2021  EXAMINATION: SPOT FLUOROSCOPIC IMAGES 12/14/2021 10:42 am TECHNIQUE: Fluoroscopy was provided by the radiology department for procedure. Radiologist was not present during examination. FLUOROSCOPY DOSE AND TYPE OR TIME AND EXPOSURES: Fluoroscopy time equals 10.8 seconds. Total dose equals 15.92 mGy COMPARISON: None HISTORY: ORDERING SYSTEM PROVIDED HISTORY: Back pain, unspecified back location, unspecified back pain laterality, unspecified chronicity TECHNOLOGIST PROVIDED HISTORY: Reason for exam:->Back pain, unspecified back location, unspecified back pain laterality, unspecified chronicity What reading provider will be dictating this exam?->CRC Intraprocedural imaging. FINDINGS: 2 spot images of the lumbar spine were obtained. Intraprocedural fluoroscopic spot images as above. See separate procedure report for more information. EKG 12 Lead  Order: [id number]   Status: Final result     Visible to patient: Yes (not seen)     Next appt:  Today at 01:30 PM in IP Unit [facility])     0 Result Notes    Component Ref Range & Units 12/27/21 1421 12/24/21 1248 12/22/21 0913 12/19/21 1058 12/18/21 1635 12/16/21 1853 12/9/21 1106   Ventricular Rate BPM 66  70  85  138  96  98  63    Atrial Rate BPM 66  70  85  115  96  98  63    P-R Interval ms 200  212  218   232  250  268    QRS Duration ms 118  98  106  98  100  94  96    Q-T Interval ms 438  390  380  312  336  328  378    QTc Calculation (Bazett) ms 459  421  452  472  424  418  386    P Axis degrees 31  52  55   83  43  68    R Axis degrees 30  56  54  44  39  46  56    T Axis degrees 67  105  94  1  60  74  73    Resulting [address]             Narrative & Impression    Normal sinus rhythm  Cannot rule out Anteroseptal infarct (cited on or before 22-DEC-2021)  Nonspecific ST and T wave abnormality  Abnormal ECG  Confirmed by Sebas Ruvalcaba ([id number]) on 12/27/2021 7:57:47 PM      Specimen Collected: 12/27/21 14:21 Last Resulted: 12/27/21 19:57             TTE 12/22/21  Conclusions      Summary   Normal left ventricular chamber size. Normal left ventricular systolic   function. Visually estimated LVEF is 60 %. No wall motion abnormalities. Grade II diastolic dysfunction. Normal right ventricle structure and function. Normal left atrium. Normal right atrium. Mild tricuspid regurgitation. Mild-to-moderate pulmonic regurgitation present. Unable to estimate PA pressure. Agitated saline contrast study shows late right to left shunting   suggestive of an intra-pulmonary shunt. No comparison study available.       Signature      ----------------------------------------------------------------   Electronically signed by Holly Schreiber MD(Interpreting   physician) on 12/22/2021 10:57 AM   ----------------------------------------------------------------      Anesthesia Evaluation  Patient summary reviewed and Nursing notes reviewed no history of anesthetic complications:   Airway: Mallampati: II  TM distance: >3 FB   Neck ROM: full  Mouth opening: > = 3 FB Dental:    (+) lower dentures and upper dentures      Pulmonary:normal exam  breath sounds clear to auscultation      (-) not a current smoker (Quit 40 years ago, 30 pack year history)                          ROS comment: Pulmonary fibrosis   Cardiovascular:  Exercise tolerance: good (>4 METS),   (+) hypertension:, valvular problems/murmurs (See TTE report):, dysrhythmias: atrial fibrillation, hyperlipidemia      ECG reviewed  Rhythm: regular  Rate: normal  Echocardiogram reviewed         Beta Blocker:  Dose within 24 Hrs      ROS comment: Walks his dog every morning     Neuro/Psych:                ROS comment: Bilateral leg pain improving S/P back surgery GI/Hepatic/Renal: Neg GI/Hepatic/Renal ROS  (+) GERD: well controlled,           Endo/Other: Negative Endo/Other ROS   (+) : arthritis: OA., .                 Abdominal:             Vascular: negative vascular ROS. Other Findings:             Anesthesia Plan      MAC     ASA 3       Induction: intravenous. Anesthetic plan and risks discussed with patient and spouse. Plan discussed with attending.                   Manual YO Tang CRNA   12/29/2021

## 2021-12-30 VITALS
WEIGHT: 200 LBS | HEIGHT: 73 IN | OXYGEN SATURATION: 95 % | TEMPERATURE: 96.7 F | SYSTOLIC BLOOD PRESSURE: 138 MMHG | BODY MASS INDEX: 26.51 KG/M2 | DIASTOLIC BLOOD PRESSURE: 59 MMHG | RESPIRATION RATE: 18 BRPM | HEART RATE: 73 BPM

## 2021-12-30 DIAGNOSIS — J84.10 PULMONARY FIBROSIS (HCC): Primary | ICD-10-CM

## 2021-12-30 LAB
ANION GAP SERPL CALCULATED.3IONS-SCNC: 12 MMOL/L (ref 7–16)
BUN BLDV-MCNC: 24 MG/DL (ref 6–23)
CALCIUM SERPL-MCNC: 8.3 MG/DL (ref 8.6–10.2)
CHLORIDE BLD-SCNC: 98 MMOL/L (ref 98–107)
CO2: 25 MMOL/L (ref 22–29)
CREAT SERPL-MCNC: 1 MG/DL (ref 0.7–1.2)
GFR AFRICAN AMERICAN: >60
GFR NON-AFRICAN AMERICAN: >60 ML/MIN/1.73
GLUCOSE BLD-MCNC: 165 MG/DL (ref 74–99)
METER GLUCOSE: 153 MG/DL (ref 74–99)
POTASSIUM REFLEX MAGNESIUM: 3.7 MMOL/L (ref 3.5–5)
PRO-BNP: 2143 PG/ML (ref 0–450)
SODIUM BLD-SCNC: 135 MMOL/L (ref 132–146)

## 2021-12-30 PROCEDURE — 6370000000 HC RX 637 (ALT 250 FOR IP): Performed by: INTERNAL MEDICINE

## 2021-12-30 PROCEDURE — 83880 ASSAY OF NATRIURETIC PEPTIDE: CPT

## 2021-12-30 PROCEDURE — 6370000000 HC RX 637 (ALT 250 FOR IP): Performed by: NEUROLOGICAL SURGERY

## 2021-12-30 PROCEDURE — 80048 BASIC METABOLIC PNL TOTAL CA: CPT

## 2021-12-30 PROCEDURE — 6360000002 HC RX W HCPCS: Performed by: FAMILY MEDICINE

## 2021-12-30 PROCEDURE — 2700000000 HC OXYGEN THERAPY PER DAY

## 2021-12-30 PROCEDURE — 99232 SBSQ HOSP IP/OBS MODERATE 35: CPT | Performed by: INTERNAL MEDICINE

## 2021-12-30 PROCEDURE — 6360000002 HC RX W HCPCS: Performed by: INTERNAL MEDICINE

## 2021-12-30 PROCEDURE — 2580000003 HC RX 258: Performed by: INTERNAL MEDICINE

## 2021-12-30 PROCEDURE — 6370000000 HC RX 637 (ALT 250 FOR IP): Performed by: PHYSICIAN ASSISTANT

## 2021-12-30 PROCEDURE — 94640 AIRWAY INHALATION TREATMENT: CPT

## 2021-12-30 PROCEDURE — 82962 GLUCOSE BLOOD TEST: CPT

## 2021-12-30 PROCEDURE — 36415 COLL VENOUS BLD VENIPUNCTURE: CPT

## 2021-12-30 PROCEDURE — 2580000003 HC RX 258: Performed by: FAMILY MEDICINE

## 2021-12-30 PROCEDURE — 6370000000 HC RX 637 (ALT 250 FOR IP): Performed by: FAMILY MEDICINE

## 2021-12-30 PROCEDURE — 97530 THERAPEUTIC ACTIVITIES: CPT

## 2021-12-30 RX ORDER — FLECAINIDE ACETATE 50 MG/1
50 TABLET ORAL 2 TIMES DAILY
Qty: 60 TABLET | Refills: 0 | Status: SHIPPED | OUTPATIENT
Start: 2021-12-30 | End: 2022-03-08

## 2021-12-30 RX ORDER — DILTIAZEM HYDROCHLORIDE 180 MG/1
180 CAPSULE, COATED, EXTENDED RELEASE ORAL DAILY
Qty: 30 CAPSULE | Refills: 0 | Status: SHIPPED | OUTPATIENT
Start: 2021-12-30

## 2021-12-30 RX ORDER — FUROSEMIDE 20 MG/1
20 TABLET ORAL 2 TIMES DAILY
Qty: 60 TABLET | Refills: 0 | Status: SHIPPED | OUTPATIENT
Start: 2021-12-30 | End: 2022-01-29

## 2021-12-30 RX ORDER — CARVEDILOL 25 MG/1
25 TABLET ORAL 2 TIMES DAILY WITH MEALS
Qty: 60 TABLET | Refills: 0 | Status: SHIPPED | OUTPATIENT
Start: 2021-12-30

## 2021-12-30 RX ADMIN — ACETAMINOPHEN 650 MG: 325 TABLET ORAL at 14:40

## 2021-12-30 RX ADMIN — FUROSEMIDE 20 MG: 20 TABLET ORAL at 08:40

## 2021-12-30 RX ADMIN — SENNOSIDES AND DOCUSATE SODIUM 1 TABLET: 50; 8.6 TABLET ORAL at 08:40

## 2021-12-30 RX ADMIN — DILTIAZEM HYDROCHLORIDE 30 MG: 30 TABLET, FILM COATED ORAL at 01:10

## 2021-12-30 RX ADMIN — PIPERACILLIN AND TAZOBACTAM 3375 MG: 3; .375 INJECTION, POWDER, LYOPHILIZED, FOR SOLUTION INTRAVENOUS at 01:09

## 2021-12-30 RX ADMIN — SODIUM CHLORIDE, PRESERVATIVE FREE 10 ML: 5 INJECTION INTRAVENOUS at 08:43

## 2021-12-30 RX ADMIN — IPRATROPIUM BROMIDE 0.5 MG: 0.5 SOLUTION RESPIRATORY (INHALATION) at 09:42

## 2021-12-30 RX ADMIN — POTASSIUM CHLORIDE 20 MEQ: 1500 TABLET, EXTENDED RELEASE ORAL at 08:39

## 2021-12-30 RX ADMIN — CARVEDILOL 25 MG: 25 TABLET, FILM COATED ORAL at 08:42

## 2021-12-30 RX ADMIN — PIPERACILLIN AND TAZOBACTAM 3375 MG: 3; .375 INJECTION, POWDER, LYOPHILIZED, FOR SOLUTION INTRAVENOUS at 08:39

## 2021-12-30 RX ADMIN — PREDNISONE 20 MG: 20 TABLET ORAL at 09:34

## 2021-12-30 RX ADMIN — APIXABAN 5 MG: 5 TABLET, FILM COATED ORAL at 08:39

## 2021-12-30 RX ADMIN — BISACODYL 5 MG: 5 TABLET, COATED ORAL at 08:40

## 2021-12-30 RX ADMIN — DILTIAZEM HYDROCHLORIDE 30 MG: 30 TABLET, FILM COATED ORAL at 14:42

## 2021-12-30 RX ADMIN — IPRATROPIUM BROMIDE 0.5 MG: 0.5 SOLUTION RESPIRATORY (INHALATION) at 14:02

## 2021-12-30 RX ADMIN — DILTIAZEM HYDROCHLORIDE 30 MG: 30 TABLET, FILM COATED ORAL at 08:39

## 2021-12-30 RX ADMIN — FLECAINIDE ACETATE 50 MG: 50 TABLET ORAL at 08:41

## 2021-12-30 ASSESSMENT — PAIN SCALES - GENERAL
PAINLEVEL_OUTOF10: 5
PAINLEVEL_OUTOF10: 4

## 2021-12-30 NOTE — PROGRESS NOTES
Physical Therapy  Treatment Note  Name: Marline Tao  : 1935  MRN: [id number]      Date of Service: 2021    Evaluating PT:  Brady De La Cruz, PT [id number]    Room #:  8505/8505-B  Diagnosis:  Spinal stenosis of lumbar region, unspecified whether neurogenic claudication present [M48.061]  Lumbar degenerative disc disease [M51.36]  PMHx/PSHx:     has a past medical history of Hypertension and New onset atrial fibrillation ([facility] [address] has a past surgical history that includes joint replacement (Right); Carotid endarterectomy (Right); Appendectomy; Carpal tunnel release (Bilateral); Lumbar spine surgery (N/A, 2021); and transesophageal echocardiogram (2021). Procedure/Surgery: L2-L5 LAMINECTOMY AND FUSION   Precautions:  Falls, falls, alarm and O2 , FWB (full weight bearing), Hemovac drain, Spinal precautions no \"BLT\" and LSO on when greater than 45degrees (elective)  Equipment Needs: Wheeled Walker    SUBJECTIVE:  Patient lives with spouse  in a trailer with 2 steps without rail and a platform between the two to enter. Patient ambulated with a  7-iron golf club PTA. Equipment owned: None    OBJECTIVE:   Initial Evaluation  Date: 21 Treatment  2021 Short Term/ Long Term   Goals   AM-PAC 6 Clicks  93/81    Was pt agreeable to Eval/treatment? Yes yes    Does pt have pain? Yes; 9/10 LBP at level of incision No c/o pain    Bed Mobility  Rolling: Min   Supine to sit:   Min   Sit to supine: Min   Scooting: Min  Rolling: NT  Supine to sit: Supervision  Sit to supine: NT   Scooting: Supervision  Rolling: Ind  Supine to sit: Ind  Sit to supine: Ind  Scooting: Ind   Transfers Sit to stand: Min  Stand to sit: Min  Stand pivot: Min  Sit to stand: SBA  Stand to sit: SBA  Stand pivot: SBA with [address] Sit to stand: Mod Ind    Stand to sit: Mod Ind    Stand pivot:  Mod Ind     Ambulation     feet x 2 reps with [address]  feet with Mod Ind     Stair negotiation: ascended and descended  NT 4 steps with 2 rail SBA  Step to pattern 3 steps with Mod Ind  1 rail    ROM BUE:  Defer to OT eval  BLE:  wfl     Strength BUE: Defer to OT eval  RLE:  Grossly 4-/5  LLE:  Grossly  4-/5  4/5   Balance Sitting EOB:  SBA  Dynamic Standing:  NT Sitting EOB:  Independent  Dynamic Standing:  SBA with [employer] Sitting EOB:  Ind  Dynamic Standing:  Ind     Pt is A & O x 4  Sensation:  Pt denies numbness and tingling to extremities  Edema:  None noted    Vitals:  SPO2 monitored throughout session and remained >90% on O2    Patient education  Pt educated on role of PT, energy conservation, PLB, safe use of WW, POC    Patient response to education:   Pt verbalized understanding Pt demonstrated skill Pt requires further education in this area   yes yes yes     ASSESSMENT:    Comments:  Pt was in bed upon arrival, agreeable to treatment session. Pt abruptly sat at EOB to use urinal.  Explained importance of donning LSO in supine unless cleared by physician. Assisted pt with donning of LSO at EOB. Pt then requested to use bedside commode. Pt ambulated into hallway with decreased gait speed but no LOB noted. Pt completed stair negotiation with non-reciprocal pattern. Pt was left in chair with all needs met and call light in reach. All lines remained intact. Treatment:  Patient practiced and was instructed in the following treatment:     Bed mobility training - pt given verbal cues to facilitate proper sequencing and safety during supine>sit     Sitting EOB for >3 minutes for upright tolerance, postural awareness and BLE ROM   Transfer training - pt was given verbal cues to facilitate proper hand placement, technique and safety during sit to stand and stand to sit    Gait training- pt was given verbal cues to facilitate safety and use of AD during ambulation .  Stair training - pt was given verbal cues to facilitate safety during stair negotiation. PLAN:    Patient is making good progress towards established goals. Will continue with current POC.       Time in  0755  Time out  0825    Total Treatment Time  30 minutes     CPT codes:  [] Gait training 74910 - minutes  [] Manual therapy 46701 - minutes  [x] Therapeutic activities 10530 30 minutes  [] Therapeutic exercises 95456 - minutes  [] Neuromuscular reeducation 05323 - minutes    You Hurtado PT, DPT  [id number]

## 2021-12-30 NOTE — CARE COORDINATION
Discharge order noted. DME for Home O2 and Nebulizer also noted. Message out to Vita Staton, liaison with [facility] re: orders received. Explained nursing aware of need for ambulatory pulse ox for discharge today, but that information is in PT note from 12/29. Message out to Marni, liaison with [facility] DME to notify of discharge to home today. Anticipate discharge to home today.      Denver Peaks, RN.  [facility]  [phone]

## 2021-12-30 NOTE — PROGRESS NOTES
INPATIENT CARDIOLOGY Follow UP    Name: Angie Starr    Age: 82 y.o. Date of Admission: 12/14/2021  5:17 AM    Date of Service: 12/29/2021      Referring Physician: Sonja Valera MD      Subjective: Denies chest pain or shortness of breath. Oxygen requirement is decreasing. Still remain in sinus rhythm. TOBIAS showed small PFO which medical management was discussed with patient and family          Past Medical History:  Past Medical History:   Diagnosis Date    Hypertension     New onset atrial fibrillation ([address] 12/19/2021       Past Surgical History:  Past Surgical History:   Procedure Laterality Date    APPENDECTOMY      CAROTID ENDARTERECTOMY Right     CARPAL TUNNEL RELEASE Bilateral     JOINT REPLACEMENT Right     LUMBAR SPINE SURGERY N/A 12/14/2021    L2-L5 LAMINECTOMY AND FUSION performed by Sonja Valera MD at [facility] TRANSESOPHAGEAL ECHOCARDIOGRAM  12/29/2021       Family History:  History reviewed. No pertinent family history. Social History:  Social History     Socioeconomic History    Marital status:      Spouse name: Not on file    Number of children: Not on file    Years of education: Not on file    Highest education level: Not on file   Occupational History    Not on file   Tobacco Use    Smoking status: Former Smoker    Smokeless tobacco: Never Used   Vaping Use    Vaping Use: Never used   Substance and Sexual Activity    Alcohol use: Not Currently    Drug use: Never    Sexual activity: Not on file   Other Topics Concern    Not on file   Social History Narrative    Not on file     Social Determinants of Health     Financial Resource Strain:     Difficulty of Paying Living Expenses: Not on file   Food Insecurity:     Worried About [address] in the Last Year: Not on file    [address] in the Last Year: Not on file   Transportation Needs:     Lack of Transportation (Medical): Not on file    Lack of Transportation (Non-Medical):  Not on file Physical Activity:     Days of Exercise per Week: Not on file    Minutes of Exercise per Session: Not on file   Stress:     Feeling of Stress : Not on file   Social Connections:     Frequency of Communication with Friends and Family: Not on file    Frequency of Social Gatherings with Friends and Family: Not on file    Attends Jain Services: Not on file    Active Member of Clubs or Organizations: Not on file    Attends Club or Organization Meetings: Not on file    Marital Status: Not on file   Intimate Partner Violence:     Fear of Current or Ex-Partner: Not on file    Emotionally Abused: Not on file    Physically Abused: Not on file    Sexually Abused: Not on file   Housing Stability:     Unable to Pay for Housing in the Last Year: Not on file    Number of [place] in the Last Year: Not on file    Unstable Housing in the Last Year: Not on file       Allergies: Allergies   Allergen Reactions    Demerol [Meperidine Hcl] Shortness Of Breath    Morphine        Home Medications:  Prior to Admission medications    Medication Sig Start Date End Date Taking? Authorizing Provider   ipratropium (ATROVENT) 0.02 % nebulizer solution Take 2.5 mLs by nebulization 4 times daily 12/29/21  Yes Eben Avery DO   predniSONE (DELTASONE) 10 MG tablet Take 1 tablet by mouth daily for 3 doses 1/1/22 1/4/22 Yes Susie Wiggins DO   Respiratory Therapy Supplies (FULL KIT NEBULIZER SET) MISC Use as directed with nebulized medication. 12/29/21  Yes Fredi Avery DO   oxyCODONE (ROXICODONE) 5 MG immediate release tablet Take 1 tablet by mouth every 4 hours as needed for Pain for up to 7 days.  12/22/21 12/29/21 Yes Karyle Else, PA   cyclobenzaprine (FLEXERIL) 10 MG tablet Take 1 tablet by mouth 3 times daily as needed for Muscle spasms 12/22/21 1/1/22 Yes Karyle Else, PA   Pediatric Multivitamins-Fl (MULTIVITAMINS/FL PO) Take by mouth   Yes Historical Provider, MD   Pediatric Multivitamins-Fl (MULTIVITAMINS/FL PO) Take by mouth   Yes Historical Provider, MD   amLODIPine (NORVASC) 10 MG tablet daily  9/3/21  Yes Historical Provider, MD   metoprolol tartrate (LOPRESSOR) 25 MG tablet take 1 tablet twice a day 6/5/08  Yes Historical Provider, MD   pravastatin (PRAVACHOL) 10 MG tablet daily  9/21/21  Yes Historical Provider, MD       Current Medications:  Current Facility-Administered Medications   Medication Dose Route Frequency Provider Last Rate Last Admin    predniSONE (DELTASONE) tablet 20 mg  20 mg Oral Daily Kim Avery DO   20 mg at 12/29/21 1838    Followed by   Mylene Rosales ON 1/1/2022] predniSONE (DELTASONE) tablet 10 mg  10 mg Oral Daily Fredi Avery DO        potassium chloride (KLOR-CON M) extended release tablet 20 mEq  20 mEq Oral BID  Bernarda Blood MD   20 mEq at 12/29/21 1758    glucose (GLUTOSE) 40 % oral gel 15 g  15 g Oral PRN Bernarda Blood MD        dextrose 50 % IV solution  12.5 g IntraVENous PRN Bernarda Blood MD        glucagon (rDNA) injection 1 mg  1 mg IntraMUSCular PRN Bernarda Blood MD        dextrose 5 % solution  100 mL/hr IntraVENous PRN Bernarda Blood MD        carvedilol (COREG) tablet 25 mg  25 mg Oral BID  Sebas Ruvalcaba MD   25 mg at 12/29/21 1757    furosemide (LASIX) tablet 20 mg  20 mg Oral BID Kim Avery, DO   20 mg at 12/29/21 1800    lidocaine 4 % external patch 1 patch  1 patch TransDERmal Daily Bernarda Blood MD   1 patch at 12/27/21 0851    piperacillin-tazobactam (ZOSYN) 3,375 mg in dextrose 5 % 100 mL IVPB extended infusion (mini-bag)  3,375 mg IntraVENous Q8H Bernarda Blood MD 25 mL/hr at 12/29/21 1759 3,375 mg at 12/29/21 1759    flecainide (TAMBOCOR) tablet 50 mg  50 mg Oral BID Sherrie Buck MD   50 mg at 12/29/21 2007    sodium chloride flush 0.9 % injection 5-40 mL  5-40 mL IntraVENous 2 times per day Sherrie Buck MD   10 mL at 12/25/21 1014    sodium chloride flush 0.9 % injection 5-40 mL 5-40 mL IntraVENous PRN China Rios MD   10 mL at 12/27/21 0050    0.9 % sodium chloride infusion  25 mL IntraVENous PRN Chnia Rios MD        metoprolol (LOPRESSOR) injection 5 mg  5 mg IntraVENous Q6H PRN oMe Evans MD   5 mg at 12/23/21 0103    apixaban (ELIQUIS) tablet 5 mg  5 mg Oral BID China Rios MD   5 mg at 12/29/21 2008    phenol 1.4 % mouth spray 1 spray  1 spray Mouth/Throat Q2H PRN RADHA Wheeler        ipratropium (ATROVENT) 0.02 % nebulizer solution 0.5 mg  0.5 mg Nebulization 4x daily Maria Antonia Avery DO   0.5 mg at 12/29/21 1643    dilTIAZem (CARDIZEM) tablet 30 mg  30 mg Oral 4 times per day Yung Randhawa MD   30 mg at 12/29/21 2007    perflutren lipid microspheres (DEFINITY) injection 1.65 mg  1.5 mL IntraVENous ONCE PRN Giovanni Harmon MD        acetaminophen (TYLENOL) tablet 650 mg  650 mg Oral Q4H PRN Clif Molina MD   650 mg at 12/29/21 1547    simethicone (MYLICON) chewable tablet 80 mg  80 mg Oral Q6H PRN RADHA Keller-DEANNE   80 mg at 12/26/21 0844    pravastatin (PRAVACHOL) tablet 10 mg  10 mg Oral Daily RADHA Keller-C   10 mg at 12/28/21 2024    sodium chloride flush 0.9 % injection 5-40 mL  5-40 mL IntraVENous 2 times per day Anusha Vaca PA-C   10 mL at 12/29/21 7626    sodium chloride flush 0.9 % injection 5-40 mL  5-40 mL IntraVENous PRN RADHA Keller-C   10 mL at 12/25/21 0707    0.9 % sodium chloride infusion  25 mL IntraVENous PRN RADHA Keller-DEANNE        ondansetron (ZOFRAN-ODT) disintegrating tablet 4 mg  4 mg Oral Q8H PRN RADHA Keller-DEANNE        Or    [facility]ron [facility]) injection 4 mg  4 mg IntraVENous Q6H PRN RADHA Keller-C   4 mg at 12/22/21 6577    [Held by provider] HYDROmorphone (DILAUDID) injection 0.5 mg  0.5 mg IntraVENous Q3H PRN RADHA Keller-DEANNE   0.5 mg at 12/20/21 0837    cyclobenzaprine (FLEXERIL) tablet 10 mg  10 mg Oral TID PRN RADHA Keller-C   10 mg at 12/22/21 1416    polyethylene glycol (GLYCOLAX) packet 17 g  17 g Oral Daily Juan A Hogan PA-C   17 g at 12/28/21 0804    bisacodyl (DULCOLAX) EC tablet 5 mg  5 mg Oral Daily Juan A Hogan PA-C   5 mg at 12/28/21 2893    sennosides-docusate sodium (SENOKOT-S) 8.6-50 MG tablet 1 tablet  1 tablet Oral BID Juan A Hogan PA-C   1 tablet at 12/29/21 2008    magnesium hydroxide (MILK OF MAGNESIA) 400 MG/5ML suspension 30 mL  30 mL Oral Daily PRN Juan A Hogan PA-C        fleet rectal enema 1 enema  1 enema Rectal Daily PRN Juan A Hogan PA-C   1 enema at 12/25/21 1301      dextrose      sodium chloride      sodium chloride         Physical Exam:  /67   Pulse 74   Temp 97.1 °F (36.2 °C) (Temporal)   Resp 20   Ht 6' 1\" (1.854 m)   Wt 200 lb (90.7 kg)   SpO2 97%   BMI 26.39 kg/m²   Wt Readings from Last 3 Encounters:   12/14/21 200 lb (90.7 kg)   12/09/21 200 lb (90.7 kg)   11/04/21 200 lb (90.7 kg)       Appearance: Alert and oriented x3 not in acute distress. Skin: Dry skin  Head: Atraumatic  Eyes: Intact extraocular muscles   ENMT: Mucous membranes are moist  Neck: Supple  Lungs: Clear to auscultation  Cardiac: Normal S1 and S2  Abdomen: Protuberant  Extremities: Intact range of motion  Neurologic: No focal neurological deficits  Peripheral Pulses: 2+ peripheral pulses    Intake/Output:    Intake/Output Summary (Last 24 hours) at 12/29/2021 2013  Last data filed at 12/29/2021 1300  Gross per 24 hour   Intake 390 ml   Output 1150 ml   Net -760 ml     No intake/output data recorded. Laboratory Tests:  Recent Labs     12/27/21  0445 12/28/21  0536 12/29/21  0450    136 138   K 3.1* 3.1* 3.5   CL 98 97* 100   CO2 29 30* 28   BUN 23 22 21   CREATININE 0.8 1.0 0.9   GLUCOSE 190* 265* 167*   CALCIUM 8.4* 8.2* 8.3*     Lab Results   Component Value Date    MG 2.0 12/29/2021    MG 1.9 12/28/2021    MG 2.0 12/27/2021     No results for input(s): ALKPHOS, ALT, AST, PROT, BILITOT, BILIDIR, LABALBU in the last 72 hours.   No results for input(s): WBC, RBC, HGB, HCT, MCV, MCH, MCHC, RDW, PLT, MPV in the last 72 hours. Lab Results   Component Value Date    CKTOTAL 67 12/18/2021     No results for input(s): CKTOTAL, CKMB, CKMBINDEX, TROPHS in the last 72 hours. Lab Results   Component Value Date    INR 1.2 12/09/2021    INR 1.1 03/23/2021    PROTIME 12.7 (H) 12/09/2021    PROTIME 13.0 (H) 03/23/2021     Lab Results   Component Value Date    TSH 0.280 12/27/2021     Lab Results   Component Value Date    LABA1C 5.8 (H) 12/28/2021     No results found for: EAG  No results found for: CHOL  No results found for: TRIG  No results found for: HDL  No results found for: LDLCALC, LDLCHOLESTEROL  No results found for: LABVLDL, VLDL  No results found for: CHOLHDLRATIO  Recent Labs     12/27/21  0445 12/28/21  0536 12/29/21  0450   PROBNP 4,993* 3,619* 2,778*       Cardiac Tests:    Echo 12/22/2021  Summary   Normal left ventricular chamber size. Normal left ventricular systolic   function. Visually estimated LVEF is 60 %.   No wall motion abnormalities.   Grade II diastolic dysfunction.   Normal right ventricle structure and function.   Normal left atrium.   Normal right atrium.   Mild tricuspid regurgitation.   Mild-to-moderate pulmonic regurgitation present.   Unable to estimate PA pressure.   Agitated saline contrast study shows late right to left shunting suggestive of an intra-pulmonary        CXR reviewed: The ASCVD Risk score (Robert Freitas, et al., 2013) failed to calculate for the following reasons:     The 2013 ASCVD risk score is only valid for ages 36 to 78    ASSESSMENT / PLAN:    Small PFO is noted on transesophageal echocardiogram today  We will medically manage and follow-up with cardiology in the outpatient     Postoperative ileus  Improving    Atrial fibrillation  Continue Eliquis and beta-blocker  Is also on flecainide      Interstitial lung disease and pulmonary fibrosis  Pulmonology following    Hypertension    History of right carotic endarterectomy    GERD    History of COVID-19 infection in May 21    HLD  S/p  L2-L5 LAMINECTOMY AND FUSION on 12/14/2021               Thank you for allowing me to participate in your patient's care. Please feel free to contact me if you have any questions or concerns.     Reed Ni MD  [facility]) Cardiology

## 2021-12-30 NOTE — PROGRESS NOTES
Heart monitor placed in storage. IVs removed. [address] paperwork reviewed with patient, spouse and daughter at bedside. All questions answered at this time.  Patient discharged in stable condition to family via wheelchair by PCA

## 2021-12-30 NOTE — PROGRESS NOTES
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 CT with chronic changes - known fibrosis per family    OBJECTIVE:     PHYSICAL EXAM:   VITALS:   Vitals:    21 0910 21 0937 21 1137 21 1515   BP:   (!) 165/85 (!) 162/67   Pulse:   68 73   Resp:   20 18   Temp:   97.7 °F (36.5 °C) 96.5 °F (35.8 °C)   TempSrc:    Temporal   SpO2: 93% 95% 97% 95%   Weight:       Height:            Intake/Output Summary (Last 24 hours) at 2021  Last data filed at 2021 1300  Gross per 24 hour   Intake 390 ml   Output 1150 ml   Net -760 ml        CONSTITUTIONAL:   A&O x 3  SKIN:     Kelechi, Fair skin with shin discoloration  HEENT:     EOMI, MMM, No thrush  NECK:    No bruits, No JVP apprechiated  CV:      Sinus TR murmur, No rubs, No gallops  PULMONARY:   Couse BS,  Dry crackles right lung  ABDOMEN:     Soft, non-tender. BS normal. No R/R/G  EXT:    No deformities . No clubbing. Trace lower extremity edema, + venous stasis  PULSE:   Appears equal and palpable. PSYCHIATRIC:  No acute psycosis  MS:    Gross weakness- stable  NEUROLOGIC:   The clinical assessment is non-focal     DATA: IMAGING & TESTING:     LABORATORY TESTS:    ECHOCARDIOGRAM:    Normal left ventricular chamber size. Normal left ventricular systolic    function.  Visually estimated LVEF is 60 %.    No wall motion abnormalities.    Grade II diastolic dysfunction.    Normal right ventricle structure and function.    Normal left atrium.    Normal right atrium.    Mild tricuspid regurgitation.    Mild-to-moderate pulmonic regurgitation present.    Unable to estimate PA pressure.    Agitated saline contrast study shows late right to left shunting    suggestive of an intra-pulmonary shunt.    No comparison study available. CBC:   Lab Results   Component Value Date    WBC 11.6 12/22/2021    RBC 3.35 12/22/2021    HGB 10.1 12/22/2021    HCT 31.5 12/22/2021    MCV 94.0 12/22/2021    MCH 30.1 12/22/2021    MCHC 32.1 12/22/2021    RDW 13.5 12/22/2021     12/22/2021    MPV 9.6 12/22/2021     CMP:    Lab Results   Component Value Date     12/29/2021    K 3.5 12/29/2021     12/29/2021    CO2 28 12/29/2021    BUN 21 12/29/2021    CREATININE 0.9 12/29/2021    GFRAA >60 12/29/2021    LABGLOM >60 12/29/2021    GLUCOSE 167 12/29/2021    PROT 5.9 12/21/2021    LABALBU 3.1 12/21/2021    CALCIUM 8.3 12/29/2021    BILITOT 0.6 12/21/2021    ALKPHOS 68 12/21/2021    AST 17 12/21/2021    ALT 18 12/21/2021     Magnesium:    Lab Results   Component Value Date    MG 2.0 12/29/2021     Phosphorus:  No results found for: PHOS     PRO-BNP:   Lab Results   Component Value Date    PROBNP 2,778 (H) 12/29/2021    PROBNP 3,619 (H) 12/28/2021      ABGs:   Lab Results   Component Value Date    PH 7.394 12/16/2021    PO2 51.0 12/16/2021    PCO2 45.8 12/16/2021     Hemoglobin A1C: No components found for: HGBA1C    IMAGING:  Imaging tests were completed and reviewed and discussed radiology and care team involved and reveals       CHEST FILM 12/22  Increased reticular and patchy airspace opacities in the right lung.  These  are nonspecific and may be on the basis of multifocal pneumonia or (less  likely) asymmetric pulmonary edema.  Atypical/viral pneumonia not excluded.           CHEST FILM COMPARED 12/18:            XR ABDOMEN (KUB) (SINGLE AP VIEW)  Result Date: 12/16/2021Dilated small and large bowel loops suggest generalized ileus. Clinical correlation and follow-up to resolution recommended. MRI LUMBAR SPINE WO CONTRAST  Result Date: 11/24/2021  Multilevel degenerative disc disease with associated facet and ligamentous hypertrophy resulting in canal stenosis at L3-4 and L4-5. Bilateral foraminal narrowing as described above. CTA PULMONARY W CONTRAST  Result Date: 12/16/2021: FINDINGS: No thoracic aortic aneurysm or dissection. Mild-to-moderate scattered calcified plaque in the thoracic aorta. No evidence of pulmonary embolism. Mild cardiomegaly. Coronary atherosclerosis. No pericardial effusion. Shotty subcentimeter short axis mediastinal hilar nodes. No pleural effusion or pneumothorax. Low lung volumes. There are a few small peripheral densities in the lungs which may represent scarring or infection (for example series 4, image 73). Another curvilinear density is noted the medial left lower lobe (series 4 image 168). Mild dependent atelectasis, right greater than left. The central airways are patent. There is centrilobular and paraseptal emphysema. Degenerative changes of the spine. The visualized abdomen demonstrates changes of cholecystectomy and the splenic parenchymal calcification. No evidence of pulmonary embolism. No acute aortic injury or aneurysm. There are a few nonspecific densities in the peripheral lungs. Imaging features can be seen with COVID-19 pneumonia, though are nonspecific and can occur with a variety of infectious and noninfectious processes. Mild cardiomegaly. Atherosclerotic disease. Assessment:   1. Post op respiratory failure  2. Hypoxemic Respiratory Failure   3. CT evidence of disease of the lung  4. Plueral plaques ? Old infection or possible asbestosis  5. Mild-to-moderate pulmonic regurgitation present.    6.  Agitated saline contrast study shows late right to left shunting  suggestive of an intra-pulmonary shunt. 7. CT show sutttble changes of pulmonary fibrosis (periphearl)  8. New onset atrial fibrillation RVR.  [id number] (age, hypertension, PAD)   9. Atypical CP.  Recent preoperative stress test negative for ischemia  10. Post op respiratory failure, on 9 L high flow nasal cannula  11. Postoperative ileus  12. 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 Hypertension  15. Hx R CEA  16. GERD  17. Hx COVID 19 5/2021          Plan:    1. Most of the finding are chronic, some fibrosis for sure   2. Continue ICS,   3. Bronchodialators QID  4. Wean oxygen as able   5. CXR with HF changes - increase lasix with BNP > 5 K  6. Started systemic steroids again for lung if oxygen doesn't improve after TOBIAS and cardioversion, 20 mg IV Q12 taper  7. Will need home oxygen   8. Serologic testing for ISLD needed and ordered  = negative  9.  Ok to discharge on 5 liters, Nebulizer for lung disease     Dani Tolliver DO DO, MPH, Raquel Sy  Professor of Internal Medicine  Pulmonary, Critical Care and Sleep Medicine

## 2021-12-30 NOTE — DISCHARGE SUMMARY
Hospitalist Discharge Summary    Patient ID: Nicolasa Sandifer   Patient : [id number]  Patient's PCP: Dimple Hidalgo MD    Admit Date: 2021   Admitting Physician: Patricia Browne MD    Discharge Date:  2021  Discharge Physician: Christina Plummer MD   Discharge Condition: Stable  Discharge Disposition: Home with  [facility]    History of presenting illness:  Mr. Nicolasa Sandifer, a 82y.o. year old male  who  has a past medical history of Hypertension.      Patient was admitted under the neurosurgery service. He underwent L2-L5 laminectomy and fusion. Patient is postop, he states that his pain is minimal.  He denies chest pain, no shortness of breath, no hematuria dysuria hematemesis or hematochezia. Patient states that he has hypertension which is fairly controlled at home. He denies being diabetic. Denies sleep apnea. Hospital course in brief:  (Please refer to daily progress notes for a comprehensive review of the hospitalization by requesting medical records)  #Post op respiratory failure  #Hypoxemic Respiratory Failure   -Plueral plaques ? Old infection or possible asbestosis  -CT show sutttble changes of pulmonary fibrosis (periphearl)  -Continue ICS,   -Bronchodialators QID  -Wean oxygen as able   -Pulmonary switch IV Solu-Medrol to p.o. steroid on discharge  -Patient currently on 4 L will be discharged home with home oxygen       #Mild-to-moderate pulmonic regurgitation present.    # Agitated saline contrast study shows late right to left shunting    suggestive of an intra-pulmonary shunt.   -Normal biventricular  systolic function systolic function  -Grade II diastolic dysfunction.  -Tricuspid regurgitation, mild  -Resume diuresis per cardiology  -TOBIAS     Small PFO is noted on transesophageal echocardiogram today  Per cardiology medical management and follow-up with cardiology in the outpatient      #Possible aspiration pneumonia  -Treated with Unasyn and Zosyn     #New onset atrial fibrillation RVR.    -[id number] (age, hypertension, PAD)   -spontaneous conversion to normal sinus rhythm  -On Cardizem flecainide and Eliquis     #Atypical CP.  Recent preoperative stress test negative for ischemia        #Postoperative ileus  -Resolved     #Spinal stenosis status post Laminectomy 12/14/21     #Hypertension  -On Coreg and Cardizem     #Hx R CEA     #GERD     #Hx COVID 19 5/2021     Physical examination:  CONST: Well developed, well nourished who appears stated age. Awake, alert and cooperative. No apparent distress  HEENT:   Head- Normocephalic, atraumatic   Eyes- Conjunctivae pink, anicteric  Throat- Oral mucosa pink and moist  Neck-  No stridor, trachea midline, no jugular venous distention. No carotid bruit  CHEST: Chest symmetrical and non-tender to palpation. No accessory muscle use or intercostal retractions  RESPIRATORY: Diminished air entry bilaterally without wheezes or rhonchi  CARDIOVASCULAR:     Heart Inspection- shows no noted pulsations  Heart Ausculation- Regular rate and rhythm, no murmur. No s3, s4 or rub   PV: No lower extremity edema. No varicosities. Pedal pulses palpable, no clubbing or cyanosis   ABDOMEN: Soft, non-tender to light palpation. Bowel sounds present. MS: Good muscle strength and tone. No atrophy or abnormal movements. : Deferred  SKIN: Warm and dry no statis dermatitis or ulcers   NEURO / PSYCH: Oriented to person, place and time. Speech clear and appropriate. Follows all commands.  Pleasant affect             Consults:   IP CONSULT TO HOSPITALIST  IP CONSULT TO GENERAL SURGERY  IP CONSULT TO PULMONOLOGY  IP CONSULT TO CARDIOLOGY  IP CONSULT TO CARDIOLOGY  IP CONSULT TO GENERAL SURGERY  IP CONSULT TO CARDIOLOGY  IP CONSULT TO HOME CARE NEEDS    Discharge Diagnoses:  #Postoperative acute hypoxic respiratory failure  #Aspiration pneumonia  #New onset A. fib with rapid ventricular response  #Postoperative ileus  #Spinal stenosis status post 82 year(s)   Interpreting      [address]                               Physician         Physician Cardiology                                                 Marline Husain MD                                Any Other  Procedure Type of Study   TTE procedure:Echo Complete W/Doppler & Color Flow, Echo W/Bubble Study. Procedure Date Date: 12/22/2021 Start: 09:40 AM Study Location: [facility] Technical Quality: Adequate visualization Indications:Atrial fibrillation. Patient Status: Routine Height: 73 inches Weight: 200 pounds BSA: 2.15 m^2 BMI: 26.39 kg/m^2 Rhythm: Within normal limits HR: 89 bpm BP: 155/91 mmHg  Findings   Left Ventricle  Normal left ventricular chamber size. Normal left ventricular systolic  function. Visually estimated LVEF is 60 %. No wall motion abnormalities. Grade II diastolic dysfunction. Right Ventricle  Normal right ventricle structure and function. Left Atrium  Normal left atrium. Right Atrium  Normal right atrium. Mitral Valve  Physiologic and/or trace mitral regurgitation is present. No evidence of hemodynamically significant mitral stenosis. Tricuspid Valve  Mild tricuspid regurgitation. Aortic Valve  Trileaflet aortic valve. Normal leaflet mobility. No aortic stenosis. Trace aortic regurgitation. Pulmonic Valve  Mild-to-moderate pulmonic regurgitation present. Pericardial Effusion  No evidence for hemodynamically significant pericardial effusion. Pleural Effusion  No evidence of pleural effusion. Aorta  Normal aortic root and ascending aorta. Miscellaneous  The inferior vena cava diameter is normal with normal respiratory  variation. Conclusions   Summary  Normal left ventricular chamber size. Normal left ventricular systolic  function. Visually estimated LVEF is 60 %. No wall motion abnormalities. Grade II diastolic dysfunction. Normal right ventricle structure and function. Normal left atrium. Normal right atrium. Mild tricuspid regurgitation. Mild-to-moderate pulmonic regurgitation present. Unable to estimate PA pressure. Agitated saline contrast study shows late right to left shunting  suggestive of an intra-pulmonary shunt. No comparison study available.    Signature   ----------------------------------------------------------------  Electronically signed by Jacqui Cervantes MD(Interpreting  physician) on 12/22/2021 10:57 AM  ----------------------------------------------------------------  M-Mode/2D Measurements & Calculations   LV Diastolic     LV Systolic Dimension: 2.7 cm   AV Cusp Separation: 2.1  Dimension: 3.7   LV Volume Diastolic: 79.8 ml    cmAO Root Dimension: 3.7  cm               LV Volume Systolic: 39.9 ml     cm  LV FS:27 %       LV EDV/LV EDV Index: 57.5 VT/30  LV PW Diastolic: UR/Q^4JV ESV/LV ESV Index: 25.9  0.9 cm           ml/12ml/ m^2  LV PW Systolic:  EF Calculated: 55 %             RV Diastolic Dimension:  1.4 cm           LV Mass Index: 92 l/min*m^2     3.7 cm  Septum           LV Length: 8.9 cm  Diastolic: 2 cm                                  Ascending Aorta: 3.8 cm  Septum Systolic: LVOT: 2.4 cm                    LA volume/Index: 68.6 ml  1.6 cm                                           /31.92ml/m^2  CO: 10.3 l/min                                   RA Area: 14.2 cm^2  CI: 4.79 l/m*m^2  LV Mass: 197.65  g  Doppler Measurements & Calculations   MV Peak E-Wave: 1.16 AV Peak Velocity: 1.37 LVOT Peak Velocity: 1.22 m/s  m/s                  m/s                    LVOT Mean Velocity: 0.86 m/s  MV Peak A-Wave: 0.96 AV Peak Gradient: 7.55 LVOT Peak Gradient: 6 mmHgLVOT  m/s                  mmHg                   Mean Gradient: 3.3 mmHg  MV E/A Ratio: 1.21   AV Mean Velocity: 0.98  MV Peak Gradient:    m/s  7.9 mmHg             AV Mean Gradient: 4.3  MV Mean Gradient:    mmHg                   TR Velocity:4.13 m/s  3.7 mmHg             AV VTI: 28.9 cm        TR Gradient:68.26 mmHg  MV Mean Velocity:    AV Area  0.92 m/s (Continuity):4.01 cm^2  MV Deceleration  Time: 217 msec       LVOT VTI: 25.6 cm      CA ED Velocity: 1.3 m/s  MV P1/2t: 86 msec  MVA by PHT:2.56 cm^2  MV Area  (continuity): 3.5  cm^2  MV E' Septal  Velocity: 0.05 m/s  MV E' Lateral  Velocity: 6 m/s  [email or web address] [id number]    XR CHEST (2 VW)    Result Date: 12/22/2021  EXAMINATION: TWO XRAY VIEWS OF THE CHEST 12/22/2021 3:17 pm COMPARISON: December 22, 2021 at 15:04, December 18, 2021 HISTORY: ORDERING SYSTEM PROVIDED HISTORY: reevaluate right infiltrate TECHNOLOGIST PROVIDED HISTORY: Reason for exam:->reevaluate right infiltrate What reading provider will be dictating this exam?->CRC FINDINGS: Heart size is unable to be accurately assessed on this single portable view of the chest, but appears to be stable. Previously described right lung airspace opacities with a reticular and patchy appearance have increased since December 18, 2021. No evidence of a sizable pleural effusion. No pneumothorax. No acute osseous abnormality. Increased reticular and patchy airspace opacities in the right lung. These are nonspecific and may be on the basis of multifocal pneumonia or (less likely) asymmetric pulmonary edema. Atypical/viral pneumonia not excluded. XR CHEST (2 VW)    Result Date: 12/9/2021  EXAMINATION: TWO XRAY VIEWS OF THE CHEST 12/9/2021 11:09 am COMPARISON: None. HISTORY: ORDERING SYSTEM PROVIDED HISTORY: Pre-op evaluation TECHNOLOGIST PROVIDED HISTORY: Reason for exam:->pre op What reading provider will be dictating this exam?->CRC FINDINGS: Slight peripheral coarsening of interstitial markings may indicate mild fibrosis. Heart and pulmonary vascularity are normal.  Neither costophrenic angle is blunted. Subtle findings which may indicate mild interstitial fibrosis.      XR ABDOMEN (KUB) (SINGLE AP VIEW)    Result Date: 12/27/2021  EXAMINATION: ONE SUPINE XRAY VIEW(S) OF THE ABDOMEN 12/27/2021 10:37 am COMPARISON: Radiographs from December 25, 2021 HISTORY: ORDERING SYSTEM PROVIDED HISTORY: ileus TECHNOLOGIST PROVIDED HISTORY: Reason for exam:->ileus What reading provider will be dictating this exam?->CRC FINDINGS: Redemonstration of dilated air-filled loops of bowel measuring up to 7.8 cm in the left upper quadrant. Findings favor dilated small and large bowel loops. This is moderately improved from prior study. Air-fluid levels in the right abdomen. Spondylosis of the spine with fusion hardware. Improved ileus pattern with persistent dilated bowel loops. XR ABDOMEN (KUB) (SINGLE AP VIEW)    Result Date: 12/25/2021  EXAMINATION: ONE SUPINE XRAY VIEW(S) OF THE ABDOMEN 12/25/2021 11:05 am COMPARISON: Is comparison is dated December 22, 2021 HISTORY: ORDERING SYSTEM PROVIDED HISTORY: no bowel movement, distention TECHNOLOGIST PROVIDED HISTORY: Reason for exam:->no bowel movement, distention What reading provider will be dictating this exam?->CRC FINDINGS: Postoperative changes are present with surgical clips present There is been in single from L2 through L5 There is severe colonic distention most prominent at the comparison descending colon in the 15 cm range there is air in some mildly distended small bowel loops. Marked colonic distension unchanged from prior study with air in mildly distended small bowel suspect postoperative ileus     XR ABDOMEN (KUB) (SINGLE AP VIEW)    Result Date: 12/16/2021  EXAMINATION: ONE SUPINE XRAY VIEW(S) OF THE ABDOMEN 12/16/2021 2:41 pm COMPARISON: None. HISTORY: ORDERING SYSTEM PROVIDED HISTORY: post-op ileus TECHNOLOGIST PROVIDED HISTORY: Reason for exam:->post-op ileus What reading provider will be dictating this exam?->CRC FINDINGS: Air scattered small and large bowel loops. Dilated small bowel measures 3.6 cm. Dilated colon measures 8.7 cm at the level the transverse.   Skin in seizure in with skin staples vertically noted over the mid lower abdomen. Postoperative change spinal fusion and laminectomy noted lumbar spine. Surgical clips right upper quadrant. Dilated small and large bowel loops suggest generalized ileus. Clinical correlation and follow-up to resolution recommended. XR ACUTE ABD SERIES CHEST 1 VW    Result Date: 12/22/2021  EXAMINATION: TWO XRAY VIEWS OF THE ABDOMEN AND SINGLE  XRAY VIEW OF THE CHEST 12/22/2021 3:16 pm COMPARISON: Chest radiograph December 22, 2021 HISTORY: ORDERING SYSTEM PROVIDED HISTORY: repeat/assess respiratory status TECHNOLOGIST PROVIDED HISTORY: Reason for exam:->repeat/assess respiratory status What reading provider will be dictating this exam?->CRC FINDINGS: The evaluation is limited due to low lung volumes. Trachea is midline. Cardiomediastinal silhouette is stable in size. Persistent airspace opacity identified in right lung lower lung. Multiple air-filled loops of colon identified in the abdomen. No abnormal abdominal calcifications. Bowel gas pattern is nonspecific nonobstructive. No evidence of bowel obstruction. The evaluation is limited due to low lung volumes. Opacities in right lung may represent pneumonia versus atelectasis in the proper clinical setting. XR CHEST PORTABLE    Result Date: 12/27/2021  EXAMINATION: ONE XRAY VIEW OF THE CHEST 12/27/2021 10:38 am COMPARISON: December 22nd-25 reviewed the CT chest December 16 HISTORY: ORDERING SYSTEM PROVIDED HISTORY: chf TECHNOLOGIST PROVIDED HISTORY: Reason for exam:->chf What reading provider will be dictating this exam?->CRC FINDINGS: Persistent findings in the mid lower aspect of the right lung which is a combination of mild the pleural reaction/effusion with areas of multi segmental atelectasis and additional increased parenchymal opacities. There is an overall loss of volume of the right lower lung base.   The right diaphragma is elevated the. Heart has upper normal size. Left lungs well expanded. No conspicuous infiltrates or consolidations in the left lung or pleural effusions. No conspicuous pneumothorax on the right on the left. No significant changes since the previous study of December 25. XR CHEST PORTABLE    Result Date: 12/25/2021  EXAMINATION: ONE XRAY VIEW OF THE CHEST 12/25/2021 2:58 pm COMPARISON: Comparison is dated December 24, 2021 HISTORY: ORDERING SYSTEM PROVIDED HISTORY: hypoxia TECHNOLOGIST PROVIDED HISTORY: Reason for exam:->hypoxia FINDINGS: There is marked increase of right-sided confluent pulmonary a hazy opacity since the prior exam with small pleural effusion present. Cardiac and mediastinal contours are unchanged. Pulmonary vasculature is unremarkable. Increasing right pleural effusion and infiltrate     XR CHEST PORTABLE    Result Date: 12/24/2021  EXAMINATION: ONE XRAY VIEW OF THE CHEST 12/24/2021 12:31 pm COMPARISON: December 22, 2021 HISTORY: ORDERING SYSTEM PROVIDED HISTORY: cough TECHNOLOGIST PROVIDED HISTORY: Reason for exam:->cough What reading provider will be dictating this exam?->CRC FINDINGS: Redemonstration of interstitial and hazy opacities bilaterally notable in mid right lung field and right lung base. Opacities have increased in right lung base now silhouetting right hemidiaphragm. The heart appears prominent in size. No pneumothorax. Redemonstration of bilateral pneumonia or interstitial pulmonary edema. Opacities have increased in right lung base. XR CHEST PORTABLE    Result Date: 12/18/2021  EXAMINATION: ONE XRAY VIEW OF THE CHEST 12/18/2021 8:38 pm COMPARISON: 12/16/2021 HISTORY: ORDERING SYSTEM PROVIDED HISTORY: Chest Pain TECHNOLOGIST PROVIDED HISTORY: Reason for exam:->Chest Pain What reading provider will be dictating this exam?->CRC FINDINGS: EKG leads overlie the chest.  Stable elevation of the right diaphragm with patchy opacification of the right lung base, appearing slightly worsened.  Minimal left basal opacities are also increased. Moderate gaseous distention of bowel in the visualized upper abdomen. No pneumothorax. No other change. Slight worsening of right greater than left basal opacities which may represent atelectasis or infiltrates from pneumonia. Continued follow-up recommended. XR CHEST PORTABLE    Result Date: 12/16/2021  EXAMINATION: ONE XRAY VIEW OF THE CHEST 12/16/2021 4:19 pm COMPARISON: 12/09/2021 HISTORY: ORDERING SYSTEM PROVIDED HISTORY: hypoxia TECHNOLOGIST PROVIDED HISTORY: Reason for exam:->hypoxia What reading provider will be dictating this exam?->CRC FINDINGS: Right lower lobe atelectasis and/or infiltrate. .  There is no effusion or pneumothorax. The cardiomediastinal silhouette is without acute process. The osseous structures are without acute process. Right lower lobe atelectasis and/or infiltrate. CTA PULMONARY W CONTRAST    Result Date: 12/16/2021  EXAMINATION: CTA OF THE CHEST 12/16/2021 3:32 pm TECHNIQUE: CTA of the chest was performed after the administration of intravenous contrast.  Multiplanar reformatted images are provided for review. MIP images are provided for review. Dose modulation, iterative reconstruction, and/or weight based adjustment of the mA/kV was utilized to reduce the radiation dose to as low as reasonably achievable. COMPARISON: None. HISTORY: ORDERING SYSTEM PROVIDED HISTORY: CHEST PAIN AND SOB R/O PE TECHNOLOGIST PROVIDED HISTORY: Reason for exam:->CHEST PAIN AND SOB R/O PE What reading provider will be dictating this exam?->CRC FINDINGS: No thoracic aortic aneurysm or dissection. Mild-to-moderate scattered calcified plaque in the thoracic aorta. No evidence of pulmonary embolism. Mild cardiomegaly. Coronary atherosclerosis. No pericardial effusion. Shotty subcentimeter short axis mediastinal hilar nodes. No pleural effusion or pneumothorax. Low lung volumes.   There are a few small peripheral densities in the lungs which may represent scarring or infection (for example series 4, image 73). Another curvilinear density is noted the medial left lower lobe (series 4 image 168). Mild dependent atelectasis, right greater than left. The central airways are patent. There is centrilobular and paraseptal emphysema. Degenerative changes of the spine. The visualized abdomen demonstrates changes of cholecystectomy and the splenic parenchymal calcification. No evidence of pulmonary embolism. No acute aortic injury or aneurysm. There are a few nonspecific densities in the peripheral lungs. Imaging features can be seen with COVID-19 pneumonia, though are nonspecific and can occur with a variety of infectious and noninfectious processes. Mild cardiomegaly. Atherosclerotic disease. FLUORO FOR SURGICAL PROCEDURES    Result Date: 12/14/2021  EXAMINATION: SPOT FLUOROSCOPIC IMAGES 12/14/2021 10:42 am TECHNIQUE: Fluoroscopy was provided by the radiology department for procedure. Radiologist was not present during examination. FLUOROSCOPY DOSE AND TYPE OR TIME AND EXPOSURES: Fluoroscopy time equals 10.8 seconds. Total dose equals 15.92 mGy COMPARISON: None HISTORY: ORDERING SYSTEM PROVIDED HISTORY: Back pain, unspecified back location, unspecified back pain laterality, unspecified chronicity TECHNOLOGIST PROVIDED HISTORY: Reason for exam:->Back pain, unspecified back location, unspecified back pain laterality, unspecified chronicity What reading provider will be dictating this exam?->CRC Intraprocedural imaging. FINDINGS: 2 spot images of the lumbar spine were obtained. Intraprocedural fluoroscopic spot images as above. See separate procedure report for more information.        Discharge Medications:      Medication List      START taking these medications    apixaban 5 MG Tabs tablet  Commonly known as: ELIQUIS  Take 1 tablet by mouth 2 times daily     carvedilol 25 MG tablet  Commonly known as: COREG  Take 1 tablet by mouth 2 times daily nebulizer solution  · predniSONE 10 MG tablet     You can get these medications from any pharmacy    Bring a paper prescription for each of these medications  · cyclobenzaprine 10 MG tablet  · Full Kit Nebulizer Set Misc  · oxyCODONE 5 MG immediate release tablet         Time Spent on discharge is more than 35 minutes in the examination, evaluation, counseling and review of medications and discharge plan.    +++++++++++++++++++++++++++++++++++++++++++++++++  Steffany Munoz MD  [address]  +++++++++++++++++++++++++++++++++++++++++++++++++  NOTE: This report was transcribed using voice recognition software. Every effort was made to ensure accuracy; however, inadvertent computerized transcription errors may be present.

## 2021-12-30 NOTE — PROGRESS NOTES
[facility]  Department of Internal Medicine  Division of Pulmonary, Critical Care and Sleep Medicine  Consult Note    Juliet Eubanks DO, MPH, Myron Santana MD, [address], [facility]      Patient: Huber Khan  MRN: [id number]  : 1935    Encounter Time: 9:35 AM     Date of Admission: 2021  5:17 AM    Primary Care Physician: Milly Weinberg MD    Reason for Consultation: Post Op respiratory failure    SUBJECTIVE:  Reduced to 4 liters  Abx day  - Unasyn stopped - now on Zosyn d7/  ECHO shows PH with SHUNT (intrapulmonary) - likely from his fibrosis and could be from liver disease ? CT with chronic changes - known fibrosis per family  Small PFO is noted on transesophageal echocardiogram today      OBJECTIVE:     PHYSICAL EXAM:   VITALS:   Vitals:    21 0015 21 0300 21 0830 21 0845   BP: 132/61  115/60    Pulse: 62  73    Resp: 19  18    Temp: 97 °F (36.1 °C)  96.7 °F (35.9 °C)    TempSrc: Temporal  Temporal    SpO2: 96% 94% 94% 91%   Weight:       Height:            Intake/Output Summary (Last 24 hours) at 2021 0935  Last data filed at 2021 0910  Gross per 24 hour   Intake 510 ml   Output 1100 ml   Net -590 ml        CONSTITUTIONAL:   A&O x 3  SKIN:     Kelechi, Fair skin with shin discoloration  HEENT:     EOMI, MMM, No thrush  NECK:    No bruits, No JVP apprechiated  CV:      Sinus TR murmur, No rubs, No gallops  PULMONARY:   Couse BS,  Dry crackles right lung  ABDOMEN:     Soft, non-tender. BS normal. No R/R/G  EXT:    No deformities . No clubbing. Trace lower extremity edema, + venous stasis  PULSE:   Appears equal and palpable. PSYCHIATRIC:  No acute psycosis  MS:    Gross weakness- stable  NEUROLOGIC:   The clinical assessment is non-focal     DATA: IMAGING & TESTING:     LABORATORY TESTS:    ECHOCARDIOGRAM:    Normal left ventricular chamber size.  Normal left ventricular systolic  function. Visually estimated LVEF is 60 %.    No wall motion abnormalities.    Grade II diastolic dysfunction.    Normal right ventricle structure and function.    Normal left atrium.    Normal right atrium.    Mild tricuspid regurgitation.    Mild-to-moderate pulmonic regurgitation present.    Unable to estimate PA pressure.    Agitated saline contrast study shows late right to left shunting    suggestive of an intra-pulmonary shunt.    No comparison study available.        CBC:   Lab Results   Component Value Date    WBC 11.6 12/22/2021    RBC 3.35 12/22/2021    HGB 10.1 12/22/2021    HCT 31.5 12/22/2021    MCV 94.0 12/22/2021    MCH 30.1 12/22/2021    MCHC 32.1 12/22/2021    RDW 13.5 12/22/2021     12/22/2021    MPV 9.6 12/22/2021     CMP:    Lab Results   Component Value Date     12/30/2021    K 3.7 12/30/2021    CL 98 12/30/2021    CO2 25 12/30/2021    BUN 24 12/30/2021    CREATININE 1.0 12/30/2021    GFRAA >60 12/30/2021    LABGLOM >60 12/30/2021    GLUCOSE 165 12/30/2021    PROT 5.9 12/21/2021    LABALBU 3.1 12/21/2021    CALCIUM 8.3 12/30/2021    BILITOT 0.6 12/21/2021    ALKPHOS 68 12/21/2021    AST 17 12/21/2021    ALT 18 12/21/2021     Magnesium:    Lab Results   Component Value Date    MG 2.0 12/29/2021     Phosphorus:  No results found for: PHOS     PRO-BNP:   Lab Results   Component Value Date    PROBNP 2,143 (H) 12/30/2021    PROBNP 2,778 (H) 12/29/2021      ABGs:   Lab Results   Component Value Date    PH 7.394 12/16/2021    PO2 51.0 12/16/2021    PCO2 45.8 12/16/2021     Hemoglobin A1C: No components found for: HGBA1C    IMAGING:  Imaging tests were completed and reviewed and discussed radiology and care team involved and reveals       CHEST FILM 12/22  Increased reticular and patchy airspace opacities in the right lung.  These  are nonspecific and may be on the basis of multifocal pneumonia or (less  likely) asymmetric pulmonary edema.  Atypical/viral pneumonia not excluded. CHEST FILM COMPARED 12/18:            XR ABDOMEN (KUB) (SINGLE AP VIEW)  Result Date: 12/16/2021Dilated small and large bowel loops suggest generalized ileus. Clinical correlation and follow-up to resolution recommended. MRI LUMBAR SPINE WO CONTRAST  Result Date: 11/24/2021  Multilevel degenerative disc disease with associated facet and ligamentous hypertrophy resulting in canal stenosis at L3-4 and L4-5. Bilateral foraminal narrowing as described above. CTA PULMONARY W CONTRAST  Result Date: 12/16/2021: FINDINGS: No thoracic aortic aneurysm or dissection. Mild-to-moderate scattered calcified plaque in the thoracic aorta. No evidence of pulmonary embolism. Mild cardiomegaly. Coronary atherosclerosis. No pericardial effusion. Shotty subcentimeter short axis mediastinal hilar nodes. No pleural effusion or pneumothorax. Low lung volumes. There are a few small peripheral densities in the lungs which may represent scarring or infection (for example series 4, image 73). Another curvilinear density is noted the medial left lower lobe (series 4 image 168). Mild dependent atelectasis, right greater than left. The central airways are patent. There is centrilobular and paraseptal emphysema. Degenerative changes of the spine. The visualized abdomen demonstrates changes of cholecystectomy and the splenic parenchymal calcification. No evidence of pulmonary embolism. No acute aortic injury or aneurysm. There are a few nonspecific densities in the peripheral lungs. Imaging features can be seen with COVID-19 pneumonia, though are nonspecific and can occur with a variety of infectious and noninfectious processes. Mild cardiomegaly. Atherosclerotic disease. Assessment:   1. Post op respiratory failure  2. Hypoxemic Respiratory Failure   3. CT evidence of disease of the lung  4. Plueral plaques ? Old infection or possible asbestosis  5.  Mild-to-moderate pulmonic regurgitation present. 6.  Agitated saline contrast study shows late right to left shunting    suggestive of an intra-pulmonary shunt. 7. PFO on TOBIAS  8. CT show sutttble changes of pulmonary fibrosis (periphearl)  9. New onset atrial fibrillation RVR.  [id number] 4 (age, hypertension, PAD)   10. Atypical CP.  Recent preoperative stress test negative for ischemia  11. Post op respiratory failure, on 9 L high flow nasal cannula  12. Postoperative ileus  13. 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 Hypertension  16. Hx R CEA  17. GERD  18. Hx COVID 19 5/2021          Plan:    1. Most of the finding are chronic, some fibrosis for sure   2. Continue ICS,   3. Bronchodialators QID  4. Wean oxygen as able   5. CXR with HF changes - increase lasix with BNP > 5 K  6. Started systemic steroids again for lung if oxygen doesn't improve after TOBIAS and cardioversion,   7. Will need home oxygen   8. Serologic testing for ISLD needed and ordered  = negative  9.  Ok to discharge on 5 liters, Nebulizer for lung disease       Mane Bennett DO DO, MPH, Genesis Sifuentes  Professor of Internal Medicine  Pulmonary, Critical Care and Sleep Medicine

## 2021-12-30 NOTE — PROGRESS NOTES
Pulse ox on Room Air Sitting 94%   Pulse ox on room Air Ambulating 85%   Pulse on 4 Liters Sitting recovery 95%   Pulse on 4 Liters Ambulating Recovery 91%

## 2021-12-31 LAB
EKG ATRIAL RATE: 70 BPM
EKG P AXIS: 52 DEGREES
EKG P-R INTERVAL: 212 MS
EKG Q-T INTERVAL: 390 MS
EKG QRS DURATION: 98 MS
EKG QTC CALCULATION (BAZETT): 421 MS
EKG R AXIS: 56 DEGREES
EKG T AXIS: 105 DEGREES
EKG VENTRICULAR RATE: 70 BPM

## 2022-01-11 ENCOUNTER — HOSPITAL ENCOUNTER (OUTPATIENT)
Dept: GENERAL RADIOLOGY | Age: 87
Discharge: HOME | End: 2022-01-13
Payer: MEDICARE

## 2022-01-11 ENCOUNTER — TELEPHONE (OUTPATIENT)
Dept: PULMONOLOGY | Age: 87
End: 2022-01-11

## 2022-01-11 ENCOUNTER — HOSPITAL ENCOUNTER (OUTPATIENT)
Age: 87
Discharge: HOME | End: 2022-01-13
Payer: MEDICARE

## 2022-01-11 ENCOUNTER — OFFICE VISIT (OUTPATIENT)
Dept: NEUROSURGERY | Age: 87
End: 2022-01-11

## 2022-01-11 VITALS
DIASTOLIC BLOOD PRESSURE: 88 MMHG | WEIGHT: 200 LBS | TEMPERATURE: 98 F | RESPIRATION RATE: 18 BRPM | HEIGHT: 73 IN | BODY MASS INDEX: 26.51 KG/M2 | SYSTOLIC BLOOD PRESSURE: 120 MMHG | HEART RATE: 68 BPM | OXYGEN SATURATION: 99 %

## 2022-01-11 DIAGNOSIS — M51.26 LUMBAR DISC HERNIATION: Primary | ICD-10-CM

## 2022-01-11 DIAGNOSIS — R06.02 SOB (SHORTNESS OF BREATH): Primary | ICD-10-CM

## 2022-01-11 PROCEDURE — 99024 POSTOP FOLLOW-UP VISIT: CPT | Performed by: PHYSICIAN ASSISTANT

## 2022-01-11 PROCEDURE — 72120 X-RAY BEND ONLY L-S SPINE: CPT

## 2022-01-11 RX ORDER — ASPIRIN 81 MG/1
TABLET, CHEWABLE ORAL
COMMUNITY

## 2022-01-11 RX ORDER — AMLODIPINE BESYLATE 10 MG/1
TABLET ORAL
COMMUNITY
Start: 2017-02-07

## 2022-01-11 RX ORDER — MELOXICAM 15 MG/1
TABLET ORAL
COMMUNITY
End: 2022-02-11

## 2022-01-11 NOTE — TELEPHONE ENCOUNTER
Mailed a letter to patient informing him that his PFT is scheduled for 1-25-22 at 9:30 am at [address] [facility],[address].  He must arrive by 9:00 am. He is to have no caffeine for 24 hours prior to test and no resp meds for at least 4 hours prior to test.    I also sent him a script for the COVID test to be done four days prior to his PFT

## 2022-01-11 NOTE — PROGRESS NOTES
Post Operative Follow-up    Patient is status post: lumbar fusion one month ago. No leg pain. alexis op site pain ok. Physical Exam  Alert and Oriented X 3  PERRLA, EOMI  BECKHAM 4/5  Wound: C/D/I    A/P: patient is s/p L2-5 fusion one month ago. X-rays stable. Continue with brace and restrictions.

## 2022-01-14 DIAGNOSIS — Z98.1 S/P LUMBAR FUSION: Primary | ICD-10-CM

## 2022-01-21 ENCOUNTER — HOSPITAL ENCOUNTER (OUTPATIENT)
Dept: GENERAL RADIOLOGY | Age: 87
Discharge: HOME | End: 2022-01-23
Payer: MEDICARE

## 2022-01-21 ENCOUNTER — HOSPITAL ENCOUNTER (OUTPATIENT)
Age: 87
Discharge: HOME | End: 2022-01-23
Payer: MEDICARE

## 2022-01-21 DIAGNOSIS — R06.02 SOB (SHORTNESS OF BREATH): ICD-10-CM

## 2022-01-21 PROCEDURE — 71045 X-RAY EXAM CHEST 1 VIEW: CPT

## 2022-01-22 DIAGNOSIS — R06.02 SOB (SHORTNESS OF BREATH): ICD-10-CM

## 2022-01-23 LAB — SARS-COV-2, PCR: NOT DETECTED

## 2022-01-25 ENCOUNTER — HOSPITAL ENCOUNTER (OUTPATIENT)
Dept: PULMONOLOGY | Age: 87
Discharge: HOME | End: 2022-01-25
Payer: MEDICARE

## 2022-01-25 DIAGNOSIS — Z53.8 PROCEDURE/TEST/EXAM NOT INDICATED: Primary | ICD-10-CM

## 2022-01-25 DIAGNOSIS — R06.02 SOB (SHORTNESS OF BREATH): ICD-10-CM

## 2022-01-25 PROCEDURE — 94729 DIFFUSING CAPACITY: CPT

## 2022-01-25 PROCEDURE — 94060 EVALUATION OF WHEEZING: CPT

## 2022-01-25 PROCEDURE — 94726 PLETHYSMOGRAPHY LUNG VOLUMES: CPT

## 2022-01-26 PROCEDURE — 94060 EVALUATION OF WHEEZING: CPT | Performed by: INTERNAL MEDICINE

## 2022-01-26 PROCEDURE — 94726 PLETHYSMOGRAPHY LUNG VOLUMES: CPT | Performed by: INTERNAL MEDICINE

## 2022-01-26 PROCEDURE — 94729 DIFFUSING CAPACITY: CPT | Performed by: INTERNAL MEDICINE

## 2022-01-27 NOTE — PROCEDURES
[address] [place] [address]                               PULMONARY FUNCTION    PATIENT NAME: Ronald Son                      :        1935  MED REC NO:   [id number]                            ROOM:  ACCOUNT NO:   [de-identified]                           ADMIT DATE: 2022  PROVIDER:     Tal Khan DO    DATE OF PROCEDURE:  2022    INDICATIONS:  Pulmonary function tests performed in an 70-year-old male,  73 inches, 200 pounds. FINDINGS:  Pulmonary function tests revealed forced vital capacity  postbronchodilator of 2.40 liters, 57% of predicted with an FEV1 of 1.96  liters, 55% of predicted with an FEV1/FVC ratio 82%. Midflow rates are  79% of predicted with a maximum voluntary ventilation of 65 liters per  minute, 54% of predicted. Static lung volumes with slow vital capacity of 2.33 liters, 48% of  predicted. Inspiratory capacity 1.91 liters, 55% of predicted. Expiratory reserve volume 0.42 liters, 32% of predicted. Thoracic gas  volume of 3.48 liters, 82% of predicted. Residual volume of 3.06  liters, 104% of predicted. Total lung capacity of 5.38 liters, 70% of  predicted. The RV/TLC ratio 141% of predicted. Diffusion capacity is  7.86 mL/min per mmHg which is 21% of predicted with no correction when  adjusted for alveolar ventilation at 38% of predicted. IMPRESSION:  There is mild restrictive lung pathology based on a total  lung capacity of 70% of predicted. There is no definitive airflow  obstruction, air trapping, but slight evidence of hyperinflation is  noted based on the elevated RV/TLC ratio. The diffusion capacity is  severely reduced, indicates a loss in gas transfer or destruction at the  alveolar capillary bed. Clinical correlation needed.         Levon Holter, DO    D: 2022 18:46:40       T: 2022 18:49:03     [id number]  Job#: [id number]     Doc#: [id number]

## 2022-02-11 ENCOUNTER — OFFICE VISIT (OUTPATIENT)
Dept: PULMONOLOGY | Age: 87
End: 2022-02-11
Payer: MEDICARE

## 2022-02-11 DIAGNOSIS — J84.10 PULMONARY FIBROSIS (HCC): ICD-10-CM

## 2022-02-11 DIAGNOSIS — J98.4 RESTRICTIVE LUNG DISEASE: Primary | ICD-10-CM

## 2022-02-11 PROCEDURE — 1036F TOBACCO NON-USER: CPT | Performed by: INTERNAL MEDICINE

## 2022-02-11 PROCEDURE — G8417 CALC BMI ABV UP PARAM F/U: HCPCS | Performed by: INTERNAL MEDICINE

## 2022-02-11 PROCEDURE — 4040F PNEUMOC VAC/ADMIN/RCVD: CPT | Performed by: INTERNAL MEDICINE

## 2022-02-11 PROCEDURE — 1123F ACP DISCUSS/DSCN MKR DOCD: CPT | Performed by: INTERNAL MEDICINE

## 2022-02-11 PROCEDURE — G8427 DOCREV CUR MEDS BY ELIG CLIN: HCPCS | Performed by: INTERNAL MEDICINE

## 2022-02-11 PROCEDURE — 99214 OFFICE O/P EST MOD 30 MIN: CPT | Performed by: INTERNAL MEDICINE

## 2022-02-11 PROCEDURE — G8484 FLU IMMUNIZE NO ADMIN: HCPCS | Performed by: INTERNAL MEDICINE

## 2022-02-11 PROCEDURE — 99213 OFFICE O/P EST LOW 20 MIN: CPT | Performed by: INTERNAL MEDICINE

## 2022-02-11 RX ORDER — METOPROLOL SUCCINATE 25 MG/1
TABLET, EXTENDED RELEASE ORAL
COMMUNITY
Start: 2022-01-29

## 2022-02-11 RX ORDER — APIXABAN 5 MG/1
TABLET, FILM COATED ORAL
COMMUNITY
Start: 2022-01-29

## 2022-02-11 NOTE — PROGRESS NOTES
-  [place]  Department of Internal Medicine  Division of Pulmonary, Critical Care and Sleep Medicine  Consult Note    Maryann Lake DO, MPH, Sawyer Lagos, Annetta Christianson MD, [address], [facility]    Patient: Ramon Bright  MRN: [id number]  : 1935    Encounter Time: 2:45 PM     Primary Care Physician: Cuba Lopez MD    Reason for Consultation: Post Op respiratory failure    -  [facility]  Department of Internal Medicine  Division of Pulmonary, Critical Care and Sleep Medicine  Consult Note      Today's Date: 2022    Patient Name: Ramon Bright    Date of admission: No admission date for patient encounter. Patient's age: 82 y.o., 1935    Admission Dx: No admission diagnoses are documented for this encounter. REASON FOR CONSULT:  Hosptial follow up for fibrosis     HISTORY OF PRESENT ILLNESS:     Ramon Bright is a 82 Y. O. male who recently underwent L2-L5 laminectomy 2021 with Dr. Tom Kearns. Patient was admitted under the neurosurgery service. He underwent L2-L5 laminectomy and fusion. Patient is postop, he states that his pain is minimal.  He denies chest pain, no shortness of breath, no hematuria dysuria hematemesis or hematochezia. Patient states that he has hypertension which is fairly controlled at home. He denies being diabetic. Denies sleep apnea. Post operative chest pain and SOB prompted a pulmonary consult and CTA was negative for PE but show chronic disease like subtle fibrosis, ? Pleural plaques, and cystic changes /COPD. ABG reviewed. He underwent an extensive evaluation during his prolonged hospitalization complicated by atrial fibrillation pulmonary venous congestion with volume overload narcotic induced ileus and an echocardiogram that initially showed an intrapulmonary shunt that turned out to be negative on TOBIAS.   Eventually he was discharged from the hospital to have liters of oxygen and is undergoing rehabilitation for his spinal surgery. His heart rate is remained in A. fib but rate controlled and is currently on 2.5 L of oxygen. The physical therapist and noted oxygen desaturations to 80% with ambulation and adjustments were made. Is currently using Atrovent nebulizer 3 times a day for his underlying apical changes possible emphysematous disease. OBJECTIVE:     PHYSICAL EXAM:   VITALS:   There were no vitals filed for this visit. No intake or output data in the 24 hours ending 02/11/22 1445     CONSTITUTIONAL:   A&O x 3  SKIN:     Kelechi, Fair skin with shin discoloration  HEENT:     EOMI, MMM, No thrush  NECK:    No bruits, No JVP appreciated  CV:      Sinus TR murmur, No rubs, No gallops  PULMONARY:   Couse BS,  Dry crackles right lung  ABDOMEN:     Soft, non-tender. BS normal. No R/R/G  EXT:    No deformities . No clubbing. Trace lower extremity edema, + venous stasis  PULSE:   Appears equal and palpable. PSYCHIATRIC:  No acute psychosis  MS:    Gross weakness- stable  NEUROLOGIC:   The clinical assessment is non-focal     DATA: IMAGING & TESTING:     LABORATORY TESTS:    PULMONARY FUNCTION: FINDINGS:  Pulmonary function tests revealed forced vital capacity post bronchodilator of 2.40 liters, 57% of predicted with an FEV1 of 1.96 liters, 55% of predicted with an FEV1/FVC ratio 82%. Midflow rates are 79% of predicted with a maximum voluntary ventilation of 65 liters per minute, 54% of predicted. Static lung volumes with slow vital capacity of 2.33 liters, 48% of predicted. Inspiratory capacity 1.91 liters, 55% of predicted. Expiratory reserve volume 0.42 liters, 32% of predicted. Thoracic gas volume of 3.48 liters, 82% of predicted. Residual volume of 3.06  liters, 104% of predicted. Total lung capacity of 5.38 liters, 70% of predicted. The RV/TLC ratio 141% of predicted.   Diffusion capacity is  7.86 mL/min per mmHg which is 21% of predicted with no correction when adjusted for alveolar ventilation at 38% of predicted. IMPRESSION:  There is mild restrictive lung pathology based on a total lung capacity of 70% of predicted. There is no definitive airflow obstruction, air trapping, but slight evidence of hyperinflation is noted based on the elevated RV/TLC ratio. The diffusion capacity is severely reduced, indicates a loss in gas transfer or destruction at the alveolar capillary bed.          ECHOCARDIOGRAM:  TOBIAS    Ejection fraction is visually estimated at 60 to 65%. Normal right ventricular size and function. Normal right atrium size. Agitated saline injected for shunt evaluation shows small PFO. NO ASD   Normal left ventricular chamber size. Normal left ventricular systolic    function. Visually estimated LVEF is 60 %.    No wall motion abnormalities.    Grade II diastolic dysfunction.    Normal right ventricle structure and function.    Normal left atrium.    Normal right atrium.    Mild tricuspid regurgitation.        CBC:   Lab Results   Component Value Date    WBC 11.6 12/22/2021    RBC 3.35 12/22/2021    HGB 10.1 12/22/2021    HCT 31.5 12/22/2021    MCV 94.0 12/22/2021    MCH 30.1 12/22/2021    MCHC 32.1 12/22/2021    RDW 13.5 12/22/2021     12/22/2021    MPV 9.6 12/22/2021     CMP:    Lab Results   Component Value Date     12/30/2021    K 3.7 12/30/2021    CL 98 12/30/2021    CO2 25 12/30/2021    BUN 24 12/30/2021    CREATININE 1.0 12/30/2021    GFRAA >60 12/30/2021    LABGLOM >60 12/30/2021    GLUCOSE 165 12/30/2021    PROT 5.9 12/21/2021    LABALBU 3.1 12/21/2021    CALCIUM 8.3 12/30/2021    BILITOT 0.6 12/21/2021    ALKPHOS 68 12/21/2021    AST 17 12/21/2021    ALT 18 12/21/2021     Magnesium:    Lab Results   Component Value Date    MG 2.0 12/29/2021     Phosphorus:  No results found for: PHOS     PRO-BNP:   Lab Results   Component Value Date    PROBNP 2,143 (H) 12/30/2021    PROBNP 2,778 previously. He is currently remains on 2.5 L of oxygen with adjustments for ambulatory desaturations. 1. Post op respiratory failure  2. Hypoxemic Respiratory Failure   3. CT evidence of disease of the lung  4. Plueral plaques ? Old infection or possible asbestosis  5. CT show settable changes of pulmonary fibrosis (peripheral)  6. New onset atrial fibrillation RVR.  [id number] 4 (age, hypertension, PAD)   7. Atypical CP.  Recent preoperative stress test negative for ischemia  8. Post op respiratory failure, on 9 L high flow nasal cannula  9. Postoperative ileus  10. S/pa Laminectomy 12/14/21  11. ILD/fibrosis  12. Hypertension  13. Hx R CEA  14. GERD  15. Hx COVID 19 5/2021          Plan: Manolo Burch seems to be doing much better. Heart rate is controlled and he is currently on 2 medications for his A. fib. Chest x-ray done about a month ago showed marked improvement in the pulmonary vascular congestion. His pulmonary function test showed mild restrictive lung impairment with a total lung capacity of 70% predicted but a diffusing capacity of only 38%. There is some cystic changes in the upper part of the lung consistent with emphysema and I told him to stay on the Atrovent 3 times a day through the nebulizer. Plan is to get a high-resolution CT scan of the chest to reevaluate the fibrosis. At that point time we discussed treatment options if additional evaluation is negative. In the meantime adjustments as mentioned above have been ordered.     Taj Baig DO DO, MPH, Charity Lazar  Professor of Internal Medicine  Pulmonary, Critical Care and Sleep Medicine

## 2022-02-11 NOTE — PROGRESS NOTES
Follow up from Hospital in Dec. Pt doing well; wife at today's visit with pt. O2 on at 3 liters NC O2 sat=98%. Pt using 2/1/2 liters at home. Plan for pt to have HR Chest CT and PFT testing completed in approx 6 weeks and then follow up with Dr. Venkat Hunter in office to discuss results. Mike Ayala

## 2022-02-14 ENCOUNTER — TELEPHONE (OUTPATIENT)
Dept: PULMONOLOGY | Age: 87
End: 2022-02-14

## 2022-02-14 NOTE — TELEPHONE ENCOUNTER
Mailed a letter to patient informing him that his CT Scan is scheduled for 3-29-22 at 11:00 am at [address] [facility],[address]. He must arrive by 10:30 am. There is no prep for this test.    His PFTis scheduled on 3-29-22 directly after his CT Chest. He is to have no caffeine 24 hours prior to test and no resp meds for at least 4 hours prior to test. He must also bring in proof of his COVID vaccines.

## 2022-03-07 DIAGNOSIS — Z98.1 S/P LUMBAR FUSION: Primary | ICD-10-CM

## 2022-03-08 ENCOUNTER — OFFICE VISIT (OUTPATIENT)
Dept: NEUROSURGERY | Age: 87
End: 2022-03-08

## 2022-03-08 ENCOUNTER — HOSPITAL ENCOUNTER (OUTPATIENT)
Age: 87
Discharge: HOME | End: 2022-03-10
Payer: MEDICARE

## 2022-03-08 ENCOUNTER — HOSPITAL ENCOUNTER (OUTPATIENT)
Dept: GENERAL RADIOLOGY | Age: 87
Discharge: HOME | End: 2022-03-10
Payer: MEDICARE

## 2022-03-08 DIAGNOSIS — Z98.1 HISTORY OF LUMBAR FUSION: Primary | ICD-10-CM

## 2022-03-08 DIAGNOSIS — Z98.1 S/P LUMBAR FUSION: ICD-10-CM

## 2022-03-08 PROCEDURE — 72100 X-RAY EXAM L-S SPINE 2/3 VWS: CPT

## 2022-03-08 PROCEDURE — 99024 POSTOP FOLLOW-UP VISIT: CPT | Performed by: PHYSICIAN ASSISTANT

## 2022-03-08 NOTE — PROGRESS NOTES
Post Operative Follow-up    Patient is status post: lumbar fusion o3 month ago. No leg pain. alexis op site pain ok. Physical Exam  Alert and Oriented X 3  PERRLA, EOMI  BECKHAM 4/5  Wound: C/D/I    A/P: patient is s/p L2-5 fusion 3 month ago. X-rays stable. No restrictions. D/c brace.   Begin PT

## 2022-03-10 DIAGNOSIS — Z01.812 PRE-PROCEDURE LAB EXAM: ICD-10-CM

## 2022-03-10 DIAGNOSIS — J84.10 PULMONARY FIBROSIS (HCC): Primary | ICD-10-CM

## 2022-03-25 DIAGNOSIS — J84.10 PULMONARY FIBROSIS (HCC): ICD-10-CM

## 2022-03-25 DIAGNOSIS — Z01.812 PRE-PROCEDURE LAB EXAM: ICD-10-CM

## 2022-03-26 LAB — SARS-COV-2, PCR: NOT DETECTED

## 2022-03-29 ENCOUNTER — HOSPITAL ENCOUNTER (OUTPATIENT)
Dept: PULMONOLOGY | Age: 87
Discharge: HOME | End: 2022-03-29
Payer: MEDICARE

## 2022-03-29 ENCOUNTER — HOSPITAL ENCOUNTER (OUTPATIENT)
Dept: CT IMAGING | Age: 87
Discharge: HOME | End: 2022-03-29
Payer: MEDICARE

## 2022-03-29 DIAGNOSIS — J98.4 RESTRICTIVE LUNG DISEASE: ICD-10-CM

## 2022-03-29 DIAGNOSIS — J84.10 PULMONARY FIBROSIS (HCC): ICD-10-CM

## 2022-03-29 PROCEDURE — 94060 EVALUATION OF WHEEZING: CPT

## 2022-03-29 PROCEDURE — 94726 PLETHYSMOGRAPHY LUNG VOLUMES: CPT

## 2022-03-29 PROCEDURE — 71250 CT THORAX DX C-: CPT

## 2022-03-29 PROCEDURE — 94729 DIFFUSING CAPACITY: CPT

## 2022-03-31 NOTE — PROCEDURES
[address]                               PULMONARY FUNCTION    PATIENT NAME: Jolie Dietz                      :        1935  MED REC NO:   [id number]                            ROOM:  ACCOUNT NO:   [de-identified]                           ADMIT DATE: 2022  PROVIDER:     Adwoa Purcell DO    DATE OF PROCEDURE:  2022    INDICATIONS:  This is an 82-year-old  gentleman, 73 inches, 191  pounds with progressive shortness of breath and a 28 pack-year smoker,  quit 40 years ago. Pulmonary function test showed forced vital capacity postbronchodilator  2.67 liters, 64% of predicted and FEV1 2.03 liters, 67% of predicted  with an FEV1/FVC ratio of 76%. A partial 3% bronchodilator response  noted with no significant airway changes identified that meet  statistical significance. Maximum voluntary ventilation 81 liters per  minute, 68% of predicted. Static lung volumes with slow vital capacity 2.77 liters, 68% of  predicted. Expiratory capacity of 1.85 liters, 63% of predicted. Expiratory reserve volume 0.93 liters, 71% of predicted. Thoracic gas  volume of 3.50 liters, 83% of predicted. Residual volume of 2.58  liters, 88% of predicted. Total lung capacity of 5.35 liters, 69% of  predicted. RV to TLC ratio of 120% of predicted. Diffusion capacity is  only 7.56 mL/minute per mmHg, which is 20% of predicted and when  corrected for alveolar ventilation to only to 34% of predicted. IMPRESSION:  Severe restrictive lung pathology with severe loss in  gas transfer. There is no airflow obstruction, air trapping,  hyperinflation, or bronchodilator response. Testing with history is consistent with  fibrotic lung disease. Clinical correlation needed.         Arden Goldman DO    D: 2022 15:18:06       T: 2022 15:21:46     [id number]  Job#: [id number]     Doc#: [id number]    CC:

## 2022-04-01 ENCOUNTER — OFFICE VISIT (OUTPATIENT)
Dept: PULMONOLOGY | Age: 87
End: 2022-04-01
Payer: MEDICARE

## 2022-04-01 VITALS
HEART RATE: 73 BPM | TEMPERATURE: 97.1 F | BODY MASS INDEX: 26.37 KG/M2 | WEIGHT: 199 LBS | HEIGHT: 73 IN | OXYGEN SATURATION: 92 % | DIASTOLIC BLOOD PRESSURE: 89 MMHG | SYSTOLIC BLOOD PRESSURE: 194 MMHG | RESPIRATION RATE: 18 BRPM

## 2022-04-01 DIAGNOSIS — J84.10 PULMONARY FIBROSIS (HCC): Primary | ICD-10-CM

## 2022-04-01 PROCEDURE — 94060 EVALUATION OF WHEEZING: CPT | Performed by: INTERNAL MEDICINE

## 2022-04-01 PROCEDURE — 99213 OFFICE O/P EST LOW 20 MIN: CPT | Performed by: INTERNAL MEDICINE

## 2022-04-01 PROCEDURE — G8417 CALC BMI ABV UP PARAM F/U: HCPCS | Performed by: INTERNAL MEDICINE

## 2022-04-01 PROCEDURE — 1123F ACP DISCUSS/DSCN MKR DOCD: CPT | Performed by: INTERNAL MEDICINE

## 2022-04-01 PROCEDURE — 94726 PLETHYSMOGRAPHY LUNG VOLUMES: CPT | Performed by: INTERNAL MEDICINE

## 2022-04-01 PROCEDURE — 1036F TOBACCO NON-USER: CPT | Performed by: INTERNAL MEDICINE

## 2022-04-01 PROCEDURE — 99214 OFFICE O/P EST MOD 30 MIN: CPT | Performed by: INTERNAL MEDICINE

## 2022-04-01 PROCEDURE — 94729 DIFFUSING CAPACITY: CPT | Performed by: INTERNAL MEDICINE

## 2022-04-01 PROCEDURE — G8427 DOCREV CUR MEDS BY ELIG CLIN: HCPCS | Performed by: INTERNAL MEDICINE

## 2022-04-01 PROCEDURE — 4040F PNEUMOC VAC/ADMIN/RCVD: CPT | Performed by: INTERNAL MEDICINE

## 2022-04-01 NOTE — PROGRESS NOTES
Patient to follow up with physician in 4 months. Patient will remain on O2. Patient to have DLCO at every visit.

## 2022-04-01 NOTE — PROGRESS NOTES
[address] [facility]  Department of Internal Medicine  Division of Pulmonary, Critical Care and Sleep Medicine  Office Note      Jaycee Suresh DO, MPH, [facility], Bora Gallagher MD, [address] Kelly DO, [facility]    Patient: Juan Pardo  MRN: [id number]  : 1935    Encounter Time: 3:44 PM     Primary Care Physician: Arden Tirado MD    Reason for Consultation: Post Op respiratory failure, Post CoVID and Hypoxemic respiratory failure with pulmonary fibrosis. REASON FOR CONSULT:  Hosptial follow up for fibrosis     HISTORY OF PRESENT ILLNESS:   Juan Pardo is a 82 Y. O. male who recently underwent L2-L5 laminectomy 2021 with Dr. Eladio Altman. Patient was admitted under the neurosurgery service. He underwent L2-L5 laminectomy and fusion. Patient is postop, he states that his pain is minimal.  He denies chest pain, no shortness of breath, no hematuria dysuria hematemesis or hematochezia. Patient states that he has hypertension which is fairly controlled at home. He denies being diabetic. Denies sleep apnea. Post operative chest pain and SOB prompted a pulmonary consult and CTA was negative for PE but show chronic disease like subtle fibrosis, ? Pleural plaques, and cystic changes /COPD. ABG reviewed. He underwent an extensive evaluation during his prolonged hospitalization complicated by atrial fibrillation pulmonary venous congestion with volume overload narcotic induced ileus and an echocardiogram that initially showed an intrapulmonary shunt that turned out to be negative on TOBIAS. Eventually he was discharged from the hospital to have liters of oxygen and is undergoing rehabilitation for his spinal surgery. His heart rate is remained in A. fib but rate controlled and is currently on 2.5 L of oxygen. The physical therapist and noted oxygen desaturations to 80% with ambulation and adjustments were made.   Is currently using Atrovent nebulizer 3 times a day for his underlying apical changes possible emphysematous disease. Kennedy Russo underwent additional testing as recommended high-resolution CT scan confirms multilobar interstitial lung changes consistent with pulmonary fibrosis without classic features of IPF extensive work-up done in the hospital for connective tissue related fibrotic disease was negative. He does have a history of asbestos exposure. Is currently maintained on 2.5 L of oxygen retropulsed device. Uses an inhaler for symptomatic relief. Pulmonary function tests are consistent with restrictive lung disease with no definitive signs of airflow obstruction. Current FVC is at 63% of predicted. No active signs of synovitis, joint effusions. He is recovering and ambulating with a wheeled walker post surgery. OBJECTIVE:     PHYSICAL EXAM:   VITALS:   Vitals:    04/01/22 1454   BP: (!) 194/89   Pulse: 73   Resp: 18   Temp: 97.1 °F (36.2 °C)   SpO2: 92%   Weight: 199 lb (90.3 kg)   Height: 6' 1\" (1.854 m)      No intake or output data in the 24 hours ending 04/01/22 1544     CONSTITUTIONAL:   A&O x 3  SKIN:     Kelechi, Fair skin with shin discoloration  HEENT:     EOMI, MMM, No thrush  NECK:    No bruits, No JVP appreciated  CV:      Sinus TR murmur, No rubs, No gallops  PULMONARY:   Couse BS,  Dry crackles right lung  ABDOMEN:     Soft, non-tender. BS normal. No R/R/G  EXT:    No deformities . No clubbing. Trace lower extremity edema, + venous stasis  PULSE:   Appears equal and palpable. PSYCHIATRIC:  No acute psychosis  MS:    Gross weakness- stable  NEUROLOGIC:   The clinical assessment is non-focal     DATA: IMAGING & TESTING:     LABORATORY TESTS:    PULMONARY FUNCTION: FINDINGS:  Pulmonary function tests revealed forced vital capacity post bronchodilator of 2.40 liters, 57% of predicted with an FEV1 of 1.96 liters, 55% of predicted with an FEV1/FVC ratio 82%.   Midflow rates are 79% of predicted with a maximum voluntary ventilation of 65 liters per minute, 54% of predicted. Static lung volumes with slow vital capacity of 2.33 liters, 48% of predicted. Inspiratory capacity 1.91 liters, 55% of predicted. Expiratory reserve volume 0.42 liters, 32% of predicted. Thoracic gas volume of 3.48 liters, 82% of predicted. Residual volume of 3.06 liters, 104% of predicted. Total lung capacity of 5.38 liters, 70% of predicted. The RV/TLC ratio 141% of predicted. Diffusion capacity is 7.86 mL/min per mmHg which is 21% of predicted with no correction when adjusted for alveolar ventilation at 38% of predicted. IMPRESSION:  There is mild restrictive lung pathology based on a total lung capacity of 70% of predicted. There is no definitive airflow obstruction, air trapping, but slight evidence of hyperinflation is noted based on the elevated RV/TLC ratio. The diffusion capacity is severely reduced, indicates a loss in gas transfer or destruction at the alveolar capillary bed.          ECHOCARDIOGRAM:  TOBIAS    Ejection fraction is visually estimated at 60 to 65%. Normal right ventricular size and function. Normal right atrium size. Agitated saline injected for shunt evaluation shows small PFO. NO ASD   Normal left ventricular chamber size. Normal left ventricular systolic    function. Visually estimated LVEF is 60 %.    No wall motion abnormalities.    Grade II diastolic dysfunction.    Normal right ventricle structure and function.    Normal left atrium.    Normal right atrium.    Mild tricuspid regurgitation. ABGs:   Lab Results   Component Value Date    PH 7.394 12/16/2021    PO2 51.0 12/16/2021    PCO2 45.8 12/16/2021       IMAGING:  Imaging tests were completed and reviewed and discussed radiology and care team involved and reveals     HRCT CHEST 3/2022:            CTA PULMONARY W CONTRAST: Result Date: 12/16/2021: FINDINGS: No thoracic aortic aneurysm or dissection.   Mild-to-moderate scattered calcified plaque in the thoracic aorta. No evidence of pulmonary embolism. Mild cardiomegaly. Coronary atherosclerosis. No pericardial effusion. Shotty sub centimeter short axis mediastinal hilar nodes. No pleural effusion or pneumothorax. Low lung volumes. There are a few small peripheral densities in the lungs which may represent scarring or infection (for example series 4, image 73). Another curvilinear density is noted the medial left lower lobe (series 4 image 168). Mild dependent atelectasis, right greater than left. The central airways are patent. There is centrilobular and paraseptal emphysema. Degenerative changes of the spine. The visualized abdomen demonstrates changes of cholecystectomy and the splenic parenchymal calcification. No evidence of pulmonary embolism. No acute aortic injury or aneurysm. There are a few nonspecific densities in the peripheral lungs. Imaging features can be seen with COVID-19 pneumonia, though are nonspecific and can occur with a variety of infectious and noninfectious processes. Mild cardiomegaly. Atherosclerotic disease. Assessment: Joseph Sky is a 59-year-old gentleman with postoperative respiratory failure with likely chronic lung disease being either asbestos and/or idiopathic pulmonary fibrosis series of testing in the hospital revealed no serologic markers consistent with a inflammatory or connective tissue related pulmonary disorder. He has chronic inspiratory crackles at the right lung base which were more prominent than previously. He is currently remains on 2.5 L of oxygen with adjustments for ambulatory desaturations. 1. Post op respiratory failure  2. Hypoxemic Respiratory Failure   3. CT evidence of disease of the lung  4. Plueral plaques ? Old infection or possible asbestosis  5. CT show settable changes of pulmonary fibrosis (peripheral)  6. New onset atrial fibrillation RVR.  [id number] (age, hypertension, PAD)   7.  Atypical CP.  Recent preoperative stress test negative for ischemia  8. Post op respiratory failure, on 9 L high flow nasal cannula  9. Postoperative ileus  10. S/pa Laminectomy 12/14/21  11. ILD/fibrosis  12. Hypertension  13. Hx R CEA  14. GERD  15. Hx COVID 19 5/2021          Plan: Eldon Bradley seems to be doing much better. Heart rate is controlled and he is currently on 2 medications for his A. fib. His pulmonary function test showed mild restrictive lung impairment with a total lung capacity of 70% predicted but a diffusing capacity of only 38%. There is some cystic changes in the upper part of the lung consistent with emphysema and I told him to stay on the Atrovent 3 times a day through the nebulizer. However, high-resolution CT scan of the chest clearly shows interstitial lung disease consistent with pulmonary fibrosis and multilobar nature. There is no classic features of idiopathic pulmonary fibrosis noted and connective tissue disease work-up was negative. It is possible as I told the family that this is post COVID fibrosis since he was not on oxygen till he underwent surgical procedure and post infection. Tell them that the CT scan done previously did show signs of early fibrosis despite him not needing oxygen or maybe he was subsequently unaware. This point I did not offer him any medication such as Tiny kinase inhibitors for the fibrosis and will follow the trend with repeat diffusion capacity and forced vital capacity checks every 4 to 6 months. We will continue to monitor if he starts to decline then we will discuss instituting different treatment options at that point. Also at his age he should be noted I strongly recommend against any type of open lung biopsy. We did spend a great deal time discussing oxygen assessment and needs related to pulmonary fibrosis especially with exercise and expectations about limitations due to his disease. I hope this updates you on my clinical evaluation and thinking.  Please and hesitate to call the office any questions should arise.     Jana Arredondo DO MPH, [facility]  Professor of Internal Medicine  Pulmonary, Critical Care and Sleep Medicine

## 2022-07-29 ENCOUNTER — HOSPITAL ENCOUNTER (OUTPATIENT)
Dept: CARDIAC REHAB | Age: 87
Discharge: HOME | End: 2022-07-29

## 2022-07-29 VITALS
HEIGHT: 73 IN | WEIGHT: 186 LBS | SYSTOLIC BLOOD PRESSURE: 120 MMHG | HEART RATE: 58 BPM | RESPIRATION RATE: 14 BRPM | OXYGEN SATURATION: 98 % | BODY MASS INDEX: 24.65 KG/M2 | DIASTOLIC BLOOD PRESSURE: 68 MMHG

## 2022-07-29 ASSESSMENT — PATIENT HEALTH QUESTIONNAIRE - PHQ9
SUM OF ALL RESPONSES TO PHQ QUESTIONS 1-9: 16
4. FEELING TIRED OR HAVING LITTLE ENERGY: 2
1. LITTLE INTEREST OR PLEASURE IN DOING THINGS: 2
7. TROUBLE CONCENTRATING ON THINGS, SUCH AS READING THE NEWSPAPER OR WATCHING TELEVISION: 1
SUM OF ALL RESPONSES TO PHQ QUESTIONS 1-9: 16
SUM OF ALL RESPONSES TO PHQ QUESTIONS 1-9: 16
2. FEELING DOWN, DEPRESSED OR HOPELESS: 2
SUM OF ALL RESPONSES TO PHQ QUESTIONS 1-9: 16
5. POOR APPETITE OR OVEREATING: 2
SUM OF ALL RESPONSES TO PHQ9 QUESTIONS 1 & 2: 4
8. MOVING OR SPEAKING SO SLOWLY THAT OTHER PEOPLE COULD HAVE NOTICED. OR THE OPPOSITE, BEING SO FIGETY OR RESTLESS THAT YOU HAVE BEEN MOVING AROUND A LOT MORE THAN USUAL: 2
3. TROUBLE FALLING OR STAYING ASLEEP: 3
10. IF YOU CHECKED OFF ANY PROBLEMS, HOW DIFFICULT HAVE THESE PROBLEMS MADE IT FOR YOU TO DO YOUR WORK, TAKE CARE OF THINGS AT HOME, OR GET ALONG WITH OTHER PEOPLE: 1
9. THOUGHTS THAT YOU WOULD BE BETTER OFF DEAD, OR OF HURTING YOURSELF: 0
6. FEELING BAD ABOUT YOURSELF - OR THAT YOU ARE A FAILURE OR HAVE LET YOURSELF OR YOUR FAMILY DOWN: 2

## 2022-07-29 ASSESSMENT — EJECTION FRACTION
EF_VALUE: 63
EF_VALUE: 63

## 2022-07-29 ASSESSMENT — PULMONARY FUNCTION TESTS
FEV1 (%PREDICTED): 71
FEV1/FVC: 93

## 2022-08-10 ENCOUNTER — HOSPITAL ENCOUNTER (OUTPATIENT)
Dept: CARDIAC REHAB | Age: 87
Discharge: HOME | End: 2022-08-10

## 2022-08-10 PROCEDURE — 9900000038 HC CARDIAC REHAB PHASE 3 - MONTHLY

## 2022-08-26 ENCOUNTER — HOSPITAL ENCOUNTER (OUTPATIENT)
Dept: CARDIAC REHAB | Age: 87
Discharge: HOME | End: 2022-08-26

## 2022-09-07 ENCOUNTER — HOSPITAL ENCOUNTER (OUTPATIENT)
Dept: CARDIAC REHAB | Age: 87
Discharge: HOME | End: 2022-09-07

## 2022-09-07 PROCEDURE — 9900000038 HC CARDIAC REHAB PHASE 3 - MONTHLY

## 2022-10-26 ENCOUNTER — HOSPITAL ENCOUNTER (OUTPATIENT)
Dept: CARDIAC REHAB | Age: 87
Discharge: HOME | End: 2022-10-26

## 2022-10-26 PROCEDURE — 9900000038 HC CARDIAC REHAB PHASE 3 - MONTHLY

## 2022-11-07 ENCOUNTER — HOSPITAL ENCOUNTER (OUTPATIENT)
Dept: CARDIAC REHAB | Age: 87
Setting detail: THERAPIES SERIES
Discharge: HOME | End: 2022-11-07
Payer: MEDICARE

## 2022-11-07 VITALS
HEART RATE: 58 BPM | SYSTOLIC BLOOD PRESSURE: 140 MMHG | RESPIRATION RATE: 18 BRPM | WEIGHT: 183 LBS | DIASTOLIC BLOOD PRESSURE: 68 MMHG | HEIGHT: 73 IN | BODY MASS INDEX: 24.25 KG/M2

## 2022-11-07 PROCEDURE — 93797 PHYS/QHP OP CAR RHAB WO ECG: CPT

## 2022-11-07 PROCEDURE — 93798 PHYS/QHP OP CAR RHAB W/ECG: CPT

## 2022-11-07 ASSESSMENT — EXERCISE STRESS TEST
PEAK_BP: 152/70
PEAK_HR: 94
PEAK_RPE: 6
PEAK_BP: 152/70
PEAK_RPD: 3

## 2022-11-07 ASSESSMENT — PATIENT HEALTH QUESTIONNAIRE - PHQ9
6. FEELING BAD ABOUT YOURSELF - OR THAT YOU ARE A FAILURE OR HAVE LET YOURSELF OR YOUR FAMILY DOWN: 0
10. IF YOU CHECKED OFF ANY PROBLEMS, HOW DIFFICULT HAVE THESE PROBLEMS MADE IT FOR YOU TO DO YOUR WORK, TAKE CARE OF THINGS AT HOME, OR GET ALONG WITH OTHER PEOPLE: 0
2. FEELING DOWN, DEPRESSED OR HOPELESS: 0
4. FEELING TIRED OR HAVING LITTLE ENERGY: 1
9. THOUGHTS THAT YOU WOULD BE BETTER OFF DEAD, OR OF HURTING YOURSELF: 0
SUM OF ALL RESPONSES TO PHQ QUESTIONS 1-9: 1
1. LITTLE INTEREST OR PLEASURE IN DOING THINGS: 0
3. TROUBLE FALLING OR STAYING ASLEEP: 0
8. MOVING OR SPEAKING SO SLOWLY THAT OTHER PEOPLE COULD HAVE NOTICED. OR THE OPPOSITE, BEING SO FIGETY OR RESTLESS THAT YOU HAVE BEEN MOVING AROUND A LOT MORE THAN USUAL: 0
7. TROUBLE CONCENTRATING ON THINGS, SUCH AS READING THE NEWSPAPER OR WATCHING TELEVISION: 0
SUM OF ALL RESPONSES TO PHQ QUESTIONS 1-9: 1
SUM OF ALL RESPONSES TO PHQ QUESTIONS 1-9: 1
SUM OF ALL RESPONSES TO PHQ9 QUESTIONS 1 & 2: 0
5. POOR APPETITE OR OVEREATING: 0
SUM OF ALL RESPONSES TO PHQ QUESTIONS 1-9: 1

## 2022-11-07 ASSESSMENT — EJECTION FRACTION: EF_VALUE: 63

## 2022-11-07 ASSESSMENT — LIFESTYLE VARIABLES
SMOKELESS_TOBACCO: NO
ALCOHOL_USE: SPECIAL

## 2022-11-07 NOTE — PROGRESS NOTES
11/07/22 1400   Treatment Diagnosis   Treatment Diagnosis 1 PCI   PCI Date 08/22/22   Referral Date 11/07/22   Co-morbidities Pulmonary disease   Oxygen Saturation / Titration    Stages of Change  Action   Oxygen Intervention   Oxygen Use Yes   Oxygen Type  Nasal canula   Patients Liter Flow  4-6 lpm   O2 Sat Greater Than 90% No   Oxygen Education  Oxygen Safety / Hazaard   Oxygen Target Goals  Keep SA02 greater than 90%   Individual Treatment Plan   ITP Visit Type Initial assessment   1st Date of Exercise  11/07/22   ITP Next Review Date 11/30/22   Visit #/Total Visits 2/36   EF% 63 %   ITP Exercise;Psychosocial;Tobacco;Nutrition;Education   Exercise    DASI Total Score 9.95   Stages of Change Action   Test Six minute walk test   Data Measured Before Walk   Heart Rate 58   Blood Pressure 140/68   O2 Saturation 94   O2 Device Nasal cannula   O2 Flow Rate (l/min) 4 l/min   RPD 0   RPE 6   Data Measured during Walk   Indicate Mode of RPE 3 minutes   Walk 105 mph   Symptoms SOB   Any problems while exercising no   Do You Have Shortness of Breath Yes   Peak RPE 6   Peak RPD 3   Data Measured Immediately After Walk   Distance Walked in Meters 105 meters   Peak Heart Rate 94   Peak Blood Pressure 152/70   Modified Barbara Scale 0   RPE 6   O2 Saturation 94   O2 Flow Rate (L/min) 6 l/min   Data Measured at 5 Minutes After Walk   Heart Rate 63   Blood Pressure 138/72   SpO2 94 %   Comments PT walked very slow pace   Exercise Prescription   Mode Stepper;Ergometer   Frequency per week 2x/week   Duration Per Session 30 minutes   Intensity METS       2-4   RPE 11-17   Progression to 60minutes   Target Heart Rate    Resistance Training No   Exercise Blood Pressures   Resting /68   Peak /70   Is BP WDL Yes   Exercise Activity at Home   Type not at this time   Exercise Education   Education Exercise safety;Equipment orientation;RPE scale   Exercise Target Goal   Target Goal(s) Individual exercise RX   Patient Stated Exercise Goals increase stamina and endurance   Psychosocial   Stages of Change Action   PHQ-9 Total Score 1   Psychosocial Intervention   Interventions No intervention indicated   Currently Taking Psychotropic Meds No   Medication Changes No   Psychosocial Education   Education Advanced directives   Psychosocial Target Goals   Target Goal(s) Assess presence or absence of depression using a valid screening tool   Tobacco   Stages of Change Action   Tobacco Use Yes   Quit Greater than 6 month   Date Quit 01/01/85   Smokeless Tobacco Use No   Tobacco Cessation Intervention   Smoking Cessation Referral No   Tobacco Adjunct No   Nutrition   Diabetes No   Lipids   Date Lipids Drawn 11/07/22   Total 109   HDL 34   LDL 61   Triglycerides 68   Lipid Med(s) no   Lipid Med Change(s) No   Weight Management   Rate Your Plate Total Score 42   Alcohol Special   Weight  182 lb (82.6 kg)   Height  5' 11\" (1.803 m)   BMI 25.44   Nutrition Intervention   Diabetes Education Referral No   Education   Cardiac Knowledge Total Score 7   Education Intervention   Education Schedule Given Yes   Patient Education    Education Cardiac A&P   Hypertension Yes   Hypertension Controlled Yes   Is BP WDL Yes   Med(s) Change No   BP Meds metoprolol   ACE/ARB Prescribed No   ASA Prescribed No   Antiplatelet Prescribed Yes   Antiplatelet Adherent Yes   BB Prescribed Yes   BB Adherent Yes   Statins Prescribed Yes   Statins Adherent Yes   Statin Intensity Moderate   Education Target Goals   Target Goals Medication compliance

## 2022-11-10 ENCOUNTER — HOSPITAL ENCOUNTER (OUTPATIENT)
Dept: CARDIAC REHAB | Age: 87
Setting detail: THERAPIES SERIES
Discharge: HOME | End: 2022-11-10
Payer: MEDICARE

## 2022-11-10 PROCEDURE — 93798 PHYS/QHP OP CAR RHAB W/ECG: CPT

## 2022-11-14 ENCOUNTER — HOSPITAL ENCOUNTER (OUTPATIENT)
Dept: CARDIAC REHAB | Age: 87
Setting detail: THERAPIES SERIES
Discharge: HOME | End: 2022-11-14
Payer: MEDICARE

## 2022-11-14 PROCEDURE — 93798 PHYS/QHP OP CAR RHAB W/ECG: CPT

## 2022-11-15 ENCOUNTER — TELEPHONE (OUTPATIENT)
Dept: CARDIAC REHAB | Age: 87
End: 2022-11-15

## 2022-11-15 NOTE — TELEPHONE ENCOUNTER
11/15/2022 11:10 am Nutrition: Spoke with patient on this date regarding nutrition appointment. Nutrition appointment time updated to 11/17/2022 to 12 noon. Patient verbalized understanding. Will remain available.  Electronically signed by Devon Lay RD, INOC[employer]IO on 11/15/22 at 11:11 AM EST

## 2022-11-17 ENCOUNTER — HOSPITAL ENCOUNTER (OUTPATIENT)
Dept: CARDIAC REHAB | Age: 87
Setting detail: THERAPIES SERIES
Discharge: HOME | End: 2022-11-17
Payer: MEDICARE

## 2022-11-17 ENCOUNTER — HOSPITAL ENCOUNTER (OUTPATIENT)
Dept: CARDIAC REHAB | Age: 87
Setting detail: THERAPIES SERIES
End: 2022-11-17
Payer: MEDICARE

## 2022-11-17 VITALS — HEIGHT: 73 IN | BODY MASS INDEX: 24.25 KG/M2 | WEIGHT: 183 LBS

## 2022-11-17 PROCEDURE — 93797 PHYS/QHP OP CAR RHAB WO ECG: CPT

## 2022-11-17 PROCEDURE — 93798 PHYS/QHP OP CAR RHAB W/ECG: CPT

## 2022-11-17 ASSESSMENT — LIFESTYLE VARIABLES
ALCOHOL_TYPE: WINE
ALCOHOL_USE: SPECIAL
ALCOHOL_AMOUNT: 1 GLASS

## 2022-11-17 NOTE — PROGRESS NOTES
CARDIAC REHAB NUTRITION ASSESSMENT 1:1     NAME: Susan Fontenot : 1935 AGE: 82 y.o. GENDER: male    CARDIAC REHAB ADMITTING DIAGNOSIS: s/p PCI  x 2     PROBLEM LIST:    Patient Active Problem List   Diagnosis    Spinal stenosis of lumbar region    Spondylolisthesis, lumbar region    Lumbar degenerative disc disease    Chest pain    Primary hypertension    Puerperal sepsis with acute hypoxic respiratory failure (HCC)    Hyperlipidemia    Pulmonary fibrosis ([facility] [address]     Additional pertinent past medical/surgical history: CAD, COPD, Arthritis, history of afib with RVR, Asbestos exposure, Hypertension, tachycardia , remote history of L2 and L5 laminectomy , minimal pain, history of chest pain and sob      LABS:    Hemoglobin A1C   Date Value Ref Range Status   2021 5.8 (H) 4.0 - 5.6 % Final       Labs: 22 external lab: Chol 109; TG 68; HDL 34; LDL 61. MEDICATIONS/SUPPLEMENTS:    Prior to Admission medications    Medication Sig Start Date End Date Taking?  Authorizing Provider   ELIQUIS 5 MG TABS tablet 5 mg 2 times daily 22   Historical Provider, MD   metoprolol succinate (TOPROL XL) 25 MG extended release tablet  22   Historical Provider, MD   ipratropium (ATROVENT) 0.02 % nebulizer solution Take 2.5 mLs by nebulization 3 times daily Bill via Medicare B plan  Patient not taking: No sig reported 22   Inell Askew, DO   aspirin 81 MG chewable tablet     Historical Provider, MD   amLODIPine (NORVASC) 10 MG tablet Take by mouth  Patient not taking: No sig reported 17   Historical Provider, MD   carvedilol (COREG) 25 MG tablet Take 1 tablet by mouth 2 times daily (with meals)  Patient not taking: No sig reported 21   Samanta To MD   furosemide (LASIX) 20 MG tablet Take 1 tablet by mouth 2 times daily  Patient not taking: Reported on 21  Samanta To MD   dilTIAZem (CARTIA XT) 180 MG extended release capsule Take 1 capsule by mouth daily  Patient not taking: No sig reported 12/30/21   Nicole Queen MD   flecainide (TAMBOCOR) 50 MG tablet Take 1 tablet by mouth 2 times daily 12/30/21 3/8/22  Nicole Queen MD   ipratropium (ATROVENT) 0.02 % nebulizer solution Take 2.5 mLs by nebulization 4 times daily  Patient not taking: Reported on 11/7/2022 12/30/21   Dudley Barksdale DO   Respiratory Therapy Supplies (FULL KIT NEBULIZER SET) MISC Use as directed with nebulized medication. Patient not taking: Reported on 11/7/2022 12/29/21   Dudley Barksdale DO   Pediatric Multivitamins-Fl (MULTIVITAMINS/FL PO) Take by mouth  Patient not taking: No sig reported    Historical Provider, MD   pravastatin (PRAVACHOL) 10 MG tablet daily   Patient not taking: Reported on 11/7/2022 9/21/21   Historical Provider, MD     Patient reports he is taking all medications. Pertinent lipid lowering medications: Pravachol per order. ANTHROPOMETRICS:    Ht Readings from Last 1 Encounters:   11/17/22 6' 1\" (1.854 m)      Wt Readings from Last 10 Encounters:   11/17/22 183 lb (83 kg)   11/07/22 183 lb (83 kg)   07/29/22 186 lb (84.4 kg)   04/01/22 199 lb (90.3 kg)   01/11/22 200 lb (90.7 kg)   12/14/21 200 lb (90.7 kg)   12/09/21 200 lb (90.7 kg)   11/04/21 200 lb (90.7 kg)   10/21/21 200 lb (90.7 kg)       BMI Readings from Last 3 Encounters:   11/17/22 24.14 kg/m²   11/07/22 24.14 kg/m²   07/29/22 24.54 kg/m²           IBW: 184 # +/- 10%       %IBW:  99 % +/- 10%                BMI: 24.41  kg/M2 Category:  normal weight    Waist: deferred ( uses wheeled walker)     Appearance: appears nourished, tall, on oxygen;; reports lost muscle and some fat  ( update: slowly regaining muscle)        Malnutrition risk: at risk     Reported Wt Hx:UBW: 200 pounds; Patient reports he lost approximately 20 pounds while hospitalized over 11 months ago. Recent usual home weight: 181 - 182 pounds. Cardiac rehab admit weight: 182 pounds  Weight today: 183 pounds.  Pt gained one pound over 2 weeks indicating less than 1% weight gain. Weight gain not significant. Will continue to monitor. Reported Diet Hx:Patient reports he is eating 3 times per day and some snacks but eats whenever he wants; diet at home: no added salt; appetite less than usual but good; no food allergies; no cultural, Evangelical or ethnic food preferences; eats fast food or at sit down restaurant 2 times per week; wife grocery shops and wife cooks most meals;    ( Other) chewing problem status: deferred  Swallowing problems: deferred  Dental status: deferred    Rate Your Plate Score: 42    (Score 58-72: Making many healthy choices; 41-57: Some choices need improving 24-40: many choices need improving)      24 HOUR DIET RECALL    Breakfast: time?, at home, either Cheerios with skim milk and strawberries or Toast with peanut butter and honey and banana and water to drink     Lunch: can't remember     Dinner: time?, at home,  pasta (rigatoni) with tomato sauce, pork chop, corn, potatoes and water to drink       Snacks: time?, at home, candy     Beverages  Drinks one protein drink every few days;- can't remember name    Coffee intake varies     Rom  is participating in rehab. Environmental/Social:1 glass wine on special occassions; financial status deferred. NUTRITION INTERVENTION:    Nutrition 45 minute one-on-one education & goal setting with [facility] [address] with Rom  relevant labs compared to ideals. Reviewed weight history and patient's verbalized weight goal as well as any real or perceived barriers to obtaining the goal. Collaborated with patient to set a specific short and long term weight goal.     Reviewed Rate Your Plate and conducted a verbal diet recall.   Assessed for environmental, financial, psychosocial, physical and comorbidities that may impact the food and eating patterns / behaviors of [address] with patient to set specific nutrient goals as well as specific food / behavior changes that will help patient meet the overall goal of following a heart healthy eating pattern (using guidelines as set forth by the American Heart Association and modeled after healthful eating patterns as recognized by the USDA Dietary Guidelines such as DASH, Mediterranean or plant-based). Briefly reviewed with Guille Howard the nutrition information: My Plate, Dietary Resolutions, food log, serving size, eating out, omegas, plant sterols , menu planning and MNT Heart Healthy Low Sodium and encouraged Angeladerrick Howard to read thoroughly, ask questions as needed, and use for future reference for heart healthy nutrition information. Label reading deferred. Guille Howard  is not scheduled to participate in Cardiac Rehab group nutrition classes. PATIENT GOALS:    Weight Goals: 183 pounds +/- 5 pounds     Nutrition Goals:Patient will try to eat 3 balanced meals and 1 to 2 snacks and follow physician guidelines for fluids. Daily Recommendations:Patient will try to follow my plate guidelines. Calories:  2000 calories /day * goal weight maintenance. (using [address] ([address]36) with AF 1.3)    Estimated Total Protein needs: 84 to 92 grams/day ( based on 1 to 1.1 grams/ kg IBW)    Estimated Total Fluid needs: per physician guidelines  *  if not on fluid restriction : 64 ounces/day        Saturated Fat: no more than 20 g/day    Trans Fat: 0 g/day    Sodium: no more than 1500- 2000 mg/day    Fruit:  2 cups / day    Vegetables: 2.5 cups/day     Patient is eating 2 servings vegetables daily and one serving fruit daily. Other:    - read and compare food labels    -Keeping a food diary was recommended. Pr is a phase II participant and ITP done. Questions addressed. Follow-up plans discussed. Patient will be followed up per pt, PNR. Contact information provided. Guille Howard verbalized understanding. Maria Esther Gamboa MA, RDN, LD  Contact phone: [phone]

## 2022-11-18 ENCOUNTER — HOSPITAL ENCOUNTER (OUTPATIENT)
Dept: CARDIAC REHAB | Age: 87
Setting detail: THERAPIES SERIES
End: 2022-11-18
Payer: MEDICARE

## 2022-11-21 ENCOUNTER — HOSPITAL ENCOUNTER (OUTPATIENT)
Dept: CARDIAC REHAB | Age: 87
Setting detail: THERAPIES SERIES
Discharge: HOME | End: 2022-11-21
Payer: MEDICARE

## 2022-11-21 PROCEDURE — 93798 PHYS/QHP OP CAR RHAB W/ECG: CPT

## 2022-11-21 RX ORDER — CLOPIDOGREL BISULFATE 75 MG/1
75 TABLET ORAL DAILY
COMMUNITY

## 2022-11-21 RX ORDER — ATORVASTATIN CALCIUM 40 MG/1
40 TABLET, FILM COATED ORAL DAILY
COMMUNITY

## 2022-11-25 ENCOUNTER — HOSPITAL ENCOUNTER (OUTPATIENT)
Dept: CARDIAC REHAB | Age: 87
Setting detail: THERAPIES SERIES
Discharge: HOME | End: 2022-11-25
Payer: MEDICARE

## 2022-11-25 PROCEDURE — 93798 PHYS/QHP OP CAR RHAB W/ECG: CPT

## 2022-11-28 ENCOUNTER — HOSPITAL ENCOUNTER (OUTPATIENT)
Dept: CARDIAC REHAB | Age: 87
Setting detail: THERAPIES SERIES
Discharge: HOME | End: 2022-11-28
Payer: MEDICARE

## 2022-11-28 PROCEDURE — 93798 PHYS/QHP OP CAR RHAB W/ECG: CPT

## 2022-12-01 ENCOUNTER — HOSPITAL ENCOUNTER (OUTPATIENT)
Dept: CARDIAC REHAB | Age: 87
Setting detail: THERAPIES SERIES
Discharge: HOME | End: 2022-12-01
Payer: MEDICARE

## 2022-12-01 PROCEDURE — 93798 PHYS/QHP OP CAR RHAB W/ECG: CPT

## 2022-12-01 ASSESSMENT — PATIENT HEALTH QUESTIONNAIRE - PHQ9
2. FEELING DOWN, DEPRESSED OR HOPELESS: 0
9. THOUGHTS THAT YOU WOULD BE BETTER OFF DEAD, OR OF HURTING YOURSELF: 0
SUM OF ALL RESPONSES TO PHQ QUESTIONS 1-9: 0
8. MOVING OR SPEAKING SO SLOWLY THAT OTHER PEOPLE COULD HAVE NOTICED. OR THE OPPOSITE, BEING SO FIGETY OR RESTLESS THAT YOU HAVE BEEN MOVING AROUND A LOT MORE THAN USUAL: 0
6. FEELING BAD ABOUT YOURSELF - OR THAT YOU ARE A FAILURE OR HAVE LET YOURSELF OR YOUR FAMILY DOWN: 0
10. IF YOU CHECKED OFF ANY PROBLEMS, HOW DIFFICULT HAVE THESE PROBLEMS MADE IT FOR YOU TO DO YOUR WORK, TAKE CARE OF THINGS AT HOME, OR GET ALONG WITH OTHER PEOPLE: 0
4. FEELING TIRED OR HAVING LITTLE ENERGY: 0
SUM OF ALL RESPONSES TO PHQ QUESTIONS 1-9: 0
3. TROUBLE FALLING OR STAYING ASLEEP: 0
SUM OF ALL RESPONSES TO PHQ QUESTIONS 1-9: 0
1. LITTLE INTEREST OR PLEASURE IN DOING THINGS: 0
5. POOR APPETITE OR OVEREATING: 0
SUM OF ALL RESPONSES TO PHQ QUESTIONS 1-9: 0
7. TROUBLE CONCENTRATING ON THINGS, SUCH AS READING THE NEWSPAPER OR WATCHING TELEVISION: 0
SUM OF ALL RESPONSES TO PHQ9 QUESTIONS 1 & 2: 0

## 2022-12-05 ENCOUNTER — HOSPITAL ENCOUNTER (OUTPATIENT)
Dept: CARDIAC REHAB | Age: 87
Setting detail: THERAPIES SERIES
Discharge: HOME | End: 2022-12-05
Payer: MEDICARE

## 2022-12-05 PROCEDURE — 93798 PHYS/QHP OP CAR RHAB W/ECG: CPT

## 2022-12-07 ENCOUNTER — HOSPITAL ENCOUNTER (OUTPATIENT)
Age: 87
Discharge: HOME | End: 2022-12-09
Payer: MEDICARE

## 2022-12-07 ENCOUNTER — OFFICE VISIT (OUTPATIENT)
Dept: NEUROSURGERY | Age: 87
End: 2022-12-07
Payer: MEDICARE

## 2022-12-07 ENCOUNTER — HOSPITAL ENCOUNTER (OUTPATIENT)
Dept: GENERAL RADIOLOGY | Age: 87
Discharge: HOME | End: 2022-12-09
Payer: MEDICARE

## 2022-12-07 VITALS
SYSTOLIC BLOOD PRESSURE: 120 MMHG | HEART RATE: 64 BPM | RESPIRATION RATE: 16 BRPM | TEMPERATURE: 97.2 F | WEIGHT: 183 LBS | HEIGHT: 75 IN | BODY MASS INDEX: 22.75 KG/M2 | DIASTOLIC BLOOD PRESSURE: 89 MMHG | OXYGEN SATURATION: 90 %

## 2022-12-07 DIAGNOSIS — Z98.1 S/P LUMBAR FUSION: ICD-10-CM

## 2022-12-07 DIAGNOSIS — Z98.1 S/P LUMBAR FUSION: Primary | ICD-10-CM

## 2022-12-07 DIAGNOSIS — M51.26 LUMBAR DISC HERNIATION: ICD-10-CM

## 2022-12-07 PROCEDURE — 72100 X-RAY EXAM L-S SPINE 2/3 VWS: CPT

## 2022-12-07 PROCEDURE — 99024 POSTOP FOLLOW-UP VISIT: CPT | Performed by: PHYSICIAN ASSISTANT

## 2022-12-07 PROCEDURE — 99212 OFFICE O/P EST SF 10 MIN: CPT

## 2022-12-09 ENCOUNTER — HOSPITAL ENCOUNTER (OUTPATIENT)
Dept: CARDIAC REHAB | Age: 87
Setting detail: THERAPIES SERIES
Discharge: HOME | End: 2022-12-09
Payer: MEDICARE

## 2022-12-09 PROCEDURE — 93798 PHYS/QHP OP CAR RHAB W/ECG: CPT

## 2022-12-13 ENCOUNTER — HOSPITAL ENCOUNTER (OUTPATIENT)
Dept: CARDIAC REHAB | Age: 87
Setting detail: THERAPIES SERIES
Discharge: HOME | End: 2022-12-13
Payer: MEDICARE

## 2022-12-13 PROCEDURE — 93798 PHYS/QHP OP CAR RHAB W/ECG: CPT

## 2022-12-16 ENCOUNTER — HOSPITAL ENCOUNTER (OUTPATIENT)
Dept: CARDIAC REHAB | Age: 87
Setting detail: THERAPIES SERIES
Discharge: HOME | End: 2022-12-16
Payer: MEDICARE

## 2022-12-16 PROCEDURE — 93798 PHYS/QHP OP CAR RHAB W/ECG: CPT

## 2022-12-20 ENCOUNTER — HOSPITAL ENCOUNTER (OUTPATIENT)
Dept: CARDIAC REHAB | Age: 87
Setting detail: THERAPIES SERIES
Discharge: HOME | End: 2022-12-20
Payer: MEDICARE

## 2022-12-20 PROCEDURE — 93798 PHYS/QHP OP CAR RHAB W/ECG: CPT

## 2022-12-28 ENCOUNTER — HOSPITAL ENCOUNTER (OUTPATIENT)
Dept: CARDIAC REHAB | Age: 87
Setting detail: THERAPIES SERIES
Discharge: HOME | End: 2022-12-28
Payer: MEDICARE

## 2022-12-28 PROCEDURE — 93798 PHYS/QHP OP CAR RHAB W/ECG: CPT

## 2022-12-30 ENCOUNTER — HOSPITAL ENCOUNTER (OUTPATIENT)
Dept: CARDIAC REHAB | Age: 87
Setting detail: THERAPIES SERIES
Discharge: HOME | End: 2022-12-30
Payer: MEDICARE

## 2022-12-30 PROCEDURE — 93798 PHYS/QHP OP CAR RHAB W/ECG: CPT

## 2023-01-03 ENCOUNTER — HOSPITAL ENCOUNTER (OUTPATIENT)
Dept: CARDIAC REHAB | Age: 88
Setting detail: THERAPIES SERIES
Discharge: HOME | End: 2023-01-03
Payer: MEDICARE

## 2023-01-03 PROCEDURE — 93798 PHYS/QHP OP CAR RHAB W/ECG: CPT

## 2023-01-06 ENCOUNTER — HOSPITAL ENCOUNTER (OUTPATIENT)
Dept: CARDIAC REHAB | Age: 88
Setting detail: THERAPIES SERIES
Discharge: HOME | End: 2023-01-06
Payer: MEDICARE

## 2023-01-06 PROCEDURE — 93798 PHYS/QHP OP CAR RHAB W/ECG: CPT

## 2023-01-10 ENCOUNTER — HOSPITAL ENCOUNTER (OUTPATIENT)
Dept: CARDIAC REHAB | Age: 88
Setting detail: THERAPIES SERIES
Discharge: HOME | End: 2023-01-10
Payer: MEDICARE

## 2023-01-10 PROCEDURE — 93798 PHYS/QHP OP CAR RHAB W/ECG: CPT

## 2023-01-13 ENCOUNTER — HOSPITAL ENCOUNTER (OUTPATIENT)
Dept: CARDIAC REHAB | Age: 88
Setting detail: THERAPIES SERIES
Discharge: HOME | End: 2023-01-13
Payer: MEDICARE

## 2023-01-13 PROCEDURE — 93798 PHYS/QHP OP CAR RHAB W/ECG: CPT

## 2023-01-17 ENCOUNTER — HOSPITAL ENCOUNTER (OUTPATIENT)
Dept: CARDIAC REHAB | Age: 88
Setting detail: THERAPIES SERIES
Discharge: HOME | End: 2023-01-17
Payer: MEDICARE

## 2023-01-17 PROCEDURE — 93798 PHYS/QHP OP CAR RHAB W/ECG: CPT

## 2023-01-17 PROCEDURE — 94626 PHY/QHP OP PULM RHB W/MNTR: CPT

## 2023-01-20 ENCOUNTER — HOSPITAL ENCOUNTER (OUTPATIENT)
Dept: CARDIAC REHAB | Age: 88
Setting detail: THERAPIES SERIES
Discharge: HOME | End: 2023-01-20
Payer: MEDICARE

## 2023-01-20 PROCEDURE — 93798 PHYS/QHP OP CAR RHAB W/ECG: CPT

## 2023-01-24 ENCOUNTER — HOSPITAL ENCOUNTER (OUTPATIENT)
Dept: CARDIAC REHAB | Age: 88
Setting detail: THERAPIES SERIES
End: 2023-01-24
Payer: MEDICARE

## 2023-01-27 ENCOUNTER — HOSPITAL ENCOUNTER (OUTPATIENT)
Dept: CARDIAC REHAB | Age: 88
Setting detail: THERAPIES SERIES
Discharge: HOME | End: 2023-01-27
Payer: MEDICARE

## 2023-01-27 PROCEDURE — 93798 PHYS/QHP OP CAR RHAB W/ECG: CPT

## 2023-02-07 ENCOUNTER — HOSPITAL ENCOUNTER (OUTPATIENT)
Dept: CARDIAC REHAB | Age: 88
Setting detail: THERAPIES SERIES
Discharge: HOME | End: 2023-02-07
Payer: MEDICARE

## 2023-02-07 PROCEDURE — 93798 PHYS/QHP OP CAR RHAB W/ECG: CPT

## 2023-02-07 ASSESSMENT — PATIENT HEALTH QUESTIONNAIRE - PHQ9
SUM OF ALL RESPONSES TO PHQ QUESTIONS 1-9: 0
5. POOR APPETITE OR OVEREATING: 0
2. FEELING DOWN, DEPRESSED OR HOPELESS: 0
8. MOVING OR SPEAKING SO SLOWLY THAT OTHER PEOPLE COULD HAVE NOTICED. OR THE OPPOSITE, BEING SO FIGETY OR RESTLESS THAT YOU HAVE BEEN MOVING AROUND A LOT MORE THAN USUAL: 0
7. TROUBLE CONCENTRATING ON THINGS, SUCH AS READING THE NEWSPAPER OR WATCHING TELEVISION: 0
3. TROUBLE FALLING OR STAYING ASLEEP: 0
SUM OF ALL RESPONSES TO PHQ QUESTIONS 1-9: 0
SUM OF ALL RESPONSES TO PHQ QUESTIONS 1-9: 0
SUM OF ALL RESPONSES TO PHQ9 QUESTIONS 1 & 2: 0
SUM OF ALL RESPONSES TO PHQ QUESTIONS 1-9: 0
4. FEELING TIRED OR HAVING LITTLE ENERGY: 0
1. LITTLE INTEREST OR PLEASURE IN DOING THINGS: 0
6. FEELING BAD ABOUT YOURSELF - OR THAT YOU ARE A FAILURE OR HAVE LET YOURSELF OR YOUR FAMILY DOWN: 0
10. IF YOU CHECKED OFF ANY PROBLEMS, HOW DIFFICULT HAVE THESE PROBLEMS MADE IT FOR YOU TO DO YOUR WORK, TAKE CARE OF THINGS AT HOME, OR GET ALONG WITH OTHER PEOPLE: 0
9. THOUGHTS THAT YOU WOULD BE BETTER OFF DEAD, OR OF HURTING YOURSELF: 0

## 2023-02-07 ASSESSMENT — EJECTION FRACTION: EF_VALUE: 63

## 2023-02-07 ASSESSMENT — LIFESTYLE VARIABLES
SMOKELESS_TOBACCO: NO
ALCOHOL_AMOUNT: 1 GLASS
ALCOHOL_TYPE: WINE
ALCOHOL_USE: SPECIAL

## 2023-02-07 ASSESSMENT — EXERCISE STRESS TEST: PEAK_BP: 130/70

## 2023-02-10 ENCOUNTER — HOSPITAL ENCOUNTER (OUTPATIENT)
Dept: CARDIAC REHAB | Age: 88
Setting detail: THERAPIES SERIES
Discharge: HOME | End: 2023-02-10
Payer: MEDICARE

## 2023-02-10 PROCEDURE — 93798 PHYS/QHP OP CAR RHAB W/ECG: CPT

## 2023-02-13 ENCOUNTER — HOSPITAL ENCOUNTER (OUTPATIENT)
Dept: CARDIAC REHAB | Age: 88
Setting detail: THERAPIES SERIES
Discharge: HOME | End: 2023-02-13
Payer: MEDICARE

## 2023-02-13 PROCEDURE — 93798 PHYS/QHP OP CAR RHAB W/ECG: CPT

## 2023-02-16 ENCOUNTER — HOSPITAL ENCOUNTER (OUTPATIENT)
Dept: CARDIAC REHAB | Age: 88
Setting detail: THERAPIES SERIES
Discharge: HOME | End: 2023-02-16
Payer: MEDICARE

## 2023-02-16 PROCEDURE — 93798 PHYS/QHP OP CAR RHAB W/ECG: CPT

## 2023-02-20 ENCOUNTER — HOSPITAL ENCOUNTER (OUTPATIENT)
Dept: CARDIAC REHAB | Age: 88
Setting detail: THERAPIES SERIES
Discharge: HOME | End: 2023-02-20
Payer: MEDICARE

## 2023-02-20 PROCEDURE — 93798 PHYS/QHP OP CAR RHAB W/ECG: CPT

## 2023-02-23 ENCOUNTER — HOSPITAL ENCOUNTER (OUTPATIENT)
Dept: CARDIAC REHAB | Age: 88
Setting detail: THERAPIES SERIES
Discharge: HOME | End: 2023-02-23
Payer: MEDICARE

## 2023-02-23 PROCEDURE — 93798 PHYS/QHP OP CAR RHAB W/ECG: CPT

## 2023-02-28 ENCOUNTER — HOSPITAL ENCOUNTER (OUTPATIENT)
Dept: CARDIAC REHAB | Age: 88
Setting detail: THERAPIES SERIES
Discharge: HOME | End: 2023-02-28
Payer: MEDICARE

## 2023-02-28 PROCEDURE — 93798 PHYS/QHP OP CAR RHAB W/ECG: CPT

## 2023-03-03 ENCOUNTER — HOSPITAL ENCOUNTER (OUTPATIENT)
Dept: CARDIAC REHAB | Age: 88
Setting detail: THERAPIES SERIES
Discharge: HOME | End: 2023-03-03
Payer: MEDICARE

## 2023-03-03 PROCEDURE — 93798 PHYS/QHP OP CAR RHAB W/ECG: CPT

## 2023-03-07 ENCOUNTER — HOSPITAL ENCOUNTER (OUTPATIENT)
Dept: CARDIAC REHAB | Age: 88
Setting detail: THERAPIES SERIES
Discharge: HOME | End: 2023-03-07
Payer: MEDICARE

## 2023-03-07 PROCEDURE — 93798 PHYS/QHP OP CAR RHAB W/ECG: CPT

## 2023-03-10 ENCOUNTER — HOSPITAL ENCOUNTER (OUTPATIENT)
Dept: CARDIAC REHAB | Age: 88
Setting detail: THERAPIES SERIES
End: 2023-03-10
Payer: MEDICARE

## 2023-03-13 ENCOUNTER — HOSPITAL ENCOUNTER (OUTPATIENT)
Dept: CARDIAC REHAB | Age: 88
Setting detail: THERAPIES SERIES
Discharge: HOME | End: 2023-03-13
Payer: MEDICARE

## 2023-03-13 PROCEDURE — 93798 PHYS/QHP OP CAR RHAB W/ECG: CPT

## 2023-03-17 ENCOUNTER — HOSPITAL ENCOUNTER (OUTPATIENT)
Dept: CARDIAC REHAB | Age: 88
Setting detail: THERAPIES SERIES
Discharge: HOME | End: 2023-03-17
Payer: MEDICARE

## 2023-03-17 PROCEDURE — 93798 PHYS/QHP OP CAR RHAB W/ECG: CPT

## 2023-03-20 ENCOUNTER — HOSPITAL ENCOUNTER (OUTPATIENT)
Dept: CARDIAC REHAB | Age: 88
Setting detail: THERAPIES SERIES
Discharge: HOME | End: 2023-03-20
Payer: MEDICARE

## 2023-03-20 PROCEDURE — 93798 PHYS/QHP OP CAR RHAB W/ECG: CPT

## 2023-03-24 ENCOUNTER — HOSPITAL ENCOUNTER (OUTPATIENT)
Dept: CARDIAC REHAB | Age: 88
Setting detail: THERAPIES SERIES
Discharge: HOME | End: 2023-03-24
Payer: MEDICARE

## 2023-03-24 PROCEDURE — 93798 PHYS/QHP OP CAR RHAB W/ECG: CPT

## 2023-03-24 ASSESSMENT — PATIENT HEALTH QUESTIONNAIRE - PHQ9
10. IF YOU CHECKED OFF ANY PROBLEMS, HOW DIFFICULT HAVE THESE PROBLEMS MADE IT FOR YOU TO DO YOUR WORK, TAKE CARE OF THINGS AT HOME, OR GET ALONG WITH OTHER PEOPLE: 0
8. MOVING OR SPEAKING SO SLOWLY THAT OTHER PEOPLE COULD HAVE NOTICED. OR THE OPPOSITE, BEING SO FIGETY OR RESTLESS THAT YOU HAVE BEEN MOVING AROUND A LOT MORE THAN USUAL: 0
4. FEELING TIRED OR HAVING LITTLE ENERGY: 1
SUM OF ALL RESPONSES TO PHQ QUESTIONS 1-9: 2
2. FEELING DOWN, DEPRESSED OR HOPELESS: 0
9. THOUGHTS THAT YOU WOULD BE BETTER OFF DEAD, OR OF HURTING YOURSELF: 0
6. FEELING BAD ABOUT YOURSELF - OR THAT YOU ARE A FAILURE OR HAVE LET YOURSELF OR YOUR FAMILY DOWN: 0
SUM OF ALL RESPONSES TO PHQ QUESTIONS 1-9: 2
SUM OF ALL RESPONSES TO PHQ QUESTIONS 1-9: 2
SUM OF ALL RESPONSES TO PHQ9 QUESTIONS 1 & 2: 1
1. LITTLE INTEREST OR PLEASURE IN DOING THINGS: 1
7. TROUBLE CONCENTRATING ON THINGS, SUCH AS READING THE NEWSPAPER OR WATCHING TELEVISION: 0
3. TROUBLE FALLING OR STAYING ASLEEP: 0
5. POOR APPETITE OR OVEREATING: 0
SUM OF ALL RESPONSES TO PHQ QUESTIONS 1-9: 2

## 2023-03-24 ASSESSMENT — LIFESTYLE VARIABLES
SMOKELESS_TOBACCO: NO
ALCOHOL_AMOUNT: 1 GLASS
ALCOHOL_USE: SPECIAL
ALCOHOL_TYPE: WINE

## 2023-03-24 ASSESSMENT — EJECTION FRACTION: EF_VALUE: 63

## 2023-03-24 ASSESSMENT — EXERCISE STRESS TEST: PEAK_BP: 138/70

## 2023-03-24 NOTE — DISCHARGE INSTRUCTIONS
[address] [facility]  [phone]    Upon completion of your initial Phase II portion of cardiopulmonary rehabilitation, you will need to continue to exercise to maintain your present level of fitness obtained during Phase II to prevent any complications that may occur with lack of activity. This home exercise program is designed to help you achieve your maximum fitness level based on your accomplishments in Phase II. Following are some guidelines to help you exercise safely and effectively. Make exercise part of your daily routine  Do not exercise if you are not feeling well  Wear comfortable shoes and clothes made for exercise  Do not engage in vigorous activities for at least one hour after a heavy meal  Do not drink alcoholic or caffeinated beverages two hours before exercising  Avoid smoking one hour before and right after exercising, try to quit ASAP  Plan to exercise in a climate-controlled environment. Do not exercise outside if it's hotter than 80F, colder than 30F or greater than 75% humidity, and/or very windy  After exercise, take a brief warm shower. Avoid extremely hot or cold water  When exercising outdoors consider:  Letting someone know that you are exercising outdoors. If possible, exercise with someone  Have a plan of action of what to do if you do not feel well during exercise  Carry your identification; especially if you are exercising alone  Wear colors that are easily seen  When exercising in cold weather:  Dress to maintain body heat, but avoid overheating  Dress in layers  Wear a hat and gloves to prevent heat loss  A mask or scarf over your nose and mouth will warm the air as you breathe. Also, inhale through your nose  During your exercise program, if you experience any of the following symptoms STOP!!!  Chest tightness, discomfort, or pain.  If chest pain persists after two minutes, take nitroglycerine pills if prescribed

## 2023-04-26 ENCOUNTER — HOSPITAL ENCOUNTER (OUTPATIENT)
Dept: CARDIAC REHAB | Age: 88
Discharge: HOME | End: 2023-04-26

## 2023-04-26 PROCEDURE — 9900000038 HC CARDIAC REHAB PHASE 3 - MONTHLY

## 2023-05-24 ENCOUNTER — HOSPITAL ENCOUNTER (OUTPATIENT)
Dept: CARDIAC REHAB | Age: 88
Discharge: HOME | End: 2023-05-24

## 2023-05-24 PROCEDURE — 9900000038 HC CARDIAC REHAB PHASE 3 - MONTHLY

## 2023-07-07 ENCOUNTER — TELEPHONE (OUTPATIENT)
Dept: PALLATIVE CARE | Age: 88
End: 2023-07-07

## 2023-07-07 NOTE — TELEPHONE ENCOUNTER
Call from [address] [facility] spouse Geoffrey Phoenix asking for information about Palliative Care. Geoffrey Phoenix states his pulmonologist at [facility] recommended this for [facility]. Jesús Phoenix shares that [facility] wears 8 L of O2, has a hard time walking and cannot make it to his rehab appointments anymore. At this time we offered a home visit. Geoffrey Phoenix states she would like to be able to get him out to clinic visits if possible. Support provided through active listening. Explained next home visit available would be 7/27/23. One of office staff will call to set time up with Geoffrey Phoenix at a later date.

## 2023-07-11 ENCOUNTER — TELEPHONE (OUTPATIENT)
Dept: PALLATIVE CARE | Age: 88
End: 2023-07-11

## 2023-07-11 NOTE — TELEPHONE ENCOUNTER
contacted pt wife Trinh to schedule pt for new pt appt with Palliative medicine. Scheduled pt for home visit with DASHAWN Eagle on 7-27 at 2:30pm. Mary Anne Ledezma agreed to appt time and date. Advised to call for any needs or issues.       Fabiano Bocanegra MSW,LSW  Palliative Medicine

## 2023-07-17 ENCOUNTER — TELEPHONE (OUTPATIENT)
Dept: PALLATIVE CARE | Age: 88
End: 2023-07-17

## 2023-07-17 NOTE — TELEPHONE ENCOUNTER
contacted pt spouse Trinh to reschedule appt due to scheduling issues on Palliative care end and Trinh stated that pt was not doing well at this time. Stated that his oxygen levels were running low as well as his heart rate.  had Sabrina Burch speak to Palliative care RN Luis Gifford to discuss pt current medical status. It was decided that it would be best for pt to have a referral for hospice care at this time as he was declining, Sarbina Burch agreed as well. Provided Trinh with contact information for several different hospice agencies for her to choose from. Advised her to call back if she had any other needs or questions. Scheduled home visit will be cancelled.        Roberta Barksdale MSW,LSW  Palliative Medicine

## (undated) DEVICE — GLOVE SURG 8.5 PF POLYMER WHT STRL SIGN LTX ESSENTIAL LTX

## (undated) DEVICE — HYPODERMIC SAFETY NEEDLE: Brand: MAGELLAN

## (undated) DEVICE — EXTRAS UGOKWE

## (undated) DEVICE — TOWEL,OR,DSP,ST,BLUE,STD,6/PK,12PK/CS: Brand: MEDLINE

## (undated) DEVICE — APPLICATOR PREP 26ML 0.7% IOD POVACRYLEX 74% ISO ALC ST

## (undated) DEVICE — 3M™ IOBAN™ 2 ANTIMICROBIAL INCISE DRAPE 6650EZ: Brand: IOBAN™ 2

## (undated) DEVICE — ADHESIVE SKIN CLOSURE TOP 36 CC HI VISC DERMBND MINI

## (undated) DEVICE — SHEET,DRAPE,40X58,STERILE: Brand: MEDLINE

## (undated) DEVICE — LOCATOR RAD PASS SPHR MRK NAVIGATION BALL DISP STLTH STN

## (undated) DEVICE — KIT PLT RATIO DISPNS KT 2IN CANN TIP SPRY TIP DISP MAGELLAN

## (undated) DEVICE — GLOVE ORANGE PI 8   MSG9080

## (undated) DEVICE — DRAPE,REIN 53X77,STERILE: Brand: MEDLINE

## (undated) DEVICE — SET SPINAL NEURO STNEU1

## (undated) DEVICE — BRUNNS CURRETTES

## (undated) DEVICE — DRESSING ADH N ADH 8X35 IN 6X175 IN SFT CLTH MEDIPORE +

## (undated) DEVICE — WET SKIN PREP TRAY: Brand: MEDLINE INDUSTRIES, INC.

## (undated) DEVICE — Device

## (undated) DEVICE — BLADE ES L6IN ELASTOMERIC COAT EXT DURABLE BEND UPTO 90DEG

## (undated) DEVICE — ELECTRODE PT RET AD L9FT HI MOIST COND ADH HYDRGEL CORDED

## (undated) DEVICE — JACKSON TABLE POSITIONER KIT: Brand: MEDLINE INDUSTRIES, INC.

## (undated) DEVICE — DRILL SYSTEM 7

## (undated) DEVICE — RESERVOIR CARDOTMY C4L HARDSHELL W/ 40UM FLTR EL SER 4 PRT

## (undated) DEVICE — SPHERE EYE 1 MRK GUIDANCE PASS STEALTHSTATION 1PK/EA

## (undated) DEVICE — NEEDLE SPNL L3.5IN PNK HUB S STL REG WALL FIT STYL W/ QNCKE

## (undated) DEVICE — 5.0MM PRECISION ROUND

## (undated) DEVICE — SYRINGE 20ML LL S/C 50

## (undated) DEVICE — TUBING SUCT 12FR MAL ALUM SHFT FN CAP VENT UNIV CONN W/ OBT

## (undated) DEVICE — KIT EVAC 400CC DIA1/8IN H PAT 12.5IN 3 SPR RND SHP PVC DRN

## (undated) DEVICE — BAG TRNSF AUTOLGS SUCT AND ANTICOAG LN AUTOLOG

## (undated) DEVICE — ADHESIVE SKIN CLSR 0.7ML TOP DERMBND ADV

## (undated) DEVICE — LUMBAR LAMINECTOMY: Brand: MEDLINE INDUSTRIES, INC.

## (undated) DEVICE — THE MILL DISPOSABLE - FINE